# Patient Record
Sex: FEMALE | Race: WHITE | Employment: OTHER | URBAN - METROPOLITAN AREA
[De-identification: names, ages, dates, MRNs, and addresses within clinical notes are randomized per-mention and may not be internally consistent; named-entity substitution may affect disease eponyms.]

---

## 2017-05-18 ENCOUNTER — HOSPITAL ENCOUNTER (OUTPATIENT)
Dept: WOUND CARE | Age: 82
Discharge: HOME | End: 2017-05-18
Attending: SURGERY
Payer: MEDICARE

## 2017-05-18 PROCEDURE — 99215 OFFICE O/P EST HI 40 MIN: CPT

## 2017-05-18 PROCEDURE — 29580 STRAPPING UNNA BOOT: CPT

## 2017-05-22 PROCEDURE — 29580 STRAPPING UNNA BOOT: CPT

## 2017-05-25 PROCEDURE — 77030030167 HC BNDG UNNA BOOT TELE -A

## 2017-05-25 PROCEDURE — 29581 APPL MULTLAYER CMPRN SYS LEG: CPT

## 2017-06-01 ENCOUNTER — HOSPITAL ENCOUNTER (OUTPATIENT)
Dept: WOUND CARE | Age: 82
Discharge: HOME | End: 2017-06-01
Attending: SURGERY
Payer: MEDICARE

## 2017-06-01 PROCEDURE — 29581 APPL MULTLAYER CMPRN SYS LEG: CPT

## 2017-06-08 PROCEDURE — 99214 OFFICE O/P EST MOD 30 MIN: CPT

## 2017-06-12 PROBLEM — R01.1 MURMUR, CARDIAC: Status: ACTIVE | Noted: 2017-06-12

## 2018-03-23 ENCOUNTER — TELEPHONE (OUTPATIENT)
Dept: CARDIOLOGY | Age: 83
End: 2018-03-23

## 2018-03-24 NOTE — TELEPHONE ENCOUNTER
Pt called to reports noting elevated BPs in the evening over the past 4-5 days. States that her PCP had put her on 100mg Lopressor BID but that she had only been taking 100mg in the AM. Rx was refilled by Dr. Rad Dang and was ordered as 50mg BID. However, she continued to take 100mg (2 tabs) only in the AM. Current /72. States that she took an additional 50mg tab around 10pm. She denies HA, vision changes, CP/pressure or SOB. Discussed MOA of med and that once daily dosing may result in elevated PM BPs. Discussed taking med BID and keeping a BP log. Instructed to call office if notes persistently elevated BPs. Pt v/u.        Marisa Rubin, NP-C

## 2018-06-26 ENCOUNTER — HOSPITAL ENCOUNTER (OUTPATIENT)
Dept: WOUND CARE | Age: 83
Discharge: HOME | End: 2018-06-26
Attending: SURGERY
Payer: MEDICARE

## 2018-06-26 PROCEDURE — 29581 APPL MULTLAYER CMPRN SYS LEG: CPT

## 2018-06-26 PROCEDURE — 99215 OFFICE O/P EST HI 40 MIN: CPT

## 2018-06-29 ENCOUNTER — HOSPITAL ENCOUNTER (OUTPATIENT)
Dept: WOUND CARE | Age: 83
Discharge: HOME | End: 2018-06-29
Attending: SURGERY
Payer: MEDICARE

## 2018-06-29 PROCEDURE — 29581 APPL MULTLAYER CMPRN SYS LEG: CPT

## 2018-07-03 ENCOUNTER — HOSPITAL ENCOUNTER (OUTPATIENT)
Dept: WOUND CARE | Age: 83
Discharge: HOME | End: 2018-07-03
Attending: SURGERY
Payer: MEDICARE

## 2018-07-03 PROCEDURE — 29581 APPL MULTLAYER CMPRN SYS LEG: CPT

## 2018-07-06 ENCOUNTER — HOSPITAL ENCOUNTER (OUTPATIENT)
Dept: WOUND CARE | Age: 83
Discharge: HOME | End: 2018-07-06
Attending: SURGERY
Payer: MEDICARE

## 2018-07-06 PROCEDURE — 29581 APPL MULTLAYER CMPRN SYS LEG: CPT

## 2018-07-10 ENCOUNTER — HOSPITAL ENCOUNTER (OUTPATIENT)
Dept: WOUND CARE | Age: 83
Discharge: HOME | End: 2018-07-10
Attending: SURGERY
Payer: MEDICARE

## 2018-07-10 PROCEDURE — 29580 STRAPPING UNNA BOOT: CPT

## 2018-07-16 ENCOUNTER — HOSPITAL ENCOUNTER (OUTPATIENT)
Dept: WOUND CARE | Age: 83
Discharge: HOME | End: 2018-07-16
Attending: SURGERY
Payer: MEDICARE

## 2018-07-16 PROCEDURE — 29580 STRAPPING UNNA BOOT: CPT

## 2018-07-24 ENCOUNTER — HOSPITAL ENCOUNTER (OUTPATIENT)
Dept: WOUND CARE | Age: 83
Discharge: HOME | End: 2018-07-24
Attending: SURGERY
Payer: MEDICARE

## 2018-07-24 PROCEDURE — 99212 OFFICE O/P EST SF 10 MIN: CPT

## 2018-08-03 ENCOUNTER — HOSPITAL ENCOUNTER (OUTPATIENT)
Dept: WOUND CARE | Age: 83
Discharge: HOME | End: 2018-08-03
Attending: SURGERY
Payer: MEDICARE

## 2018-08-03 PROCEDURE — 29580 STRAPPING UNNA BOOT: CPT

## 2018-08-07 ENCOUNTER — HOSPITAL ENCOUNTER (OUTPATIENT)
Dept: WOUND CARE | Age: 83
Discharge: HOME | End: 2018-08-07
Attending: SURGERY
Payer: MEDICARE

## 2018-08-07 PROCEDURE — 29580 STRAPPING UNNA BOOT: CPT

## 2018-08-10 ENCOUNTER — APPOINTMENT (OUTPATIENT)
Dept: WOUND CARE | Age: 83
End: 2018-08-10
Attending: SURGERY
Payer: MEDICARE

## 2018-08-14 ENCOUNTER — HOSPITAL ENCOUNTER (OUTPATIENT)
Dept: WOUND CARE | Age: 83
Discharge: HOME | End: 2018-08-14
Attending: SURGERY
Payer: MEDICARE

## 2018-08-14 PROCEDURE — 29580 STRAPPING UNNA BOOT: CPT

## 2018-08-17 ENCOUNTER — HOSPITAL ENCOUNTER (OUTPATIENT)
Dept: WOUND CARE | Age: 83
Discharge: HOME | End: 2018-08-17
Attending: SURGERY
Payer: MEDICARE

## 2018-08-17 NOTE — WOUND CARE
08/17/18 1436   Wound Leg Lower Left   Date First Assessed/Time First Assessed: 08/17/18 1434   POA: Yes  Wound Type: Venous  Location: Leg Lower  Wound Description (Optional): 15 A  Orientation: Left   Wound Length (cm) 0.1 cm   Wound Width (cm) 0.1 cm   Wound Depth (cm) 0.1   Wound Surface area (cm^2) 0.01 cm^2   Epithelialization (%) 100  (100)   Wound Odor None   Wound Leg Lower Right   Date First Assessed/Time First Assessed: 08/17/18 1436   POA: Yes  Wound Type: Venous  Location: Leg Lower  Wound Description (Optional): 16  Orientation: Right   Wound Length (cm) 0.5 cm   Wound Width (cm) 0.5 cm   Wound Depth (cm) 0.1   Wound Surface area (cm^2) 0.25 cm^2   Epithelialization (%) 50   Tissue Type Red   Tissue Type Percent Red 50   Drainage Amount  Small    Drainage Color Serous   Wound Odor None

## 2018-08-21 ENCOUNTER — HOSPITAL ENCOUNTER (OUTPATIENT)
Dept: WOUND CARE | Age: 83
Discharge: HOME | End: 2018-08-21
Attending: SURGERY
Payer: MEDICARE

## 2018-08-21 VITALS
OXYGEN SATURATION: 95 % | WEIGHT: 260.8 LBS | HEIGHT: 65 IN | TEMPERATURE: 97.3 F | HEART RATE: 69 BPM | BODY MASS INDEX: 43.45 KG/M2 | SYSTOLIC BLOOD PRESSURE: 149 MMHG | RESPIRATION RATE: 18 BRPM | DIASTOLIC BLOOD PRESSURE: 65 MMHG

## 2018-08-21 PROBLEM — L97.221 NON-PRESSURE CHRONIC ULCER OF LEFT CALF, LIMITED TO BREAKDOWN OF SKIN (HCC): Status: ACTIVE | Noted: 2018-08-21

## 2018-08-21 PROCEDURE — 29580 STRAPPING UNNA BOOT: CPT

## 2018-08-21 NOTE — PROGRESS NOTES
Wound Center Progress Note    Patient: Jefe Hester MRN: [id number]  SSN: [id number]    YOB: 1935  Age: 81 y.o.   Sex: female      Subjective:     Chief Complaint:  Recurrent Venous leg ulcer BLE    History of Present Illness:       Wound Caused By: venous insufficiency  Associated Signs and Symptoms: drainage, pain  Timing: since June 2018  Quality: wound  Severity: full thickness  Modifying Factors: Dm2, obesity, venous insufficiency  Current Wound Care: Multilayer compression, CHF    Past Medical History:   Diagnosis Date    Acute hyponatremia 1/21/2011    Acute on chronic diastolic congestive heart failure ([address] 1/19/2011    Acute renal failure ([address] 1/21/2011    AMI (acute myocardial infarction) ([address] 2009    Anemia 9/5/2012    Arthritis     Asthma     Asthma exacerbation 11/11/2011    CAD (coronary artery disease)     MI 2009, stents heart 2009    CAD (coronary artery disease), native coronary artery 9/2/2009    Previous stent to dRCA with Cypher 3.5x33mm SABA 8/09 9/09 LAD- Xience 2.5x23mm and 3.0x18mm SABA in mid-distal LAD      Cellulitis Bilateral Lower Extremities 1/20/2011    Chest discomfort, tightness, pressure 1/28/2016    Chronic diastolic heart failure ([address] 10/20/2011    Chronic venous insufficiency 10/24/2011    Diabetes ([address]     Diabetes Mellitus Type II-insulin dependent 9/2/2009    Diverticulitis 1/28/2016    Dyslipidemia 9/2/2009    Edema 1/28/2016    GERD (gastroesophageal reflux disease)     GLAUCOMA     BILATERAL    Heart failure ([address]     Hypertension     Hypokalemia 1/28/2016    Hypoxemia 9/7/2012    Morbid obesity (HCC)     Nausea & vomiting     Neuropathy in diabetes ([address] 9/2/2009    NSTEMI (non-ST elevation myocardial infarction) ([address] 9/2/2009    Dr Adela Calero    Obstructive sleep apnea (adult) (pediatric)- probably  11/11/2011    TRISH (obstructive sleep apnea) 9/7/2012    Intolerant of CPAP     Other dyspnea and respiratory abnormality 11/10/2011    Other ill-defined conditions(799.89)     benign tumor in lower abdomen- not removed, diabetic ulcers, edema, neuropathy    Peripheral vascular disease ([facility] 10/20/2011    Post PTCA 6/20/2014    PVD (peripheral vascular disease) ([facility]     Unspecified anemia 1/23/2011    Unspecified sleep apnea     oxygen at night    Venous stasis of lower extremity 2010    BILATERAL W RECURRENT ADMISSIONS    Volume overload 9/6/2012      Past Surgical History:   Procedure Laterality Date    BREAST SURGERY PROCEDURE UNLISTED  1973    benign tumor left breast    CARDIAC SURG PROCEDURE UNLIST      4 stents most recent 2 yrs ago    HX CHOLECYSTECTOMY      HX COLONOSCOPY      HX GI      PTCA EACH ADDL VESSEL      stentsx3     Family History   Problem Relation Age of Onset    Cancer Father     Lung Disease Father     Cancer Brother     Diabetes Paternal Aunt     Hypertension Maternal Grandmother     Hypertension Paternal Grandmother       Social History   Substance Use Topics    Smoking status: Never Smoker    Smokeless tobacco: Never Used    Alcohol use No       Prior to Admission medications    Medication Sig Start Date End Date Taking? Authorizing Provider   bumetanide (BUMEX) 2 mg tablet Take 1 Tab by mouth daily. 8/17/18   Jyothi Mackey MD   atorvastatin (LIPITOR) 80 mg tablet Take 1 Tab by mouth daily. 7/16/18   Jyothi Mackey MD   clopidogrel (PLAVIX) 75 mg tab Take 1 Tab by mouth daily. 3/2/18   Jyothi Mackey MD   metOLazone (ZAROXOLYN) 10 mg tablet Take 1 Tab by mouth daily. 1/19/18   Jyothi Mackey MD   metoprolol tartrate (LOPRESSOR) 50 mg tablet Take 1 Tab by mouth two (2) times a day. 1/19/18   Jyothi Mackey MD   aspirin 81 mg chewable tablet Take 1 Tab by mouth daily. 6/12/17   Jyothi Mackey MD   DULoxetine (CYMBALTA) 60 mg capsule Take 60 mg by mouth daily.     Historical Provider   insulin glargine (TOUJEO SOLOSTAR) 300 unit/mL (1.5 mL) inpn 220 Units by SubCUTAneous route daily. Historical Provider   acetaminophen (TYLENOL EXTRA STRENGTH) 500 mg tablet Take  by mouth every six (6) hours as needed for Pain. Historical Provider   insulin glulisine (APIDRA) 100 unit/mL injection 90 Units by SubCUTAneous route three (3) times daily. Historical Provider   gabapentin (NEURONTIN) 400 mg capsule Take 800 mg by mouth two (2) times a day. Pt states 8 x daily      Phys MD Alda   albuterol (PROVENTIL VENTOLIN) 2.5 mg /3 mL (0.083 %) nebulizer solution 3 mL by Nebulization route three (3) times daily. 11/16/11   Federico Moore MD   nitroglycerin (NITROSTAT) 0.4 mg SL tablet 0.4 mg by SubLINGual route every five (5) minutes as needed. Historical Provider   ergocalciferol (VITAMIN D) 50,000 unit capsule Take 50,000 Units by mouth every seven (7) days. Takes on wed once a month     Historical Provider   albuterol (PROVENTIL, VENTOLIN) 90 mcg/Actuation inhaler Take 2 Puffs by inhalation every four (4) hours as needed for Wheezing. Dispense: (1) inhaler with no refills. 12/19/10   NATHAN Robles     Allergies   Allergen Reactions    Pcn [Penicillins] Hives, Swelling and Myalgia     Can take cephalosporins    Codeine Nausea Only    Dilaudid [Hydromorphone (Bulk)] Other (comments)    Lortab [Hydrocodone-Acetaminophen] Nausea Only        Review of Systems:  Pertinent items are noted in the History of Present Illness. Lab Results   Component Value Date/Time    Hemoglobin A1c 8.7 (H) 09/30/2011 01:07 PM        Immunization History   Administered Date(s) Administered    Influenza Vaccine Split 09/11/2012    ZZZ-RETIRED (DO NOT USE) Pneumococcal Vaccine (Unspecified Type) 09/20/2010       Body mass index is 43.4 kg/(m^2).     Counseling regarding nutrition done: Yes Pateint counsled about risks of smoking and cessation      Current medications:  Current Outpatient Prescriptions   Medication Sig Dispense Refill    bumetanide (BUMEX) 2 mg tablet Take 1 Tab by mouth daily. 90 Tab 3    atorvastatin (LIPITOR) 80 mg tablet Take 1 Tab by mouth daily. 90 Tab 3    clopidogrel (PLAVIX) 75 mg tab Take 1 Tab by mouth daily. 30 Tab 11    metOLazone (ZAROXOLYN) 10 mg tablet Take 1 Tab by mouth daily. 90 Tab 3    metoprolol tartrate (LOPRESSOR) 50 mg tablet Take 1 Tab by mouth two (2) times a day. 180 Tab 3    aspirin 81 mg chewable tablet Take 1 Tab by mouth daily. 90 Tab 3    DULoxetine (CYMBALTA) 60 mg capsule Take 60 mg by mouth daily.  insulin glargine (TOUJEO SOLOSTAR) 300 unit/mL (1.5 mL) inpn 220 Units by SubCUTAneous route daily.  acetaminophen (TYLENOL EXTRA STRENGTH) 500 mg tablet Take  by mouth every six (6) hours as needed for Pain.  insulin glulisine (APIDRA) 100 unit/mL injection 90 Units by SubCUTAneous route three (3) times daily.  gabapentin (NEURONTIN) 400 mg capsule Take 800 mg by mouth two (2) times a day. Pt states 8 x daily        albuterol (PROVENTIL VENTOLIN) 2.5 mg /3 mL (0.083 %) nebulizer solution 3 mL by Nebulization route three (3) times daily. 1 Package 0    nitroglycerin (NITROSTAT) 0.4 mg SL tablet 0.4 mg by SubLINGual route every five (5) minutes as needed.  ergocalciferol (VITAMIN D) 50,000 unit capsule Take 50,000 Units by mouth every seven (7) days. Takes on wed once a month       albuterol (PROVENTIL, VENTOLIN) 90 mcg/Actuation inhaler Take 2 Puffs by inhalation every four (4) hours as needed for Wheezing. Dispense: (1) inhaler with no refills. 1 g 0         Objective:     Physical Exam:     Visit Vitals    /65 (BP 1 Location: Left arm)    Pulse 69    Temp 97.3 °F (36.3 °C)    Resp 18    Ht 5' 5\" (1.651 m)    Wt 118.3 kg (260 lb 12.8 oz)    SpO2 95%    BMI 43.4 kg/m2       General: Healthy appearing  Neuro: alert and oriented to person/place/situation. Otherwise nonfocal.  Derm: Normal turgor for age, otherwise per wound exam  HEENT: Normocephalic, atraumatic. EOMI. Conjunctiva clear. No scleral icterus. Neck: Normal range of motion. No masses. Chest: Resp unlabored  Cardio: Regular rate, rhythm  Abdomen: Soft, nontender  Ext. No hemosiderrosis. DP/PT 2+  Psych: cooperative. Pleasant. No anxiety or depression. Normal mood and affect. Ulcer Description:   Wound Leg Medial;Left;Right (Active)   Number of days:2175       Wound Leg Lower Left (Active)   Wound Length (cm) 0 cm 8/21/2018  2:23 PM   Wound Width (cm) 0 cm 8/21/2018  2:23 PM   Wound Depth (cm) 0 8/21/2018  2:23 PM   Wound Surface area (cm^2) 0 cm^2 8/21/2018  2:23 PM   Epithelialization (%) 100 8/21/2018  2:23 PM   Drainage Amount  None 8/21/2018  2:23 PM   Wound Odor None 8/17/2018  2:36 PM   Number of days:4       Wound Leg Lower Right (Active)   Wound Length (cm) 0.7 cm 8/21/2018  2:23 PM   Wound Width (cm) 0.7 cm 8/21/2018  2:23 PM   Wound Depth (cm) 0.1 8/21/2018  2:23 PM   Wound Surface area (cm^2) 0.49 cm^2 8/21/2018  2:23 PM   Condition of Base Epithelializing;Granulation 8/21/2018  2:23 PM   Epithelialization (%) 50 8/17/2018  2:36 PM   Tissue Type Red 8/17/2018  2:36 PM   Tissue Type Percent Pink 50 8/21/2018  2:23 PM   Tissue Type Percent Red 50 8/17/2018  2:36 PM   Drainage Amount  Small  8/21/2018  2:23 PM   Drainage Color Serous 8/21/2018  2:23 PM   Wound Odor None 8/21/2018  2:23 PM   Cleansing and Cleansing Agents  Soap and water 8/21/2018  2:23 PM   Number of days:4             Assessment:     BLE VSU, slowly improving    Plan:     Almost healed on the left. One more wrap and start compression hose. Bring compression to next visit and put in place at next.     Signed By: Isidra Almaraz MD     August 21, 2018

## 2018-08-21 NOTE — DISCHARGE INSTRUCTIONS
Cleanse wounds with normal saline. Apply Xeroform to wound base; cover with ABD and wrap with unna boots bilaterally. Change dressing at wound center two times per week (Friday and Tuesday). Return to wound center in 1 week to see MD. Be sure to bring bilateral compression stockings to next MD visit.

## 2018-08-21 NOTE — IP AVS SNAPSHOT
[address] [place] [address] [facility] [address] 
[phone] Patient: Julian Almanza MRN: [id number] JQE:[birth year] About your hospitalization You were admitted on:  August 21, 2018 You last received care in the:  [place] [address]. You were discharged on:  August 21, 2018 Why you were hospitalized Your primary diagnosis was:  Not on File Follow-up Information Follow up With Details Comments Contact Info [facility] Go in 1 week  [address] [place] [phone] 
[phone] Discharge Orders None A check serenity indicates which time of day the medication should be taken. My Medications ASK your doctor about these medications Instructions Each Dose to Equal  
 Morning Noon Evening Bedtime  
 albuterol 2.5 mg /3 mL (0.083 %) nebulizer solution Commonly known as:  PROVENTIL VENTOLIN Your last dose was: Your next dose is:    
   
   
 3 mL by Nebulization route three (3) times daily. 2.5 mg  
    
   
   
   
  
 albuterol 90 mcg/actuation inhaler Commonly known as:  Reji Khalil Your last dose was: Your next dose is: Take 2 Puffs by inhalation every four (4) hours as needed for Wheezing. Dispense: (1) inhaler with no refills. 2 Puff APIDRA U-100 INSULIN 100 unit/mL injection Generic drug:  insulin glulisine U-100 Your last dose was: Your next dose is:    
   
   
 90 Units by SubCUTAneous route three (3) times daily. 90 Units  
    
   
   
   
  
 aspirin 81 mg chewable tablet Your last dose was: Your next dose is: Take 1 Tab by mouth daily. 81 mg  
    
   
   
   
  
 atorvastatin 80 mg tablet Commonly known as:  LIPITOR Your last dose was: Your next dose is: Take 1 Tab by mouth daily.   
 80 mg  
    
   
 bumetanide 2 mg tablet Commonly known as:  Megan Ezel Your last dose was: Your next dose is: Take 1 Tab by mouth daily. 2 mg  
    
   
   
   
  
 clopidogrel 75 mg Tab Commonly known as:  PLAVIX Your last dose was: Your next dose is: Take 1 Tab by mouth daily. 75 mg DULoxetine 60 mg capsule Commonly known as:  CYMBALTA Your last dose was: Your next dose is: Take 60 mg by mouth daily. 60 mg  
    
   
   
   
  
 gabapentin 400 mg capsule Commonly known as:  NEURONTIN Your last dose was: Your next dose is: Take 800 mg by mouth two (2) times a day. Pt states 8 x daily 800 mg  
    
   
   
   
  
 metOLazone 10 mg tablet Commonly known as:  Kriss Campbell Your last dose was: Your next dose is: Take 1 Tab by mouth daily. 10 mg  
    
   
   
   
  
 metoprolol tartrate 50 mg tablet Commonly known as:  LOPRESSOR Your last dose was: Your next dose is: Take 1 Tab by mouth two (2) times a day. 50 mg  
    
   
   
   
  
 nitroglycerin 0.4 mg SL tablet Commonly known as:  NITROSTAT Your last dose was: Your next dose is: 0.4 mg by SubLINGual route every five (5) minutes as needed. 0.4 mg  
    
   
   
   
  
 TOUJEO SOLOSTAR U-300 INSULIN 300 unit/mL (1.5 mL) Inpn Generic drug:  insulin glargine Your last dose was: Your next dose is:    
   
   
 220 Units by SubCUTAneous route daily. 220 Units TYLENOL EXTRA STRENGTH 500 mg tablet Generic drug:  acetaminophen Your last dose was: Your next dose is: Take  by mouth every six (6) hours as needed for Pain. VITAMIN D2 50,000 unit capsule Generic drug:  ergocalciferol Your last dose was: Your next dose is: Take 50,000 Units by mouth every seven (7) days. Takes on wed once a month [address] Units Discharge Instructions Cleanse wounds with normal saline. Apply Xeroform to wound base; cover with ABD and wrap with unna boots bilaterally. Change dressing at wound center two times per week (Friday and Tuesday). Return to wound center in 1 week to see MD. Be sure to bring bilateral compression stockings to next MD visit. Introducing [facility]! Dear Stefani Dear: 
Thank you for requesting a [employer] account. Our records indicate that you already have an active [employer] account. You can access your account anytime at [email or web address] Did you know that you can access your hospital and ER discharge instructions at any time in [employer]? You can also review all of your test results from your hospital stay or ER visit. Additional Information If you have questions, please visit the Frequently Asked Questions section of the [employer] website at [email or web address] Remember, [employer] is NOT to be used for urgent needs. For medical emergencies, dial 911. Now available from your iPhone and Android! Introducing Victoriano Oconnell As a [facility] patient, I wanted to make you aware of our electronic visit tool called Victoriano Nilsdaniellekaylan. [facility] 24/7 allows you to connect within minutes with a medical provider 24 hours a day, seven days a week via a mobile device or tablet or logging into a secure website from your computer. You can access Victoriano Oconnell from anywhere in the [place].  
 
A virtual visit might be right for you when you have a simple condition and feel like you just dont want to get out of bed, or cant get away from work for an appointment, when your regular [facility] provider is not available (evenings, weekends or holidays), or when youre out of town and need minor care. Electronic visits cost only $49 and if the [employer] 24/7 provider determines a prescription is needed to treat your condition, one can be electronically transmitted to a nearby pharmacy*. Please take a moment to enroll today if you have not already done so. The enrollment process is free and takes just a few minutes. To enroll, please download the [employer] 24/7 jessica to your tablet or phone, or visit [email or web address] to enroll on your computer. And, as an [address] [place] [address] patient with a [employer] account, the results of your visits will be scanned into your electronic medical record and your primary care provider will be able to view the scanned results. We urge you to continue to see your regular [employer] provider for your ongoing medical care. And while your primary care provider may not be the one available when you seek a [employer] virtual visit, the peace of mind you get from getting a real diagnosis real time can be priceless. For more information on [employer], view our Frequently Asked Questions (FAQs) at [email or web address]. Sincerely, 
 
Denton Nassar MD 
Chief Medical Officer [address] *:  certain medications cannot be prescribed via [employer] Providers Seen During Your Hospitalization Provider Specialty Primary office phone Hosea Jolly MD General Surgery [phone] Nidia Nascimento MD Plastic Surgery [phone] Your Primary Care Physician (PCP) Primary Care Physician Office Phone Office Fax Tonya Guerrero [phone] You are allergic to the following Allergen Reactions Pcn (Penicillins) Hives Swelling Myalgia Can take cephalosporins Codeine Nausea Only Dilaudid (Hydromorphone (Bulk)) Other (comments) Lortab (Hydrocodone-Acetaminophen) Nausea Only Recent Documentation Height Weight BMI OB Status Smoking Status 1.651 m 118.3 kg 43.4 kg/m2 Postmenopausal Never Smoker Emergency Contacts Name Discharge Info Relation Home Work Mobile Oneil Mendoza  Spouse [3] [phone] [facility]  Child [2] [phone] Patient Belongings The following personal items are in your possession at time of discharge: 
                             
 
  
  
 Please provide this summary of care documentation to your next provider. Signatures-by signing, you are acknowledging that this After Visit Summary has been reviewed with you and you have received a copy. Patient Signature:  ____________________________________________________________ Date:  ____________________________________________________________  
  
[facility] Provider Signature:  ____________________________________________________________ Date:  ____________________________________________________________

## 2018-08-24 ENCOUNTER — APPOINTMENT (OUTPATIENT)
Dept: WOUND CARE | Age: 83
End: 2018-08-24
Attending: SURGERY
Payer: MEDICARE

## 2018-08-28 ENCOUNTER — HOSPITAL ENCOUNTER (OUTPATIENT)
Dept: WOUND CARE | Age: 83
Discharge: HOME | End: 2018-08-28
Attending: SURGERY
Payer: MEDICARE

## 2018-08-28 VITALS
OXYGEN SATURATION: 95 % | HEART RATE: 86 BPM | TEMPERATURE: 97.7 F | SYSTOLIC BLOOD PRESSURE: 177 MMHG | DIASTOLIC BLOOD PRESSURE: 75 MMHG | RESPIRATION RATE: 18 BRPM

## 2018-08-28 PROCEDURE — 29580 STRAPPING UNNA BOOT: CPT

## 2018-08-28 NOTE — DISCHARGE INSTRUCTIONS
Cleanse right leg with normal saline. Cover wound bed with Xeroform gauze, ABD pad, Unna Boot to right lower leg. Change 2 times a week at [facility].  Return with clinician on Friday for dressing change and return in 1 week with MD.

## 2018-08-28 NOTE — WOUND CARE
08/28/18 1534   Wound Leg Lower Left   Date First Assessed/Time First Assessed: 08/17/18 1434   POA: Yes  Wound Type: Venous  Location: Leg Lower  Wound Description (Optional): 15 A  Orientation: Left   DRESSING STATUS Breakthrough drainage;Moist   DRESSING TYPE Unna boot   Wound Length (cm) 0 cm   Wound Width (cm) 0 cm   Wound Depth (cm) 0   Wound Surface area (cm^2) 0 cm^2   Epithelialization (%) 100   Drainage Amount  None   Wound Odor None   Cleansing and Cleansing Agents  Soap and water   Wound Leg Lower Right   Date First Assessed/Time First Assessed: 08/17/18 1436   POA: Yes  Wound Type: Venous  Location: Leg Lower  Wound Description (Optional): 16  Orientation: Right   DRESSING STATUS Breakthrough drainage;Moist   DRESSING TYPE (Xeroform, ABD, Unna Boot)   Wound Length (cm) 1.4 cm   Wound Width (cm) 1 cm   Wound Depth (cm) 0.1   Wound Surface area (cm^2) 1.4 cm^2   Condition of Base Epithelializing;Granulation   Epithelialization (%) 50   Tissue Type Percent Pink 50   Tissue Type Percent Red 50   Drainage Amount  Small    Drainage Color Serous   Wound Odor None   Cleansing and Cleansing Agents  Soap and water

## 2018-08-28 NOTE — PROGRESS NOTES
Wound Center Progress Note    Patient: Spring Payton MRN: [id number]  SSN: [id number]    YOB: 1935  Age: 81 y.o. Sex: female      Subjective:     Chief Complaint:  Recurrent Venous leg ulcer BLE    History of Present Illness:       Wound Caused By: venous insufficiency  Associated Signs and Symptoms: drainage, pain  Timing: since June 2018  Quality: wound  Severity: full thickness  Modifying Factors: Dm2, obesity, venous insufficiency  Current Wound Care: Multilayer compression, CHF    Noted to have issues with urinary soiling of the bottom feet but not down wraps. Patient unclear of reason. Left remains healed.     Past Medical History:   Diagnosis Date    Acute hyponatremia 1/21/2011    Acute on chronic diastolic congestive heart failure ([address] 1/19/2011    Acute renal failure ([address] 1/21/2011    AMI (acute myocardial infarction) ([address] 2009    Anemia 9/5/2012    Arthritis     Asthma     Asthma exacerbation 11/11/2011    CAD (coronary artery disease)     MI 2009, stents heart 2009    CAD (coronary artery disease), native coronary artery 9/2/2009    Previous stent to CA with Cypher 3.5x33mm SABA 8/09 9/09 LAD- Xience 2.5x23mm and 3.0x18mm SABA in mid-distal LAD      Cellulitis Bilateral Lower Extremities 1/20/2011    Chest discomfort, tightness, pressure 1/28/2016    Chronic diastolic heart failure ([address] 10/20/2011    Chronic venous insufficiency 10/24/2011    Diabetes ([address]     Diabetes Mellitus Type II-insulin dependent 9/2/2009    Diverticulitis 1/28/2016    Dyslipidemia 9/2/2009    Edema 1/28/2016    GERD (gastroesophageal reflux disease)     GLAUCOMA     BILATERAL    Heart failure ([address]     Hypertension     Hypokalemia 1/28/2016    Hypoxemia 9/7/2012    Morbid obesity (HCC)     Nausea & vomiting     Neuropathy in diabetes ([address] 9/2/2009    NSTEMI (non-ST elevation myocardial infarction) ([address] 9/2/2009    Dr Nithya Ibanez    Obstructive sleep apnea (adult) (pediatric)- probably  2011    TRISH (obstructive sleep apnea) 2012    Intolerant of CPAP     Other dyspnea and respiratory abnormality 11/10/2011    Other ill-defined conditions(799.89)     benign tumor in lower abdomen- not removed, diabetic ulcers, edema, neuropathy    Peripheral vascular disease ([facility] 10/20/2011    Post PTCA 2014    PVD (peripheral vascular disease) ([facility]     Unspecified anemia 2011    Unspecified sleep apnea     oxygen at night    Venous stasis of lower extremity 2010    BILATERAL W RECURRENT ADMISSIONS    Volume overload 2012      Past Surgical History:   Procedure Laterality Date    BREAST SURGERY PROCEDURE UNLISTED  1973    benign tumor left breast    CARDIAC SURG PROCEDURE UNLIST      4 stents most recent 2 yrs ago    HX CHOLECYSTECTOMY      HX COLONOSCOPY      HX GI      PTCA EACH ADDL VESSEL      stentsx3     Family History   Problem Relation Age of Onset    Cancer Father     Lung Disease Father     Cancer Brother     Diabetes Paternal Aunt     Hypertension Maternal Grandmother     Hypertension Paternal Grandmother       Social History   Substance Use Topics    Smoking status: Never Smoker    Smokeless tobacco: Never Used    Alcohol use No       Prior to Admission medications    Medication Sig Start Date End Date Taking? Authorizing Provider   bumetanide (BUMEX) 2 mg tablet Take 1 Tab by mouth daily. 18   Rodrigo Garcia MD   atorvastatin (LIPITOR) 80 mg tablet Take 1 Tab by mouth daily. 18   Rodrigo Garcia MD   clopidogrel (PLAVIX) 75 mg tab Take 1 Tab by mouth daily. 3/2/18   Rodrigo Garcia MD   metOLazone (ZAROXOLYN) 10 mg tablet Take 1 Tab by mouth daily. 18   Rodrigo Garcia MD   metoprolol tartrate (LOPRESSOR) 50 mg tablet Take 1 Tab by mouth two (2) times a day. 18   Rodrigo Garcia MD   aspirin 81 mg chewable tablet Take 1 Tab by mouth daily.  17   Rodrigo Garcia MD   DULoxetine (CYMBALTA) 60 mg capsule Take 60 mg by mouth daily. Historical Provider   insulin glargine (TOUJEO SOLOSTAR) 300 unit/mL (1.5 mL) inpn 220 Units by SubCUTAneous route daily. Historical Provider   acetaminophen (TYLENOL EXTRA STRENGTH) 500 mg tablet Take  by mouth every six (6) hours as needed for Pain. Historical Provider   insulin glulisine (APIDRA) 100 unit/mL injection 90 Units by SubCUTAneous route three (3) times daily. Historical Provider   gabapentin (NEURONTIN) 400 mg capsule Take 800 mg by mouth two (2) times a day. Pt states 8 x daily      Phys MD Alda   albuterol (PROVENTIL VENTOLIN) 2.5 mg /3 mL (0.083 %) nebulizer solution 3 mL by Nebulization route three (3) times daily. 11/16/11   Joy Palacios MD   nitroglycerin (NITROSTAT) 0.4 mg SL tablet 0.4 mg by SubLINGual route every five (5) minutes as needed. Historical Provider   ergocalciferol (VITAMIN D) 50,000 unit capsule Take 50,000 Units by mouth every seven (7) days. Takes on wed once a month     Historical Provider   albuterol (PROVENTIL, VENTOLIN) 90 mcg/Actuation inhaler Take 2 Puffs by inhalation every four (4) hours as needed for Wheezing. Dispense: (1) inhaler with no refills. 12/19/10   NATHAN Woodward     Allergies   Allergen Reactions    Pcn [Penicillins] Hives, Swelling and Myalgia     Can take cephalosporins    Codeine Nausea Only    Dilaudid [Hydromorphone (Bulk)] Other (comments)    Lortab [Hydrocodone-Acetaminophen] Nausea Only        Review of Systems:  Pertinent items are noted in the History of Present Illness. Lab Results   Component Value Date/Time    Hemoglobin A1c 8.7 (H) 09/30/2011 01:07 PM        Immunization History   Administered Date(s) Administered    Influenza Vaccine Split 09/11/2012    ZZZ-RETIRED (DO NOT USE) Pneumococcal Vaccine (Unspecified Type) 09/20/2010       There is no height or weight on file to calculate BMI.     Counseling regarding nutrition done: Yes Pateint counsled about risks of smoking and cessation      Current medications:  Current Outpatient Prescriptions   Medication Sig Dispense Refill    bumetanide (BUMEX) 2 mg tablet Take 1 Tab by mouth daily. 90 Tab 3    atorvastatin (LIPITOR) 80 mg tablet Take 1 Tab by mouth daily. 90 Tab 3    clopidogrel (PLAVIX) 75 mg tab Take 1 Tab by mouth daily. 30 Tab 11    metOLazone (ZAROXOLYN) 10 mg tablet Take 1 Tab by mouth daily. 90 Tab 3    metoprolol tartrate (LOPRESSOR) 50 mg tablet Take 1 Tab by mouth two (2) times a day. 180 Tab 3    aspirin 81 mg chewable tablet Take 1 Tab by mouth daily. 90 Tab 3    DULoxetine (CYMBALTA) 60 mg capsule Take 60 mg by mouth daily.  insulin glargine (TOUJEO SOLOSTAR) 300 unit/mL (1.5 mL) inpn 220 Units by SubCUTAneous route daily.  acetaminophen (TYLENOL EXTRA STRENGTH) 500 mg tablet Take  by mouth every six (6) hours as needed for Pain.  insulin glulisine (APIDRA) 100 unit/mL injection 90 Units by SubCUTAneous route three (3) times daily.  gabapentin (NEURONTIN) 400 mg capsule Take 800 mg by mouth two (2) times a day. Pt states 8 x daily        albuterol (PROVENTIL VENTOLIN) 2.5 mg /3 mL (0.083 %) nebulizer solution 3 mL by Nebulization route three (3) times daily. 1 Package 0    nitroglycerin (NITROSTAT) 0.4 mg SL tablet 0.4 mg by SubLINGual route every five (5) minutes as needed.  ergocalciferol (VITAMIN D) 50,000 unit capsule Take 50,000 Units by mouth every seven (7) days. Takes on wed once a month       albuterol (PROVENTIL, VENTOLIN) 90 mcg/Actuation inhaler Take 2 Puffs by inhalation every four (4) hours as needed for Wheezing. Dispense: (1) inhaler with no refills. 1 g 0         Objective:     Physical Exam:     Visit Vitals    /75 (BP 1 Location: Left arm)    Pulse 86    Temp 97.7 °F (36.5 °C)    Resp 18    SpO2 95%       General: Healthy appearing  Neuro: alert and oriented to person/place/situation.  Otherwise nonfocal.  Derm: Normal turgor for age, otherwise per wound exam  HEENT: Normocephalic, atraumatic. EOMI. Conjunctiva clear. No scleral icterus. Neck: Normal range of motion. No masses. Chest: Resp unlabored  Cardio: Regular rate, rhythm  Abdomen: Soft, nontender  Ext. No hemosiderrosis. DP/PT 2+  Psych: cooperative. Pleasant. No anxiety or depression. Normal mood and affect.          Diabetic Foot Ulcer/Neuropathic   Is Wound Greater than 1.0 sq cm ? : No  Re-Current Wound with Skin Substitue within Last Year : No  X-Ray in Last 3 Months: No  Smoking Status: Non- Smoker  Wound Free from Infection : No Culture Done  Is Wound Free of Eschar, [place] , and / or Bio-[place]: Yes  Malignant Process in Wound : No Biopsy Done  Compression Therapy of 20mm or greater ?: Yes     Ulcer Description:   Wound Leg Medial;Left;Right (Active)   Number of days:2182       Wound Leg Lower Left (Active)   DRESSING STATUS Breakthrough drainage;Moist 8/28/2018  3:34 PM   DRESSING TYPE Unna boot 8/28/2018  3:34 PM   Wound Length (cm) 0 cm 8/28/2018  3:34 PM   Wound Width (cm) 0 cm 8/28/2018  3:34 PM   Wound Depth (cm) 0 8/28/2018  3:34 PM   Wound Surface area (cm^2) 0 cm^2 8/28/2018  3:34 PM   Epithelialization (%) 100 8/28/2018  3:34 PM   Drainage Amount  None 8/28/2018  3:34 PM   Wound Odor None 8/28/2018  3:34 PM   Cleansing and Cleansing Agents  Soap and water 8/28/2018  3:34 PM   Number of days:11       Wound Leg Lower Right (Active)   DRESSING STATUS Breakthrough drainage;Moist 8/28/2018  3:34 PM   Wound Length (cm) 1.4 cm 8/28/2018  3:34 PM   Wound Width (cm) 1 cm 8/28/2018  3:34 PM   Wound Depth (cm) 0.1 8/28/2018  3:34 PM   Wound Surface area (cm^2) 1.4 cm^2 8/28/2018  3:34 PM   Condition of Base Epithelializing;Granulation 8/28/2018  3:34 PM   Epithelialization (%) 50 8/28/2018  3:34 PM   Tissue Type Red 8/17/2018  2:36 PM   Tissue Type Percent Pink 50 8/28/2018  3:34 PM   Tissue Type Percent Red 50 8/28/2018  3:34 PM   Drainage Amount  Small  8/28/2018  3:34 PM Drainage Color Serous 8/28/2018  3:34 PM   Wound Odor None 8/28/2018  3:34 PM   Cleansing and Cleansing Agents  Soap and water 8/28/2018  3:34 PM   Number of days:11             Assessment:     BLE VSU, slowly improving    Plan:     Healed on left. Start compression hose on left side. Continue wraps on right. Avoid soiling.      Signed By: Isidra Almaraz MD     August 28, 2018

## 2018-08-28 NOTE — WOUND CARE
[address]  [address], [address]   Phone: [phone]  Fax: [phone]    Patient: Bakari Willams MRN: [id number]  SSN: [id number]    YOB: 1935  Age: 81 y.o. Sex: female       Return Appointment: 1 week with Gadiel Hester MD   Return Appointment: Friday 8/31/18 with Clinician      Instructions: Cleanse right leg with normal saline. Cover wound bed with Xeroform gauze, ABD pad, Unna Boot to bilateral lower legs. Change 2 times a week at [facility]. Return with clinician on Friday for dressing change and return in 1 week with MD.     Should you experience increased redness, swelling, pain, foul odor, size of wound(s), or have a temperature over 101 degrees please contact the [address] [place] [address] Road at [phone] or if after hours contact your primary care physician or go to the hospital emergency department.     Signed By: Alis Costa RN     August 28, 2018

## 2018-08-31 ENCOUNTER — HOSPITAL ENCOUNTER (OUTPATIENT)
Dept: WOUND CARE | Age: 83
Discharge: HOME | End: 2018-08-31
Attending: SURGERY
Payer: MEDICARE

## 2018-08-31 VITALS
OXYGEN SATURATION: 97 % | HEART RATE: 69 BPM | WEIGHT: 260.7 LBS | HEIGHT: 65 IN | RESPIRATION RATE: 20 BRPM | DIASTOLIC BLOOD PRESSURE: 76 MMHG | BODY MASS INDEX: 43.43 KG/M2 | SYSTOLIC BLOOD PRESSURE: 157 MMHG | TEMPERATURE: 99 F

## 2018-08-31 PROCEDURE — 29580 STRAPPING UNNA BOOT: CPT

## 2018-08-31 PROCEDURE — 77030030167 HC BNDG UNNA BOOT TELE -A

## 2018-08-31 NOTE — WOUND CARE
08/31/18 0901 Wound Leg Lower Left Date First Assessed/Time First Assessed: 08/17/18 1434   POA: Yes  Wound Type: Venous  Location: Leg Lower  Wound Description (Optional): 15 A  Orientation: Left DRESSING TYPE Unna boot Wound Length (cm) 0 cm Wound Width (cm) 0 cm Wound Depth (cm) 0 Wound Surface area (cm^2) 0 cm^2 Epithelialization (%) 100 Drainage Amount  None Wound Odor None Cleansing and Cleansing Agents  Soap and water Dressing Type Applied Unna boot Wound Leg Lower Right Date First Assessed/Time First Assessed: 08/17/18 1436   POA: Yes  Wound Type: Venous  Location: Leg Lower  Wound Description (Optional): 16  Orientation: Right DRESSING STATUS Breakthrough drainage DRESSING TYPE (Xeroform, ABD, [employer]) Wound Length (cm) 1.6 cm Wound Width (cm) 1.6 cm Wound Depth (cm) 0.1 Wound Surface area (cm^2) 2.56 cm^2 Condition of Base Epithelializing;Granulation Epithelialization (%) 50 Tissue Type Percent Pink 50 Tissue Type Percent Red 50 Drainage Amount  Small Drainage Color Serous Wound Odor None Cleansing and Cleansing Agents  Normal saline Dressing Type Applied Xeroform;ABD pad;Unna boot Procedure Tolerated Well

## 2018-09-04 ENCOUNTER — HOSPITAL ENCOUNTER (OUTPATIENT)
Dept: WOUND CARE | Age: 83
Discharge: HOME | End: 2018-09-04
Attending: SURGERY
Payer: MEDICARE

## 2018-09-04 VITALS
DIASTOLIC BLOOD PRESSURE: 68 MMHG | BODY MASS INDEX: 43.42 KG/M2 | RESPIRATION RATE: 18 BRPM | HEART RATE: 72 BPM | HEIGHT: 65 IN | TEMPERATURE: 97.8 F | SYSTOLIC BLOOD PRESSURE: 153 MMHG | OXYGEN SATURATION: 95 % | WEIGHT: 260.58 LBS

## 2018-09-04 PROCEDURE — 29580 STRAPPING UNNA BOOT: CPT

## 2018-09-04 NOTE — WOUND CARE
[address]  [address], [address]  Phone: [phone]  Fax: [phone]    Patient: Sung Celis MRN: [id number]  SSN: [id number]    YOB: 1935  Age: 81 y.o. Sex: female       Return Appointment: 1 week with Silvino Payne MD  Return Appointment: Friday with Clinician    Instructions: To right lower leg wound:  Clean with soap and water. Xeroform- apply to wound bed. Cover with ABD. Wrap bilateral lower legs with unna boots. Change two times/week in wound center. Should you experience increased redness, swelling, pain, foul odor, size of wound(s), or have a temperature over 101 degrees please contact the [address] [place] [address] Road at [phone] or if after hours contact your primary care physician or go to the hospital emergency department.     Signed By: Carito Avendano RN     September 4, 2018

## 2018-09-04 NOTE — WOUND CARE
09/04/18 1429   Wound Leg Lower Left   Date First Assessed/Time First Assessed: 08/17/18 1434   POA: Yes  Wound Type: Venous  Location: Leg Lower  Wound Description (Optional): 15 A  Orientation: Left   DRESSING STATUS Clean, dry, and intact   DRESSING TYPE Unna boot   Wound Length (cm) 0 cm   Wound Width (cm) 0 cm   Wound Depth (cm) 0   Wound Surface area (cm^2) 0 cm^2   Drainage Amount  None   Wound Odor None   Cleansing and Cleansing Agents  Soap and water   Wound Leg Lower Right   Date First Assessed/Time First Assessed: 08/17/18 1436   POA: Yes  Wound Type: Venous  Location: Leg Lower  Wound Description (Optional): 16  Orientation: Right   DRESSING STATUS Clean   DRESSING TYPE (xeroform, unn aboot)   Wound Length (cm) 1.1 cm   Wound Width (cm) 1.4 cm   Wound Depth (cm) 0.1   Wound Surface area (cm^2) 1.54 cm^2   Condition of Base Epithelializing   Tissue Type Percent Pink 100   Drainage Amount  Small    Drainage Color Serous   Wound Odor None   Periwound Skin Condition Macerated   Cleansing and Cleansing Agents  Normal saline

## 2018-09-07 ENCOUNTER — HOSPITAL ENCOUNTER (OUTPATIENT)
Dept: WOUND CARE | Age: 83
Discharge: HOME | End: 2018-09-07
Attending: SURGERY
Payer: MEDICARE

## 2018-09-07 VITALS
RESPIRATION RATE: 20 BRPM | DIASTOLIC BLOOD PRESSURE: 80 MMHG | SYSTOLIC BLOOD PRESSURE: 186 MMHG | HEART RATE: 78 BPM | HEIGHT: 65 IN | OXYGEN SATURATION: 96 % | TEMPERATURE: 98.6 F

## 2018-09-07 PROCEDURE — 29580 STRAPPING UNNA BOOT: CPT

## 2018-09-07 NOTE — WOUND CARE
09/07/18 1635   Wound Leg Lower Left   Date First Assessed/Time First Assessed: 08/17/18 1434   POA: Yes  Wound Type: Venous  Location: Leg Lower  Wound Description (Optional): 15 A  Orientation: Left   DRESSING STATUS Clean, dry, and intact   DRESSING TYPE (Unna Boot)   Wound Length (cm) 0 cm   Wound Width (cm) 0 cm   Wound Depth (cm) 0   Wound Surface area (cm^2) 0 cm^2   Epithelialization (%) 100   Drainage Amount  None   Wound Odor None   Cleansing and Cleansing Agents  Soap and water   Dressing Type Applied Unna boot   Wound Leg Lower Right   Date First Assessed/Time First Assessed: 08/17/18 1436   POA: Yes  Wound Type: Venous  Location: Leg Lower  Wound Description (Optional): 16  Orientation: Right   DRESSING STATUS Clean   DRESSING TYPE Unna boot   Wound Length (cm) 1.1 cm   Wound Width (cm) 1.4 cm   Wound Depth (cm) 0.1   Wound Surface area (cm^2) 1.54 cm^2   Condition of Base Epithelializing   Epithelialization (%) 50   Tissue Type Percent Pink 100   Drainage Amount  Small    Drainage Color Serous   Wound Odor None   Periwound Skin Condition Macerated   Cleansing and Cleansing Agents  Normal saline; Soap and water   Dressing Type Applied (Xeroform, [employer])

## 2018-09-11 ENCOUNTER — HOSPITAL ENCOUNTER (OUTPATIENT)
Dept: WOUND CARE | Age: 83
Discharge: HOME | End: 2018-09-11
Attending: SURGERY
Payer: MEDICARE

## 2018-09-11 VITALS
SYSTOLIC BLOOD PRESSURE: 180 MMHG | DIASTOLIC BLOOD PRESSURE: 73 MMHG | OXYGEN SATURATION: 96 % | BODY MASS INDEX: 43.42 KG/M2 | WEIGHT: 260.58 LBS | HEART RATE: 74 BPM | RESPIRATION RATE: 20 BRPM | HEIGHT: 65 IN | TEMPERATURE: 98.1 F

## 2018-09-11 PROCEDURE — 29580 STRAPPING UNNA BOOT: CPT

## 2018-09-11 NOTE — WOUND CARE
09/11/18 1601   Wound Leg Lower Right   Date First Assessed/Time First Assessed: 08/17/18 1436   POA: Yes  Wound Type: Venous  Location: Leg Lower  Wound Description (Optional): 16  Orientation: Right   DRESSING STATUS Clean, dry, and intact   DRESSING TYPE Unna boot   Wound Length (cm) 1.5 cm   Wound Width (cm) 1.5 cm   Wound Depth (cm) 0.1   Wound Surface area (cm^2) 2.25 cm^2   Condition of Base Granulation;Epithelializing   Epithelialization (%) 50   Tissue Type Percent Pink 50   Drainage Amount  Small    Drainage Color Serous   Wound Odor None   Periwound Skin Condition Macerated   Cleansing and Cleansing Agents  Normal saline   Wound Leg Lower Left   Date First Assessed/Time First Assessed: 08/17/18 1434   POA: Yes  Wound Type: Venous  Location: Leg Lower  Wound Description (Optional): 15 A  Orientation: Left   DRESSING STATUS Clean, dry, and intact   DRESSING TYPE Unna boot   Wound Length (cm) 0 cm   Wound Width (cm) 0 cm   Wound Depth (cm) 0   Wound Surface area (cm^2) 0 cm^2   Epithelialization (%) 100   Drainage Amount  None   Wound Odor None   Cleansing and Cleansing Agents  Normal saline; Soap and water

## 2018-09-11 NOTE — WOUND CARE
[address]  [address], [address]  Phone: [phone]  Fax: [phone]    Patient: Matthew Dukes MRN: [id number]  SSN: [id number]    YOB: 1935  Age: 81 y.o. Sex: female       Return Appointment: 1 week with Ronald Frey MD    Instructions: R leg  Cleanse wound and periwound with wound cleanser or normal saline. Acticoat Flex 3: cut top approximate size of wound, apply to wound bed. Unna boots bilaterally. Change in one week at wound center. Should you experience increased redness, swelling, pain, foul odor, size of wound(s), or have a temperature over 101 degrees please contact the [address] [place] [address] Road at [phone] or if after hours contact your primary care physician or go to the hospital emergency department.     Signed By: Willow Borrero     September 11, 2018

## 2018-09-18 ENCOUNTER — HOSPITAL ENCOUNTER (OUTPATIENT)
Dept: WOUND CARE | Age: 83
Discharge: HOME | End: 2018-09-18
Attending: SURGERY
Payer: MEDICARE

## 2018-09-18 VITALS
DIASTOLIC BLOOD PRESSURE: 79 MMHG | OXYGEN SATURATION: 93 % | SYSTOLIC BLOOD PRESSURE: 187 MMHG | TEMPERATURE: 97.4 F | RESPIRATION RATE: 22 BRPM | HEIGHT: 65 IN | HEART RATE: 84 BPM

## 2018-09-18 PROCEDURE — 29580 STRAPPING UNNA BOOT: CPT

## 2018-09-18 NOTE — WOUND CARE
09/18/18 1407   [REMOVED] Wound Leg Lower Left   Final Assessment Date/Final Assessment Time: 09/18/18 1413  Date First Assessed/Time First Assessed: 08/17/18 1434   POA: Yes  Wound Type: Venous  Location: Leg Lower  Wound Description (Optional): 15 A  Orientation: Left   DRESSING STATUS Clean, dry, and intact   DRESSING TYPE Unna boot   Wound Leg Lower Right   Date First Assessed/Time First Assessed: 08/17/18 1436   POA: Yes  Wound Type: Venous  Location: Leg Lower  Wound Description (Optional): 16  Orientation: Right   DRESSING STATUS Clean, dry, and intact   DRESSING TYPE Unna boot   Wound Length (cm) 1.5 cm   Wound Width (cm) 1.5 cm   Wound Depth (cm) 0.1   Wound Surface area (cm^2) 2.25 cm^2   Change in Wound Size % -800   Condition of Base Epithelializing   Tissue Type Percent Pink 100   Drainage Amount  Small    Drainage Color Serous   Wound Odor None   Periwound Skin Condition Macerated   Cleansing and Cleansing Agents  Normal saline

## 2018-09-18 NOTE — WOUND CARE
[address], [address]  Phone: [phone]  Fax: [phone]    Patient: Irene Bella MRN: [id number]  SSN: [id number]    YOB: 1935  Age: 81 y.o. Sex: female       Return Appointment: 1 week with Inez Coronado MD    Instructions: R leg  Cleanse wound and periwound with wound cleanser or normal saline. Acticoat Flex 3: cut top approximate size of wound, apply to wound bed. Unna boots bilaterally. Change in one week at wound center. Should you experience increased redness, swelling, pain, foul odor, size of wound(s), or have a temperature over 101 degrees please contact the [address] [place] [address] Road at [phone] or if after hours contact your primary care physician or go to the hospital emergency department.     Signed By: Deborah Rhodes RN     September 18, 2018

## 2018-09-25 ENCOUNTER — HOSPITAL ENCOUNTER (OUTPATIENT)
Dept: WOUND CARE | Age: 83
Discharge: HOME | End: 2018-09-25
Attending: SURGERY
Payer: MEDICARE

## 2018-09-25 PROCEDURE — 29580 STRAPPING UNNA BOOT: CPT

## 2018-09-25 NOTE — WOUND CARE
[address]  [address], [address]  Phone: [phone]  Fax: [phone]    Patient: Yamileth Caro MRN: [id number]  SSN: [id number]    YOB: 1935  Age: 81 y.o. Sex: female       Return Appointment: 1 weeks with Melquiades Ambriz MD    Instructions:   R Leg Wound  Cleanse wound and periwound with wound cleanser or normal saline. Acticoat Flex 3: cut top approximate size of wound, apply to wound bed. Cover with ABD and Unna boots bilaterally. Change in one week at wound center. Should you experience increased redness, swelling, pain, foul odor, size of wound(s), or have a temperature over 101 degrees please contact the [address] [place] [address] Road at [phone] or if after hours contact your primary care physician or go to the hospital emergency department.     Signed By: Cynthia Valle RN     September 25, 2018

## 2018-09-25 NOTE — WOUND CARE
09/25/18 1507   Wound Leg Lower Right   Date First Assessed/Time First Assessed: 08/17/18 1436   POA: Yes  Wound Type: Venous  Location: Leg Lower  Wound Description (Optional): 16  Orientation: Right   DRESSING STATUS Clean, dry, and intact   DRESSING TYPE Unna boot   Wound Length (cm) 1.5 cm   Wound Width (cm) 1 cm   Wound Depth (cm) 0.1   Wound Surface area (cm^2) 1.5 cm^2   Change in Wound Size % -500   Condition of Base Granulation;Epithelializing   Tissue Type Percent Pink 100   Drainage Amount  Small    Drainage Color Serous   Wound Odor None   Periwound Skin Condition Macerated   Cleansing and Cleansing Agents  Soap and water

## 2018-09-25 NOTE — DISCHARGE INSTRUCTIONS
R Leg Wound  Cleanse wound and periwound with wound cleanser or normal saline. Acticoat Flex 3: cut top approximate size of wound, apply to wound bed. Cover with ABD and Unna boots bilaterally. Change in one week at wound center.

## 2018-10-01 NOTE — PROGRESS NOTES
Wound Center Progress Note    Patient: Johnathon Kc MRN: [id number]  SSN: [id number]    YOB: 1935  Age: 81 y.o. Sex: female      Subjective:     Chief Complaint:  Recurrent Venous leg ulcer BLE    History of Present Illness:       Wound Caused By: venous insufficiency  Associated Signs and Symptoms: drainage, pain  Timing: since June 2018  Quality: wound  Severity: full thickness  Modifying Factors: Dm2, obesity, venous insufficiency  Current Wound Care: Multilayer compression, CHF    8/2018 - Left VSU healed      Using multilayer compression on both sides as does not have anyone scheduled to help with left.  not strong enough. Noted to have issues with urinary soiling of the bottom feet but not down wraps.            Past Medical History:   Diagnosis Date    Acute hyponatremia 1/21/2011    Acute on chronic diastolic congestive heart failure ([address] 1/19/2011    Acute renal failure ([address] 1/21/2011    AMI (acute myocardial infarction) ([address] 2009    Anemia 9/5/2012    Arthritis     Asthma     Asthma exacerbation 11/11/2011    CAD (coronary artery disease)     MI 2009, stents heart 2009    CAD (coronary artery disease), native coronary artery 9/2/2009    Previous stent to dRCA with Cypher 3.5x33mm SABA 8/09 9/09 LAD- Xience 2.5x23mm and 3.0x18mm SABA in mid-distal LAD      Cellulitis Bilateral Lower Extremities 1/20/2011    Chest discomfort, tightness, pressure 1/28/2016    Chronic diastolic heart failure ([address] 10/20/2011    Chronic venous insufficiency 10/24/2011    Diabetes ([address]     Diabetes Mellitus Type II-insulin dependent 9/2/2009    Diverticulitis 1/28/2016    Dyslipidemia 9/2/2009    Edema 1/28/2016    GERD (gastroesophageal reflux disease)     GLAUCOMA     BILATERAL    Heart failure ([address]     Hypertension     Hypokalemia 1/28/2016    Hypoxemia 9/7/2012    Morbid obesity (HCC)     Nausea & vomiting     Neuropathy in diabetes ([address] 9/2/2009    NSTEMI (non-ST elevation myocardial infarction) ([facility] 9/2/2009    Dr Juan Luis Medina    Obstructive sleep apnea (adult) (pediatric)- probably  11/11/2011    TRISH (obstructive sleep apnea) 9/7/2012    Intolerant of CPAP     Other dyspnea and respiratory abnormality 11/10/2011    Other ill-defined conditions(799.89)     benign tumor in lower abdomen- not removed, diabetic ulcers, edema, neuropathy    Peripheral vascular disease ([facility] 10/20/2011    Post PTCA 6/20/2014    PVD (peripheral vascular disease) ([facility]     Unspecified anemia 1/23/2011    Unspecified sleep apnea     oxygen at night    Venous stasis of lower extremity 2010    BILATERAL W RECURRENT ADMISSIONS    Volume overload 9/6/2012      Past Surgical History:   Procedure Laterality Date    BREAST SURGERY PROCEDURE UNLISTED  1973    benign tumor left breast    CARDIAC SURG PROCEDURE UNLIST      4 stents most recent 2 yrs ago    HX CHOLECYSTECTOMY      HX COLONOSCOPY      HX GI      PTCA EACH ADDL VESSEL      stentsx3     Family History   Problem Relation Age of Onset    Cancer Father     Lung Disease Father     Cancer Brother     Diabetes Paternal Aunt     Hypertension Maternal Grandmother     Hypertension Paternal Grandmother       Social History   Substance Use Topics    Smoking status: Never Smoker    Smokeless tobacco: Never Used    Alcohol use No       Prior to Admission medications    Medication Sig Start Date End Date Taking? Authorizing Provider   bumetanide (BUMEX) 2 mg tablet Take 1 Tab by mouth daily. 8/17/18   Rosetta Jackson MD   atorvastatin (LIPITOR) 80 mg tablet Take 1 Tab by mouth daily. 7/16/18   Rosetta Jackson MD   clopidogrel (PLAVIX) 75 mg tab Take 1 Tab by mouth daily. 3/2/18   Rosetta Jackson MD   metOLazone (ZAROXOLYN) 10 mg tablet Take 1 Tab by mouth daily. 1/19/18   Rosetta Jackson MD   metoprolol tartrate (LOPRESSOR) 50 mg tablet Take 1 Tab by mouth two (2) times a day.  1/19/18   Rosetta Jackson MD aspirin 81 mg chewable tablet Take 1 Tab by mouth daily. 6/12/17   Dakota Jo MD   DULoxetine (CYMBALTA) 60 mg capsule Take 60 mg by mouth daily. Historical Provider   insulin glargine (TOUJEO SOLOSTAR) 300 unit/mL (1.5 mL) inpn 220 Units by SubCUTAneous route daily. Historical Provider   acetaminophen (TYLENOL EXTRA STRENGTH) 500 mg tablet Take  by mouth every six (6) hours as needed for Pain. Historical Provider   insulin glulisine (APIDRA) 100 unit/mL injection 90 Units by SubCUTAneous route three (3) times daily. Historical Provider   gabapentin (NEURONTIN) 400 mg capsule Take 800 mg by mouth two (2) times a day. Pt states 8 x daily      Diego Lizama MD   albuterol (PROVENTIL VENTOLIN) 2.5 mg /3 mL (0.083 %) nebulizer solution 3 mL by Nebulization route three (3) times daily. 11/16/11   Joy Palacios MD   nitroglycerin (NITROSTAT) 0.4 mg SL tablet 0.4 mg by SubLINGual route every five (5) minutes as needed. Historical Provider   ergocalciferol (VITAMIN D) 50,000 unit capsule Take 50,000 Units by mouth every seven (7) days. Takes on wed once a month     Historical Provider   albuterol (PROVENTIL, VENTOLIN) 90 mcg/Actuation inhaler Take 2 Puffs by inhalation every four (4) hours as needed for Wheezing. Dispense: (1) inhaler with no refills. 12/19/10   NATHAN Woodward     Allergies   Allergen Reactions    Pcn [Penicillins] Hives, Swelling and Myalgia     Can take cephalosporins    Codeine Nausea Only    Dilaudid [Hydromorphone (Bulk)] Other (comments)    Lortab [Hydrocodone-Acetaminophen] Nausea Only        Review of Systems:  Pertinent items are noted in the History of Present Illness.      Lab Results   Component Value Date/Time    Hemoglobin A1c 8.7 (H) 09/30/2011 01:07 PM        Immunization History   Administered Date(s) Administered    Influenza Vaccine Split 09/11/2012    ZZZ-RETIRED (DO NOT USE) Pneumococcal Vaccine (Unspecified Type) 09/20/2010       Body mass index is 43.36 kg/(m^2). Counseling regarding nutrition done: Yes Pateint counsled about risks of smoking and cessation      Current medications:  Current Outpatient Prescriptions   Medication Sig Dispense Refill    bumetanide (BUMEX) 2 mg tablet Take 1 Tab by mouth daily. 90 Tab 3    atorvastatin (LIPITOR) 80 mg tablet Take 1 Tab by mouth daily. 90 Tab 3    clopidogrel (PLAVIX) 75 mg tab Take 1 Tab by mouth daily. 30 Tab 11    metOLazone (ZAROXOLYN) 10 mg tablet Take 1 Tab by mouth daily. 90 Tab 3    metoprolol tartrate (LOPRESSOR) 50 mg tablet Take 1 Tab by mouth two (2) times a day. 180 Tab 3    aspirin 81 mg chewable tablet Take 1 Tab by mouth daily. 90 Tab 3    DULoxetine (CYMBALTA) 60 mg capsule Take 60 mg by mouth daily.  insulin glargine (TOUJEO SOLOSTAR) 300 unit/mL (1.5 mL) inpn 220 Units by SubCUTAneous route daily.  acetaminophen (TYLENOL EXTRA STRENGTH) 500 mg tablet Take  by mouth every six (6) hours as needed for Pain.  insulin glulisine (APIDRA) 100 unit/mL injection 90 Units by SubCUTAneous route three (3) times daily.  gabapentin (NEURONTIN) 400 mg capsule Take 800 mg by mouth two (2) times a day. Pt states 8 x daily        albuterol (PROVENTIL VENTOLIN) 2.5 mg /3 mL (0.083 %) nebulizer solution 3 mL by Nebulization route three (3) times daily. 1 Package 0    nitroglycerin (NITROSTAT) 0.4 mg SL tablet 0.4 mg by SubLINGual route every five (5) minutes as needed.  ergocalciferol (VITAMIN D) 50,000 unit capsule Take 50,000 Units by mouth every seven (7) days. Takes on wed once a month       albuterol (PROVENTIL, VENTOLIN) 90 mcg/Actuation inhaler Take 2 Puffs by inhalation every four (4) hours as needed for Wheezing. Dispense: (1) inhaler with no refills.  1 g 0         Objective:     Physical Exam:     Visit Vitals    /73 (BP 1 Location: Right arm)    Pulse 74    Temp 98.1 °F (36.7 °C)    Resp 20    Ht 5' 5\" (1.651 m)    Wt 118.2 kg (260 lb 9.3 oz)    SpO2 96%    BMI 43.36 kg/m2       General: Healthy appearing  Neuro: alert and oriented to person/place/situation. Otherwise nonfocal.  Derm: Normal turgor for age, otherwise per wound exam  HEENT: Normocephalic, atraumatic. EOMI. Conjunctiva clear. No scleral icterus. Neck: Normal range of motion. No masses. Chest: Resp unlabored  Cardio: Regular rate, rhythm  Abdomen: Soft, nontender  Ext. No hemosiderrosis. DP/PT 2+  Psych: cooperative. Pleasant. No anxiety or depression. Normal mood and affect. Diabetic Foot Ulcer/Neuropathic   Is Wound Greater than 1.0 sq cm ? : No  Re-Current Wound with Skin Substitue within Last Year : No  X-Ray in Last 3 Months: No  Smoking Status: Non- Smoker  Wound Free from Infection : No Culture Done  Is Wound Free of Eschar, Slough , and / or Bio-Orange: Yes  Malignant Process in Wound : No Biopsy Done  Compression Therapy of 20mm or greater ?: Yes     Assessment:     BLE VSU, slowly improving    Plan:     Continue multilayer compression on both sides.  Dm2 control still not optimal.    Signed By: Shashi Brandon MD     October 1, 2018

## 2018-10-01 NOTE — PROGRESS NOTES
Wound Center Progress Note    Patient: Julian Almanza MRN: [id number]  SSN: [id number]    YOB: 1935  Age: 81 y.o. Sex: female      Subjective:     Chief Complaint:  Recurrent Venous leg ulcer BLE    History of Present Illness:       Wound Caused By: venous insufficiency  Associated Signs and Symptoms: drainage, pain  Timing: since June 2018  Quality: wound  Severity: full thickness  Modifying Factors: Dm2, obesity, venous insufficiency  Current Wound Care: Multilayer compression, CHF    8/2018 - Left VSU healed    Noted to have issues with urinary soiling of the bottom feet but not down wraps.            Past Medical History:   Diagnosis Date    Acute hyponatremia 1/21/2011    Acute on chronic diastolic congestive heart failure ([address] 1/19/2011    Acute renal failure ([address] 1/21/2011    AMI (acute myocardial infarction) ([address] 2009    Anemia 9/5/2012    Arthritis     Asthma     Asthma exacerbation 11/11/2011    CAD (coronary artery disease)     MI 2009, stents heart 2009    CAD (coronary artery disease), native coronary artery 9/2/2009    Previous stent to [facility] with Cypher 3.5x33mm SABA 8/09 9/09 LAD- Xience 2.5x23mm and 3.0x18mm SABA in mid-distal LAD      Cellulitis Bilateral Lower Extremities 1/20/2011    Chest discomfort, tightness, pressure 1/28/2016    Chronic diastolic heart failure ([address] 10/20/2011    Chronic venous insufficiency 10/24/2011    Diabetes ([address]     Diabetes Mellitus Type II-insulin dependent 9/2/2009    Diverticulitis 1/28/2016    Dyslipidemia 9/2/2009    Edema 1/28/2016    GERD (gastroesophageal reflux disease)     GLAUCOMA     BILATERAL    Heart failure ([address]     Hypertension     Hypokalemia 1/28/2016    Hypoxemia 9/7/2012    Morbid obesity (HCC)     Nausea & vomiting     Neuropathy in diabetes ([address] 9/2/2009    NSTEMI (non-ST elevation myocardial infarction) ([address] 9/2/2009    Dr Darren Branham    Obstructive sleep apnea (adult) (pediatric)- probably  11/11/2011    TRISH (obstructive sleep apnea) 9/7/2012    Intolerant of CPAP     Other dyspnea and respiratory abnormality 11/10/2011    Other ill-defined conditions(799.89)     benign tumor in lower abdomen- not removed, diabetic ulcers, edema, neuropathy    Peripheral vascular disease ([facility] 10/20/2011    Post PTCA 6/20/2014    PVD (peripheral vascular disease) ([facility]     Unspecified anemia 1/23/2011    Unspecified sleep apnea     oxygen at night    Venous stasis of lower extremity 2010    BILATERAL W RECURRENT ADMISSIONS    Volume overload 9/6/2012      Past Surgical History:   Procedure Laterality Date    BREAST SURGERY PROCEDURE UNLISTED  1973    benign tumor left breast    CARDIAC SURG PROCEDURE UNLIST      4 stents most recent 2 yrs ago    HX CHOLECYSTECTOMY      HX COLONOSCOPY      HX GI      PTCA EACH ADDL VESSEL      stentsx3     Family History   Problem Relation Age of Onset    Cancer Father     Lung Disease Father     Cancer Brother     Diabetes Paternal Aunt     Hypertension Maternal Grandmother     Hypertension Paternal Grandmother       Social History   Substance Use Topics    Smoking status: Never Smoker    Smokeless tobacco: Never Used    Alcohol use No       Prior to Admission medications    Medication Sig Start Date End Date Taking? Authorizing Provider   bumetanide (BUMEX) 2 mg tablet Take 1 Tab by mouth daily. 8/17/18   Cielo Cerda MD   atorvastatin (LIPITOR) 80 mg tablet Take 1 Tab by mouth daily. 7/16/18   Cielo Cerda MD   clopidogrel (PLAVIX) 75 mg tab Take 1 Tab by mouth daily. 3/2/18   Cielo Cerda MD   metOLazone (ZAROXOLYN) 10 mg tablet Take 1 Tab by mouth daily. 1/19/18   Cielo Cerda MD   metoprolol tartrate (LOPRESSOR) 50 mg tablet Take 1 Tab by mouth two (2) times a day. 1/19/18   Cielo Cerda MD   aspirin 81 mg chewable tablet Take 1 Tab by mouth daily.  6/12/17   Cielo Cerda MD   DULoxetine (CYMBALTA) 60 mg capsule Take 60 mg by mouth daily. Historical Provider   insulin glargine (TOUJEO SOLOSTAR) 300 unit/mL (1.5 mL) inpn 220 Units by SubCUTAneous route daily. Historical Provider   acetaminophen (TYLENOL EXTRA STRENGTH) 500 mg tablet Take  by mouth every six (6) hours as needed for Pain. Historical Provider   insulin glulisine (APIDRA) 100 unit/mL injection 90 Units by SubCUTAneous route three (3) times daily. Historical Provider   gabapentin (NEURONTIN) 400 mg capsule Take 800 mg by mouth two (2) times a day. Pt states 8 x daily      Phys MD Alda   albuterol (PROVENTIL VENTOLIN) 2.5 mg /3 mL (0.083 %) nebulizer solution 3 mL by Nebulization route three (3) times daily. 11/16/11   Argenis Partida MD   nitroglycerin (NITROSTAT) 0.4 mg SL tablet 0.4 mg by SubLINGual route every five (5) minutes as needed. Historical Provider   ergocalciferol (VITAMIN D) 50,000 unit capsule Take 50,000 Units by mouth every seven (7) days. Takes on wed once a month     Historical Provider   albuterol (PROVENTIL, VENTOLIN) 90 mcg/Actuation inhaler Take 2 Puffs by inhalation every four (4) hours as needed for Wheezing. Dispense: (1) inhaler with no refills. 12/19/10   NATHAN Waterman     Allergies   Allergen Reactions    Pcn [Penicillins] Hives, Swelling and Myalgia     Can take cephalosporins    Codeine Nausea Only    Dilaudid [Hydromorphone (Bulk)] Other (comments)    Lortab [Hydrocodone-Acetaminophen] Nausea Only        Review of Systems:  Pertinent items are noted in the History of Present Illness. Lab Results   Component Value Date/Time    Hemoglobin A1c 8.7 (H) 09/30/2011 01:07 PM        Immunization History   Administered Date(s) Administered    Influenza Vaccine Split 09/11/2012    ZZZ-RETIRED (DO NOT USE) Pneumococcal Vaccine (Unspecified Type) 09/20/2010       Body mass index is 43.36 kg/(m^2).     Counseling regarding nutrition done: Yes Pateint counsled about risks of smoking and cessation Current medications:  Current Outpatient Prescriptions   Medication Sig Dispense Refill    bumetanide (BUMEX) 2 mg tablet Take 1 Tab by mouth daily. 90 Tab 3    atorvastatin (LIPITOR) 80 mg tablet Take 1 Tab by mouth daily. 90 Tab 3    clopidogrel (PLAVIX) 75 mg tab Take 1 Tab by mouth daily. 30 Tab 11    metOLazone (ZAROXOLYN) 10 mg tablet Take 1 Tab by mouth daily. 90 Tab 3    metoprolol tartrate (LOPRESSOR) 50 mg tablet Take 1 Tab by mouth two (2) times a day. 180 Tab 3    aspirin 81 mg chewable tablet Take 1 Tab by mouth daily. 90 Tab 3    DULoxetine (CYMBALTA) 60 mg capsule Take 60 mg by mouth daily.  insulin glargine (TOUJEO SOLOSTAR) 300 unit/mL (1.5 mL) inpn 220 Units by SubCUTAneous route daily.  acetaminophen (TYLENOL EXTRA STRENGTH) 500 mg tablet Take  by mouth every six (6) hours as needed for Pain.  insulin glulisine (APIDRA) 100 unit/mL injection 90 Units by SubCUTAneous route three (3) times daily.  gabapentin (NEURONTIN) 400 mg capsule Take 800 mg by mouth two (2) times a day. Pt states 8 x daily        albuterol (PROVENTIL VENTOLIN) 2.5 mg /3 mL (0.083 %) nebulizer solution 3 mL by Nebulization route three (3) times daily. 1 Package 0    nitroglycerin (NITROSTAT) 0.4 mg SL tablet 0.4 mg by SubLINGual route every five (5) minutes as needed.  ergocalciferol (VITAMIN D) 50,000 unit capsule Take 50,000 Units by mouth every seven (7) days. Takes on wed once a month       albuterol (PROVENTIL, VENTOLIN) 90 mcg/Actuation inhaler Take 2 Puffs by inhalation every four (4) hours as needed for Wheezing. Dispense: (1) inhaler with no refills. 1 g 0         Objective:     Physical Exam:     Visit Vitals    /68 (BP 1 Location: Right arm)    Pulse 72    Temp 97.8 °F (36.6 °C)    Resp 18    Ht 5' 5\" (1.651 m)    Wt 118.2 kg (260 lb 9.3 oz)    SpO2 95%    BMI 43.36 kg/m2       General: Healthy appearing  Neuro: alert and oriented to person/place/situation. Otherwise nonfocal.  Derm: Normal turgor for age, otherwise per wound exam  HEENT: Normocephalic, atraumatic. EOMI. Conjunctiva clear. No scleral icterus. Neck: Normal range of motion. No masses. Chest: Resp unlabored  Cardio: Regular rate, rhythm  Abdomen: Soft, nontender  Ext. No hemosiderrosis. DP/PT 2+  Psych: cooperative. Pleasant. No anxiety or depression. Normal mood and affect. Diabetic Foot Ulcer/Neuropathic   Is Wound Greater than 1.0 sq cm ? : No  Re-Current Wound with Skin Substitue within Last Year : No  X-Ray in Last 3 Months: No  Smoking Status: Non- Smoker  Wound Free from Infection : No Culture Done  Is Wound Free of Eschar, Slough , and / or Bio-[place]ada: Yes  Malignant Process in Wound : No Biopsy Done  Compression Therapy of 20mm or greater ?: Yes     Assessment:     BLE VSU, slowly improving    Plan:     Compression hose on left side. Continue wraps on right. Avoid soiling.      Signed By: Kait Palma MD     October 1, 2018

## 2018-10-01 NOTE — PROGRESS NOTES
Wound Center Progress Note    Patient: Spring Payton MRN: [id number]  SSN: [id number]    YOB: 1935  Age: 81 y.o. Sex: female      Subjective:     Chief Complaint:  Recurrent Venous leg ulcer BLE    History of Present Illness:       Wound Caused By: venous insufficiency  Associated Signs and Symptoms: drainage, pain  Timing: since June 2018  Quality: wound  Severity: full thickness  Modifying Factors: Dm2, obesity, venous insufficiency  Current Wound Care: Multilayer compression, CHF    8/2018 - Left VSU healed      Using multilayer compression on both sides as does not have anyone scheduled to help with left.  not strong enough. Noted to have issues with urinary soiling of the bottom feet but not down wraps.            Past Medical History:   Diagnosis Date    Acute hyponatremia 1/21/2011    Acute on chronic diastolic congestive heart failure ([address] 1/19/2011    Acute renal failure ([address] 1/21/2011    AMI (acute myocardial infarction) ([address] 2009    Anemia 9/5/2012    Arthritis     Asthma     Asthma exacerbation 11/11/2011    CAD (coronary artery disease)     MI 2009, stents heart 2009    CAD (coronary artery disease), native coronary artery 9/2/2009    Previous stent to dRCA with Cypher 3.5x33mm SABA 8/09 9/09 LAD- Xience 2.5x23mm and 3.0x18mm SABA in mid-distal LAD      Cellulitis Bilateral Lower Extremities 1/20/2011    Chest discomfort, tightness, pressure 1/28/2016    Chronic diastolic heart failure ([address] 10/20/2011    Chronic venous insufficiency 10/24/2011    Diabetes ([address]     Diabetes Mellitus Type II-insulin dependent 9/2/2009    Diverticulitis 1/28/2016    Dyslipidemia 9/2/2009    Edema 1/28/2016    GERD (gastroesophageal reflux disease)     GLAUCOMA     BILATERAL    Heart failure ([address]     Hypertension     Hypokalemia 1/28/2016    Hypoxemia 9/7/2012    Morbid obesity (HCC)     Nausea & vomiting     Neuropathy in diabetes ([address] 9/2/2009    NSTEMI (non-ST elevation myocardial infarction) ([facility] 9/2/2009    Dr Joe Godinez    Obstructive sleep apnea (adult) (pediatric)- probably  11/11/2011    TRISH (obstructive sleep apnea) 9/7/2012    Intolerant of CPAP     Other dyspnea and respiratory abnormality 11/10/2011    Other ill-defined conditions(799.89)     benign tumor in lower abdomen- not removed, diabetic ulcers, edema, neuropathy    Peripheral vascular disease ([facility] 10/20/2011    Post PTCA 6/20/2014    PVD (peripheral vascular disease) ([facility]     Unspecified anemia 1/23/2011    Unspecified sleep apnea     oxygen at night    Venous stasis of lower extremity 2010    BILATERAL W RECURRENT ADMISSIONS    Volume overload 9/6/2012      Past Surgical History:   Procedure Laterality Date    BREAST SURGERY PROCEDURE UNLISTED  1973    benign tumor left breast    CARDIAC SURG PROCEDURE UNLIST      4 stents most recent 2 yrs ago    HX CHOLECYSTECTOMY      HX COLONOSCOPY      HX GI      PTCA EACH ADDL VESSEL      stentsx3     Family History   Problem Relation Age of Onset    Cancer Father     Lung Disease Father     Cancer Brother     Diabetes Paternal Aunt     Hypertension Maternal Grandmother     Hypertension Paternal Grandmother       Social History   Substance Use Topics    Smoking status: Never Smoker    Smokeless tobacco: Never Used    Alcohol use No       Prior to Admission medications    Medication Sig Start Date End Date Taking? Authorizing Provider   bumetanide (BUMEX) 2 mg tablet Take 1 Tab by mouth daily. 8/17/18   Jyothi Mackey MD   atorvastatin (LIPITOR) 80 mg tablet Take 1 Tab by mouth daily. 7/16/18   Jyothi Mackey MD   clopidogrel (PLAVIX) 75 mg tab Take 1 Tab by mouth daily. 3/2/18   Jyothi Mackey MD   metOLazone (ZAROXOLYN) 10 mg tablet Take 1 Tab by mouth daily. 1/19/18   Jyothi Mackey MD   metoprolol tartrate (LOPRESSOR) 50 mg tablet Take 1 Tab by mouth two (2) times a day.  1/19/18   Jyothi Mackey MD aspirin 81 mg chewable tablet Take 1 Tab by mouth daily. 6/12/17   Jyothi Mackey MD   DULoxetine (CYMBALTA) 60 mg capsule Take 60 mg by mouth daily. Historical Provider   insulin glargine (TOUJEO SOLOSTAR) 300 unit/mL (1.5 mL) inpn 220 Units by SubCUTAneous route daily. Historical Provider   acetaminophen (TYLENOL EXTRA STRENGTH) 500 mg tablet Take  by mouth every six (6) hours as needed for Pain. Historical Provider   insulin glulisine (APIDRA) 100 unit/mL injection 90 Units by SubCUTAneous route three (3) times daily. Historical Provider   gabapentin (NEURONTIN) 400 mg capsule Take 800 mg by mouth two (2) times a day. Pt states 8 x daily      Phys MD Alda   albuterol (PROVENTIL VENTOLIN) 2.5 mg /3 mL (0.083 %) nebulizer solution 3 mL by Nebulization route three (3) times daily. 11/16/11   Wilbert Bach MD   nitroglycerin (NITROSTAT) 0.4 mg SL tablet 0.4 mg by SubLINGual route every five (5) minutes as needed. Historical Provider   ergocalciferol (VITAMIN D) 50,000 unit capsule Take 50,000 Units by mouth every seven (7) days. Takes on wed once a month     Historical Provider   albuterol (PROVENTIL, VENTOLIN) 90 mcg/Actuation inhaler Take 2 Puffs by inhalation every four (4) hours as needed for Wheezing. Dispense: (1) inhaler with no refills. 12/19/10   NATHAN Garcia     Allergies   Allergen Reactions    Pcn [Penicillins] Hives, Swelling and Myalgia     Can take cephalosporins    Codeine Nausea Only    Dilaudid [Hydromorphone (Bulk)] Other (comments)    Lortab [Hydrocodone-Acetaminophen] Nausea Only        Review of Systems:  Pertinent items are noted in the History of Present Illness.      Lab Results   Component Value Date/Time    Hemoglobin A1c 8.7 (H) 09/30/2011 01:07 PM        Immunization History   Administered Date(s) Administered    Influenza Vaccine Split 09/11/2012    ZZZ-RETIRED (DO NOT USE) Pneumococcal Vaccine (Unspecified Type) 09/20/2010       There is no height or weight on file to calculate BMI. Counseling regarding nutrition done: Yes Pateint counsled about risks of smoking and cessation      Current medications:  Current Outpatient Prescriptions   Medication Sig Dispense Refill    bumetanide (BUMEX) 2 mg tablet Take 1 Tab by mouth daily. 90 Tab 3    atorvastatin (LIPITOR) 80 mg tablet Take 1 Tab by mouth daily. 90 Tab 3    clopidogrel (PLAVIX) 75 mg tab Take 1 Tab by mouth daily. 30 Tab 11    metOLazone (ZAROXOLYN) 10 mg tablet Take 1 Tab by mouth daily. 90 Tab 3    metoprolol tartrate (LOPRESSOR) 50 mg tablet Take 1 Tab by mouth two (2) times a day. 180 Tab 3    aspirin 81 mg chewable tablet Take 1 Tab by mouth daily. 90 Tab 3    DULoxetine (CYMBALTA) 60 mg capsule Take 60 mg by mouth daily.  insulin glargine (TOUJEO SOLOSTAR) 300 unit/mL (1.5 mL) inpn 220 Units by SubCUTAneous route daily.  acetaminophen (TYLENOL EXTRA STRENGTH) 500 mg tablet Take  by mouth every six (6) hours as needed for Pain.  insulin glulisine (APIDRA) 100 unit/mL injection 90 Units by SubCUTAneous route three (3) times daily.  gabapentin (NEURONTIN) 400 mg capsule Take 800 mg by mouth two (2) times a day. Pt states 8 x daily        albuterol (PROVENTIL VENTOLIN) 2.5 mg /3 mL (0.083 %) nebulizer solution 3 mL by Nebulization route three (3) times daily. 1 Package 0    nitroglycerin (NITROSTAT) 0.4 mg SL tablet 0.4 mg by SubLINGual route every five (5) minutes as needed.  ergocalciferol (VITAMIN D) 50,000 unit capsule Take 50,000 Units by mouth every seven (7) days. Takes on wed once a month       albuterol (PROVENTIL, VENTOLIN) 90 mcg/Actuation inhaler Take 2 Puffs by inhalation every four (4) hours as needed for Wheezing. Dispense: (1) inhaler with no refills.  1 g 0         Objective:     Physical Exam:     Visit Vitals    /79 (BP 1 Location: Right arm)    Pulse 84    Temp 97.4 °F (36.3 °C)    Resp 22    Ht 5' 5\" (1.651 m)    SpO2 93% General: Healthy appearing  Neuro: alert and oriented to person/place/situation. Otherwise nonfocal.  Derm: Normal turgor for age, otherwise per wound exam  HEENT: Normocephalic, atraumatic. EOMI. Conjunctiva clear. No scleral icterus. Neck: Normal range of motion. No masses. Chest: Resp unlabored  Cardio: Regular rate, rhythm  Abdomen: Soft, nontender  Ext. No hemosiderrosis. DP/PT 2+  Psych: cooperative. Pleasant. No anxiety or depression. Normal mood and affect. Diabetic Foot Ulcer/Neuropathic   Is Wound Greater than 1.0 sq cm ? : No  Re-Current Wound with Skin Substitue within Last Year : No  X-Ray in Last 3 Months: No  Smoking Status: Non- Smoker  Wound Free from Infection : No Culture Done  Is Wound Free of Eschar, [place] , and / or Bio-[place]: Yes  Malignant Process in Wound : No Biopsy Done  Compression Therapy of 20mm or greater ?: Yes     Assessment:     BLE VSU, slowly improving    Plan:     Continue multilayer compression on both sides.  Dm2 control still not optimal.    Signed By: Adriel Piper MD     October 1, 2018

## 2018-10-05 ENCOUNTER — HOSPITAL ENCOUNTER (OUTPATIENT)
Dept: WOUND CARE | Age: 83
Discharge: HOME | End: 2018-10-05
Attending: SURGERY
Payer: MEDICARE

## 2018-10-05 VITALS
RESPIRATION RATE: 22 BRPM | TEMPERATURE: 98.4 F | DIASTOLIC BLOOD PRESSURE: 73 MMHG | HEIGHT: 65 IN | SYSTOLIC BLOOD PRESSURE: 152 MMHG | HEART RATE: 74 BPM

## 2018-10-05 PROCEDURE — 29580 STRAPPING UNNA BOOT: CPT

## 2018-10-05 NOTE — WOUND CARE
[address]  [address], [address]  Phone: [phone]  Fax: [phone]    Patient: Alexander Luis MRN: [id number]  SSN: [id number]    YOB: 1935  Age: 81 y.o. Sex: female       Return Appointment: 1 week with Latesha Barger MD    Instructions:  Clean with saline. Xeroform- apply to wound bed. Wrap bilateral lower legs with unna boots. Change once weekly. Should you experience increased redness, swelling, pain, foul odor, size of wound(s), or have a temperature over 101 degrees please contact the [address] [place] [address] Road at [phone] or if after hours contact your primary care physician or go to the hospital emergency department.     Signed By: Robert Pedersen RN     October 5, 2018

## 2018-10-12 ENCOUNTER — HOSPITAL ENCOUNTER (OUTPATIENT)
Dept: WOUND CARE | Age: 83
Discharge: HOME | End: 2018-10-12
Attending: SURGERY
Payer: MEDICARE

## 2018-10-12 VITALS
HEART RATE: 78 BPM | OXYGEN SATURATION: 92 % | TEMPERATURE: 97.6 F | DIASTOLIC BLOOD PRESSURE: 86 MMHG | SYSTOLIC BLOOD PRESSURE: 160 MMHG | HEIGHT: 65 IN | RESPIRATION RATE: 20 BRPM

## 2018-10-12 PROCEDURE — 29580 STRAPPING UNNA BOOT: CPT

## 2018-10-12 NOTE — PROGRESS NOTES
10/12/18 1623   Wound Toe Left   Date First Assessed/Time First Assessed: 10/12/18 1622   POA: Yes  Wound Type: Diabetic  Location: Toe  Wound Description (Optional): 2nd  Orientation: Left   DRESSING TYPE (no dressing)   Wound Length (cm) 0.1 cm   Wound Width (cm) 0.4 cm   Wound Depth (cm) 0.1   Wound Surface area (cm^2) 0.04 cm^2   Tissue Type Percent Pink 100   Drainage Amount  Small    Drainage Color Sanguinous   Wound Odor None   Cleansing and Cleansing Agents  Normal saline   Dressing Type Applied (Iodosorb, dry gauze, rolled gauze)   Procedure Tolerated Well   Wound Leg Lower Right   Date First Assessed/Time First Assessed: 08/17/18 1436   POA: Yes  Wound Type: Venous  Location: Leg Lower  Wound Description (Optional): 16  Orientation: Right   DRESSING STATUS Clean, dry, and intact   DRESSING TYPE (Xeroform, ABD, unna boot bilaterally)   Wound Length (cm) 0.7 cm   Wound Width (cm) 1 cm   Wound Depth (cm) 0.1   Wound Surface area (cm^2) 0.7 cm^2   Change in Wound Size % -180   Condition of Base Granulation   Tissue Type Percent Pink 100   Drainage Amount  Moderate   Drainage Color Serous   Wound Odor None   Periwound Skin Condition Macerated   Cleansing and Cleansing Agents  Soap and water   Dressing Type Applied (Xeroform, ABD, unna boot bilaterally)   Procedure Tolerated Well

## 2018-10-12 NOTE — WOUND CARE
[address]  [address], [address]   Phone: [phone]  Fax: [phone]    Patient: Oliver Peters MRN: [id number]  SSN: [id number]    YOB: 1935  Age: 81 y.o. Sex: female       Return Appointment: 1 week with Kailyn Lagos MD    Instructions:   Right lower leg:   Clean with saline. Apply Xeroform to wound bed and cover with ABD pad. Wrap bilateral lower legs with unna boots. Change dressing once weekly. Left 2nd toe:  Clean with saline. Apply Iodosorb ointment to wound base and cover. Change dressing daily. Should you experience increased redness, swelling, pain, foul odor, size of wound(s), or have a temperature over 101 degrees please contact the [address] [place] [address] Road at [phone] or if after hours contact your primary care physician or go to the hospital emergency department.     Signed By: Venessa Leggett PT, [facility]    October 12, 2018

## 2018-10-19 ENCOUNTER — HOSPITAL ENCOUNTER (OUTPATIENT)
Dept: WOUND CARE | Age: 83
Discharge: HOME | End: 2018-10-19
Attending: SURGERY
Payer: MEDICARE

## 2018-10-19 VITALS
OXYGEN SATURATION: 94 % | HEART RATE: 75 BPM | RESPIRATION RATE: 18 BRPM | WEIGHT: 268.8 LBS | SYSTOLIC BLOOD PRESSURE: 144 MMHG | BODY MASS INDEX: 44.79 KG/M2 | DIASTOLIC BLOOD PRESSURE: 98 MMHG | TEMPERATURE: 99.1 F | HEIGHT: 65 IN

## 2018-10-19 PROCEDURE — 99214 OFFICE O/P EST MOD 30 MIN: CPT

## 2018-10-19 NOTE — WOUND CARE
[address]  [address], [address]   Phone: [phone]  Fax: [phone]    Patient: Juan Bond MRN: [id number]  SSN: [id number]    YOB: 1935  Age: 81 y.o. Sex: female       Return Appointment: 1 week with Kadeem Melendrez MD    Instructions: Right lower leg  Clean with saline. Xeroform- apply to wound bed. Wrap bilateral lower legs with unna boots. Change once weekly. Left 2nd and 3rd toe-  Clean with saline  Iodosorb to open areas; wrap with rolled gauze. Change three times a week. Should you experience increased redness, swelling, pain, foul odor, size of wound(s), or have a temperature over 101 degrees please contact the [address] [place] [address] Road at [phone] or if after hours contact your primary care physician or go to the hospital emergency department.     Signed By: Enid Otoole RN     October 19, 2018

## 2018-10-19 NOTE — WOUND CARE
10/19/18 1508   Wound Toe (specify in comments) Left;Plantar   Date First Assessed/Time First Assessed: 10/19/18 1513   POA: Yes  Wound Type: Diabetic  Location: Toe (specify in comments)  Wound Description (Optional): 3rd toe  Orientation: Left;Plantar   DRESSING TYPE (no dressing)   Non-Pressure Injury Partial thickness (epider/derm)   Wound Length (cm) 0.5 cm   Wound Width (cm) 0.7 cm   Wound Depth (cm) 0.1   Wound Surface area (cm^2) 0.35 cm^2   Condition of Base Pink   Drainage Amount  None   Wound Odor None   Periwound Skin Condition Intact   Cleansing and Cleansing Agents  Normal saline   Wound Toe Left   Date First Assessed/Time First Assessed: 10/12/18 1622   POA: Yes  Wound Type: Diabetic  Location: Toe  Wound Description (Optional): 2nd  Orientation: Left   DRESSING TYPE (no dressing, Iodosorb noted )   Wound Length (cm) 0.1 cm   Wound Width (cm) 0.4 cm   Wound Depth (cm) 0.1   Wound Surface area (cm^2) 0.04 cm^2   Change in Wound Size % 0   Tissue Type Percent Pink 100   Drainage Amount  Scant   Drainage Color Serosanguinous   Wound Odor None   Periwound Skin Condition Intact   Cleansing and Cleansing Agents  Normal saline   Wound Leg Lower Right   Date First Assessed/Time First Assessed: 08/17/18 1436   POA: Yes  Wound Type: Venous  Location: Leg Lower  Wound Description (Optional): 16  Orientation: Right   Wound Length (cm) 0.7 cm   Wound Width (cm) 1 cm   Wound Depth (cm) 0.1   Wound Surface area (cm^2) 0.7 cm^2   Change in Wound Size % -180   Condition of Base Granulation   Tissue Type Percent Pink 100   Drainage Amount  Small    Drainage Color Serous   Wound Odor None   Periwound Skin Condition Intact   Cleansing and Cleansing Agents  Soap and water

## 2018-11-02 ENCOUNTER — HOSPITAL ENCOUNTER (OUTPATIENT)
Dept: WOUND CARE | Age: 83
Discharge: HOME | End: 2018-11-02
Attending: SURGERY
Payer: MEDICARE

## 2018-11-02 VITALS
HEART RATE: 103 BPM | SYSTOLIC BLOOD PRESSURE: 167 MMHG | DIASTOLIC BLOOD PRESSURE: 73 MMHG | OXYGEN SATURATION: 93 % | RESPIRATION RATE: 18 BRPM | TEMPERATURE: 97.7 F | BODY MASS INDEX: 44.73 KG/M2 | HEIGHT: 65 IN

## 2018-11-02 PROCEDURE — 74011000250 HC RX REV CODE- 250: Performed by: SURGERY

## 2018-11-02 PROCEDURE — 29580 STRAPPING UNNA BOOT: CPT

## 2018-11-02 RX ORDER — SILVER NITRATE 38.21; 12.74 MG/1; MG/1
1 STICK TOPICAL ONCE
Status: COMPLETED | OUTPATIENT
Start: 2018-11-02 | End: 2018-11-02

## 2018-11-02 RX ADMIN — SILVER NITRATE APPLICATORS 1 APPLICATOR: 25; 75 STICK TOPICAL at 11:38

## 2018-11-02 NOTE — WOUND CARE
11/02/18 1104   Wound Leg Lower Right   Date First Assessed/Time First Assessed: 08/17/18 1436   POA: Yes  Wound Type: Venous  Location: Leg Lower  Wound Description (Optional): 16  Orientation: Right   DRESSING STATUS (xeroform, unna boot)   Wound Length (cm) 1 cm   Wound Width (cm) 1.1 cm   Wound Depth (cm) 0.1   Wound Surface area (cm^2) 1.1 cm^2   Change in Wound Size % -340   Condition of Base Granulation   Tissue Type Percent Pink 100   Drainage Amount  Small    Drainage Color Serous   Wound Odor None   Periwound Skin Condition Edematous   Cleansing and Cleansing Agents  Soap and water   Wound Toe (specify in comments) Left;Plantar   Date First Assessed/Time First Assessed: 10/19/18 1513   POA: Yes  Wound Type: Diabetic  Location: Toe (specify in comments)  Wound Description (Optional): 3rd toe  Orientation: Left;Plantar   DRESSING STATUS Clean, dry, and intact   DRESSING TYPE Adhesive wound dressing (Band-Aid)   Wound Length (cm) 0 cm   Wound Width (cm) 0 cm   Wound Depth (cm) 0   Wound Surface area (cm^2) 0 cm^2   Change in Wound Size % 100   Drainage Amount  None   Wound Odor None   Cleansing and Cleansing Agents  Normal saline   Wound Toe Left   Date First Assessed/Time First Assessed: 10/12/18 1622   POA: Yes  Wound Type: Diabetic  Location: Toe  Wound Description (Optional): 2nd  Orientation: Left   DRESSING STATUS (no dressing)   Wound Length (cm) 0 cm   Wound Width (cm) 0 cm   Wound Depth (cm) 0   Wound Surface area (cm^2) 0 cm^2   Change in Wound Size % 100   Drainage Amount  None   Wound Odor None   Cleansing and Cleansing Agents  Normal saline

## 2018-11-02 NOTE — WOUND CARE
[address]  [address], [address]  Phone: [phone]  Fax: [phone]    Patient: Licha Wu MRN: [id number]  SSN: [id number]    YOB: 1935  Age: 81 y.o. Sex: female       Return Appointment: 2 weeks with Fara Hightower MD    Instructions: Clean legs with soap and water. Xeroform- apply to right lower leg wound bed. Cover with ABD and wrap bilateral lower legs with unna boots. Change two times every week. Home health for twice weekly dressing changes. Should you experience increased redness, swelling, pain, foul odor, size of wound(s), or have a temperature over 101 degrees please contact the [address] [place] [address] Road at [phone] or if after hours contact your primary care physician or go to the hospital emergency department.     Signed By: Esther Marquez RN     November 2, 2018

## 2018-11-16 ENCOUNTER — HOSPITAL ENCOUNTER (OUTPATIENT)
Dept: WOUND CARE | Age: 83
End: 2018-11-16
Attending: SURGERY
Payer: MEDICARE

## 2018-11-19 NOTE — WOUND CARE
10/05/18 1452   Wound Leg Lower Right   Date First Assessed/Time First Assessed: 08/17/18 1436   POA: Yes  Wound Type: Venous  Location: Leg Lower  Wound Description (Optional): 16  Orientation: Right   DRESSING STATUS Clean, dry, and intact   DRESSING TYPE Unna boot   Wound Length (cm) 0.6 cm   Wound Width (cm) 1 cm   Wound Depth (cm) 0.1   Wound Surface area (cm^2) 0.6 cm^2   Change in Wound Size % -140   Condition of Base Granulation   Tissue Type Percent Pink 100   Drainage Amount  Small    Drainage Color Serous   Wound Odor None   Periwound Skin Condition Intact   Cleansing and Cleansing Agents  Soap and water   Dressing Type Applied (xeroform, unna boot bilaterally)

## 2018-12-03 NOTE — PROGRESS NOTES
Wound Center Progress Note    Patient: Garcia Polo MRN: [id number]  SSN: [id number]    YOB: 1935  Age: 81 y.o. Sex: female      Subjective:     Chief Complaint:  Recurrent Venous leg ulcer BLE    History of Present Illness:       Wound Caused By: venous insufficiency  Associated Signs and Symptoms: drainage, pain  Timing: since June 2018  Quality: wound  Severity: full thickness  Modifying Factors: Dm2, obesity, venous insufficiency  Current Wound Care: Multilayer compression, CHF    No new issues. Urinary soiling continues to be improved. Left 2nd toe stable.      Past Medical History:   Diagnosis Date    Acute hyponatremia 1/21/2011    Acute on chronic diastolic congestive heart failure ([address] 1/19/2011    Acute renal failure ([address] 1/21/2011    AMI (acute myocardial infarction) ([address] 2009    Anemia 9/5/2012    Arthritis     Asthma     Asthma exacerbation 11/11/2011    CAD (coronary artery disease)     MI 2009, stents heart 2009    CAD (coronary artery disease), native coronary artery 9/2/2009    Previous stent to dRCA with Cypher 3.5x33mm SABA 8/09 9/09 LAD- Xience 2.5x23mm and 3.0x18mm SABA in mid-distal LAD      Cellulitis Bilateral Lower Extremities 1/20/2011    Chest discomfort, tightness, pressure 1/28/2016    Chronic diastolic heart failure ([address] 10/20/2011    Chronic venous insufficiency 10/24/2011    Diabetes ([address]     Diabetes Mellitus Type II-insulin dependent 9/2/2009    Diverticulitis 1/28/2016    Dyslipidemia 9/2/2009    Edema 1/28/2016    GERD (gastroesophageal reflux disease)     GLAUCOMA     BILATERAL    Heart failure ([address]     Hypertension     Hypokalemia 1/28/2016    Hypoxemia 9/7/2012    Morbid obesity (HCC)     Nausea & vomiting     Neuropathy in diabetes ([address] 9/2/2009    NSTEMI (non-ST elevation myocardial infarction) ([address] 9/2/2009    Dr Mali Sinclair    Obstructive sleep apnea (adult) (pediatric)- probably  11/11/2011    TRISH (obstructive sleep apnea) 9/7/2012    Intolerant of CPAP     Other dyspnea and respiratory abnormality 11/10/2011    Other ill-defined conditions(799.89)     benign tumor in lower abdomen- not removed, diabetic ulcers, edema, neuropathy    Peripheral vascular disease ([facility] 10/20/2011    Post PTCA 6/20/2014    PVD (peripheral vascular disease) ([facility]     Unspecified anemia 1/23/2011    Unspecified sleep apnea     oxygen at night    Venous stasis of lower extremity 2010    BILATERAL W RECURRENT ADMISSIONS    Volume overload 9/6/2012      Past Surgical History:   Procedure Laterality Date    BREAST SURGERY PROCEDURE UNLISTED  1973    benign tumor left breast    CARDIAC SURG PROCEDURE UNLIST      4 stents most recent 2 yrs ago    HX CHOLECYSTECTOMY      HX COLONOSCOPY      HX GI      PTCA EACH ADDL VESSEL      stentsx3     Family History   Problem Relation Age of Onset    Cancer Father     Lung Disease Father     Cancer Brother     Diabetes Paternal Aunt     Hypertension Maternal Grandmother     Hypertension Paternal Grandmother       Social History     Tobacco Use    Smoking status: Never Smoker    Smokeless tobacco: Never Used   Substance Use Topics    Alcohol use: No       Prior to Admission medications    Medication Sig Start Date End Date Taking? Authorizing Provider   dulaglutide (TRULICITY) 4.90 FF/8.8 mL sub-q pen 0.75 mg by SubCUTAneous route every seven (7) days. Yes Provider, Historical   bumetanide (BUMEX) 2 mg tablet Take 1 Tab by mouth daily. 8/17/18   Ignacio Longoria MD   atorvastatin (LIPITOR) 80 mg tablet Take 1 Tab by mouth daily. 7/16/18   Ignacio Longoria MD   clopidogrel (PLAVIX) 75 mg tab Take 1 Tab by mouth daily. 3/2/18   Ignacio Longoria MD   metOLazone (ZAROXOLYN) 10 mg tablet Take 1 Tab by mouth daily. 1/19/18   Ignacio Longoria MD   metoprolol tartrate (LOPRESSOR) 50 mg tablet Take 1 Tab by mouth two (2) times a day.  1/19/18   Ignacio Longoria MD   aspirin 81 mg chewable tablet Take 1 Tab by mouth daily. 6/12/17   Ari Longoria MD   DULoxetine (CYMBALTA) 60 mg capsule Take 60 mg by mouth daily. Provider, Historical   insulin glargine (TOUJEO SOLOSTAR) 300 unit/mL (1.5 mL) inpn 220 Units by SubCUTAneous route daily. Provider, Historical   acetaminophen (TYLENOL EXTRA STRENGTH) 500 mg tablet Take  by mouth every six (6) hours as needed for Pain. Provider, Historical   insulin glulisine (APIDRA) 100 unit/mL injection 90 Units by SubCUTAneous route three (3) times daily. Provider, Historical   gabapentin (NEURONTIN) 400 mg capsule Take 800 mg by mouth two (2) times a day. Pt states 8 x daily      Other, MD Diego   albuterol (PROVENTIL VENTOLIN) 2.5 mg /3 mL (0.083 %) nebulizer solution 3 mL by Nebulization route three (3) times daily. 11/16/11   Cami Alcocer MD   nitroglycerin (NITROSTAT) 0.4 mg SL tablet 0.4 mg by SubLINGual route every five (5) minutes as needed. Provider, Historical   ergocalciferol (VITAMIN D) 50,000 unit capsule Take 50,000 Units by mouth every seven (7) days. Takes on wed once a month     Provider, Historical   albuterol (PROVENTIL, VENTOLIN) 90 mcg/Actuation inhaler Take 2 Puffs by inhalation every four (4) hours as needed for Wheezing. Dispense: (1) inhaler with no refills. 12/19/10   Rian Breaux PA     Allergies   Allergen Reactions    Pcn [Penicillins] Hives, Swelling and Myalgia     Can take cephalosporins    Codeine Nausea Only    Dilaudid [Hydromorphone (Bulk)] Other (comments)    Lortab [Hydrocodone-Acetaminophen] Nausea Only        Review of Systems:  Pertinent items are noted in the History of Present Illness.      Lab Results   Component Value Date/Time    Hemoglobin A1c 8.7 (H) 09/30/2011 01:07 PM        Immunization History   Administered Date(s) Administered    Influenza Vaccine Split 09/11/2012    ZZZ-RETIRED (DO NOT USE) Pneumococcal Vaccine (Unspecified Type) 09/20/2010       Body mass index is 44.73 kg/m². Counseling regarding nutrition done: Yes Pateint counsled about risks of smoking and cessation      Current medications:  Current Outpatient Medications   Medication Sig Dispense Refill    dulaglutide (TRULICITY) 8.10 VJ/2.4 mL sub-q pen 0.75 mg by SubCUTAneous route every seven (7) days.  bumetanide (BUMEX) 2 mg tablet Take 1 Tab by mouth daily. 90 Tab 3    atorvastatin (LIPITOR) 80 mg tablet Take 1 Tab by mouth daily. 90 Tab 3    clopidogrel (PLAVIX) 75 mg tab Take 1 Tab by mouth daily. 30 Tab 11    metOLazone (ZAROXOLYN) 10 mg tablet Take 1 Tab by mouth daily. 90 Tab 3    metoprolol tartrate (LOPRESSOR) 50 mg tablet Take 1 Tab by mouth two (2) times a day. 180 Tab 3    aspirin 81 mg chewable tablet Take 1 Tab by mouth daily. 90 Tab 3    DULoxetine (CYMBALTA) 60 mg capsule Take 60 mg by mouth daily.  insulin glargine (TOUJEO SOLOSTAR) 300 unit/mL (1.5 mL) inpn 220 Units by SubCUTAneous route daily.  acetaminophen (TYLENOL EXTRA STRENGTH) 500 mg tablet Take  by mouth every six (6) hours as needed for Pain.  insulin glulisine (APIDRA) 100 unit/mL injection 90 Units by SubCUTAneous route three (3) times daily.  gabapentin (NEURONTIN) 400 mg capsule Take 800 mg by mouth two (2) times a day. Pt states 8 x daily        albuterol (PROVENTIL VENTOLIN) 2.5 mg /3 mL (0.083 %) nebulizer solution 3 mL by Nebulization route three (3) times daily. 1 Package 0    nitroglycerin (NITROSTAT) 0.4 mg SL tablet 0.4 mg by SubLINGual route every five (5) minutes as needed.  ergocalciferol (VITAMIN D) 50,000 unit capsule Take 50,000 Units by mouth every seven (7) days. Takes on wed once a month       albuterol (PROVENTIL, VENTOLIN) 90 mcg/Actuation inhaler Take 2 Puffs by inhalation every four (4) hours as needed for Wheezing. Dispense: (1) inhaler with no refills.  1 g 0         Objective:     Physical Exam:     Visit Vitals  BP (!) 144/98 (BP 1 Location: Left arm)   Pulse 75   Temp 99.1 °F (37.3 °C)   Resp 18   Ht 5' 5\" (1.651 m)   Wt 121.9 kg (268 lb 12.8 oz)   SpO2 94%   BMI 44.73 kg/m²       General: Healthy appearing  Neuro: alert and oriented to person/place/situation. Otherwise nonfocal.  Derm: Normal turgor for age, otherwise per wound exam  HEENT: Normocephalic, atraumatic. EOMI. Conjunctiva clear. No scleral icterus. Neck: Normal range of motion. No masses. Chest: Resp unlabored  Cardio: Regular rate, rhythm  Abdomen: Soft, nontender  Ext. No hemosiderrosis. DP/PT 2+  Psych: cooperative. Pleasant. No anxiety or depression. Normal mood and affect. Diabetic Foot Ulcer/Neuropathic   Is Wound Greater than 1.0 sq cm ? : No  Re-Current Wound with Skin Substitue within Last Year : No  X-Ray in Last 3 Months: No  VIBHA In Last 6 Months : No  Smoking Status: Non- Smoker  Wound Free from Infection : No Culture Done  Is Wound Free of Eschar, Slough , and / or Bio-[place]: Yes  Malignant Process in Wound : No Biopsy Done  Hemoglobin A1Cin Last 3 Months: No(8.8 June 2018 per care everywhere)  Type of off Loading: Wheelchair if area off surface of wheelchair  Compression Therapy of 20mm or greater ?: Yes         Assessment:     BLE VSU, slowly improving. L resolved. R 2nd toe diabetic/traumatic wound    Plan:     Continue dressings. Slow but appropriate improvement.

## 2018-12-03 NOTE — PROGRESS NOTES
Wound Center Progress Note    Patient: Cameron Brooks MRN: [id number]  SSN: [id number]    YOB: 1935  Age: 81 y.o. Sex: female      Subjective:     Chief Complaint:  Recurrent Venous leg ulcer BLE    History of Present Illness:       Wound Caused By: venous insufficiency  Associated Signs and Symptoms: drainage, pain  Timing: since June 2018  Quality: wound  Severity: full thickness  Modifying Factors: Dm2, obesity, venous insufficiency  Current Wound Care: Multilayer compression, CHF    No new issues. Urinary soiling continues to be improved. Left 2nd toe stable.  Getting HH for wraps    Past Medical History:   Diagnosis Date    Acute hyponatremia 1/21/2011    Acute on chronic diastolic congestive heart failure ([address] 1/19/2011    Acute renal failure ([address] 1/21/2011    AMI (acute myocardial infarction) ([address] 2009    Anemia 9/5/2012    Arthritis     Asthma     Asthma exacerbation 11/11/2011    CAD (coronary artery disease)     MI 2009, stents heart 2009    CAD (coronary artery disease), native coronary artery 9/2/2009    Previous stent to dRCA with Cypher 3.5x33mm SABA 8/09 9/09 LAD- Xience 2.5x23mm and 3.0x18mm SABA in mid-distal LAD      Cellulitis Bilateral Lower Extremities 1/20/2011    Chest discomfort, tightness, pressure 1/28/2016    Chronic diastolic heart failure ([address] 10/20/2011    Chronic venous insufficiency 10/24/2011    Diabetes ([address]     Diabetes Mellitus Type II-insulin dependent 9/2/2009    Diverticulitis 1/28/2016    Dyslipidemia 9/2/2009    Edema 1/28/2016    GERD (gastroesophageal reflux disease)     GLAUCOMA     BILATERAL    Heart failure ([address]     Hypertension     Hypokalemia 1/28/2016    Hypoxemia 9/7/2012    Morbid obesity (HCC)     Nausea & vomiting     Neuropathy in diabetes ([address] 9/2/2009    NSTEMI (non-ST elevation myocardial infarction) ([address] 9/2/2009    Dr Ramiro Rahman    Obstructive sleep apnea (adult) (pediatric)- probably  11/11/2011  TRISH (obstructive sleep apnea) 9/7/2012    Intolerant of CPAP     Other dyspnea and respiratory abnormality 11/10/2011    Other ill-defined conditions(799.89)     benign tumor in lower abdomen- not removed, diabetic ulcers, edema, neuropathy    Peripheral vascular disease ([facility] 10/20/2011    Post PTCA 6/20/2014    PVD (peripheral vascular disease) ([facility]     Unspecified anemia 1/23/2011    Unspecified sleep apnea     oxygen at night    Venous stasis of lower extremity 2010    BILATERAL W RECURRENT ADMISSIONS    Volume overload 9/6/2012      Past Surgical History:   Procedure Laterality Date    BREAST SURGERY PROCEDURE UNLISTED  1973    benign tumor left breast    CARDIAC SURG PROCEDURE UNLIST      4 stents most recent 2 yrs ago    HX CHOLECYSTECTOMY      HX COLONOSCOPY      HX GI      PTCA EACH ADDL VESSEL      stentsx3     Family History   Problem Relation Age of Onset    Cancer Father     Lung Disease Father     Cancer Brother     Diabetes Paternal Aunt     Hypertension Maternal Grandmother     Hypertension Paternal Grandmother       Social History     Tobacco Use    Smoking status: Never Smoker    Smokeless tobacco: Never Used   Substance Use Topics    Alcohol use: No       Prior to Admission medications    Medication Sig Start Date End Date Taking? Authorizing Provider   dulaglutide (TRULICITY) 0.83 QE/0.2 mL sub-q pen 0.75 mg by SubCUTAneous route every seven (7) days. Provider, Historical   bumetanide (BUMEX) 2 mg tablet Take 1 Tab by mouth daily. 8/17/18   Nessmith, Jerlyn Siemens, MD   atorvastatin (LIPITOR) 80 mg tablet Take 1 Tab by mouth daily. 7/16/18   Nessmith, Jerlyn Siemens, MD   clopidogrel (PLAVIX) 75 mg tab Take 1 Tab by mouth daily. 3/2/18   Nessmith, Jerlyn Siemens, MD   metOLazone (ZAROXOLYN) 10 mg tablet Take 1 Tab by mouth daily. 1/19/18   Nessmith, Jerlyn Siemens, MD   metoprolol tartrate (LOPRESSOR) 50 mg tablet Take 1 Tab by mouth two (2) times a day.  1/19/18   Janie Longoria MD [facility]   aspirin 81 mg chewable tablet Take 1 Tab by mouth daily. 6/12/17   Jason Longoria MD   DULoxetine (CYMBALTA) 60 mg capsule Take 60 mg by mouth daily. Provider, Historical   insulin glargine (TOUJEO SOLOSTAR) 300 unit/mL (1.5 mL) inpn 220 Units by SubCUTAneous route daily. Provider, Historical   acetaminophen (TYLENOL EXTRA STRENGTH) 500 mg tablet Take  by mouth every six (6) hours as needed for Pain. Provider, Historical   insulin glulisine (APIDRA) 100 unit/mL injection 90 Units by SubCUTAneous route three (3) times daily. Provider, Historical   gabapentin (NEURONTIN) 400 mg capsule Take 800 mg by mouth two (2) times a day. Pt states 8 x daily      Other, MD Diego   albuterol (PROVENTIL VENTOLIN) 2.5 mg /3 mL (0.083 %) nebulizer solution 3 mL by Nebulization route three (3) times daily. 11/16/11   Barb Lamb MD   nitroglycerin (NITROSTAT) 0.4 mg SL tablet 0.4 mg by SubLINGual route every five (5) minutes as needed. Provider, Historical   ergocalciferol (VITAMIN D) 50,000 unit capsule Take 50,000 Units by mouth every seven (7) days. Takes on wed once a month     Provider, Historical   albuterol (PROVENTIL, VENTOLIN) 90 mcg/Actuation inhaler Take 2 Puffs by inhalation every four (4) hours as needed for Wheezing. Dispense: (1) inhaler with no refills. 12/19/10   Amirah Breaux PA     Allergies   Allergen Reactions    Pcn [Penicillins] Hives, Swelling and Myalgia     Can take cephalosporins    Codeine Nausea Only    Dilaudid [Hydromorphone (Bulk)] Other (comments)    Lortab [Hydrocodone-Acetaminophen] Nausea Only        Review of Systems:  Pertinent items are noted in the History of Present Illness.      Lab Results   Component Value Date/Time    Hemoglobin A1c 8.7 (H) 09/30/2011 01:07 PM        Immunization History   Administered Date(s) Administered    Influenza Vaccine Split 09/11/2012    ZZZ-RETIRED (DO NOT USE) Pneumococcal Vaccine (Unspecified Type) 09/20/2010 Body mass index is 44.73 kg/m². Counseling regarding nutrition done: Yes Pateint counsled about risks of smoking and cessation      Current medications:  Current Outpatient Medications   Medication Sig Dispense Refill    dulaglutide (TRULICITY) 8.79 WH/6.2 mL sub-q pen 0.75 mg by SubCUTAneous route every seven (7) days.  bumetanide (BUMEX) 2 mg tablet Take 1 Tab by mouth daily. 90 Tab 3    atorvastatin (LIPITOR) 80 mg tablet Take 1 Tab by mouth daily. 90 Tab 3    clopidogrel (PLAVIX) 75 mg tab Take 1 Tab by mouth daily. 30 Tab 11    metOLazone (ZAROXOLYN) 10 mg tablet Take 1 Tab by mouth daily. 90 Tab 3    metoprolol tartrate (LOPRESSOR) 50 mg tablet Take 1 Tab by mouth two (2) times a day. 180 Tab 3    aspirin 81 mg chewable tablet Take 1 Tab by mouth daily. 90 Tab 3    DULoxetine (CYMBALTA) 60 mg capsule Take 60 mg by mouth daily.  insulin glargine (TOUJEO SOLOSTAR) 300 unit/mL (1.5 mL) inpn 220 Units by SubCUTAneous route daily.  acetaminophen (TYLENOL EXTRA STRENGTH) 500 mg tablet Take  by mouth every six (6) hours as needed for Pain.  insulin glulisine (APIDRA) 100 unit/mL injection 90 Units by SubCUTAneous route three (3) times daily.  gabapentin (NEURONTIN) 400 mg capsule Take 800 mg by mouth two (2) times a day. Pt states 8 x daily        albuterol (PROVENTIL VENTOLIN) 2.5 mg /3 mL (0.083 %) nebulizer solution 3 mL by Nebulization route three (3) times daily. 1 Package 0    nitroglycerin (NITROSTAT) 0.4 mg SL tablet 0.4 mg by SubLINGual route every five (5) minutes as needed.  ergocalciferol (VITAMIN D) 50,000 unit capsule Take 50,000 Units by mouth every seven (7) days. Takes on wed once a month       albuterol (PROVENTIL, VENTOLIN) 90 mcg/Actuation inhaler Take 2 Puffs by inhalation every four (4) hours as needed for Wheezing. Dispense: (1) inhaler with no refills.  1 g 0         Objective:     Physical Exam:     Visit Vitals  /73 (BP 1 Location: Right arm)   Pulse (!) 103   Temp 97.7 °F (36.5 °C)   Resp 18   Ht 5' 5\" (1.651 m)   SpO2 93%   BMI 44.73 kg/m²       General: Healthy appearing  Neuro: alert and oriented to person/place/situation. Otherwise nonfocal.  Derm: Normal turgor for age, otherwise per wound exam  HEENT: Normocephalic, atraumatic. EOMI. Conjunctiva clear. No scleral icterus. Neck: Normal range of motion. No masses. Chest: Resp unlabored  Cardio: Regular rate, rhythm  Abdomen: Soft, nontender  Ext. No hemosiderrosis. DP/PT 2+  Psych: cooperative. Pleasant. No anxiety or depression. Normal mood and affect. Diabetic Foot Ulcer/Neuropathic   Is Wound Greater than 1.0 sq cm ? : Yes  Re-Current Wound with Skin Substitue within Last Year : No  X-Ray in Last 3 Months: No  VIBHA In Last 6 Months : No  Smoking Status: Non- Smoker  Wound Free from Infection : No Culture Done  Is Wound Free of Eschar, Slough , and / or Bio-[place]: Yes  Malignant Process in Wound : No Biopsy Done  Type of off Loading: Wheelchair if area off surface of wheelchair  Compression Therapy of 20mm or greater ?: Yes         Assessment:     BLE VSU, slowly improving. L resolved. R 2nd toe diabetic/traumatic wound    Plan:     Continue dressings. Slow but appropriate improvement. Continue HH.  Keep legs elevated

## 2018-12-03 NOTE — PROGRESS NOTES
Wound Center Progress Note    Patient: Jefe Hester MRN: [id number]  SSN: [id number]    YOB: 1935  Age: 81 y.o. Sex: female      Subjective:     Chief Complaint:  Recurrent Venous leg ulcer BLE    History of Present Illness:       Wound Caused By: venous insufficiency  Associated Signs and Symptoms: drainage, pain  Timing: since June 2018  Quality: wound  Severity: full thickness  Modifying Factors: Dm2, obesity, venous insufficiency  Current Wound Care: Multilayer compression, CHF    No new issues. Remains healed on the left side. Does not have help for compression and HH.      Past Medical History:   Diagnosis Date    Acute hyponatremia 1/21/2011    Acute on chronic diastolic congestive heart failure ([address] 1/19/2011    Acute renal failure ([address] 1/21/2011    AMI (acute myocardial infarction) ([address] 2009    Anemia 9/5/2012    Arthritis     Asthma     Asthma exacerbation 11/11/2011    CAD (coronary artery disease)     MI 2009, stents heart 2009    CAD (coronary artery disease), native coronary artery 9/2/2009    Previous stent to dRCA with Cypher 3.5x33mm SABA 8/09 9/09 LAD- Xience 2.5x23mm and 3.0x18mm SABA in mid-distal LAD      Cellulitis Bilateral Lower Extremities 1/20/2011    Chest discomfort, tightness, pressure 1/28/2016    Chronic diastolic heart failure ([address] 10/20/2011    Chronic venous insufficiency 10/24/2011    Diabetes ([address]     Diabetes Mellitus Type II-insulin dependent 9/2/2009    Diverticulitis 1/28/2016    Dyslipidemia 9/2/2009    Edema 1/28/2016    GERD (gastroesophageal reflux disease)     GLAUCOMA     BILATERAL    Heart failure ([address]     Hypertension     Hypokalemia 1/28/2016    Hypoxemia 9/7/2012    Morbid obesity (HCC)     Nausea & vomiting     Neuropathy in diabetes ([address] 9/2/2009    NSTEMI (non-ST elevation myocardial infarction) ([address] 9/2/2009    Dr Adela Calero    Obstructive sleep apnea (adult) (pediatric)- probably  11/11/2011    TRISH (obstructive sleep apnea) 9/7/2012    Intolerant of CPAP     Other dyspnea and respiratory abnormality 11/10/2011    Other ill-defined conditions(799.89)     benign tumor in lower abdomen- not removed, diabetic ulcers, edema, neuropathy    Peripheral vascular disease ([facility] 10/20/2011    Post PTCA 6/20/2014    PVD (peripheral vascular disease) ([facility]     Unspecified anemia 1/23/2011    Unspecified sleep apnea     oxygen at night    Venous stasis of lower extremity 2010    BILATERAL W RECURRENT ADMISSIONS    Volume overload 9/6/2012      Past Surgical History:   Procedure Laterality Date    BREAST SURGERY PROCEDURE UNLISTED  1973    benign tumor left breast    CARDIAC SURG PROCEDURE UNLIST      4 stents most recent 2 yrs ago    HX CHOLECYSTECTOMY      HX COLONOSCOPY      HX GI      PTCA EACH ADDL VESSEL      stentsx3     Family History   Problem Relation Age of Onset    Cancer Father     Lung Disease Father     Cancer Brother     Diabetes Paternal Aunt     Hypertension Maternal Grandmother     Hypertension Paternal Grandmother       Social History     Tobacco Use    Smoking status: Never Smoker    Smokeless tobacco: Never Used   Substance Use Topics    Alcohol use: No       Prior to Admission medications    Medication Sig Start Date End Date Taking? Authorizing Provider   dulaglutide (TRULICITY) 4.44 VE/2.9 mL sub-q pen 0.75 mg by SubCUTAneous route every seven (7) days. Provider, Historical   bumetanide (BUMEX) 2 mg tablet Take 1 Tab by mouth daily. 8/17/18   Ward Longoria MD   atorvastatin (LIPITOR) 80 mg tablet Take 1 Tab by mouth daily. 7/16/18   Ward Longoria MD   clopidogrel (PLAVIX) 75 mg tab Take 1 Tab by mouth daily. 3/2/18   Ward Longoria MD   metOLazone (ZAROXOLYN) 10 mg tablet Take 1 Tab by mouth daily. 1/19/18   Ward Longoria MD   metoprolol tartrate (LOPRESSOR) 50 mg tablet Take 1 Tab by mouth two (2) times a day.  1/19/18   Ward Longoria MD aspirin 81 mg chewable tablet Take 1 Tab by mouth daily. 6/12/17   Moises Longoria MD   DULoxetine (CYMBALTA) 60 mg capsule Take 60 mg by mouth daily. Provider, Historical   insulin glargine (TOUJEO SOLOSTAR) 300 unit/mL (1.5 mL) inpn 220 Units by SubCUTAneous route daily. Provider, Historical   acetaminophen (TYLENOL EXTRA STRENGTH) 500 mg tablet Take  by mouth every six (6) hours as needed for Pain. Provider, Historical   insulin glulisine (APIDRA) 100 unit/mL injection 90 Units by SubCUTAneous route three (3) times daily. Provider, Historical   gabapentin (NEURONTIN) 400 mg capsule Take 800 mg by mouth two (2) times a day. Pt states 8 x daily      Other, MD Diego   albuterol (PROVENTIL VENTOLIN) 2.5 mg /3 mL (0.083 %) nebulizer solution 3 mL by Nebulization route three (3) times daily. 11/16/11   Milagros Robles MD   nitroglycerin (NITROSTAT) 0.4 mg SL tablet 0.4 mg by SubLINGual route every five (5) minutes as needed. Provider, Historical   ergocalciferol (VITAMIN D) 50,000 unit capsule Take 50,000 Units by mouth every seven (7) days. Takes on wed once a month     Provider, Historical   albuterol (PROVENTIL, VENTOLIN) 90 mcg/Actuation inhaler Take 2 Puffs by inhalation every four (4) hours as needed for Wheezing. Dispense: (1) inhaler with no refills. 12/19/10   Liliana Breaux PA     Allergies   Allergen Reactions    Pcn [Penicillins] Hives, Swelling and Myalgia     Can take cephalosporins    Codeine Nausea Only    Dilaudid [Hydromorphone (Bulk)] Other (comments)    Lortab [Hydrocodone-Acetaminophen] Nausea Only        Review of Systems:  Pertinent items are noted in the History of Present Illness.      Lab Results   Component Value Date/Time    Hemoglobin A1c 8.7 (H) 09/30/2011 01:07 PM        Immunization History   Administered Date(s) Administered    Influenza Vaccine Split 09/11/2012    ZZZ-RETIRED (DO NOT USE) Pneumococcal Vaccine (Unspecified Type) 09/20/2010       There is no height or weight on file to calculate BMI. Counseling regarding nutrition done: Yes Pateint counsled about risks of smoking and cessation      Current medications:  Current Outpatient Medications   Medication Sig Dispense Refill    dulaglutide (TRULICITY) 7.20 LY/1.5 mL sub-q pen 0.75 mg by SubCUTAneous route every seven (7) days.  bumetanide (BUMEX) 2 mg tablet Take 1 Tab by mouth daily. 90 Tab 3    atorvastatin (LIPITOR) 80 mg tablet Take 1 Tab by mouth daily. 90 Tab 3    clopidogrel (PLAVIX) 75 mg tab Take 1 Tab by mouth daily. 30 Tab 11    metOLazone (ZAROXOLYN) 10 mg tablet Take 1 Tab by mouth daily. 90 Tab 3    metoprolol tartrate (LOPRESSOR) 50 mg tablet Take 1 Tab by mouth two (2) times a day. 180 Tab 3    aspirin 81 mg chewable tablet Take 1 Tab by mouth daily. 90 Tab 3    DULoxetine (CYMBALTA) 60 mg capsule Take 60 mg by mouth daily.  insulin glargine (TOUJEO SOLOSTAR) 300 unit/mL (1.5 mL) inpn 220 Units by SubCUTAneous route daily.  acetaminophen (TYLENOL EXTRA STRENGTH) 500 mg tablet Take  by mouth every six (6) hours as needed for Pain.  insulin glulisine (APIDRA) 100 unit/mL injection 90 Units by SubCUTAneous route three (3) times daily.  gabapentin (NEURONTIN) 400 mg capsule Take 800 mg by mouth two (2) times a day. Pt states 8 x daily        albuterol (PROVENTIL VENTOLIN) 2.5 mg /3 mL (0.083 %) nebulizer solution 3 mL by Nebulization route three (3) times daily. 1 Package 0    nitroglycerin (NITROSTAT) 0.4 mg SL tablet 0.4 mg by SubLINGual route every five (5) minutes as needed.  ergocalciferol (VITAMIN D) 50,000 unit capsule Take 50,000 Units by mouth every seven (7) days. Takes on wed once a month       albuterol (PROVENTIL, VENTOLIN) 90 mcg/Actuation inhaler Take 2 Puffs by inhalation every four (4) hours as needed for Wheezing. Dispense: (1) inhaler with no refills.  1 g 0         Objective:     Physical Exam:     There were no vitals taken for this visit. General: Healthy appearing  Neuro: alert and oriented to person/place/situation. Otherwise nonfocal.  Derm: Normal turgor for age, otherwise per wound exam  HEENT: Normocephalic, atraumatic. EOMI. Conjunctiva clear. No scleral icterus. Neck: Normal range of motion. No masses. Chest: Resp unlabored  Cardio: Regular rate, rhythm  Abdomen: Soft, nontender  Ext. No hemosiderrosis. DP/PT 2+  Psych: cooperative. Pleasant. No anxiety or depression. Normal mood and affect. Diabetic Foot Ulcer/Neuropathic   Is Wound Greater than 1.0 sq cm ? : No  Re-Current Wound with Skin Substitue within Last Year : No  X-Ray in Last 3 Months: No  Smoking Status: Non- Smoker  Wound Free from Infection : No Culture Done  Is Wound Free of Eschar, Slough , and / or Bio-[place]: Yes  Malignant Process in Wound : No Biopsy Done  Compression Therapy of 20mm or greater ?: Yes             Assessment:     BLE VSU, slowly improving    Plan:   Continue compression R. L remains healed.

## 2018-12-03 NOTE — PROGRESS NOTES
Wound Center Progress Note    Patient: Ugo Smith MRN: [id number]  SSN: [id number]    YOB: 1935  Age: 81 y.o. Sex: female      Subjective:     Chief Complaint:  Recurrent Venous leg ulcer BLE    History of Present Illness:       Wound Caused By: venous insufficiency  Associated Signs and Symptoms: drainage, pain  Timing: since June 2018  Quality: wound  Severity: full thickness  Modifying Factors: Dm2, obesity, venous insufficiency  Current Wound Care: Multilayer compression, CHF    Note new  to have issues. Soiling improved.      Past Medical History:   Diagnosis Date    Acute hyponatremia 1/21/2011    Acute on chronic diastolic congestive heart failure ([address] 1/19/2011    Acute renal failure ([address] 1/21/2011    AMI (acute myocardial infarction) ([address] 2009    Anemia 9/5/2012    Arthritis     Asthma     Asthma exacerbation 11/11/2011    CAD (coronary artery disease)     MI 2009, stents heart 2009    CAD (coronary artery disease), native coronary artery 9/2/2009    Previous stent to [facility] with Cypher 3.5x33mm SABA 8/09 9/09 LAD- Xience 2.5x23mm and 3.0x18mm SABA in mid-distal LAD      Cellulitis Bilateral Lower Extremities 1/20/2011    Chest discomfort, tightness, pressure 1/28/2016    Chronic diastolic heart failure ([address] 10/20/2011    Chronic venous insufficiency 10/24/2011    Diabetes ([address]     Diabetes Mellitus Type II-insulin dependent 9/2/2009    Diverticulitis 1/28/2016    Dyslipidemia 9/2/2009    Edema 1/28/2016    GERD (gastroesophageal reflux disease)     GLAUCOMA     BILATERAL    Heart failure ([address]     Hypertension     Hypokalemia 1/28/2016    Hypoxemia 9/7/2012    Morbid obesity (HCC)     Nausea & vomiting     Neuropathy in diabetes ([address] 9/2/2009    NSTEMI (non-ST elevation myocardial infarction) ([address] 9/2/2009    Dr Tere Sawant    Obstructive sleep apnea (adult) (pediatric)- probably  11/11/2011    TRISH (obstructive sleep apnea) 9/7/2012 Intolerant of CPAP     Other dyspnea and respiratory abnormality 11/10/2011    Other ill-defined conditions(799.89)     benign tumor in lower abdomen- not removed, diabetic ulcers, edema, neuropathy    Peripheral vascular disease ([facility] 10/20/2011    Post PTCA 6/20/2014    PVD (peripheral vascular disease) ([facility]     Unspecified anemia 1/23/2011    Unspecified sleep apnea     oxygen at night    Venous stasis of lower extremity 2010    BILATERAL W RECURRENT ADMISSIONS    Volume overload 9/6/2012      Past Surgical History:   Procedure Laterality Date    BREAST SURGERY PROCEDURE UNLISTED  1973    benign tumor left breast    CARDIAC SURG PROCEDURE UNLIST      4 stents most recent 2 yrs ago    HX CHOLECYSTECTOMY      HX COLONOSCOPY      HX GI      PTCA EACH ADDL VESSEL      stentsx3     Family History   Problem Relation Age of Onset    Cancer Father     Lung Disease Father     Cancer Brother     Diabetes Paternal Aunt     Hypertension Maternal Grandmother     Hypertension Paternal Grandmother       Social History     Tobacco Use    Smoking status: Never Smoker    Smokeless tobacco: Never Used   Substance Use Topics    Alcohol use: No       Prior to Admission medications    Medication Sig Start Date End Date Taking? Authorizing Provider   dulaglutide (TRULICITY) 7.97 EJ/2.2 mL sub-q pen 0.75 mg by SubCUTAneous route every seven (7) days. Provider, Historical   bumetanide (BUMEX) 2 mg tablet Take 1 Tab by mouth daily. 8/17/18   Char Longoria MD   atorvastatin (LIPITOR) 80 mg tablet Take 1 Tab by mouth daily. 7/16/18   Char Longoria MD   clopidogrel (PLAVIX) 75 mg tab Take 1 Tab by mouth daily. 3/2/18   Char Longoria MD   metOLazone (ZAROXOLYN) 10 mg tablet Take 1 Tab by mouth daily. 1/19/18   Char Longoria MD   metoprolol tartrate (LOPRESSOR) 50 mg tablet Take 1 Tab by mouth two (2) times a day.  1/19/18   Char Longoria MD   aspirin 81 mg chewable tablet Take 1 Tab by mouth daily. 6/12/17   Checo Longoria MD   DULoxetine (CYMBALTA) 60 mg capsule Take 60 mg by mouth daily. Provider, Historical   insulin glargine (TOUJEO SOLOSTAR) 300 unit/mL (1.5 mL) inpn 220 Units by SubCUTAneous route daily. Provider, Historical   acetaminophen (TYLENOL EXTRA STRENGTH) 500 mg tablet Take  by mouth every six (6) hours as needed for Pain. Provider, Historical   insulin glulisine (APIDRA) 100 unit/mL injection 90 Units by SubCUTAneous route three (3) times daily. Provider, Historical   gabapentin (NEURONTIN) 400 mg capsule Take 800 mg by mouth two (2) times a day. Pt states 8 x daily      Other, MD Diego   albuterol (PROVENTIL VENTOLIN) 2.5 mg /3 mL (0.083 %) nebulizer solution 3 mL by Nebulization route three (3) times daily. 11/16/11   Iva Jennings MD   nitroglycerin (NITROSTAT) 0.4 mg SL tablet 0.4 mg by SubLINGual route every five (5) minutes as needed. Provider, Historical   ergocalciferol (VITAMIN D) 50,000 unit capsule Take 50,000 Units by mouth every seven (7) days. Takes on wed once a month     Provider, Historical   albuterol (PROVENTIL, VENTOLIN) 90 mcg/Actuation inhaler Take 2 Puffs by inhalation every four (4) hours as needed for Wheezing. Dispense: (1) inhaler with no refills. 12/19/10   Hoshko, Onita [place], PA     Allergies   Allergen Reactions    Pcn [Penicillins] Hives, Swelling and Myalgia     Can take cephalosporins    Codeine Nausea Only    Dilaudid [Hydromorphone (Bulk)] Other (comments)    Lortab [Hydrocodone-Acetaminophen] Nausea Only        Review of Systems:  Pertinent items are noted in the History of Present Illness. Lab Results   Component Value Date/Time    Hemoglobin A1c 8.7 (H) 09/30/2011 01:07 PM        Immunization History   Administered Date(s) Administered    Influenza Vaccine Split 09/11/2012    ZZZ-RETIRED (DO NOT USE) Pneumococcal Vaccine (Unspecified Type) 09/20/2010       Body mass index is 43.36 kg/m².     Counseling regarding nutrition done: Yes Pateint counsled about risks of smoking and cessation      Current medications:  Current Outpatient Medications   Medication Sig Dispense Refill    dulaglutide (TRULICITY) 4.50 YK/3.2 mL sub-q pen 0.75 mg by SubCUTAneous route every seven (7) days.  bumetanide (BUMEX) 2 mg tablet Take 1 Tab by mouth daily. 90 Tab 3    atorvastatin (LIPITOR) 80 mg tablet Take 1 Tab by mouth daily. 90 Tab 3    clopidogrel (PLAVIX) 75 mg tab Take 1 Tab by mouth daily. 30 Tab 11    metOLazone (ZAROXOLYN) 10 mg tablet Take 1 Tab by mouth daily. 90 Tab 3    metoprolol tartrate (LOPRESSOR) 50 mg tablet Take 1 Tab by mouth two (2) times a day. 180 Tab 3    aspirin 81 mg chewable tablet Take 1 Tab by mouth daily. 90 Tab 3    DULoxetine (CYMBALTA) 60 mg capsule Take 60 mg by mouth daily.  insulin glargine (TOUJEO SOLOSTAR) 300 unit/mL (1.5 mL) inpn 220 Units by SubCUTAneous route daily.  acetaminophen (TYLENOL EXTRA STRENGTH) 500 mg tablet Take  by mouth every six (6) hours as needed for Pain.  insulin glulisine (APIDRA) 100 unit/mL injection 90 Units by SubCUTAneous route three (3) times daily.  gabapentin (NEURONTIN) 400 mg capsule Take 800 mg by mouth two (2) times a day. Pt states 8 x daily        albuterol (PROVENTIL VENTOLIN) 2.5 mg /3 mL (0.083 %) nebulizer solution 3 mL by Nebulization route three (3) times daily. 1 Package 0    nitroglycerin (NITROSTAT) 0.4 mg SL tablet 0.4 mg by SubLINGual route every five (5) minutes as needed.  ergocalciferol (VITAMIN D) 50,000 unit capsule Take 50,000 Units by mouth every seven (7) days. Takes on wed once a month       albuterol (PROVENTIL, VENTOLIN) 90 mcg/Actuation inhaler Take 2 Puffs by inhalation every four (4) hours as needed for Wheezing. Dispense: (1) inhaler with no refills.  1 g 0         Objective:     Physical Exam:     Visit Vitals  /73 (BP 1 Location: Left arm)   Pulse 74   Temp 98.4 °F (36.9 °C)   Resp 22 Ht 5' 5\" (1.651 m)   BMI 43.36 kg/m²       General: Healthy appearing  Neuro: alert and oriented to person/place/situation. Otherwise nonfocal.  Derm: Normal turgor for age, otherwise per wound exam  HEENT: Normocephalic, atraumatic. EOMI. Conjunctiva clear. No scleral icterus. Neck: Normal range of motion. No masses. Chest: Resp unlabored  Cardio: Regular rate, rhythm  Abdomen: Soft, nontender  Ext. No hemosiderrosis. DP/PT 2+  Psych: cooperative. Pleasant. No anxiety or depression. Normal mood and affect. Diabetic Foot Ulcer/Neuropathic   Is Wound Greater than 1.0 sq cm ? : No  Re-Current Wound with Skin Substitue within Last Year : No  X-Ray in Last 3 Months: No  VIBHA In Last 6 Months : No  Smoking Status: Non- Smoker  Wound Free from Infection : No Culture Done  Is Wound Free of Eschar, Slough , and / or Bio-[place]: Yes  Malignant Process in Wound : No Biopsy Done  Compression Therapy of 20mm or greater ?: Yes         Assessment:     BLE VSU, slowly improving    Plan:     Healed on left. Can't do unilateral compression as does not have the help.

## 2018-12-03 NOTE — PROGRESS NOTES
Wound Center Progress Note    Patient: Devne Cosby MRN: [id number]  SSN: [id number]    YOB: 1935  Age: 81 y.o. Sex: female      Subjective:     Chief Complaint:  Recurrent Venous leg ulcer BLE    History of Present Illness:       Wound Caused By: venous insufficiency  Associated Signs and Symptoms: drainage, pain  Timing: since June 2018  Quality: wound  Severity: full thickness  Modifying Factors: Dm2, obesity, venous insufficiency  Current Wound Care: Multilayer compression, CHF    No new  to have issues. Soiling continues to be improved.  Took left 2nd toenail off stubbing it    Past Medical History:   Diagnosis Date    Acute hyponatremia 1/21/2011    Acute on chronic diastolic congestive heart failure ([address] 1/19/2011    Acute renal failure ([address] 1/21/2011    AMI (acute myocardial infarction) ([address] 2009    Anemia 9/5/2012    Arthritis     Asthma     Asthma exacerbation 11/11/2011    CAD (coronary artery disease)     MI 2009, stents heart 2009    CAD (coronary artery disease), native coronary artery 9/2/2009    Previous stent to CA with Cypher 3.5x33mm SABA 8/09 9/09 LAD- Xience 2.5x23mm and 3.0x18mm SABA in mid-distal LAD      Cellulitis Bilateral Lower Extremities 1/20/2011    Chest discomfort, tightness, pressure 1/28/2016    Chronic diastolic heart failure ([address] 10/20/2011    Chronic venous insufficiency 10/24/2011    Diabetes ([address]     Diabetes Mellitus Type II-insulin dependent 9/2/2009    Diverticulitis 1/28/2016    Dyslipidemia 9/2/2009    Edema 1/28/2016    GERD (gastroesophageal reflux disease)     GLAUCOMA     BILATERAL    Heart failure ([address]     Hypertension     Hypokalemia 1/28/2016    Hypoxemia 9/7/2012    Morbid obesity (HCC)     Nausea & vomiting     Neuropathy in diabetes ([address] 9/2/2009    NSTEMI (non-ST elevation myocardial infarction) ([address] 9/2/2009    Dr Megan Woods    Obstructive sleep apnea (adult) (pediatric)- probably  11/11/2011    TRISH (obstructive sleep apnea) 9/7/2012    Intolerant of CPAP     Other dyspnea and respiratory abnormality 11/10/2011    Other ill-defined conditions(799.89)     benign tumor in lower abdomen- not removed, diabetic ulcers, edema, neuropathy    Peripheral vascular disease ([facility] 10/20/2011    Post PTCA 6/20/2014    PVD (peripheral vascular disease) ([facility]     Unspecified anemia 1/23/2011    Unspecified sleep apnea     oxygen at night    Venous stasis of lower extremity 2010    BILATERAL W RECURRENT ADMISSIONS    Volume overload 9/6/2012      Past Surgical History:   Procedure Laterality Date    BREAST SURGERY PROCEDURE UNLISTED  1973    benign tumor left breast    CARDIAC SURG PROCEDURE UNLIST      4 stents most recent 2 yrs ago    HX CHOLECYSTECTOMY      HX COLONOSCOPY      HX GI      PTCA EACH ADDL VESSEL      stentsx3     Family History   Problem Relation Age of Onset    Cancer Father     Lung Disease Father     Cancer Brother     Diabetes Paternal Aunt     Hypertension Maternal Grandmother     Hypertension Paternal Grandmother       Social History     Tobacco Use    Smoking status: Never Smoker    Smokeless tobacco: Never Used   Substance Use Topics    Alcohol use: No       Prior to Admission medications    Medication Sig Start Date End Date Taking? Authorizing Provider   dulaglutide (TRULICITY) 3.75 YH/9.0 mL sub-q pen 0.75 mg by SubCUTAneous route every seven (7) days. Provider, Historical   bumetanide (BUMEX) 2 mg tablet Take 1 Tab by mouth daily. 8/17/18   Heather Longoria MD   atorvastatin (LIPITOR) 80 mg tablet Take 1 Tab by mouth daily. 7/16/18   Heather Longoria MD   clopidogrel (PLAVIX) 75 mg tab Take 1 Tab by mouth daily. 3/2/18   Heather Longoria MD   metOLazone (ZAROXOLYN) 10 mg tablet Take 1 Tab by mouth daily. 1/19/18   Heather Longoria MD   metoprolol tartrate (LOPRESSOR) 50 mg tablet Take 1 Tab by mouth two (2) times a day.  1/19/18   Heather Longoria MD aspirin 81 mg chewable tablet Take 1 Tab by mouth daily. 6/12/17   Toni Longoria MD   DULoxetine (CYMBALTA) 60 mg capsule Take 60 mg by mouth daily. Provider, Historical   insulin glargine (TOUJEO SOLOSTAR) 300 unit/mL (1.5 mL) inpn 220 Units by SubCUTAneous route daily. Provider, Historical   acetaminophen (TYLENOL EXTRA STRENGTH) 500 mg tablet Take  by mouth every six (6) hours as needed for Pain. Provider, Historical   insulin glulisine (APIDRA) 100 unit/mL injection 90 Units by SubCUTAneous route three (3) times daily. Provider, Historical   gabapentin (NEURONTIN) 400 mg capsule Take 800 mg by mouth two (2) times a day. Pt states 8 x daily      Other, MD Diego   albuterol (PROVENTIL VENTOLIN) 2.5 mg /3 mL (0.083 %) nebulizer solution 3 mL by Nebulization route three (3) times daily. 11/16/11   Khadijah Connelly MD   nitroglycerin (NITROSTAT) 0.4 mg SL tablet 0.4 mg by SubLINGual route every five (5) minutes as needed. Provider, Historical   ergocalciferol (VITAMIN D) 50,000 unit capsule Take 50,000 Units by mouth every seven (7) days. Takes on wed once a month     Provider, Historical   albuterol (PROVENTIL, VENTOLIN) 90 mcg/Actuation inhaler Take 2 Puffs by inhalation every four (4) hours as needed for Wheezing. Dispense: (1) inhaler with no refills. 12/19/10   Ludwin Breaux PA     Allergies   Allergen Reactions    Pcn [Penicillins] Hives, Swelling and Myalgia     Can take cephalosporins    Codeine Nausea Only    Dilaudid [Hydromorphone (Bulk)] Other (comments)    Lortab [Hydrocodone-Acetaminophen] Nausea Only        Review of Systems:  Pertinent items are noted in the History of Present Illness.      Lab Results   Component Value Date/Time    Hemoglobin A1c 8.7 (H) 09/30/2011 01:07 PM        Immunization History   Administered Date(s) Administered    Influenza Vaccine Split 09/11/2012    ZZZ-RETIRED (DO NOT USE) Pneumococcal Vaccine (Unspecified Type) 09/20/2010       Body mass index is 43.36 kg/m². Counseling regarding nutrition done: Yes Pateint counsled about risks of smoking and cessation      Current medications:  Current Outpatient Medications   Medication Sig Dispense Refill    dulaglutide (TRULICITY) 1.95 BF/2.6 mL sub-q pen 0.75 mg by SubCUTAneous route every seven (7) days.  bumetanide (BUMEX) 2 mg tablet Take 1 Tab by mouth daily. 90 Tab 3    atorvastatin (LIPITOR) 80 mg tablet Take 1 Tab by mouth daily. 90 Tab 3    clopidogrel (PLAVIX) 75 mg tab Take 1 Tab by mouth daily. 30 Tab 11    metOLazone (ZAROXOLYN) 10 mg tablet Take 1 Tab by mouth daily. 90 Tab 3    metoprolol tartrate (LOPRESSOR) 50 mg tablet Take 1 Tab by mouth two (2) times a day. 180 Tab 3    aspirin 81 mg chewable tablet Take 1 Tab by mouth daily. 90 Tab 3    DULoxetine (CYMBALTA) 60 mg capsule Take 60 mg by mouth daily.  insulin glargine (TOUJEO SOLOSTAR) 300 unit/mL (1.5 mL) inpn 220 Units by SubCUTAneous route daily.  acetaminophen (TYLENOL EXTRA STRENGTH) 500 mg tablet Take  by mouth every six (6) hours as needed for Pain.  insulin glulisine (APIDRA) 100 unit/mL injection 90 Units by SubCUTAneous route three (3) times daily.  gabapentin (NEURONTIN) 400 mg capsule Take 800 mg by mouth two (2) times a day. Pt states 8 x daily        albuterol (PROVENTIL VENTOLIN) 2.5 mg /3 mL (0.083 %) nebulizer solution 3 mL by Nebulization route three (3) times daily. 1 Package 0    nitroglycerin (NITROSTAT) 0.4 mg SL tablet 0.4 mg by SubLINGual route every five (5) minutes as needed.  ergocalciferol (VITAMIN D) 50,000 unit capsule Take 50,000 Units by mouth every seven (7) days. Takes on wed once a month       albuterol (PROVENTIL, VENTOLIN) 90 mcg/Actuation inhaler Take 2 Puffs by inhalation every four (4) hours as needed for Wheezing. Dispense: (1) inhaler with no refills.  1 g 0         Objective:     Physical Exam:     Visit Vitals  /86 (BP 1 Location: Left arm)   Pulse 78   Temp 97.6 °F (36.4 °C)   Resp 20   Ht 5' 5\" (1.651 m)   SpO2 92%   BMI 43.36 kg/m²       General: Healthy appearing  Neuro: alert and oriented to person/place/situation. Otherwise nonfocal.  Derm: Normal turgor for age, otherwise per wound exam  HEENT: Normocephalic, atraumatic. EOMI. Conjunctiva clear. No scleral icterus. Neck: Normal range of motion. No masses. Chest: Resp unlabored  Cardio: Regular rate, rhythm  Abdomen: Soft, nontender  Ext. No hemosiderrosis. DP/PT 2+  Psych: cooperative. Pleasant. No anxiety or depression. Normal mood and affect. Diabetic Foot Ulcer/Neuropathic   Is Wound Greater than 1.0 sq cm ? : No  Re-Current Wound with Skin Substitue within Last Year : No  X-Ray in Last 3 Months: No  VIBHA In Last 6 Months : No  Smoking Status: Non- Smoker  Wound Free from Infection : No Culture Done  Is Wound Free of Eschar, Slough , and / or Bio[place]: Yes  Malignant Process in Wound : No Biopsy Done  Compression Therapy of 20mm or greater ?: Yes         Assessment:     BLE VSU, slowly improving    Plan:     Healed on left. New toe wound. Continue compression and start dressings to the toe.

## 2018-12-21 ENCOUNTER — HOSPITAL ENCOUNTER (OUTPATIENT)
Dept: WOUND CARE | Age: 83
Discharge: HOME | End: 2018-12-21
Attending: SURGERY
Payer: MEDICARE

## 2018-12-21 VITALS
BODY MASS INDEX: 44.73 KG/M2 | SYSTOLIC BLOOD PRESSURE: 189 MMHG | OXYGEN SATURATION: 90 % | RESPIRATION RATE: 18 BRPM | HEART RATE: 80 BPM | HEIGHT: 65 IN | DIASTOLIC BLOOD PRESSURE: 74 MMHG | TEMPERATURE: 98.5 F

## 2018-12-21 PROCEDURE — 29580 STRAPPING UNNA BOOT: CPT

## 2018-12-21 NOTE — WOUND CARE
12/21/18 1432   Wound Leg Lower Right   Date First Assessed/Time First Assessed: 08/17/18 1436   POA: Yes  Wound Type: Venous  Location: Leg Lower  Wound Description (Optional): 16  Orientation: Right   DRESSING STATUS Clean, dry, and intact   DRESSING TYPE (xeroform, ABD, Unna Boot)   Wound Length (cm) 1.3 cm   Wound Width (cm) 1 cm   Wound Depth (cm) 0.1   Wound Surface area (cm^2) 1.3 cm^2   Change in Wound Size % -420   Condition of Base Granulation   Tissue Type Percent Red 100   Drainage Amount  Small    Drainage Color Serous   Wound Odor None   Periwound Skin Condition Intact   Cleansing and Cleansing Agents  Normal saline

## 2018-12-21 NOTE — WOUND CARE
[address]  [address], [address]  Phone: [phone]  Fax: [phone]    Patient: Abelino Pearson MRN: [id number]  SSN: [id number]    YOB: 1935  Age: 81 y.o. Sex: female       Return Appointment: 2 weeks with Aleksandr Jones MD    Instructions: Return Appointment: 2 weeks with Aleksandr Jones MD     Instructions: Clean legs with soap and water. Xeroform- apply to right lower leg wound bed. Cover with ABD and wrap bilateral lower legs with unna boots. Change two times every week. Home health for twice weekly dressing changes. Should you experience increased redness, swelling, pain, foul odor, size of wound(s), or have a temperature over 101 degrees please contact the [address] [place] [address] Road at [phone] or if after hours contact your primary care physician or go to the hospital emergency department.     Signed By: Ismael Jerez RN     December 21, 2018

## 2019-01-18 ENCOUNTER — HOSPITAL ENCOUNTER (OUTPATIENT)
Dept: WOUND CARE | Age: 84
Discharge: HOME | End: 2019-01-18
Attending: SURGERY
Payer: MEDICARE

## 2019-01-18 VITALS
DIASTOLIC BLOOD PRESSURE: 75 MMHG | TEMPERATURE: 97.8 F | HEART RATE: 83 BPM | SYSTOLIC BLOOD PRESSURE: 162 MMHG | BODY MASS INDEX: 44.73 KG/M2 | HEIGHT: 65 IN | RESPIRATION RATE: 18 BRPM | OXYGEN SATURATION: 93 %

## 2019-01-18 PROCEDURE — 29580 STRAPPING UNNA BOOT: CPT

## 2019-01-18 NOTE — WOUND CARE
[address]  [address], [address]  Phone: [phone]  Fax: [phone]    Patient: Rand Del Rio MRN: [id number]  SSN: [id number]    YOB: 1935  Age: 81 y.o. Sex: female       Return Appointment: 2 weeks with Taryn Henderson MD    Instructions: Clean legs with soap and water. Xeroform- apply to right & left lower leg wound bed. Cover with ABD and wrap bilateral lower legs with unna boots. Change two times every week. Home health for twice weekly dressing changes. Should you experience increased redness, swelling, pain, foul odor, size of wound(s), or have a temperature over 101 degrees please contact the [address] [place] [address] Road at [phone] or if after hours contact your primary care physician or go to the hospital emergency department.     Signed By: Sobeida Vásquez RN     January 18, 2019

## 2019-01-18 NOTE — WOUND CARE
01/18/19 1317   Wound Leg Lower Right   Date First Assessed/Time First Assessed: 08/17/18 1436   POA: Yes  Wound Type: Venous  Location: Leg Lower  Wound Description (Optional): 16  Orientation: Right   DRESSING STATUS Clean, dry, and intact   DRESSING TYPE (xeroform, unna boot)   Wound Length (cm) 1.4 cm   Wound Width (cm) 1 cm   Wound Depth (cm) 0.1   Wound Surface area (cm^2) 1.4 cm^2   Change in Wound Size % -460   Condition of Base Granulation   Tissue Type Percent Red 100   Drainage Amount  Small    Drainage Color Serous   Wound Odor None   Periwound Skin Condition Intact   Cleansing and Cleansing Agents  Normal saline   Wound Leg Lower Left;Medial   Date First Assessed/Time First Assessed: 01/18/19 1325   POA: Yes  Wound Type: Trauma  Location: Leg Lower  Orientation: Left;Medial   DRESSING STATUS Clean, dry, and intact   DRESSING TYPE (unna boot)   Wound Length (cm) 1 cm   Wound Width (cm) 0.4 cm   Wound Depth (cm) 0.1   Wound Surface area (cm^2) 0.4 cm^2   Condition of Base Granulation   Tissue Type Percent Red 100   Drainage Amount  Scant   Drainage Color Serous   Wound Odor None   Periwound Skin Condition Intact   Cleansing and Cleansing Agents  Normal saline

## 2019-02-01 ENCOUNTER — APPOINTMENT (OUTPATIENT)
Dept: WOUND CARE | Age: 84
End: 2019-02-01
Attending: SURGERY

## 2019-03-01 ENCOUNTER — HOSPITAL ENCOUNTER (OUTPATIENT)
Dept: WOUND CARE | Age: 84
Discharge: HOME | End: 2019-03-01
Attending: SURGERY
Payer: MEDICARE

## 2019-03-01 VITALS
TEMPERATURE: 98.1 F | OXYGEN SATURATION: 91 % | HEART RATE: 87 BPM | RESPIRATION RATE: 18 BRPM | DIASTOLIC BLOOD PRESSURE: 66 MMHG | SYSTOLIC BLOOD PRESSURE: 150 MMHG | WEIGHT: 256.4 LBS | HEIGHT: 65 IN | BODY MASS INDEX: 42.72 KG/M2

## 2019-03-01 PROCEDURE — 29580 STRAPPING UNNA BOOT: CPT

## 2019-03-01 NOTE — WOUND CARE
[address]  [address], [address] [facility]   Phone: [phone]  Fax: [phone]    Patient: Ela Fournier MRN: [id number]  SSN: [id number]    YOB: 1935  Age: 81 y.o. Sex: female       Return Appointment: 2 weeks with Noah Perdomo MD    Instructions: Return Appointment: 2 weeks with Noah Perdomo MD     Instructions: Clean legs with soap and water. Xeroform- apply to right lower leg wound bed. Cover with ABD and wrap bilateral lower legs with unna boots. Change two times every week. Home health for twice weekly dressing changes.          Should you experience increased redness, swelling, pain, foul odor, size of wound(s), or have a temperature over 101 degrees please contact the [address] [place] [address] Road at [phone] or if after hours contact your primary care physician or go to the hospital emergency department.     Signed By: Eduin Cox RN     March 1, 2019

## 2019-03-01 NOTE — WOUND CARE
03/01/19 1341   Wound Leg Lower Left;Medial   Date First Assessed/Time First Assessed: 01/18/19 1325   POA: Yes  Wound Type: Trauma  Location: Leg Lower  Orientation: Left;Medial   Dressing Status  Clean, dry, and intact   Dressing Type  (unna boot)   Wound Length (cm) 0 cm   Wound Width (cm) 0 cm   Wound Depth (cm) 0   Wound Surface area (cm^2) 0 cm^2   Change in Wound Size % 100   Condition of Base Epithelializing   Tissue Type Percent Pink 100   Drainage Amount  None   Wound Odor None   Periwound Skin Condition Intact   Cleansing and Cleansing Agents  Soap and water   Wound Leg Lower Right   Date First Assessed/Time First Assessed: 08/17/18 1436   POA: Yes  Wound Type: Venous  Location: Leg Lower  Wound Description (Optional): 16  Orientation: Right   Dressing Status  Clean, dry, and intact   Dressing Type  (xeroform, unna boot)   Wound Length (cm) 0.9 cm   Wound Width (cm) 0.9 cm   Wound Depth (cm) 0.1   Wound Surface area (cm^2) 0.81 cm^2   Change in Wound Size % -224   Condition of Base Granulation   Tissue Type Percent Red 100   Drainage Amount  Small    Drainage Color Serous   Wound Odor None   Periwound Skin Condition Intact   Cleansing and Cleansing Agents  Soap and water           Patient is taking Aspirin 81 mg and Plavix 75 mg daily.

## 2019-03-01 NOTE — PROGRESS NOTES
Wound Center Progress Note    Patient: Daisha Mayo MRN: [id number]  SSN: [id number]    YOB: 1935  Age: 81 y.o. Sex: female      Subjective:     Chief Complaint:  Recurrent Venous leg ulcer BLE    History of Present Illness:       Wound Caused By: venous insufficiency  Associated Signs and Symptoms: drainage, pain  Timing: since June 2018  Quality: wound  Severity: full thickness  Modifying Factors: Dm2, obesity, venous insufficiency  Current Wound Care: Multilayer compression, CHF    12/21/2018  No new issues. Urinary soiling continues to be improved. Left 2nd toe stable.  Getting HH for wraps    Past Medical History:   Diagnosis Date    Acute hyponatremia 1/21/2011    Acute on chronic diastolic congestive heart failure ([address] 1/19/2011    Acute renal failure ([address] 1/21/2011    AMI (acute myocardial infarction) ([address] 2009    Anemia 9/5/2012    Arthritis     Asthma     Asthma exacerbation 11/11/2011    CAD (coronary artery disease)     MI 2009, stents heart 2009    CAD (coronary artery disease), native coronary artery 9/2/2009    Previous stent to [facility] with Cypher 3.5x33mm SABA 8/09 9/09 LAD- Xience 2.5x23mm and 3.0x18mm SABA in mid-distal LAD      Cellulitis Bilateral Lower Extremities 1/20/2011    Chest discomfort, tightness, pressure 1/28/2016    Chronic diastolic heart failure ([address] 10/20/2011    Chronic venous insufficiency 10/24/2011    Diabetes ([address]     Diabetes Mellitus Type II-insulin dependent 9/2/2009    Diverticulitis 1/28/2016    Dyslipidemia 9/2/2009    Edema 1/28/2016    GERD (gastroesophageal reflux disease)     GLAUCOMA     BILATERAL    Heart failure ([address]     Hypertension     Hypokalemia 1/28/2016    Hypoxemia 9/7/2012    Morbid obesity (HCC)     Nausea & vomiting     Neuropathy in diabetes ([address] 9/2/2009    NSTEMI (non-ST elevation myocardial infarction) ([address] 9/2/2009    Dr Iraj Doshi    Obstructive sleep apnea (adult) (pediatric)- probably 11/11/2011    TRISH (obstructive sleep apnea) 9/7/2012    Intolerant of CPAP     Other dyspnea and respiratory abnormality 11/10/2011    Other ill-defined conditions(799.89)     benign tumor in lower abdomen- not removed, diabetic ulcers, edema, neuropathy    Peripheral vascular disease ([facility] 10/20/2011    Post PTCA 6/20/2014    PVD (peripheral vascular disease) ([facility]     Unspecified anemia 1/23/2011    Unspecified sleep apnea     oxygen at night    Venous stasis of lower extremity 2010    BILATERAL W RECURRENT ADMISSIONS    Volume overload 9/6/2012      Past Surgical History:   Procedure Laterality Date    BREAST SURGERY PROCEDURE UNLISTED  1973    benign tumor left breast    CARDIAC SURG PROCEDURE UNLIST      4 stents most recent 2 yrs ago    HX CHOLECYSTECTOMY      HX COLONOSCOPY      HX GI      PTCA EACH ADDL VESSEL      stentsx3     Family History   Problem Relation Age of Onset    Cancer Father     Lung Disease Father     Cancer Brother     Diabetes Paternal Aunt     Hypertension Maternal Grandmother     Hypertension Paternal Grandmother       Social History     Tobacco Use    Smoking status: Never Smoker    Smokeless tobacco: Never Used   Substance Use Topics    Alcohol use: No       Prior to Admission medications    Medication Sig Start Date End Date Taking? Authorizing Provider   dulaglutide (TRULICITY) 5.70 CZ/1.8 mL sub-q pen 0.75 mg by SubCUTAneous route every seven (7) days. Provider, Historical   bumetanide (BUMEX) 2 mg tablet Take 1 Tab by mouth daily. 8/17/18   Maribell Longoria MD   atorvastatin (LIPITOR) 80 mg tablet Take 1 Tab by mouth daily. 7/16/18   Maribell Longoria MD   clopidogrel (PLAVIX) 75 mg tab Take 1 Tab by mouth daily. 3/2/18   Maribell Longoria MD   metOLazone (ZAROXOLYN) 10 mg tablet Take 1 Tab by mouth daily. 1/19/18   Maribell Longoria MD   metoprolol tartrate (LOPRESSOR) 50 mg tablet Take 1 Tab by mouth two (2) times a day.  1/19/18 Lorrie Smyth MD   aspirin 81 mg chewable tablet Take 1 Tab by mouth daily. 6/12/17   Annette Longoria MD   DULoxetine (CYMBALTA) 60 mg capsule Take 60 mg by mouth daily. Provider, Historical   insulin glargine (TOUJEO SOLOSTAR) 300 unit/mL (1.5 mL) inpn 220 Units by SubCUTAneous route daily. Provider, Historical   acetaminophen (TYLENOL EXTRA STRENGTH) 500 mg tablet Take  by mouth every six (6) hours as needed for Pain. Provider, Historical   insulin glulisine (APIDRA) 100 unit/mL injection 90 Units by SubCUTAneous route three (3) times daily. Provider, Historical   gabapentin (NEURONTIN) 400 mg capsule Take 800 mg by mouth two (2) times a day. Pt states 8 x daily      Other, MD Diego   albuterol (PROVENTIL VENTOLIN) 2.5 mg /3 mL (0.083 %) nebulizer solution 3 mL by Nebulization route three (3) times daily. 11/16/11   Gene Mosher MD   nitroglycerin (NITROSTAT) 0.4 mg SL tablet 0.4 mg by SubLINGual route every five (5) minutes as needed. Provider, Historical   ergocalciferol (VITAMIN D) 50,000 unit capsule Take 50,000 Units by mouth every seven (7) days. Takes on wed once a month     Provider, Historical   albuterol (PROVENTIL, VENTOLIN) 90 mcg/Actuation inhaler Take 2 Puffs by inhalation every four (4) hours as needed for Wheezing. Dispense: (1) inhaler with no refills. 12/19/10   Adriel Breaux PA     Allergies   Allergen Reactions    Pcn [Penicillins] Hives, Swelling and Myalgia     Can take cephalosporins    Codeine Nausea Only    Dilaudid [Hydromorphone (Bulk)] Other (comments)    Lortab [Hydrocodone-Acetaminophen] Nausea Only        Review of Systems:  Pertinent items are noted in the History of Present Illness.      Lab Results   Component Value Date/Time    Hemoglobin A1c 8.7 (H) 09/30/2011 01:07 PM        Immunization History   Administered Date(s) Administered    Influenza Vaccine Split 09/11/2012    ZZZ-RETIRED (DO NOT USE) Pneumococcal Vaccine (Unspecified Type) 09/20/2010       Body mass index is 44.73 kg/m². Counseling regarding nutrition done: Yes Pateint counsled about risks of smoking and cessation      Current medications:  Current Outpatient Medications   Medication Sig Dispense Refill    dulaglutide (TRULICITY) 9.05 HY/8.0 mL sub-q pen 0.75 mg by SubCUTAneous route every seven (7) days.  bumetanide (BUMEX) 2 mg tablet Take 1 Tab by mouth daily. 90 Tab 3    atorvastatin (LIPITOR) 80 mg tablet Take 1 Tab by mouth daily. 90 Tab 3    clopidogrel (PLAVIX) 75 mg tab Take 1 Tab by mouth daily. 30 Tab 11    metOLazone (ZAROXOLYN) 10 mg tablet Take 1 Tab by mouth daily. 90 Tab 3    metoprolol tartrate (LOPRESSOR) 50 mg tablet Take 1 Tab by mouth two (2) times a day. 180 Tab 3    aspirin 81 mg chewable tablet Take 1 Tab by mouth daily. 90 Tab 3    DULoxetine (CYMBALTA) 60 mg capsule Take 60 mg by mouth daily.  insulin glargine (TOUJEO SOLOSTAR) 300 unit/mL (1.5 mL) inpn 220 Units by SubCUTAneous route daily.  acetaminophen (TYLENOL EXTRA STRENGTH) 500 mg tablet Take  by mouth every six (6) hours as needed for Pain.  insulin glulisine (APIDRA) 100 unit/mL injection 90 Units by SubCUTAneous route three (3) times daily.  gabapentin (NEURONTIN) 400 mg capsule Take 800 mg by mouth two (2) times a day. Pt states 8 x daily        albuterol (PROVENTIL VENTOLIN) 2.5 mg /3 mL (0.083 %) nebulizer solution 3 mL by Nebulization route three (3) times daily. 1 Package 0    nitroglycerin (NITROSTAT) 0.4 mg SL tablet 0.4 mg by SubLINGual route every five (5) minutes as needed.  ergocalciferol (VITAMIN D) 50,000 unit capsule Take 50,000 Units by mouth every seven (7) days. Takes on wed once a month       albuterol (PROVENTIL, VENTOLIN) 90 mcg/Actuation inhaler Take 2 Puffs by inhalation every four (4) hours as needed for Wheezing. Dispense: (1) inhaler with no refills.  1 g 0         Objective:     Physical Exam:     Visit Vitals  /74 (BP 1 Location: Right arm)   Pulse 80   Temp 98.5 °F (36.9 °C)   Resp 18   Ht 5' 5\" (1.651 m)   SpO2 90%   BMI 44.73 kg/m²       General: Healthy appearing  Neuro: alert and oriented to person/place/situation. Otherwise nonfocal.  Derm: Normal turgor for age, otherwise per wound exam  HEENT: Normocephalic, atraumatic. EOMI. Conjunctiva clear. No scleral icterus. Neck: Normal range of motion. No masses. Chest: Resp unlabored  Cardio: Regular rate, rhythm  Abdomen: Soft, nontender  Ext. No hemosiderrosis. DP/PT 2+  Psych: cooperative. Pleasant. No anxiety or depression. Normal mood and affect. Diabetic Foot Ulcer/Neuropathic   Is Wound Greater than 1.0 sq cm ? : Yes  Re-Current Wound with Skin Substitue within Last Year : No  X-Ray in Last 3 Months: No  VIBHA In Last 6 Months : No  Smoking Status: Non- Smoker  Wound Free from Infection : No Culture Done  Is Wound Free of Eschar, Slough , and / or Bio-[place]: Yes  Malignant Process in Wound : No Biopsy Done  Compression Therapy of 20mm or greater ?: Yes                 Assessment:     BLE VSU, slowly improving. L resolved. R 2nd toe diabetic/traumatic wound    Plan:     Continue dressings. Slow but appropriate improvement. Continue HH. Keep legs elevated. Will need both sides resolved simultaneously before she can have help getting her home compression on.

## 2019-03-04 NOTE — PROGRESS NOTES
Wound Center Progress Note    Patient: Tona Bruno MRN: [id number]  SSN: [id number]    YOB: 1935  Age: 81 y.o. Sex: female      Subjective:     Chief Complaint:  Recurrent Venous leg ulcer BLE    History of Present Illness:       Wound Caused By: venous insufficiency  Associated Signs and Symptoms: drainage, pain  Timing: since June 2018  Quality: wound  Severity: full thickness  Modifying Factors: Dm2, obesity, venous insufficiency  Current Wound Care: Multilayer compression, CHF    3/11/2018  No new issues. Urinary soiling continues to be improved. Left 2nd toe stable.  Getting HH for wraps    Past Medical History:   Diagnosis Date    Acute hyponatremia 1/21/2011    Acute on chronic diastolic congestive heart failure ([address] 1/19/2011    Acute renal failure ([address] 1/21/2011    AMI (acute myocardial infarction) ([address] 2009    Anemia 9/5/2012    Arthritis     Asthma     Asthma exacerbation 11/11/2011    CAD (coronary artery disease)     MI 2009, stents heart 2009    CAD (coronary artery disease), native coronary artery 9/2/2009    Previous stent to [facility] with Cypher 3.5x33mm SABA 8/09 9/09 LAD- Xience 2.5x23mm and 3.0x18mm SABA in mid-distal LAD      Cellulitis Bilateral Lower Extremities 1/20/2011    Chest discomfort, tightness, pressure 1/28/2016    Chronic diastolic heart failure ([address] 10/20/2011    Chronic venous insufficiency 10/24/2011    Diabetes ([address]     Diabetes Mellitus Type II-insulin dependent 9/2/2009    Diverticulitis 1/28/2016    Dyslipidemia 9/2/2009    Edema 1/28/2016    GERD (gastroesophageal reflux disease)     GLAUCOMA     BILATERAL    Heart failure ([address]     Hypertension     Hypokalemia 1/28/2016    Hypoxemia 9/7/2012    Morbid obesity (HCC)     Nausea & vomiting     Neuropathy in diabetes ([address] 9/2/2009    NSTEMI (non-ST elevation myocardial infarction) ([address] 9/2/2009    Dr Oropeza Mention    Obstructive sleep apnea (adult) (pediatric)- probably 11/11/2011    TRISH (obstructive sleep apnea) 9/7/2012    Intolerant of CPAP     Other dyspnea and respiratory abnormality 11/10/2011    Other ill-defined conditions(799.89)     benign tumor in lower abdomen- not removed, diabetic ulcers, edema, neuropathy    Peripheral vascular disease ([facility] 10/20/2011    Post PTCA 6/20/2014    PVD (peripheral vascular disease) ([facility]     Unspecified anemia 1/23/2011    Unspecified sleep apnea     oxygen at night    Venous stasis of lower extremity 2010    BILATERAL W RECURRENT ADMISSIONS    Volume overload 9/6/2012      Past Surgical History:   Procedure Laterality Date    BREAST SURGERY PROCEDURE UNLISTED  1973    benign tumor left breast    CARDIAC SURG PROCEDURE UNLIST      4 stents most recent 2 yrs ago    HX CHOLECYSTECTOMY      HX COLONOSCOPY      HX GI      PTCA EACH ADDL VESSEL      stentsx3     Family History   Problem Relation Age of Onset    Cancer Father     Lung Disease Father     Cancer Brother     Diabetes Paternal Aunt     Hypertension Maternal Grandmother     Hypertension Paternal Grandmother       Social History     Tobacco Use    Smoking status: Never Smoker    Smokeless tobacco: Never Used   Substance Use Topics    Alcohol use: No       Prior to Admission medications    Medication Sig Start Date End Date Taking? Authorizing Provider   dulaglutide (TRULICITY) 0.55 CD/2.5 mL sub-q pen 0.75 mg by SubCUTAneous route every seven (7) days. Provider, Historical   bumetanide (BUMEX) 2 mg tablet Take 1 Tab by mouth daily. 8/17/18   Oral Longoria MD   atorvastatin (LIPITOR) 80 mg tablet Take 1 Tab by mouth daily. 7/16/18   Oral Longoria MD   clopidogrel (PLAVIX) 75 mg tab Take 1 Tab by mouth daily. 3/2/18   Oral Longoria MD   metOLazone (ZAROXOLYN) 10 mg tablet Take 1 Tab by mouth daily. 1/19/18   Oral Longoria MD   metoprolol tartrate (LOPRESSOR) 50 mg tablet Take 1 Tab by mouth two (2) times a day.  1/19/18 Leatha Faulkner MD   aspirin 81 mg chewable tablet Take 1 Tab by mouth daily. 6/12/17   Negin Longoria MD   DULoxetine (CYMBALTA) 60 mg capsule Take 60 mg by mouth daily. Provider, Historical   insulin glargine (TOUJEO SOLOSTAR) 300 unit/mL (1.5 mL) inpn 220 Units by SubCUTAneous route daily. Provider, Historical   acetaminophen (TYLENOL EXTRA STRENGTH) 500 mg tablet Take  by mouth every six (6) hours as needed for Pain. Provider, Historical   insulin glulisine (APIDRA) 100 unit/mL injection 90 Units by SubCUTAneous route three (3) times daily. Provider, Historical   gabapentin (NEURONTIN) 400 mg capsule Take 800 mg by mouth two (2) times a day. Pt states 8 x daily      Other, MD Diego   albuterol (PROVENTIL VENTOLIN) 2.5 mg /3 mL (0.083 %) nebulizer solution 3 mL by Nebulization route three (3) times daily. 11/16/11   Gualberto Rodriguez MD   nitroglycerin (NITROSTAT) 0.4 mg SL tablet 0.4 mg by SubLINGual route every five (5) minutes as needed. Provider, Historical   ergocalciferol (VITAMIN D) 50,000 unit capsule Take 50,000 Units by mouth every seven (7) days. Takes on wed once a month     Provider, Historical   albuterol (PROVENTIL, VENTOLIN) 90 mcg/Actuation inhaler Take 2 Puffs by inhalation every four (4) hours as needed for Wheezing. Dispense: (1) inhaler with no refills. 12/19/10   Liz Breaux PA     Allergies   Allergen Reactions    Pcn [Penicillins] Hives, Swelling and Myalgia     Can take cephalosporins    Codeine Nausea Only    Dilaudid [Hydromorphone (Bulk)] Other (comments)    Lortab [Hydrocodone-Acetaminophen] Nausea Only        Review of Systems:  Pertinent items are noted in the History of Present Illness.      Lab Results   Component Value Date/Time    Hemoglobin A1c 8.7 (H) 09/30/2011 01:07 PM        Immunization History   Administered Date(s) Administered    Influenza Vaccine Split 09/11/2012    ZZZ-RETIRED (DO NOT USE) Pneumococcal Vaccine (Unspecified Type) 09/20/2010       Body mass index is 42.67 kg/m². Counseling regarding nutrition done: Yes Pateint counsled about risks of smoking and cessation      Current medications:  Current Outpatient Medications   Medication Sig Dispense Refill    dulaglutide (TRULICITY) 7.19 RC/0.9 mL sub-q pen 0.75 mg by SubCUTAneous route every seven (7) days.  bumetanide (BUMEX) 2 mg tablet Take 1 Tab by mouth daily. 90 Tab 3    atorvastatin (LIPITOR) 80 mg tablet Take 1 Tab by mouth daily. 90 Tab 3    clopidogrel (PLAVIX) 75 mg tab Take 1 Tab by mouth daily. 30 Tab 11    metOLazone (ZAROXOLYN) 10 mg tablet Take 1 Tab by mouth daily. 90 Tab 3    metoprolol tartrate (LOPRESSOR) 50 mg tablet Take 1 Tab by mouth two (2) times a day. 180 Tab 3    aspirin 81 mg chewable tablet Take 1 Tab by mouth daily. 90 Tab 3    DULoxetine (CYMBALTA) 60 mg capsule Take 60 mg by mouth daily.  insulin glargine (TOUJEO SOLOSTAR) 300 unit/mL (1.5 mL) inpn 220 Units by SubCUTAneous route daily.  acetaminophen (TYLENOL EXTRA STRENGTH) 500 mg tablet Take  by mouth every six (6) hours as needed for Pain.  insulin glulisine (APIDRA) 100 unit/mL injection 90 Units by SubCUTAneous route three (3) times daily.  gabapentin (NEURONTIN) 400 mg capsule Take 800 mg by mouth two (2) times a day. Pt states 8 x daily        albuterol (PROVENTIL VENTOLIN) 2.5 mg /3 mL (0.083 %) nebulizer solution 3 mL by Nebulization route three (3) times daily. 1 Package 0    nitroglycerin (NITROSTAT) 0.4 mg SL tablet 0.4 mg by SubLINGual route every five (5) minutes as needed.  ergocalciferol (VITAMIN D) 50,000 unit capsule Take 50,000 Units by mouth every seven (7) days. Takes on wed once a month       albuterol (PROVENTIL, VENTOLIN) 90 mcg/Actuation inhaler Take 2 Puffs by inhalation every four (4) hours as needed for Wheezing. Dispense: (1) inhaler with no refills.  1 g 0         Objective:     Physical Exam:     Visit Vitals  /66 (BP 1 Location: Right arm)   Pulse 87   Temp 98.1 °F (36.7 °C)   Resp 18   Ht 5' 5\" (1.651 m)   Wt 116.3 kg (256 lb 6.4 oz)   SpO2 91%   BMI 42.67 kg/m²       General: Healthy appearing  Neuro: alert and oriented to person/place/situation. Otherwise nonfocal.  Derm: Normal turgor for age, otherwise per wound exam  HEENT: Normocephalic, atraumatic. EOMI. Conjunctiva clear. No scleral icterus. Neck: Normal range of motion. No masses. Chest: Resp unlabored  Cardio: Regular rate, rhythm  Abdomen: Soft, nontender  Ext. No hemosiderrosis. DP/PT 2+  Psych: cooperative. Pleasant. No anxiety or depression. Normal mood and affect. Diabetic Foot Ulcer/Neuropathic   Is Wound Greater than 1.0 sq cm ? : No  Re-Current Wound with Skin Substitue within Last Year : No  X-Ray in Last 3 Months: No  VIBHA In Last 6 Months : No  Smoking Status: Non- Smoker  Wound Free from Infection : No Culture Done  Is Wound Free of Eschar, Slough , and / or Bio-[place]: Yes  Malignant Process in Wound : No Biopsy Done  Type of off Loading: Wheelchair if area off surface of wheelchair  Compression Therapy of 20mm or greater ?: Yes                         Assessment:     BLE VSU, slowly improving. L resolved. R 2nd toe diabetic/traumatic wound    Plan:     Continue dressings. Slow but appropriate improvement. Continue HH. Keep legs elevated. Will need both sides resolved simultaneously before she can have help getting her home compression on.

## 2019-03-29 ENCOUNTER — HOSPITAL ENCOUNTER (OUTPATIENT)
Dept: WOUND CARE | Age: 84
Discharge: HOME | End: 2019-03-29
Attending: SURGERY
Payer: MEDICARE

## 2019-03-29 VITALS
HEART RATE: 88 BPM | HEIGHT: 65 IN | TEMPERATURE: 97.8 F | SYSTOLIC BLOOD PRESSURE: 114 MMHG | OXYGEN SATURATION: 92 % | BODY MASS INDEX: 42.67 KG/M2 | RESPIRATION RATE: 18 BRPM | DIASTOLIC BLOOD PRESSURE: 83 MMHG

## 2019-03-29 PROCEDURE — 29580 STRAPPING UNNA BOOT: CPT

## 2019-03-29 NOTE — WOUND CARE
[address]  [address], [address]  Phone: [phone]  Fax: [phone]    Patient: Neetu Burk MRN: [id number]  SSN: [id number]    YOB: 1935  Age: 81 y.o. Sex: female       Return Appointment: 2 weeks with Gela Monson MD    Instructions: Clean legs with soap and water. Xeroform- apply to right lower leg wound bed. Cover with ABD and wrap bilateral lower legs with unna boots. Change two times every week. Home health for twice weekly dressing changes.           Should you experience increased redness, swelling, pain, foul odor, size of wound(s), or have a temperature over 101 degrees please contact the [address] [place] [address] Road at [phone] or if after hours contact your primary care physician or go to the hospital emergency department.     Signed By: Harry Sneed RN     March 29, 2019

## 2019-03-29 NOTE — WOUND CARE
03/29/19 1315   Wound Leg Lower Right   Date First Assessed/Time First Assessed: 08/17/18 1436   POA: Yes  Wound Type: Venous  Location: Leg Lower  Wound Description (Optional): 16  Orientation: Right   Dressing Status  Clean, dry, and intact   Wound Length (cm) 0.8 cm   Wound Width (cm) 0.8 cm   Wound Depth (cm) 0.1   Wound Surface area (cm^2) 0.64 cm^2   Change in Wound Size % -156   Condition of Base Granulation   Tissue Type Percent Red 100   Drainage Amount  Small    Drainage Color Serous   Wound Odor None   Periwound Skin Condition Intact   Cleansing and Cleansing Agents  Soap and water       Patient takes Plavix and ASA Daily

## 2019-03-30 NOTE — PROGRESS NOTES
Wound Center Progress Note    Patient: Bakari Willams MRN: [id number]  SSN: [id number]    YOB: 1935  Age: 81 y.o. Sex: female      Subjective:     Chief Complaint:  Recurrent Venous leg ulcer BLE    History of Present Illness:       Wound Caused By: venous insufficiency  Associated Signs and Symptoms: drainage, pain  Timing: since June 2018  Quality: wound  Severity: full thickness  Modifying Factors: Dm2, obesity, venous insufficiency  Current Wound Care: Multilayer compression, CHF    3/11/2018  No new issues. Urinary soiling continues to be improved. Left 2nd toe stable. Getting [facility] for wraps    3/29/2019  No new issues. Continues to do well with dressings.      Past Medical History:   Diagnosis Date    Acute hyponatremia 1/21/2011    Acute on chronic diastolic congestive heart failure ([address] 1/19/2011    Acute renal failure ([address] 1/21/2011    AMI (acute myocardial infarction) ([address] 2009    Anemia 9/5/2012    Arthritis     Asthma     Asthma exacerbation 11/11/2011    CAD (coronary artery disease)     MI 2009, stents heart 2009    CAD (coronary artery disease), native coronary artery 9/2/2009    Previous stent to [place] with Cypher 3.5x33mm SABA 8/09 9/09 LAD- Xience 2.5x23mm and 3.0x18mm SABA in mid-distal LAD      Cellulitis Bilateral Lower Extremities 1/20/2011    Chest discomfort, tightness, pressure 1/28/2016    Chronic diastolic heart failure ([address] 10/20/2011    Chronic venous insufficiency 10/24/2011    Diabetes ([address]     Diabetes Mellitus Type II-insulin dependent 9/2/2009    Diverticulitis 1/28/2016    Dyslipidemia 9/2/2009    Edema 1/28/2016    GERD (gastroesophageal reflux disease)     GLAUCOMA     BILATERAL    Heart failure ([address]     Hypertension     Hypokalemia 1/28/2016    Hypoxemia 9/7/2012    Morbid obesity (HCC)     Nausea & vomiting     Neuropathy in diabetes ([address] 9/2/2009    NSTEMI (non-ST elevation myocardial infarction) ([address] 9/2/2009     [employer]    Obstructive sleep apnea (adult) (pediatric)- probably  11/11/2011    TRISH (obstructive sleep apnea) 9/7/2012    Intolerant of CPAP     Other dyspnea and respiratory abnormality 11/10/2011    Other ill-defined conditions(799.89)     benign tumor in lower abdomen- not removed, diabetic ulcers, edema, neuropathy    Peripheral vascular disease ([facility] 10/20/2011    Post PTCA 6/20/2014    PVD (peripheral vascular disease) ([facility]     Unspecified anemia 1/23/2011    Unspecified sleep apnea     oxygen at night    Venous stasis of lower extremity 2010    BILATERAL W RECURRENT ADMISSIONS    Volume overload 9/6/2012      Past Surgical History:   Procedure Laterality Date    BREAST SURGERY PROCEDURE UNLISTED  1973    benign tumor left breast    CARDIAC SURG PROCEDURE UNLIST      4 stents most recent 2 yrs ago    HX CHOLECYSTECTOMY      HX COLONOSCOPY      HX GI      PTCA EACH ADDL VESSEL      stentsx3     Family History   Problem Relation Age of Onset    Cancer Father     Lung Disease Father     Cancer Brother     Diabetes Paternal Aunt     Hypertension Maternal Grandmother     Hypertension Paternal Grandmother       Social History     Tobacco Use    Smoking status: Never Smoker    Smokeless tobacco: Never Used   Substance Use Topics    Alcohol use: No       Prior to Admission medications    Medication Sig Start Date End Date Taking? Authorizing Provider   dulaglutide (TRULICITY) 7.89 DR/8.8 mL sub-q pen 0.75 mg by SubCUTAneous route every seven (7) days. Provider, Historical   bumetanide (BUMEX) 2 mg tablet Take 1 Tab by mouth daily. 8/17/18   Isaiah Longoria MD   atorvastatin (LIPITOR) 80 mg tablet Take 1 Tab by mouth daily. 7/16/18   Isaiah Longoria MD   clopidogrel (PLAVIX) 75 mg tab Take 1 Tab by mouth daily. 3/2/18   Isaiah Longoria MD   metOLazone (ZAROXOLYN) 10 mg tablet Take 1 Tab by mouth daily.  1/19/18   Isaiah Longoria MD   metoprolol tartrate (LOPRESSOR) 50 mg tablet Take 1 Tab by mouth two (2) times a day. 1/19/18   Colonel Lyndsay Longoria MD   aspirin 81 mg chewable tablet Take 1 Tab by mouth daily. 6/12/17   Colonel Lyndsay Longoria MD   DULoxetine (CYMBALTA) 60 mg capsule Take 60 mg by mouth daily. Provider, Historical   insulin glargine (TOUJEO SOLOSTAR) 300 unit/mL (1.5 mL) inpn 220 Units by SubCUTAneous route daily. Provider, Historical   acetaminophen (TYLENOL EXTRA STRENGTH) 500 mg tablet Take  by mouth every six (6) hours as needed for Pain. Provider, Historical   insulin glulisine (APIDRA) 100 unit/mL injection 90 Units by SubCUTAneous route three (3) times daily. Provider, Historical   gabapentin (NEURONTIN) 400 mg capsule Take 800 mg by mouth two (2) times a day. Pt states 8 x daily      Other, MD Diego   albuterol (PROVENTIL VENTOLIN) 2.5 mg /3 mL (0.083 %) nebulizer solution 3 mL by Nebulization route three (3) times daily. 11/16/11   Celeste Ramírez MD   nitroglycerin (NITROSTAT) 0.4 mg SL tablet 0.4 mg by SubLINGual route every five (5) minutes as needed. Provider, Historical   ergocalciferol (VITAMIN D) 50,000 unit capsule Take 50,000 Units by mouth every seven (7) days. Takes on wed once a month     Provider, Historical   albuterol (PROVENTIL, VENTOLIN) 90 mcg/Actuation inhaler Take 2 Puffs by inhalation every four (4) hours as needed for Wheezing. Dispense: (1) inhaler with no refills. 12/19/10   Ariadna Breaux PA     Allergies   Allergen Reactions    Pcn [Penicillins] Hives, Swelling and Myalgia     Can take cephalosporins    Codeine Nausea Only    Dilaudid [Hydromorphone (Bulk)] Other (comments)    Lortab [Hydrocodone-Acetaminophen] Nausea Only        Review of Systems:  Pertinent items are noted in the History of Present Illness.      Lab Results   Component Value Date/Time    Hemoglobin A1c 8.7 (H) 09/30/2011 01:07 PM        Immunization History   Administered Date(s) Administered    (RETIRED) Pneumococcal Vaccine (Unspecified Type) 09/20/2010    Influenza Vaccine Split 09/11/2012       Body mass index is 42.67 kg/m². Counseling regarding nutrition done: Yes Pateint counsled about risks of smoking and cessation      Current medications:  Current Outpatient Medications   Medication Sig Dispense Refill    dulaglutide (TRULICITY) 1.26 WC/6.3 mL sub-q pen 0.75 mg by SubCUTAneous route every seven (7) days.  bumetanide (BUMEX) 2 mg tablet Take 1 Tab by mouth daily. 90 Tab 3    atorvastatin (LIPITOR) 80 mg tablet Take 1 Tab by mouth daily. 90 Tab 3    clopidogrel (PLAVIX) 75 mg tab Take 1 Tab by mouth daily. 30 Tab 11    metOLazone (ZAROXOLYN) 10 mg tablet Take 1 Tab by mouth daily. 90 Tab 3    metoprolol tartrate (LOPRESSOR) 50 mg tablet Take 1 Tab by mouth two (2) times a day. 180 Tab 3    aspirin 81 mg chewable tablet Take 1 Tab by mouth daily. 90 Tab 3    DULoxetine (CYMBALTA) 60 mg capsule Take 60 mg by mouth daily.  insulin glargine (TOUJEO SOLOSTAR) 300 unit/mL (1.5 mL) inpn 220 Units by SubCUTAneous route daily.  acetaminophen (TYLENOL EXTRA STRENGTH) 500 mg tablet Take  by mouth every six (6) hours as needed for Pain.  insulin glulisine (APIDRA) 100 unit/mL injection 90 Units by SubCUTAneous route three (3) times daily.  gabapentin (NEURONTIN) 400 mg capsule Take 800 mg by mouth two (2) times a day. Pt states 8 x daily        albuterol (PROVENTIL VENTOLIN) 2.5 mg /3 mL (0.083 %) nebulizer solution 3 mL by Nebulization route three (3) times daily. 1 Package 0    nitroglycerin (NITROSTAT) 0.4 mg SL tablet 0.4 mg by SubLINGual route every five (5) minutes as needed.  ergocalciferol (VITAMIN D) 50,000 unit capsule Take 50,000 Units by mouth every seven (7) days. Takes on wed once a month       albuterol (PROVENTIL, VENTOLIN) 90 mcg/Actuation inhaler Take 2 Puffs by inhalation every four (4) hours as needed for Wheezing. Dispense: (1) inhaler with no refills.  1 g 0         Objective:     Physical Exam:     Visit Vitals  /83 (BP 1 Location: Right arm)   Pulse 88   Temp 97.8 °F (36.6 °C)   Resp 18   Ht 5' 5\" (1.651 m)   SpO2 92%   BMI 42.67 kg/m²       General: Healthy appearing  Neuro: alert and oriented to person/place/situation. Otherwise nonfocal.  Derm: Normal turgor for age, otherwise per wound exam  HEENT: Normocephalic, atraumatic. EOMI. Conjunctiva clear. No scleral icterus. Neck: Normal range of motion. No masses. Chest: Resp unlabored  Cardio: Regular rate, rhythm  Abdomen: Soft, nontender  Ext. No hemosiderrosis. DP/PT 2+  Psych: cooperative. Pleasant. No anxiety or depression. Normal mood and affect. Diabetic Foot Ulcer/Neuropathic   Is Wound Greater than 1.0 sq cm ? : No  Re-Current Wound with Skin Substitue within Last Year : No  X-Ray in Last 3 Months: No  VIBHA In Last 6 Months : No  Smoking Status: Non- Smoker  Wound Free from Infection : No Culture Done  Is Wound Free of Eschar, Slough , and / or Bio-Canby: Yes  Malignant Process in Wound : No Biopsy Done  Compression Therapy of 20mm or greater ?: Yes                         Assessment:     BLE VSU, slowly improving. L resolved. R 2nd toe diabetic/traumatic wound    Plan:     Continue dressings. Slow but appropriate improvement. Continue HH. Keep legs elevated. Will need both sides resolved simultaneously before she can have help getting her home compression on.

## 2019-03-31 NOTE — PROGRESS NOTES
Wound Center Progress Note    Patient: Bernardino Oconnell MRN: [id number]  SSN: [id number]    YOB: 1935  Age: 81 y.o. Sex: female      Subjective:     Chief Complaint:  Recurrent Venous leg ulcer BLE    History of Present Illness:       Wound Caused By: venous insufficiency  Associated Signs and Symptoms: drainage, pain  Timing: since June 2018  Quality: wound  Severity: full thickness  Modifying Factors: Dm2, obesity, venous insufficiency  Current Wound Care: Multilayer compression, CHF    1/18/2018  No new issues. Urinary soiling continues to be improved. Left 2nd toe stable. Continues HH for wraps.  health is stable.      Past Medical History:   Diagnosis Date    Acute hyponatremia 1/21/2011    Acute on chronic diastolic congestive heart failure ([address] 1/19/2011    Acute renal failure ([address] 1/21/2011    AMI (acute myocardial infarction) ([address] 2009    Anemia 9/5/2012    Arthritis     Asthma     Asthma exacerbation 11/11/2011    CAD (coronary artery disease)     MI 2009, stents heart 2009    CAD (coronary artery disease), native coronary artery 9/2/2009    Previous stent to [facility] with Cypher 3.5x33mm SABA 8/09 9/09 LAD- Xience 2.5x23mm and 3.0x18mm SABA in mid-distal LAD      Cellulitis Bilateral Lower Extremities 1/20/2011    Chest discomfort, tightness, pressure 1/28/2016    Chronic diastolic heart failure ([address] 10/20/2011    Chronic venous insufficiency 10/24/2011    Diabetes ([address]     Diabetes Mellitus Type II-insulin dependent 9/2/2009    Diverticulitis 1/28/2016    Dyslipidemia 9/2/2009    Edema 1/28/2016    GERD (gastroesophageal reflux disease)     GLAUCOMA     BILATERAL    Heart failure ([address]     Hypertension     Hypokalemia 1/28/2016    Hypoxemia 9/7/2012    Morbid obesity (HCC)     Nausea & vomiting     Neuropathy in diabetes ([address] 9/2/2009    NSTEMI (non-ST elevation myocardial infarction) ([address] 9/2/2009    Dr Dillan Sanford    Obstructive sleep apnea (adult) (pediatric)- probably  11/11/2011    TRISH (obstructive sleep apnea) 9/7/2012    Intolerant of CPAP     Other dyspnea and respiratory abnormality 11/10/2011    Other ill-defined conditions(799.89)     benign tumor in lower abdomen- not removed, diabetic ulcers, edema, neuropathy    Peripheral vascular disease ([facility] 10/20/2011    Post PTCA 6/20/2014    PVD (peripheral vascular disease) ([facility]     Unspecified anemia 1/23/2011    Unspecified sleep apnea     oxygen at night    Venous stasis of lower extremity 2010    BILATERAL W RECURRENT ADMISSIONS    Volume overload 9/6/2012      Past Surgical History:   Procedure Laterality Date    BREAST SURGERY PROCEDURE UNLISTED  1973    benign tumor left breast    CARDIAC SURG PROCEDURE UNLIST      4 stents most recent 2 yrs ago    HX CHOLECYSTECTOMY      HX COLONOSCOPY      HX GI      PTCA EACH ADDL VESSEL      stentsx3     Family History   Problem Relation Age of Onset    Cancer Father     Lung Disease Father     Cancer Brother     Diabetes Paternal Aunt     Hypertension Maternal Grandmother     Hypertension Paternal Grandmother       Social History     Tobacco Use    Smoking status: Never Smoker    Smokeless tobacco: Never Used   Substance Use Topics    Alcohol use: No       Prior to Admission medications    Medication Sig Start Date End Date Taking? Authorizing Provider   dulaglutide (TRULICITY) 9.29 ED/0.9 mL sub-q pen 0.75 mg by SubCUTAneous route every seven (7) days. Provider, Historical   bumetanide (BUMEX) 2 mg tablet Take 1 Tab by mouth daily. 8/17/18   Neptali Longoria MD   atorvastatin (LIPITOR) 80 mg tablet Take 1 Tab by mouth daily. 7/16/18   Neptali Longoria MD   clopidogrel (PLAVIX) 75 mg tab Take 1 Tab by mouth daily. 3/2/18   Neptali Longoria MD   metOLazone (ZAROXOLYN) 10 mg tablet Take 1 Tab by mouth daily.  1/19/18   Neptali Longoria MD   metoprolol tartrate (LOPRESSOR) 50 mg tablet Take 1 Tab by mouth two (2) times a day. 1/19/18   Neptali Longoria MD   aspirin 81 mg chewable tablet Take 1 Tab by mouth daily. 6/12/17   Neptali Longoria MD   DULoxetine (CYMBALTA) 60 mg capsule Take 60 mg by mouth daily. Provider, Historical   insulin glargine (TOUJEO SOLOSTAR) 300 unit/mL (1.5 mL) inpn 220 Units by SubCUTAneous route daily. Provider, Historical   acetaminophen (TYLENOL EXTRA STRENGTH) 500 mg tablet Take  by mouth every six (6) hours as needed for Pain. Provider, Historical   insulin glulisine (APIDRA) 100 unit/mL injection 90 Units by SubCUTAneous route three (3) times daily. Provider, Historical   gabapentin (NEURONTIN) 400 mg capsule Take 800 mg by mouth two (2) times a day. Pt states 8 x daily      Other, MD Diego   albuterol (PROVENTIL VENTOLIN) 2.5 mg /3 mL (0.083 %) nebulizer solution 3 mL by Nebulization route three (3) times daily. 11/16/11   Janet Huntley MD   nitroglycerin (NITROSTAT) 0.4 mg SL tablet 0.4 mg by SubLINGual route every five (5) minutes as needed. Provider, Historical   ergocalciferol (VITAMIN D) 50,000 unit capsule Take 50,000 Units by mouth every seven (7) days. Takes on wed once a month     Provider, Historical   albuterol (PROVENTIL, VENTOLIN) 90 mcg/Actuation inhaler Take 2 Puffs by inhalation every four (4) hours as needed for Wheezing. Dispense: (1) inhaler with no refills. 12/19/10   Silvano Breaux, PA     Allergies   Allergen Reactions    Pcn [Penicillins] Hives, Swelling and Myalgia     Can take cephalosporins    Codeine Nausea Only    Dilaudid [Hydromorphone (Bulk)] Other (comments)    Lortab [Hydrocodone-Acetaminophen] Nausea Only        Review of Systems:  Pertinent items are noted in the History of Present Illness.      Lab Results   Component Value Date/Time    Hemoglobin A1c 8.7 (H) 09/30/2011 01:07 PM        Immunization History   Administered Date(s) Administered    (RETIRED) Pneumococcal Vaccine (Unspecified Type) 09/20/2010    Influenza Vaccine Split 09/11/2012       Body mass index is 44.73 kg/m². Counseling regarding nutrition done: Yes Pateint counsled about risks of smoking and cessation      Current medications:  Current Outpatient Medications   Medication Sig Dispense Refill    dulaglutide (TRULICITY) 9.71 FP/8.4 mL sub-q pen 0.75 mg by SubCUTAneous route every seven (7) days.  bumetanide (BUMEX) 2 mg tablet Take 1 Tab by mouth daily. 90 Tab 3    atorvastatin (LIPITOR) 80 mg tablet Take 1 Tab by mouth daily. 90 Tab 3    clopidogrel (PLAVIX) 75 mg tab Take 1 Tab by mouth daily. 30 Tab 11    metOLazone (ZAROXOLYN) 10 mg tablet Take 1 Tab by mouth daily. 90 Tab 3    metoprolol tartrate (LOPRESSOR) 50 mg tablet Take 1 Tab by mouth two (2) times a day. 180 Tab 3    aspirin 81 mg chewable tablet Take 1 Tab by mouth daily. 90 Tab 3    DULoxetine (CYMBALTA) 60 mg capsule Take 60 mg by mouth daily.  insulin glargine (TOUJEO SOLOSTAR) 300 unit/mL (1.5 mL) inpn 220 Units by SubCUTAneous route daily.  acetaminophen (TYLENOL EXTRA STRENGTH) 500 mg tablet Take  by mouth every six (6) hours as needed for Pain.  insulin glulisine (APIDRA) 100 unit/mL injection 90 Units by SubCUTAneous route three (3) times daily.  gabapentin (NEURONTIN) 400 mg capsule Take 800 mg by mouth two (2) times a day. Pt states 8 x daily        albuterol (PROVENTIL VENTOLIN) 2.5 mg /3 mL (0.083 %) nebulizer solution 3 mL by Nebulization route three (3) times daily. 1 Package 0    nitroglycerin (NITROSTAT) 0.4 mg SL tablet 0.4 mg by SubLINGual route every five (5) minutes as needed.  ergocalciferol (VITAMIN D) 50,000 unit capsule Take 50,000 Units by mouth every seven (7) days. Takes on wed once a month       albuterol (PROVENTIL, VENTOLIN) 90 mcg/Actuation inhaler Take 2 Puffs by inhalation every four (4) hours as needed for Wheezing. Dispense: (1) inhaler with no refills.  1 g 0         Objective:     Physical Exam:     Visit Vitals  BP 162/75 (BP 1 Location: Right arm)   Pulse 83   Temp 97.8 °F (36.6 °C)   Resp 18   Ht 5' 5\" (1.651 m)   SpO2 93%   BMI 44.73 kg/m²       General: Healthy appearing  Neuro: alert and oriented to person/place/situation. Otherwise nonfocal.  Derm: Normal turgor for age, otherwise per wound exam  HEENT: Normocephalic, atraumatic. EOMI. Conjunctiva clear. No scleral icterus. Neck: Normal range of motion. No masses. Chest: Resp unlabored  Cardio: Regular rate, rhythm  Abdomen: Soft, nontender  Ext. No hemosiderrosis. DP/PT 2+  Psych: cooperative. Pleasant. No anxiety or depression. Normal mood and affect. Assessment:     BLE VSU, slowly improving. L resolved. R 2nd toe diabetic/traumatic wound    Plan:     Continue dressings. Slow but appropriate improvement. Continue HH. Keep legs elevated. Will need both sides resolved simultaneously before she can have help getting her home compression on.

## 2019-04-26 ENCOUNTER — HOSPITAL ENCOUNTER (OUTPATIENT)
Dept: WOUND CARE | Age: 84
Discharge: HOME | End: 2019-04-26
Attending: SURGERY
Payer: MEDICARE

## 2019-04-26 VITALS
RESPIRATION RATE: 20 BRPM | DIASTOLIC BLOOD PRESSURE: 77 MMHG | OXYGEN SATURATION: 90 % | TEMPERATURE: 97.5 F | HEART RATE: 89 BPM | SYSTOLIC BLOOD PRESSURE: 171 MMHG

## 2019-04-26 PROCEDURE — 29580 STRAPPING UNNA BOOT: CPT

## 2019-04-26 NOTE — WOUND CARE
[address]  [address], [address]   Phone: [phone]  Fax: [phone]    Patient: Jackie Gamboa MRN: [id number]  SSN: [id number]    YOB: 1935  Age: 81 y.o. Sex: female       Return Appointment: 2 weeks with Anita Gatica MD       Instructions: Clean legs with soap and water. Xeroform- apply to right lower leg wound bed. Cover with ABD and wrap bilateral lower legs with unna boots. Change two times every week. Home health for twice weekly dressing changes.                 Should you experience increased redness, swelling, pain, foul odor, size of wound(s), or have a temperature over 101 degrees please contact the [address] [place] [address] Road at [phone] or if after hours contact your primary care physician or go to the hospital emergency department.     Signed By: Rocío Dale RN     April 26, 2019

## 2019-04-26 NOTE — PROGRESS NOTES
Wound Center Progress Note    Patient: Johnathon Kc MRN: [id number]  SSN: [id number]    YOB: 1935  Age: 81 y.o. Sex: female      Subjective:     Chief Complaint:  Recurrent Venous leg ulcer BLE    History of Present Illness:       Wound Caused By: venous insufficiency  Associated Signs and Symptoms: drainage, pain  Timing: since June 2018  Quality: wound  Severity: full thickness  Modifying Factors: Dm2, obesity, venous insufficiency  Current Wound Care: Multilayer compression, CHF    3/11/2018  No new issues. Urinary soiling continues to be improved. Left 2nd toe stable. Getting [facility] for wraps    4/26/2019  Continues to do well with dressings. Has had issues with a recurrent UTI for the last few weeks. Has been to ER.      Past Medical History:   Diagnosis Date    Acute hyponatremia 1/21/2011    Acute on chronic diastolic congestive heart failure ([address] 1/19/2011    Acute renal failure ([address] 1/21/2011    AMI (acute myocardial infarction) ([address] 2009    Anemia 9/5/2012    Arthritis     Asthma     Asthma exacerbation 11/11/2011    CAD (coronary artery disease)     MI 2009, stents heart 2009    CAD (coronary artery disease), native coronary artery 9/2/2009    Previous stent to dRCA with Cypher 3.5x33mm SABA 8/09 9/09 LAD- Xience 2.5x23mm and 3.0x18mm SABA in mid-distal LAD      Cellulitis Bilateral Lower Extremities 1/20/2011    Chest discomfort, tightness, pressure 1/28/2016    Chronic diastolic heart failure ([address] 10/20/2011    Chronic venous insufficiency 10/24/2011    Diabetes ([address]     Diabetes Mellitus Type II-insulin dependent 9/2/2009    Diverticulitis 1/28/2016    Dyslipidemia 9/2/2009    Edema 1/28/2016    GERD (gastroesophageal reflux disease)     GLAUCOMA     BILATERAL    Heart failure ([address]     Hypertension     Hypokalemia 1/28/2016    Hypoxemia 9/7/2012    Morbid obesity (HCC)     Nausea & vomiting     Neuropathy in diabetes ([address] 9/2/2009    NSTEMI (non-ST elevation myocardial infarction) ([facility] 9/2/2009    Dr Juan Luis Medina    Obstructive sleep apnea (adult) (pediatric)- probably  11/11/2011    TRISH (obstructive sleep apnea) 9/7/2012    Intolerant of CPAP     Other dyspnea and respiratory abnormality 11/10/2011    Other ill-defined conditions(799.89)     benign tumor in lower abdomen- not removed, diabetic ulcers, edema, neuropathy    Peripheral vascular disease ([facility] 10/20/2011    Post PTCA 6/20/2014    PVD (peripheral vascular disease) ([facility]     Unspecified anemia 1/23/2011    Unspecified sleep apnea     oxygen at night    Venous stasis of lower extremity 2010    BILATERAL W RECURRENT ADMISSIONS    Volume overload 9/6/2012      Past Surgical History:   Procedure Laterality Date    BREAST SURGERY PROCEDURE UNLISTED  1973    benign tumor left breast    CARDIAC SURG PROCEDURE UNLIST      4 stents most recent 2 yrs ago    HX CHOLECYSTECTOMY      HX COLONOSCOPY      HX GI      PTCA EACH ADDL VESSEL      stentsx3     Family History   Problem Relation Age of Onset    Cancer Father     Lung Disease Father     Cancer Brother     Diabetes Paternal Aunt     Hypertension Maternal Grandmother     Hypertension Paternal Grandmother       Social History     Tobacco Use    Smoking status: Never Smoker    Smokeless tobacco: Never Used   Substance Use Topics    Alcohol use: No       Prior to Admission medications    Medication Sig Start Date End Date Taking? Authorizing Provider   dulaglutide (TRULICITY) 8.22 JK/4.7 mL sub-q pen 0.75 mg by SubCUTAneous route every seven (7) days. Provider, Historical   bumetanide (BUMEX) 2 mg tablet Take 1 Tab by mouth daily. 8/17/18   Deloris Longoria MD   atorvastatin (LIPITOR) 80 mg tablet Take 1 Tab by mouth daily. 7/16/18   Deloris Longoria MD   clopidogrel (PLAVIX) 75 mg tab Take 1 Tab by mouth daily. 3/2/18   Deloris Longoria MD   metOLazone (ZAROXOLYN) 10 mg tablet Take 1 Tab by mouth daily.  1/19/18 Lino Flores MD   metoprolol tartrate (LOPRESSOR) 50 mg tablet Take 1 Tab by mouth two (2) times a day. 1/19/18   Gustavo Longoria MD   aspirin 81 mg chewable tablet Take 1 Tab by mouth daily. 6/12/17   Gustavo Longoria MD   DULoxetine (CYMBALTA) 60 mg capsule Take 60 mg by mouth daily. Provider, Historical   insulin glargine (TOUJEO SOLOSTAR) 300 unit/mL (1.5 mL) inpn 220 Units by SubCUTAneous route daily. Provider, Historical   acetaminophen (TYLENOL EXTRA STRENGTH) 500 mg tablet Take  by mouth every six (6) hours as needed for Pain. Provider, Historical   insulin glulisine (APIDRA) 100 unit/mL injection 90 Units by SubCUTAneous route three (3) times daily. Provider, Historical   gabapentin (NEURONTIN) 400 mg capsule Take 800 mg by mouth two (2) times a day. Pt states 8 x daily      Other, MD Diego   albuterol (PROVENTIL VENTOLIN) 2.5 mg /3 mL (0.083 %) nebulizer solution 3 mL by Nebulization route three (3) times daily. 11/16/11   Rogerio Monroe MD   nitroglycerin (NITROSTAT) 0.4 mg SL tablet 0.4 mg by SubLINGual route every five (5) minutes as needed. Provider, Historical   ergocalciferol (VITAMIN D) 50,000 unit capsule Take 50,000 Units by mouth every seven (7) days. Takes on wed once a month     Provider, Historical   albuterol (PROVENTIL, VENTOLIN) 90 mcg/Actuation inhaler Take 2 Puffs by inhalation every four (4) hours as needed for Wheezing. Dispense: (1) inhaler with no refills. 12/19/10   Santiago Breaux PA     Allergies   Allergen Reactions    Pcn [Penicillins] Hives, Swelling and Myalgia     Can take cephalosporins    Codeine Nausea Only    Dilaudid [Hydromorphone (Bulk)] Other (comments)    Lortab [Hydrocodone-Acetaminophen] Nausea Only        Review of Systems:  Pertinent items are noted in the History of Present Illness.      Lab Results   Component Value Date/Time    Hemoglobin A1c 8.7 (H) 09/30/2011 01:07 PM        Immunization History   Administered Date(s) Administered    (RETIRED) Pneumococcal Vaccine (Unspecified Type) 09/20/2010    Influenza Vaccine Split 09/11/2012       There is no height or weight on file to calculate BMI. Counseling regarding nutrition done: Yes Pateint counsled about risks of smoking and cessation      Current medications:  Current Outpatient Medications   Medication Sig Dispense Refill    dulaglutide (TRULICITY) 3.53 CI/7.2 mL sub-q pen 0.75 mg by SubCUTAneous route every seven (7) days.  bumetanide (BUMEX) 2 mg tablet Take 1 Tab by mouth daily. 90 Tab 3    atorvastatin (LIPITOR) 80 mg tablet Take 1 Tab by mouth daily. 90 Tab 3    clopidogrel (PLAVIX) 75 mg tab Take 1 Tab by mouth daily. 30 Tab 11    metOLazone (ZAROXOLYN) 10 mg tablet Take 1 Tab by mouth daily. 90 Tab 3    metoprolol tartrate (LOPRESSOR) 50 mg tablet Take 1 Tab by mouth two (2) times a day. 180 Tab 3    aspirin 81 mg chewable tablet Take 1 Tab by mouth daily. 90 Tab 3    DULoxetine (CYMBALTA) 60 mg capsule Take 60 mg by mouth daily.  insulin glargine (TOUJEO SOLOSTAR) 300 unit/mL (1.5 mL) inpn 220 Units by SubCUTAneous route daily.  acetaminophen (TYLENOL EXTRA STRENGTH) 500 mg tablet Take  by mouth every six (6) hours as needed for Pain.  insulin glulisine (APIDRA) 100 unit/mL injection 90 Units by SubCUTAneous route three (3) times daily.  gabapentin (NEURONTIN) 400 mg capsule Take 800 mg by mouth two (2) times a day. Pt states 8 x daily        albuterol (PROVENTIL VENTOLIN) 2.5 mg /3 mL (0.083 %) nebulizer solution 3 mL by Nebulization route three (3) times daily. 1 Package 0    nitroglycerin (NITROSTAT) 0.4 mg SL tablet 0.4 mg by SubLINGual route every five (5) minutes as needed.  ergocalciferol (VITAMIN D) 50,000 unit capsule Take 50,000 Units by mouth every seven (7) days.  Takes on wed once a month       albuterol (PROVENTIL, VENTOLIN) 90 mcg/Actuation inhaler Take 2 Puffs by inhalation every four (4) hours as needed for Wheezing. Dispense: (1) inhaler with no refills. 1 g 0         Objective:     Physical Exam:     Visit Vitals  /77 (BP 1 Location: Left arm)   Pulse 89   Temp 97.5 °F (36.4 °C)   Resp 20   SpO2 90%       General: Healthy appearing  Neuro: alert and oriented to person/place/situation. Otherwise nonfocal.  Derm: Normal turgor for age, otherwise per wound exam  HEENT: Normocephalic, atraumatic. EOMI. Conjunctiva clear. No scleral icterus. Neck: Normal range of motion. No masses. Chest: Resp unlabored  Cardio: Regular rate, rhythm  Abdomen: Soft, nontender  Ext. No hemosiderrosis. DP/PT 2+  Psych: cooperative. Pleasant. No anxiety or depression. Normal mood and affect. Assessment:     BLE VSU, slowly improving. L resolved. R 2nd toe diabetic/traumatic wound    Plan:     Continue dressings. Slow but appropriate improvement. Continue HH. Keep legs elevated. Will need both sides resolved simultaneously before she can have help getting her home compression on.

## 2019-04-26 NOTE — WOUND CARE
04/26/19 1417   Wound Leg Lower Right   Date First Assessed/Time First Assessed: 08/17/18 1436   POA: Yes  Wound Type: Venous  Location: Leg Lower  Wound Description (Optional): 16  Orientation: Right   Dressing Status  Clean, dry, and intact   Dressing Type    (xeroform, unna boot)   Wound Length (cm) 1.2 cm   Wound Width (cm) 1.7 cm   Wound Depth (cm) 0.1   Wound Surface area (cm^2) 2.04 cm^2   Change in Wound Size % -716   Condition of Base Epithelializing;Granulation   Tissue Type Percent Red 100   Drainage Amount  Scant   Drainage Color Serous   Wound Odor None   Periwound Skin Condition Intact   Cleansing and Cleansing Agents  Soap and water

## 2019-05-02 PROBLEM — R01.1 MURMUR, CARDIAC: Status: RESOLVED | Noted: 2017-06-12 | Resolved: 2019-05-02

## 2019-05-02 PROBLEM — I10 ESSENTIAL HYPERTENSION: Status: ACTIVE | Noted: 2019-05-02

## 2019-05-02 PROBLEM — I35.0 NONRHEUMATIC AORTIC VALVE STENOSIS: Status: ACTIVE | Noted: 2019-05-02

## 2019-05-24 ENCOUNTER — HOSPITAL ENCOUNTER (OUTPATIENT)
Dept: WOUND CARE | Age: 84
Discharge: HOME | End: 2019-05-24
Attending: SURGERY
Payer: MEDICARE

## 2019-05-24 VITALS
OXYGEN SATURATION: 94 % | SYSTOLIC BLOOD PRESSURE: 185 MMHG | DIASTOLIC BLOOD PRESSURE: 76 MMHG | TEMPERATURE: 98.8 F | HEIGHT: 65 IN | WEIGHT: 257.6 LBS | HEART RATE: 78 BPM | BODY MASS INDEX: 42.92 KG/M2 | RESPIRATION RATE: 20 BRPM

## 2019-05-24 PROCEDURE — 29580 STRAPPING UNNA BOOT: CPT

## 2019-05-24 NOTE — WOUND CARE
05/24/19 1424   Wound Leg Lower Right   Date First Assessed/Time First Assessed: 08/17/18 1436   POA: Yes  Wound Type: Venous  Location: Leg Lower  Wound Description (Optional): 16  Orientation: Right   Dressing Status  Clean, dry, and intact   Dressing Type    (xeroform, abd, unna boot)   Non-Pressure Injury Full thickness (subcut/muscle)   Wound Length (cm) 0.8 cm   Wound Width (cm) 0.7 cm   Wound Depth (cm) 0.1   Wound Surface area (cm^2) 0.56 cm^2   Change in Wound Size % -124   Condition of Base Epithelializing;Granulation   Tissue Type Percent Red 100   Drainage Amount  Small    Drainage Color Serous; Serosanguinous   Wound Odor None   Periwound Skin Condition Intact   Cleansing and Cleansing Agents  Soap and water       Patient is on an anti-coagulant daily ASA81 and Plavix

## 2019-05-24 NOTE — WOUND CARE
[address]  [address], [address]  Phone: [phone]  Fax: [phone]    Patient: Matthew Dukes MRN: [id number]  SSN: [id number]    YOB: 1935  Age: 81 y.o. Sex: female       Return Appointment: 2 weeks with Ronald Frey MD    Instructions: Right leg  Cleanse wound and periwound with wound cleanser or normal saline. Xeroform- apply to wound bed. May cover with abd if needed. Unna boots bilaterally. Home health to change 2x weekly. Should you experience increased redness, swelling, pain, foul odor, size of wound(s), or have a temperature over 101 degrees please contact the [address] [place] [address] Road at [phone] or if after hours contact your primary care physician or go to the hospital emergency department.     Signed By: Willow Borrero     May 24, 2019

## 2019-05-31 NOTE — PROGRESS NOTES
Wound Center Progress Note    Patient: Vee Ko MRN: [id number]  SSN: [id number]    YOB: 1935  Age: 81 y.o. Sex: female      Subjective:     Chief Complaint:  Recurrent Venous leg ulcer BLE    History of Present Illness:       Wound Caused By: venous insufficiency  Associated Signs and Symptoms: drainage, pain  Timing: since June 2018  Quality: wound  Severity: full thickness  Modifying Factors: Dm2, obesity, venous insufficiency  Current Wound Care: Multilayer compression, CHF    3/11/2018  No new issues. Urinary soiling continues to be improved. Left 2nd toe stable. Getting HH for wraps    Continues to do well with dressings. No further UTI issues.          Past Medical History:   Diagnosis Date    Acute hyponatremia 1/21/2011    Acute on chronic diastolic congestive heart failure ([address] 1/19/2011    Acute renal failure ([address] 1/21/2011    AMI (acute myocardial infarction) ([address] 2009    Anemia 9/5/2012    Arthritis     Asthma     Asthma exacerbation 11/11/2011    CAD (coronary artery disease)     MI 2009, stents heart 2009    CAD (coronary artery disease), native coronary artery 9/2/2009    Previous stent to [facility] with Cypher 3.5x33mm SABA 8/09 9/09 LAD- Xience 2.5x23mm and 3.0x18mm SABA in mid-distal LAD      Cellulitis Bilateral Lower Extremities 1/20/2011    Chest discomfort, tightness, pressure 1/28/2016    Chronic diastolic heart failure ([address] 10/20/2011    Chronic venous insufficiency 10/24/2011    Diabetes ([address]     Diabetes Mellitus Type II-insulin dependent 9/2/2009    Diverticulitis 1/28/2016    Dyslipidemia 9/2/2009    Edema 1/28/2016    GERD (gastroesophageal reflux disease)     GLAUCOMA     BILATERAL    Heart failure ([address]     Hypertension     Hypokalemia 1/28/2016    Hypoxemia 9/7/2012    Morbid obesity (HCC)     Nausea & vomiting     Neuropathy in diabetes ([address] 9/2/2009    NSTEMI (non-ST elevation myocardial infarction) ([address] 9/2/2009     [employer]    Obstructive sleep apnea (adult) (pediatric)- probably  11/11/2011    TRISH (obstructive sleep apnea) 9/7/2012    Intolerant of CPAP     Other dyspnea and respiratory abnormality 11/10/2011    Other ill-defined conditions(799.89)     benign tumor in lower abdomen- not removed, diabetic ulcers, edema, neuropathy    Peripheral vascular disease ([facility] 10/20/2011    Post PTCA 6/20/2014    PVD (peripheral vascular disease) ([facility]     Unspecified anemia 1/23/2011    Unspecified sleep apnea     oxygen at night    Venous stasis of lower extremity 2010    BILATERAL W RECURRENT ADMISSIONS    Volume overload 9/6/2012      Past Surgical History:   Procedure Laterality Date    BREAST SURGERY PROCEDURE UNLISTED  1973    benign tumor left breast    CARDIAC SURG PROCEDURE UNLIST      4 stents most recent 2 yrs ago    HX CHOLECYSTECTOMY      HX COLONOSCOPY      HX GI      PTCA EACH ADDL VESSEL      stentsx3     Family History   Problem Relation Age of Onset    Cancer Father     Lung Disease Father     Cancer Brother     Diabetes Paternal Aunt     Hypertension Maternal Grandmother     Hypertension Paternal Grandmother       Social History     Tobacco Use    Smoking status: Never Smoker    Smokeless tobacco: Never Used   Substance Use Topics    Alcohol use: No       Prior to Admission medications    Medication Sig Start Date End Date Taking? Authorizing Provider   metoprolol tartrate (LOPRESSOR) 50 mg tablet Take 1 Tab by mouth two (2) times a day. 5/2/19   Eduin Longoria MD   clopidogrel (PLAVIX) 75 mg tab Take 1 Tab by mouth daily. 5/2/19   Eduin Longoria MD   DULoxetine (CYMBALTA) 60 mg capsule Take 1 Cap by mouth daily. 5/2/19   Eduin Longoria MD   dulaglutide (TRULICITY) 0.98 IM/9.1 mL sub-q pen 0.75 mg by SubCUTAneous route every seven (7) days. Provider, Historical   bumetanide (BUMEX) 2 mg tablet Take 1 Tab by mouth daily.  8/17/18   Eduin Longoria MD   atorvastatin (LIPITOR) 80 mg tablet Take 1 Tab by mouth daily. 7/16/18   Leatha Longoria MD   metOLazone (ZAROXOLYN) 10 mg tablet Take 1 Tab by mouth daily. 1/19/18   Leatha Longoria MD   aspirin 81 mg chewable tablet Take 1 Tab by mouth daily. 6/12/17   Leatha Longoria MD   insulin glargine (TOUJEO SOLOSTAR) 300 unit/mL (1.5 mL) inpn 220 Units by SubCUTAneous route daily. Provider, Historical   acetaminophen (TYLENOL EXTRA STRENGTH) 500 mg tablet Take  by mouth every six (6) hours as needed for Pain. Provider, Historical   insulin glulisine (APIDRA) 100 unit/mL injection 90 Units by SubCUTAneous route three (3) times daily. Provider, Historical   gabapentin (NEURONTIN) 400 mg capsule Take 800 mg by mouth two (2) times a day. Pt states 8 x daily      Other, MD Diego   albuterol (PROVENTIL VENTOLIN) 2.5 mg /3 mL (0.083 %) nebulizer solution 3 mL by Nebulization route three (3) times daily. 11/16/11   Jenifer Villareal MD   nitroglycerin (NITROSTAT) 0.4 mg SL tablet 0.4 mg by SubLINGual route every five (5) minutes as needed. Provider, Historical   ergocalciferol (VITAMIN D) 50,000 unit capsule Take 50,000 Units by mouth every seven (7) days. Takes on wed once a month     Provider, Historical   albuterol (PROVENTIL, VENTOLIN) 90 mcg/Actuation inhaler Take 2 Puffs by inhalation every four (4) hours as needed for Wheezing. Dispense: (1) inhaler with no refills. 12/19/10   Donald Breaux PA     Allergies   Allergen Reactions    Pcn [Penicillins] Hives, Swelling and Myalgia     Can take cephalosporins    Codeine Nausea Only    Dilaudid [Hydromorphone (Bulk)] Other (comments)    Lortab [Hydrocodone-Acetaminophen] Nausea Only        Review of Systems:  Pertinent items are noted in the History of Present Illness.      Lab Results   Component Value Date/Time    Hemoglobin A1c 8.7 (H) 09/30/2011 01:07 PM        Immunization History   Administered Date(s) Administered    (RETIRED) Pneumococcal Vaccine (Unspecified Type) 09/20/2010    Influenza Vaccine Split 09/11/2012       Body mass index is 42.87 kg/m². Counseling regarding nutrition done: Yes Pateint counsled about risks of smoking and cessation      Current medications:  Current Outpatient Medications   Medication Sig Dispense Refill    metoprolol tartrate (LOPRESSOR) 50 mg tablet Take 1 Tab by mouth two (2) times a day. 180 Tab 3    clopidogrel (PLAVIX) 75 mg tab Take 1 Tab by mouth daily. 90 Tab 3    DULoxetine (CYMBALTA) 60 mg capsule Take 1 Cap by mouth daily. 90 Cap 3    dulaglutide (TRULICITY) 0.91 VR/5.7 mL sub-q pen 0.75 mg by SubCUTAneous route every seven (7) days.  bumetanide (BUMEX) 2 mg tablet Take 1 Tab by mouth daily. 90 Tab 3    atorvastatin (LIPITOR) 80 mg tablet Take 1 Tab by mouth daily. 90 Tab 3    metOLazone (ZAROXOLYN) 10 mg tablet Take 1 Tab by mouth daily. 90 Tab 3    aspirin 81 mg chewable tablet Take 1 Tab by mouth daily. 90 Tab 3    insulin glargine (TOUJEO SOLOSTAR) 300 unit/mL (1.5 mL) inpn 220 Units by SubCUTAneous route daily.  acetaminophen (TYLENOL EXTRA STRENGTH) 500 mg tablet Take  by mouth every six (6) hours as needed for Pain.  insulin glulisine (APIDRA) 100 unit/mL injection 90 Units by SubCUTAneous route three (3) times daily.  gabapentin (NEURONTIN) 400 mg capsule Take 800 mg by mouth two (2) times a day. Pt states 8 x daily        albuterol (PROVENTIL VENTOLIN) 2.5 mg /3 mL (0.083 %) nebulizer solution 3 mL by Nebulization route three (3) times daily. 1 Package 0    nitroglycerin (NITROSTAT) 0.4 mg SL tablet 0.4 mg by SubLINGual route every five (5) minutes as needed.  ergocalciferol (VITAMIN D) 50,000 unit capsule Take 50,000 Units by mouth every seven (7) days. Takes on wed once a month       albuterol (PROVENTIL, VENTOLIN) 90 mcg/Actuation inhaler Take 2 Puffs by inhalation every four (4) hours as needed for Wheezing. Dispense: (1) inhaler with no refills.  1 g 0 Objective:     Physical Exam:     Visit Vitals  /76 (BP 1 Location: Right arm)   Pulse 78   Temp 98.8 °F (37.1 °C)   Resp 20   Ht 5' 5\" (1.651 m)   Wt 116.8 kg (257 lb 9.6 oz)   SpO2 94%   BMI 42.87 kg/m²       General: Healthy appearing  Neuro: alert and oriented to person/place/situation. Otherwise nonfocal.  Derm: Normal turgor for age, otherwise per wound exam  HEENT: Normocephalic, atraumatic. EOMI. Conjunctiva clear. No scleral icterus. Neck: Normal range of motion. No masses. Chest: Resp unlabored  Cardio: Regular rate, rhythm  Abdomen: Soft, nontender  Ext. No hemosiderrosis. DP/PT 2+  Psych: cooperative. Pleasant. No anxiety or depression. Normal mood and affect. Diabetic Foot Ulcer/Neuropathic   Is Wound Greater than 1.0 sq cm ? : No  Re-Current Wound with Skin Substitue within Last Year : No  X-Ray in Last 3 Months: No  VIBHA In Last 6 Months : No  Smoking Status: Non- Smoker  Wound Free from Infection : No Culture Done  Is Wound Free of Eschar, Slough , and / or Bio-Garrison: Yes  Malignant Process in Wound : No Biopsy Done  Type of off Loading: Wheelchair if area off surface of wheelchair  Compression Therapy of 20mm or greater ?: Yes(unna boots)      Assessment:     BLE VSU, slowly improving. L resolved. R 2nd toe diabetic/traumatic wound    Plan:     Continue dressings. Slow but appropriate improvement. Continue HH. Keep legs elevated. Will need both sides resolved simultaneously before she can have help getting her home compression on.

## 2019-06-14 ENCOUNTER — HOSPITAL ENCOUNTER (OUTPATIENT)
Dept: WOUND CARE | Age: 84
Discharge: HOME | End: 2019-06-14
Attending: SURGERY
Payer: MEDICARE

## 2019-06-14 PROCEDURE — 29580 STRAPPING UNNA BOOT: CPT

## 2019-06-14 NOTE — WOUND CARE
06/14/19 1419   Wound Leg Lower Right   Date First Assessed/Time First Assessed: 08/17/18 1436   POA: Yes  Wound Type: Venous  Location: Leg Lower  Wound Description (Optional): 16  Orientation: Right   Dressing Status  Clean, dry, and intact   Dressing Type    (Xeroform, Unna Boot)   Non-Pressure Injury Full thickness (subcut/muscle)   Wound Length (cm) 0.8 cm   Wound Width (cm) 0.6 cm   Wound Depth (cm) 0.1   Wound Surface area (cm^2) 0.48 cm^2   Change in Wound Size % -92   Condition of Base Granulation   Tissue Type Percent Red 100   Drainage Amount  Small    Drainage Color Serosanguinous   Wound Odor None   Periwound Skin Condition Intact   Cleansing and Cleansing Agents  Soap and water       Patient takes ASA 81mg and Plavix

## 2019-06-14 NOTE — WOUND CARE
[address]  [facility], [address]  Phone: [phone]  Fax: [phone]    Patient: Sung Celis MRN: [id number]  SSN: [id number]    YOB: 1935  Age: 81 y.o. Sex: female       Return Appointment: 2 weeks with Silvino Payne MD    Instructions: Right leg  Cleanse wound and periwound with wound cleanser or normal saline. Xeroform- apply to wound bed. May cover with abd if needed.      Unna boots bilaterally. Home health to change 2x weekly.            Should you experience increased redness, swelling, pain, foul odor, size of wound(s), or have a temperature over 101 degrees please contact the [address] [place] [address] Road at [phone] or if after hours contact your primary care physician or go to the hospital emergency department.     Signed By: Nunu Boland RN     June 14, 2019

## 2019-07-05 NOTE — PROGRESS NOTES
Wound Center Progress Note    Patient: Deidre Corley MRN: [id number]  SSN: [id number]    YOB: 1935  Age: 81 y.o. Sex: female      Subjective:     Chief Complaint:  Recurrent Venous leg ulcer BLE    History of Present Illness:       Wound Caused By: venous insufficiency  Associated Signs and Symptoms: drainage, pain  Timing: since June 2018  Quality: wound  Severity: full thickness  Modifying Factors: Dm2, obesity, venous insufficiency  Current Wound Care: Multilayer compression, CHF    3/11/2018  No new issues. Urinary soiling continues to be improved. Left 2nd toe stable. Getting [place] for wraps    6/14/2019  Continues to do well with dressings but still with substantial drainage. No further UTI issues.          Past Medical History:   Diagnosis Date    Acute hyponatremia 1/21/2011    Acute on chronic diastolic congestive heart failure ([address] 1/19/2011    Acute renal failure ([address] 1/21/2011    AMI (acute myocardial infarction) ([address] 2009    Anemia 9/5/2012    Arthritis     Asthma     Asthma exacerbation 11/11/2011    CAD (coronary artery disease)     MI 2009, stents heart 2009    CAD (coronary artery disease), native coronary artery 9/2/2009    Previous stent to dRCA with Cypher 3.5x33mm SABA 8/09 9/09 LAD- Xience 2.5x23mm and 3.0x18mm SABA in mid-distal LAD      Cellulitis Bilateral Lower Extremities 1/20/2011    Chest discomfort, tightness, pressure 1/28/2016    Chronic diastolic heart failure ([address] 10/20/2011    Chronic venous insufficiency 10/24/2011    Diabetes ([address]     Diabetes Mellitus Type II-insulin dependent 9/2/2009    Diverticulitis 1/28/2016    Dyslipidemia 9/2/2009    Edema 1/28/2016    GERD (gastroesophageal reflux disease)     GLAUCOMA     BILATERAL    Heart failure ([address]     Hypertension     Hypokalemia 1/28/2016    Hypoxemia 9/7/2012    Morbid obesity (HCC)     Nausea & vomiting     Neuropathy in diabetes ([address] 9/2/2009    NSTEMI (non-ST elevation myocardial infarction) ([facility] 9/2/2009    Dr Aarti Blackmon    Obstructive sleep apnea (adult) (pediatric)- probably  11/11/2011    TRISH (obstructive sleep apnea) 9/7/2012    Intolerant of CPAP     Other dyspnea and respiratory abnormality 11/10/2011    Other ill-defined conditions(799.89)     benign tumor in lower abdomen- not removed, diabetic ulcers, edema, neuropathy    Peripheral vascular disease ([facility] 10/20/2011    Post PTCA 6/20/2014    [facility]     Unspecified anemia 1/23/2011    Unspecified sleep apnea     oxygen at night    Venous stasis of lower extremity 2010    BILATERAL W RECURRENT ADMISSIONS    Volume overload 9/6/2012      Past Surgical History:   Procedure Laterality Date    BREAST SURGERY PROCEDURE UNLISTED  1973    benign tumor left breast    CARDIAC SURG PROCEDURE UNLIST      4 stents most recent 2 yrs ago    HX CHOLECYSTECTOMY      HX COLONOSCOPY      HX GI      PTCA EACH ADDL VESSEL      stentsx3     Family History   Problem Relation Age of Onset    Cancer Father     Lung Disease Father     Cancer Brother     Diabetes Paternal Aunt     Hypertension Maternal Grandmother     Hypertension Paternal Grandmother       Social History     Tobacco Use    Smoking status: Never Smoker    Smokeless tobacco: Never Used   Substance Use Topics    Alcohol use: No       Prior to Admission medications    Medication Sig Start Date End Date Taking? Authorizing Provider   metoprolol tartrate (LOPRESSOR) 50 mg tablet Take 1 Tab by mouth two (2) times a day. 5/2/19   Tiff Longoria MD   clopidogrel (PLAVIX) 75 mg tab Take 1 Tab by mouth daily. 5/2/19   Tiff Longoria MD   DULoxetine (CYMBALTA) 60 mg capsule Take 1 Cap by mouth daily. 5/2/19   Tiff Longoria MD   dulaglutide (TRULICITY) 9.06 AF/8.9 mL sub-q pen 0.75 mg by SubCUTAneous route every seven (7) days.     Provider, Historical   bumetanide (BUMEX) 2 mg tablet Take 1 Tab by mouth daily. 8/17/18   Angel Longoria MD   atorvastatin (LIPITOR) 80 mg tablet Take 1 Tab by mouth daily. 7/16/18   Angel Longoria MD   metOLazone (ZAROXOLYN) 10 mg tablet Take 1 Tab by mouth daily. 1/19/18   Angel Longoria MD   aspirin 81 mg chewable tablet Take 1 Tab by mouth daily. 6/12/17   Angel Longoria MD   insulin glargine (TOUJEO SOLOSTAR) 300 unit/mL (1.5 mL) inpn 220 Units by SubCUTAneous route daily. Provider, Historical   acetaminophen (TYLENOL EXTRA STRENGTH) 500 mg tablet Take  by mouth every six (6) hours as needed for Pain. Provider, Historical   insulin glulisine (APIDRA) 100 unit/mL injection 90 Units by SubCUTAneous route three (3) times daily. Provider, Historical   gabapentin (NEURONTIN) 400 mg capsule Take 800 mg by mouth two (2) times a day. Pt states 8 x daily      Other, MD Diego   albuterol (PROVENTIL VENTOLIN) 2.5 mg /3 mL (0.083 %) nebulizer solution 3 mL by Nebulization route three (3) times daily. 11/16/11   Kari Castañeda MD   nitroglycerin (NITROSTAT) 0.4 mg SL tablet 0.4 mg by SubLINGual route every five (5) minutes as needed. Provider, Historical   ergocalciferol (VITAMIN D) 50,000 unit capsule Take 50,000 Units by mouth every seven (7) days. Takes on wed once a month     Provider, Historical   albuterol (PROVENTIL, VENTOLIN) 90 mcg/Actuation inhaler Take 2 Puffs by inhalation every four (4) hours as needed for Wheezing. Dispense: (1) inhaler with no refills. 12/19/10   Alonzo Breaux PA     Allergies   Allergen Reactions    Pcn [Penicillins] Hives, Swelling and Myalgia     Can take cephalosporins    Codeine Nausea Only    Dilaudid [Hydromorphone (Bulk)] Other (comments)    Lortab [Hydrocodone-Acetaminophen] Nausea Only        Review of Systems:  Pertinent items are noted in the History of Present Illness.      Lab Results   Component Value Date/Time    Hemoglobin A1c 8.7 (H) 09/30/2011 01:07 PM        Immunization History   Administered Date(s) Administered    (RETIRED) Pneumococcal Vaccine (Unspecified Type) 09/20/2010    Influenza Vaccine Split 09/11/2012       There is no height or weight on file to calculate BMI. Counseling regarding nutrition done: Yes Pateint counsled about risks of smoking and cessation      Current medications:  Current Outpatient Medications   Medication Sig Dispense Refill    metoprolol tartrate (LOPRESSOR) 50 mg tablet Take 1 Tab by mouth two (2) times a day. 180 Tab 3    clopidogrel (PLAVIX) 75 mg tab Take 1 Tab by mouth daily. 90 Tab 3    DULoxetine (CYMBALTA) 60 mg capsule Take 1 Cap by mouth daily. 90 Cap 3    dulaglutide (TRULICITY) 6.36 IS/3.8 mL sub-q pen 0.75 mg by SubCUTAneous route every seven (7) days.  bumetanide (BUMEX) 2 mg tablet Take 1 Tab by mouth daily. 90 Tab 3    atorvastatin (LIPITOR) 80 mg tablet Take 1 Tab by mouth daily. 90 Tab 3    metOLazone (ZAROXOLYN) 10 mg tablet Take 1 Tab by mouth daily. 90 Tab 3    aspirin 81 mg chewable tablet Take 1 Tab by mouth daily. 90 Tab 3    insulin glargine (TOUJEO SOLOSTAR) 300 unit/mL (1.5 mL) inpn 220 Units by SubCUTAneous route daily.  acetaminophen (TYLENOL EXTRA STRENGTH) 500 mg tablet Take  by mouth every six (6) hours as needed for Pain.  insulin glulisine (APIDRA) 100 unit/mL injection 90 Units by SubCUTAneous route three (3) times daily.  gabapentin (NEURONTIN) 400 mg capsule Take 800 mg by mouth two (2) times a day. Pt states 8 x daily        albuterol (PROVENTIL VENTOLIN) 2.5 mg /3 mL (0.083 %) nebulizer solution 3 mL by Nebulization route three (3) times daily. 1 Package 0    nitroglycerin (NITROSTAT) 0.4 mg SL tablet 0.4 mg by SubLINGual route every five (5) minutes as needed.  ergocalciferol (VITAMIN D) 50,000 unit capsule Take 50,000 Units by mouth every seven (7) days.  Takes on wed once a month       albuterol (PROVENTIL, VENTOLIN) 90 mcg/Actuation inhaler Take 2 Puffs by inhalation every four (4) hours as needed for Wheezing. Dispense: (1) inhaler with no refills. 1 g 0         Objective:     Physical Exam:     There were no vitals taken for this visit. General: Healthy appearing  Neuro: alert and oriented to person/place/situation. Otherwise nonfocal.  Derm: Normal turgor for age, otherwise per wound exam  HEENT: Normocephalic, atraumatic. EOMI. Conjunctiva clear. No scleral icterus. Neck: Normal range of motion. No masses. Chest: Resp unlabored  Cardio: Regular rate, rhythm  Abdomen: Soft, nontender  Ext. No hemosiderrosis. DP/PT 2+  Psych: cooperative. Pleasant. No anxiety or depression. Normal mood and affect. Diabetic Foot Ulcer/Neuropathic   Is Wound Greater than 1.0 sq cm ? : No  Re-Current Wound with Skin Substitue within Last Year : No  X-Ray in Last 3 Months: No  VIBHA In Last 6 Months : No  Smoking Status: Non- Smoker  Wound Free from Infection : No Culture Done  Is Wound Free of Eschar, Slough , and / or Bio-[place]: Yes  Malignant Process in Wound : No Biopsy Done  Type of off Loading: Wheelchair if area off surface of wheelchair  Compression Therapy of 20mm or greater ?: Yes      Assessment:     BLE VSU, slowly improving. L resolved. R 2nd toe diabetic/traumatic wound    Plan:     Continue dressings. Slow but appropriate improvement. Continue HH. Keep legs elevated. Continue plan to have both sides resolved before she can do home compression.

## 2019-07-19 ENCOUNTER — HOSPITAL ENCOUNTER (OUTPATIENT)
Dept: WOUND CARE | Age: 84
Discharge: HOME | End: 2019-07-19
Attending: SURGERY
Payer: MEDICARE

## 2019-07-19 VITALS
DIASTOLIC BLOOD PRESSURE: 79 MMHG | HEART RATE: 78 BPM | RESPIRATION RATE: 18 BRPM | SYSTOLIC BLOOD PRESSURE: 157 MMHG | HEIGHT: 65 IN | TEMPERATURE: 98.4 F | BODY MASS INDEX: 42.87 KG/M2 | OXYGEN SATURATION: 90 %

## 2019-07-19 PROCEDURE — 29580 STRAPPING UNNA BOOT: CPT

## 2019-07-19 PROCEDURE — 74011000250 HC RX REV CODE- 250: Performed by: SURGERY

## 2019-07-19 RX ORDER — SILVER NITRATE 38.21; 12.74 MG/1; MG/1
1 STICK TOPICAL ONCE
Status: COMPLETED | OUTPATIENT
Start: 2019-07-19 | End: 2019-07-19

## 2019-07-19 RX ADMIN — SILVER NITRATE APPLICATORS 1 APPLICATOR: 25; 75 STICK TOPICAL at 15:32

## 2019-07-19 NOTE — WOUND CARE
[address]  [address], [address]  Phone: [phone]  Fax: [phone]    Patient: Aline Finn MRN: [id number]  SSN: [id number]    YOB: 1935  Age: 81 y.o. Sex: female       Return Appointment: 1 week with Emily Vilchis MD    Instructions: Right leg  Cleanse wound and periwound with wound cleanser or normal saline. Xeroform- apply to wound bed only. Please do not apply Xeroform to entire anterior lower leg due to risk of maceration. Cover with ABD  Silver nitrate applied today.      Unna boots bilaterally. Home health to change 2x weekly.     Should you experience increased redness, swelling, pain, foul odor, size of wound(s), or have a temperature over 101 degrees please contact the [address] [place] [address] Road at [phone] or if after hours contact your primary care physician or go to the hospital emergency department.     Signed By: Devin Doll PT, [facility]     July 19, 2019

## 2019-07-22 NOTE — PROGRESS NOTES
Wound Center Progress Note    Patient: Nadja Head MRN: [id number]  SSN: [id number]    YOB: 1935  Age: 81 y.o. Sex: female      Subjective:     Chief Complaint:  Recurrent Venous leg ulcer BLE    History of Present Illness:       Wound Caused By: venous insufficiency  Associated Signs and Symptoms: drainage, pain  Timing: since June 2018  Quality: wound  Severity: full thickness  Modifying Factors: Dm2, obesity, venous insufficiency  Current Wound Care: Multilayer compression, CHF    3/11/2018  No new issues. Urinary soiling continues to be improved. Left 2nd toe stable. Getting [facility] for wraps    6/14/2019  Continues to do well with dressings but still with substantial drainage. No further UTI issues. 7/19/2019  No new issues. Tolerating dressings. No further soiling.      Past Medical History:   Diagnosis Date    Acute hyponatremia 1/21/2011    Acute on chronic diastolic congestive heart failure ([address] 1/19/2011    Acute renal failure ([address] 1/21/2011    AMI (acute myocardial infarction) ([address] 2009    Anemia 9/5/2012    Arthritis     Asthma     Asthma exacerbation 11/11/2011    CAD (coronary artery disease)     MI 2009, stents heart 2009    CAD (coronary artery disease), native coronary artery 9/2/2009    Previous stent to dRCA with Cypher 3.5x33mm SABA 8/09 9/09 LAD- Xience 2.5x23mm and 3.0x18mm SABA in mid-distal LAD      Cellulitis Bilateral Lower Extremities 1/20/2011    Chest discomfort, tightness, pressure 1/28/2016    Chronic diastolic heart failure ([address] 10/20/2011    Chronic venous insufficiency 10/24/2011    Diabetes ([address]     Diabetes Mellitus Type II-insulin dependent 9/2/2009    Diverticulitis 1/28/2016    Dyslipidemia 9/2/2009    Edema 1/28/2016    GERD (gastroesophageal reflux disease)     GLAUCOMA     BILATERAL    Heart failure ([address]     Hypertension     Hypokalemia 1/28/2016    Hypoxemia 9/7/2012    Morbid obesity (HCC)     Nausea & vomiting  Neuropathy in diabetes ([facility] 9/2/2009    NSTEMI (non-ST elevation myocardial infarction) ([facility] 9/2/2009    Dr Estella Lozano    Obstructive sleep apnea (adult) (pediatric)- probably  11/11/2011    TRISH (obstructive sleep apnea) 9/7/2012    Intolerant of CPAP     Other dyspnea and respiratory abnormality 11/10/2011    Other ill-defined conditions(799.89)     benign tumor in lower abdomen- not removed, diabetic ulcers, edema, neuropathy    Peripheral vascular disease ([facility] 10/20/2011    Post PTCA 6/20/2014    [facility]     Unspecified anemia 1/23/2011    Unspecified sleep apnea     oxygen at night    Venous stasis of lower extremity 2010    BILATERAL W RECURRENT ADMISSIONS    Volume overload 9/6/2012      Past Surgical History:   Procedure Laterality Date    BREAST SURGERY PROCEDURE UNLISTED  1973    benign tumor left breast    CARDIAC SURG PROCEDURE UNLIST      4 stents most recent 2 yrs ago    HX CHOLECYSTECTOMY      HX COLONOSCOPY      HX GI      PTCA EACH ADDL VESSEL      stentsx3     Family History   Problem Relation Age of Onset    Cancer Father     Lung Disease Father     Cancer Brother     Diabetes Paternal Aunt     Hypertension Maternal Grandmother     Hypertension Paternal Grandmother       Social History     Tobacco Use    Smoking status: Never Smoker    Smokeless tobacco: Never Used   Substance Use Topics    Alcohol use: No       Prior to Admission medications    Medication Sig Start Date End Date Taking? Authorizing Provider   metoprolol tartrate (LOPRESSOR) 50 mg tablet Take 1 Tab by mouth two (2) times a day. 5/2/19   Deepika Longoria MD   clopidogrel (PLAVIX) 75 mg tab Take 1 Tab by mouth daily. 5/2/19   Deepika Longoria MD   DULoxetine (CYMBALTA) 60 mg capsule Take 1 Cap by mouth daily. 5/2/19   Deepika Longoria MD   dulaglutide (TRULICITY) 1.48 GF/1.0 mL sub-q pen 0.75 mg by SubCUTAneous route every seven (7) days.     Provider, Historical   bumetanide (BUMEX) 2 mg tablet Take 1 Tab by mouth daily. 8/17/18   Abimael Longoria MD   atorvastatin (LIPITOR) 80 mg tablet Take 1 Tab by mouth daily. 7/16/18   Abimael Longoria MD   metOLazone (ZAROXOLYN) 10 mg tablet Take 1 Tab by mouth daily. 1/19/18   Abimael Longoria MD   aspirin 81 mg chewable tablet Take 1 Tab by mouth daily. 6/12/17   Abimael Longoria MD   insulin glargine (TOUJEO SOLOSTAR) 300 unit/mL (1.5 mL) inpn 220 Units by SubCUTAneous route daily. Provider, Historical   acetaminophen (TYLENOL EXTRA STRENGTH) 500 mg tablet Take  by mouth every six (6) hours as needed for Pain. Provider, Historical   insulin glulisine (APIDRA) 100 unit/mL injection 90 Units by SubCUTAneous route three (3) times daily. Provider, Historical   gabapentin (NEURONTIN) 400 mg capsule Take 800 mg by mouth two (2) times a day. Pt states 8 x daily      Other, MD Diego   albuterol (PROVENTIL VENTOLIN) 2.5 mg /3 mL (0.083 %) nebulizer solution 3 mL by Nebulization route three (3) times daily. 11/16/11   Vamsi Love MD   nitroglycerin (NITROSTAT) 0.4 mg SL tablet 0.4 mg by SubLINGual route every five (5) minutes as needed. Provider, Historical   ergocalciferol (VITAMIN D) 50,000 unit capsule Take 50,000 Units by mouth every seven (7) days. Takes on wed once a month     Provider, Historical   albuterol (PROVENTIL, VENTOLIN) 90 mcg/Actuation inhaler Take 2 Puffs by inhalation every four (4) hours as needed for Wheezing. Dispense: (1) inhaler with no refills. 12/19/10   Marcelo Breaux PA     Allergies   Allergen Reactions    Pcn [Penicillins] Hives, Swelling and Myalgia     Can take cephalosporins    Codeine Nausea Only    Dilaudid [Hydromorphone (Bulk)] Other (comments)    Lortab [Hydrocodone-Acetaminophen] Nausea Only        Review of Systems:  Pertinent items are noted in the History of Present Illness.      Lab Results   Component Value Date/Time    Hemoglobin A1c 8.7 (H) 09/30/2011 01:07 PM        Immunization History   Administered Date(s) Administered    (RETIRED) Pneumococcal Vaccine (Unspecified Type) 09/20/2010    Influenza Vaccine Split 09/11/2012       Body mass index is 42.87 kg/m². Counseling regarding nutrition done: Yes Pateint counsled about risks of smoking and cessation      Current medications:  Current Outpatient Medications   Medication Sig Dispense Refill    metoprolol tartrate (LOPRESSOR) 50 mg tablet Take 1 Tab by mouth two (2) times a day. 180 Tab 3    clopidogrel (PLAVIX) 75 mg tab Take 1 Tab by mouth daily. 90 Tab 3    DULoxetine (CYMBALTA) 60 mg capsule Take 1 Cap by mouth daily. 90 Cap 3    dulaglutide (TRULICITY) 5.94 MX/7.1 mL sub-q pen 0.75 mg by SubCUTAneous route every seven (7) days.  bumetanide (BUMEX) 2 mg tablet Take 1 Tab by mouth daily. 90 Tab 3    atorvastatin (LIPITOR) 80 mg tablet Take 1 Tab by mouth daily. 90 Tab 3    metOLazone (ZAROXOLYN) 10 mg tablet Take 1 Tab by mouth daily. 90 Tab 3    aspirin 81 mg chewable tablet Take 1 Tab by mouth daily. 90 Tab 3    insulin glargine (TOUJEO SOLOSTAR) 300 unit/mL (1.5 mL) inpn 220 Units by SubCUTAneous route daily.  acetaminophen (TYLENOL EXTRA STRENGTH) 500 mg tablet Take  by mouth every six (6) hours as needed for Pain.  insulin glulisine (APIDRA) 100 unit/mL injection 90 Units by SubCUTAneous route three (3) times daily.  gabapentin (NEURONTIN) 400 mg capsule Take 800 mg by mouth two (2) times a day. Pt states 8 x daily        albuterol (PROVENTIL VENTOLIN) 2.5 mg /3 mL (0.083 %) nebulizer solution 3 mL by Nebulization route three (3) times daily. 1 Package 0    nitroglycerin (NITROSTAT) 0.4 mg SL tablet 0.4 mg by SubLINGual route every five (5) minutes as needed.  ergocalciferol (VITAMIN D) 50,000 unit capsule Take 50,000 Units by mouth every seven (7) days.  Takes on wed once a month       albuterol (PROVENTIL, VENTOLIN) 90 mcg/Actuation inhaler Take 2 Puffs by inhalation every four (4) hours as needed for Wheezing. Dispense: (1) inhaler with no refills. 1 g 0         Objective:     Physical Exam:     Visit Vitals  /79 (BP 1 Location: Right arm)   Pulse 78   Temp 98.4 °F (36.9 °C)   Resp 18   Ht 5' 5\" (1.651 m)   SpO2 90%   BMI 42.87 kg/m²       General: Healthy appearing  Neuro: alert and oriented to person/place/situation. Otherwise nonfocal.  Derm: Normal turgor for age, otherwise per wound exam  HEENT: Normocephalic, atraumatic. EOMI. Conjunctiva clear. No scleral icterus. Neck: Normal range of motion. No masses. Chest: Resp unlabored  Cardio: Regular rate, rhythm  Abdomen: Soft, nontender  Ext. No hemosiderrosis. DP/PT 2+  Psych: cooperative. Pleasant. No anxiety or depression. Normal mood and affect. Diabetic Foot Ulcer/Neuropathic   Is Wound Greater than 1.0 sq cm ? : No  Re-Current Wound with Skin Substitue within Last Year : No  X-Ray in Last 3 Months: No  VIBHA In Last 6 Months : No  Smoking Status: Non- Smoker  Wound Free from Infection : No Culture Done  Is Wound Free of Eschar, Slough , and / or Bio-[place]: Yes  Malignant Process in Wound : No Biopsy Done  Type of off Loading: Wheelchair if area off surface of wheelchair  Compression Therapy of 20mm or greater ?: Yes      Assessment:     BLE VSU, slowly improving. L resolved. R 2nd toe diabetic/traumatic wound    Plan:     Minimal progress with leg wound in the last few weeks. Will plan for biopsy at next to r/o malignancy.

## 2019-07-26 ENCOUNTER — HOSPITAL ENCOUNTER (OUTPATIENT)
Dept: WOUND CARE | Age: 84
Discharge: HOME | End: 2019-07-26
Attending: SURGERY
Payer: MEDICARE

## 2019-07-26 PROCEDURE — 74011000250 HC RX REV CODE- 250: Performed by: SURGERY

## 2019-07-26 PROCEDURE — 88305 TISSUE EXAM BY PATHOLOGIST: CPT

## 2019-07-26 PROCEDURE — 29580 STRAPPING UNNA BOOT: CPT

## 2019-07-26 RX ORDER — LIDOCAINE HYDROCHLORIDE AND EPINEPHRINE 10; 10 MG/ML; UG/ML
1.5 INJECTION, SOLUTION INFILTRATION; PERINEURAL ONCE
Status: COMPLETED | OUTPATIENT
Start: 2019-07-26 | End: 2019-07-26

## 2019-07-26 RX ADMIN — LIDOCAINE HYDROCHLORIDE AND EPINEPHRINE 15 MG: 10; 10 INJECTION, SOLUTION INFILTRATION; PERINEURAL at 16:14

## 2019-07-26 NOTE — WOUND CARE
07/26/19 1521   Wound Leg Lower Right   Date First Assessed/Time First Assessed: 08/17/18 1436   POA: Yes  Wound Type: Venous  Location: Leg Lower  Wound Description (Optional): 16  Orientation: Right   Dressing Status  Clean, dry, and intact   Dressing Type    (xeroform, bilateral unna boot)   Wound Length (cm) 1 cm   Wound Width (cm) 0.8 cm   Wound Depth (cm) 0.1   Wound Surface area (cm^2) 0.8 cm^2   Change in Wound Size % -220   Condition of Base Granulation   Tissue Type Percent Red 100   Drainage Amount  Small    Drainage Color Serosanguinous   Wound Odor None   Periwound Skin Condition Intact   Cleansing and Cleansing Agents  Soap and water       Patient is currently taking plavix

## 2019-07-26 NOTE — WOUND CARE
[address]  [address], [address]  Phone: [phone]  Fax: [phone]    Patient: Kd Hammonds MRN: [id number]  SSN: [id number]    YOB: 1935  Age: 81 y.o. Sex: female       Return Appointment: 1 week with Pam Romero MD    Instructions: Right leg  Cleanse wound and periwound with wound cleanser or normal saline. Xeroform- apply to wound bed only. Please do not apply Xeroform to entire anterior lower leg due to risk of maceration. Cover with ABD   Unna boots bilaterally. Home health to change 2x weekly. Biopsy obtained today. 7/26/19    Should you experience increased redness, swelling, pain, foul odor, size of wound(s), or have a temperature over 101 degrees please contact the [address] [place] [address] Road at [phone] or if after hours contact your primary care physician or go to the hospital emergency department.     Signed By: Chikis Casanova RN     July 26, 2019

## 2019-08-02 ENCOUNTER — HOSPITAL ENCOUNTER (OUTPATIENT)
Dept: WOUND CARE | Age: 84
Discharge: HOME | End: 2019-08-02
Attending: SURGERY
Payer: MEDICARE

## 2019-08-02 VITALS — BODY MASS INDEX: 42.65 KG/M2 | WEIGHT: 256 LBS | HEIGHT: 65 IN

## 2019-08-02 NOTE — WOUND CARE
08/02/19 1514   Wound Leg Lower Right   Date First Assessed/Time First Assessed: 08/17/18 1436   POA: Yes  Wound Type: Venous  Location: Leg Lower  Wound Description (Optional): 16  Orientation: Right   Dressing Status  Clean, dry, and intact   Dressing Type    (xeroform, unna boot)   Wound Length (cm) 1.4 cm   Wound Width (cm) 1.1 cm   Wound Depth (cm) 0.1   Wound Surface area (cm^2) 1.54 cm^2   Change in Wound Size % -516   Condition of Base Granulation   Tissue Type Percent Red 100   Drainage Amount  Small    Drainage Color Serosanguinous   Wound Odor None   Periwound Skin Condition Intact   Cleansing and Cleansing Agents  Soap and water       Patient is currently taking plavix

## 2019-08-02 NOTE — PROGRESS NOTES
Wound Center Progress Note    Patient: Aline Finn MRN: [id number]  SSN: [id number]    YOB: 1935  Age: 81 y.o. Sex: female      Subjective:     Chief Complaint:  Recurrent Venous leg ulcer BLE    History of Present Illness:       Wound Caused By: venous insufficiency  Associated Signs and Symptoms: drainage, pain  Timing: since June 2018  Quality: wound  Severity: full thickness  Modifying Factors: Dm2, obesity, venous insufficiency  Current Wound Care: Multilayer compression, CHF    3/11/2018  No new issues. Urinary soiling continues to be improved. Left 2nd toe stable. Getting [facility] for wraps    6/14/2019  Continues to do well with dressings but still with substantial drainage. No further UTI issues. 7/19/2019  No new issues. Tolerating dressings. No further soiling.     7/26/2019  Doing well. No new issues. R leg wound not improving.     Past Medical History:   Diagnosis Date    Acute hyponatremia 1/21/2011    Acute on chronic diastolic congestive heart failure ([address] 1/19/2011    Acute renal failure ([address] 1/21/2011    AMI (acute myocardial infarction) ([address] 2009    Anemia 9/5/2012    Arthritis     Asthma     Asthma exacerbation 11/11/2011    CAD (coronary artery disease)     MI 2009, stents heart 2009    CAD (coronary artery disease), native coronary artery 9/2/2009    Previous stent to dRCA with Cypher 3.5x33mm SABA 8/09 9/09 LAD- Xience 2.5x23mm and 3.0x18mm SABA in mid-distal LAD      Cellulitis Bilateral Lower Extremities 1/20/2011    Chest discomfort, tightness, pressure 1/28/2016    Chronic diastolic heart failure ([address] 10/20/2011    Chronic venous insufficiency 10/24/2011    Diabetes ([address]     Diabetes Mellitus Type II-insulin dependent 9/2/2009    Diverticulitis 1/28/2016    Dyslipidemia 9/2/2009    Edema 1/28/2016    GERD (gastroesophageal reflux disease)     GLAUCOMA     BILATERAL    Heart failure ([address]     Hypertension     Hypokalemia 1/28/2016    Hypoxemia 9/7/2012    Morbid obesity (HCC)     Nausea & vomiting     Neuropathy in diabetes ([facility] 9/2/2009    NSTEMI (non-ST elevation myocardial infarction) ([facility] 9/2/2009    Dr Hermelindo Chandler    Obstructive sleep apnea (adult) (pediatric)- probably  11/11/2011    TRISH (obstructive sleep apnea) 9/7/2012    Intolerant of CPAP     Other dyspnea and respiratory abnormality 11/10/2011    Other ill-defined conditions(799.89)     benign tumor in lower abdomen- not removed, diabetic ulcers, edema, neuropathy    Peripheral vascular disease ([address] 10/20/2011    Post PTCA 6/20/2014    PVD (peripheral vascular disease) ([facility]     Unspecified anemia 1/23/2011    Unspecified sleep apnea     oxygen at night    Venous stasis of lower extremity 2010    BILATERAL W RECURRENT ADMISSIONS    Volume overload 9/6/2012      Past Surgical History:   Procedure Laterality Date    BREAST SURGERY PROCEDURE UNLISTED  1973    benign tumor left breast    CARDIAC SURG PROCEDURE UNLIST      4 stents most recent 2 yrs ago    HX CHOLECYSTECTOMY      HX COLONOSCOPY      HX GI      PTCA EACH ADDL VESSEL      stentsx3     Family History   Problem Relation Age of Onset    Cancer Father     Lung Disease Father     Cancer Brother     Diabetes Paternal Aunt     Hypertension Maternal Grandmother     Hypertension Paternal Grandmother       Social History     Tobacco Use    Smoking status: Never Smoker    Smokeless tobacco: Never Used   Substance Use Topics    Alcohol use: No       Prior to Admission medications    Medication Sig Start Date End Date Taking? Authorizing Provider   metoprolol tartrate (LOPRESSOR) 50 mg tablet Take 1 Tab by mouth two (2) times a day. Patient taking differently: Take 100 mg by mouth two (2) times a day. 5/2/19   Stacey Longoria MD   clopidogrel (PLAVIX) 75 mg tab Take 1 Tab by mouth daily. 5/2/19   Stacey Longoria MD   DULoxetine (CYMBALTA) 60 mg capsule Take 1 Cap by mouth daily.  5/2/19 Alfredito Longoria MD   dulaglutide (TRULICITY) 7.88 FT/3.0 mL sub-q pen 0.75 mg by SubCUTAneous route every seven (7) days. Provider, Historical   bumetanide (BUMEX) 2 mg tablet Take 1 Tab by mouth daily. 8/17/18   Alfredito Longoria MD   atorvastatin (LIPITOR) 80 mg tablet Take 1 Tab by mouth daily. 7/16/18   Alfredito Longoria MD   metOLazone (ZAROXOLYN) 10 mg tablet Take 1 Tab by mouth daily. 1/19/18   Alfredito Longoria MD   aspirin 81 mg chewable tablet Take 1 Tab by mouth daily. 6/12/17   Alfredito Longoria MD   insulin glargine (TOUJEO SOLOSTAR) 300 unit/mL (1.5 mL) inpn 220 Units by SubCUTAneous route daily. Provider, Historical   acetaminophen (TYLENOL EXTRA STRENGTH) 500 mg tablet Take  by mouth every six (6) hours as needed for Pain. Provider, Historical   insulin glulisine (APIDRA) 100 unit/mL injection 90 Units by SubCUTAneous route three (3) times daily. Provider, Historical   gabapentin (NEURONTIN) 400 mg capsule Take 800 mg by mouth two (2) times a day. Pt states 8 x daily      OtherDiego MD   albuterol (PROVENTIL VENTOLIN) 2.5 mg /3 mL (0.083 %) nebulizer solution 3 mL by Nebulization route three (3) times daily. 11/16/11   Bhavna Ly MD   nitroglycerin (NITROSTAT) 0.4 mg SL tablet 0.4 mg by SubLINGual route every five (5) minutes as needed. Provider, Historical   ergocalciferol (VITAMIN D) 50,000 unit capsule Take 50,000 Units by mouth every seven (7) days. Takes on wed once a month     Provider, Historical   albuterol (PROVENTIL, VENTOLIN) 90 mcg/Actuation inhaler Take 2 Puffs by inhalation every four (4) hours as needed for Wheezing. Dispense: (1) inhaler with no refills.  12/19/10   Zuly Breaux PA     Allergies   Allergen Reactions    Pcn [Penicillins] Hives, Swelling and Myalgia     Can take cephalosporins    Codeine Nausea Only    Dilaudid [Hydromorphone (Bulk)] Other (comments)    Lortab [Hydrocodone-Acetaminophen] Nausea Only        Review of Systems:  Pertinent items are noted in the History of Present Illness. Lab Results   Component Value Date/Time    Hemoglobin A1c 8.7 (H) 09/30/2011 01:07 PM        Immunization History   Administered Date(s) Administered    (RETIRED) Pneumococcal Vaccine (Unspecified Type) 09/20/2010    Influenza Vaccine Split 09/11/2012       There is no height or weight on file to calculate BMI. Counseling regarding nutrition done: Yes Pateint counsled about risks of smoking and cessation      Current medications:  Current Outpatient Medications   Medication Sig Dispense Refill    metoprolol tartrate (LOPRESSOR) 50 mg tablet Take 1 Tab by mouth two (2) times a day. (Patient taking differently: Take 100 mg by mouth two (2) times a day.) 180 Tab 3    clopidogrel (PLAVIX) 75 mg tab Take 1 Tab by mouth daily. 90 Tab 3    DULoxetine (CYMBALTA) 60 mg capsule Take 1 Cap by mouth daily. 90 Cap 3    dulaglutide (TRULICITY) 5.67 [place]/0.1 mL sub-q pen 0.75 mg by SubCUTAneous route every seven (7) days.  bumetanide (BUMEX) 2 mg tablet Take 1 Tab by mouth daily. 90 Tab 3    atorvastatin (LIPITOR) 80 mg tablet Take 1 Tab by mouth daily. 90 Tab 3    metOLazone (ZAROXOLYN) 10 mg tablet Take 1 Tab by mouth daily. 90 Tab 3    aspirin 81 mg chewable tablet Take 1 Tab by mouth daily. 90 Tab 3    insulin glargine (TOUJEO SOLOSTAR) 300 unit/mL (1.5 mL) inpn 220 Units by SubCUTAneous route daily.  acetaminophen (TYLENOL EXTRA STRENGTH) 500 mg tablet Take  by mouth every six (6) hours as needed for Pain.  insulin glulisine (APIDRA) 100 unit/mL injection 90 Units by SubCUTAneous route three (3) times daily.  gabapentin (NEURONTIN) 400 mg capsule Take 800 mg by mouth two (2) times a day. Pt states 8 x daily        albuterol (PROVENTIL VENTOLIN) 2.5 mg /3 mL (0.083 %) nebulizer solution 3 mL by Nebulization route three (3) times daily.  1 Package 0    nitroglycerin (NITROSTAT) 0.4 mg SL tablet 0.4 mg by SubLINGual route every five (5) minutes as needed.  ergocalciferol (VITAMIN D) 50,000 unit capsule Take 50,000 Units by mouth every seven (7) days. Takes on wed once a month       albuterol (PROVENTIL, VENTOLIN) 90 mcg/Actuation inhaler Take 2 Puffs by inhalation every four (4) hours as needed for Wheezing. Dispense: (1) inhaler with no refills. 1 g 0         Objective:     Physical Exam:     There were no vitals taken for this visit. General: Healthy appearing  Neuro: alert and oriented to person/place/situation. Otherwise nonfocal.  Derm: Normal turgor for age, otherwise per wound exam  HEENT: Normocephalic, atraumatic. EOMI. Conjunctiva clear. No scleral icterus. Neck: Normal range of motion. No masses. Chest: Resp unlabored  Cardio: Regular rate, rhythm  Abdomen: Soft, nontender  Ext. No hemosiderrosis. DP/PT 2+  Psych: cooperative. Pleasant. No anxiety or depression. Normal mood and affect. Assessment:     BLE VSU, slowly improving. L resolved. R 2nd toe diabetic/traumatic wound    Plan:     Minimal progress with leg wound in the last few weeks.  Biopsy today will follow

## 2019-08-02 NOTE — WOUND CARE
[address]  [address], [address]  [place]   Phone: [phone]  Fax: [phone]    Patient: Carmen Sheehan MRN: [id number]  SSN: [id number]    YOB: 1935  Age: 81 y.o. Sex: female       Return Appointment: 2 weeks with Jackie Braun MD    Instructions: Right lower leg:  Clean wound with saline. Hydrofera Blue: Cut to wound size, moisten with saline, and apply to wound bed. Cover with bandaid. Change 3 times/week. Wear compression stockings during the day. May remove at night. Home health for dressing changes    Should you experience increased redness, swelling, pain, foul odor, size of wound(s), or have a temperature over 101 degrees please contact the [address] [place] [address] Road at [phone] or if after hours contact your primary care physician or go to the hospital emergency department.     Signed By: Hannah Lara RN     August 2, 2019

## 2019-08-09 NOTE — PROGRESS NOTES
Wound Center Progress Note    Patient: Yordan Hightower MRN: [id number]  SSN: [id number]    YOB: 1935  Age: 81 y.o. Sex: female      Subjective:     Chief Complaint:  Recurrent Venous leg ulcer BLE    History of Present Illness:       Wound Caused By: venous insufficiency  Associated Signs and Symptoms: drainage, pain  Timing: since June 2018  Quality: wound  Severity: full thickness  Modifying Factors: Dm2, obesity, venous insufficiency  Current Wound Care: Multilayer compression, CHF    3/11/2018  No new issues. Urinary soiling continues to be improved. Left 2nd toe stable. Getting [facility] for wraps    6/14/2019  Continues to do well with dressings but still with substantial drainage. No further UTI issues. 7/19/2019  No new issues. Tolerating dressings. No further soiling.     7/26/2019  Doing well. No new issues. R leg wound not improving. 8/2/2019  Returns for re-eval. Bx proven [place]. Chest lesion benign.      Past Medical History:   Diagnosis Date    Acute hyponatremia 1/21/2011    Acute on chronic diastolic congestive heart failure ([address] 1/19/2011    Acute renal failure ([address] 1/21/2011    AMI (acute myocardial infarction) ([address] 2009    Anemia 9/5/2012    Arthritis     Asthma     Asthma exacerbation 11/11/2011    CAD (coronary artery disease)     MI 2009, stents heart 2009    CAD (coronary artery disease), native coronary artery 9/2/2009    Previous stent to dRCA with Cypher 3.5x33mm SABA 8/09 9/09 LAD- Xience 2.5x23mm and 3.0x18mm SABA in mid-distal LAD      Cellulitis Bilateral Lower Extremities 1/20/2011    Chest discomfort, tightness, pressure 1/28/2016    Chronic diastolic heart failure ([address] 10/20/2011    Chronic venous insufficiency 10/24/2011    Diabetes ([address]     Diabetes Mellitus Type II-insulin dependent 9/2/2009    Diverticulitis 1/28/2016    Dyslipidemia 9/2/2009    Edema 1/28/2016    GERD (gastroesophageal reflux disease)     GLAUCOMA BILATERAL    [facility]     Hypertension     Hypokalemia 1/28/2016    Hypoxemia 9/7/2012    Morbid obesity (HCC)     Nausea & vomiting     Neuropathy in diabetes ([facility] 9/2/2009    NSTEMI (non-ST elevation myocardial infarction) ([facility] 9/2/2009    Dr Erna Barnes    Obstructive sleep apnea (adult) (pediatric)- probably  11/11/2011    TRISH (obstructive sleep apnea) 9/7/2012    Intolerant of CPAP     Other dyspnea and respiratory abnormality 11/10/2011    Other ill-defined conditions(799.89)     benign tumor in lower abdomen- not removed, diabetic ulcers, edema, neuropathy    Peripheral vascular disease ([facility] 10/20/2011    Post PTCA 6/20/2014    PVD (peripheral vascular disease) ([facility]     Unspecified anemia 1/23/2011    Unspecified sleep apnea     oxygen at night    Venous stasis of lower extremity 2010    BILATERAL W RECURRENT ADMISSIONS    Volume overload 9/6/2012      Past Surgical History:   Procedure Laterality Date    BREAST SURGERY PROCEDURE UNLISTED  1973    benign tumor left breast    CARDIAC SURG PROCEDURE UNLIST      4 stents most recent 2 yrs ago    HX CHOLECYSTECTOMY      HX COLONOSCOPY      HX GI      PTCA EACH ADDL VESSEL      stentsx3     Family History   Problem Relation Age of Onset    Cancer Father     Lung Disease Father     Cancer Brother     Diabetes Paternal Aunt     Hypertension Maternal Grandmother     Hypertension Paternal Grandmother       Social History     Tobacco Use    Smoking status: Never Smoker    Smokeless tobacco: Never Used   Substance Use Topics    Alcohol use: No       Prior to Admission medications    Medication Sig Start Date End Date Taking? Authorizing Provider   metoprolol tartrate (LOPRESSOR) 50 mg tablet Take 1 Tab by mouth two (2) times a day. Patient taking differently: Take 100 mg by mouth two (2) times a day. 5/2/19   Esther Longoria MD   clopidogrel (PLAVIX) 75 mg tab Take 1 Tab by mouth daily.  5/2/19   Esther Longoria MD DULoxetine (CYMBALTA) 60 mg capsule Take 1 Cap by mouth daily. 5/2/19   Liseth Longoria MD   dulaglutide (TRULICITY) 9.46 AL/9.9 mL sub-q pen 0.75 mg by SubCUTAneous route every seven (7) days. Provider, Historical   bumetanide (BUMEX) 2 mg tablet Take 1 Tab by mouth daily. 8/17/18   Liseth Longoria MD   atorvastatin (LIPITOR) 80 mg tablet Take 1 Tab by mouth daily. 7/16/18   Liseth Longoria MD   metOLazone (ZAROXOLYN) 10 mg tablet Take 1 Tab by mouth daily. 1/19/18   Liseth Longoria MD   aspirin 81 mg chewable tablet Take 1 Tab by mouth daily. 6/12/17   Liseth Longoria MD   insulin glargine (TOUJEO SOLOSTAR) 300 unit/mL (1.5 mL) inpn 220 Units by SubCUTAneous route daily. Provider, Historical   acetaminophen (TYLENOL EXTRA STRENGTH) 500 mg tablet Take  by mouth every six (6) hours as needed for Pain. Provider, Historical   insulin glulisine (APIDRA) 100 unit/mL injection 90 Units by SubCUTAneous route three (3) times daily. Provider, Historical   gabapentin (NEURONTIN) 400 mg capsule Take 800 mg by mouth two (2) times a day. Pt states 8 x daily      OtherDiego MD   albuterol (PROVENTIL VENTOLIN) 2.5 mg /3 mL (0.083 %) nebulizer solution 3 mL by Nebulization route three (3) times daily. 11/16/11   Kinjal Zaragoza MD   nitroglycerin (NITROSTAT) 0.4 mg SL tablet 0.4 mg by SubLINGual route every five (5) minutes as needed. Provider, Historical   ergocalciferol (VITAMIN D) 50,000 unit capsule Take 50,000 Units by mouth every seven (7) days. Takes on wed once a month     Provider, Historical   albuterol (PROVENTIL, VENTOLIN) 90 mcg/Actuation inhaler Take 2 Puffs by inhalation every four (4) hours as needed for Wheezing. Dispense: (1) inhaler with no refills.  12/19/10   Adonay Breaux PA     Allergies   Allergen Reactions    Pcn [Penicillins] Hives, Swelling and Myalgia     Can take cephalosporins    Codeine Nausea Only    Dilaudid [Hydromorphone (Bulk)] Other (comments)    Lortab [Hydrocodone-Acetaminophen] Nausea Only        Review of Systems:  Pertinent items are noted in the History of Present Illness. Lab Results   Component Value Date/Time    Hemoglobin A1c 8.7 (H) 09/30/2011 01:07 PM        Immunization History   Administered Date(s) Administered    (RETIRED) Pneumococcal Vaccine (Unspecified Type) 09/20/2010    Influenza Vaccine Split 09/11/2012       Body mass index is 42.6 kg/m². Counseling regarding nutrition done: Yes Pateint counsled about risks of smoking and cessation      Current medications:  Current Outpatient Medications   Medication Sig Dispense Refill    metoprolol tartrate (LOPRESSOR) 50 mg tablet Take 1 Tab by mouth two (2) times a day. (Patient taking differently: Take 100 mg by mouth two (2) times a day.) 180 Tab 3    clopidogrel (PLAVIX) 75 mg tab Take 1 Tab by mouth daily. 90 Tab 3    DULoxetine (CYMBALTA) 60 mg capsule Take 1 Cap by mouth daily. 90 Cap 3    dulaglutide (TRULICITY) 3.83 VG/6.3 mL sub-q pen 0.75 mg by SubCUTAneous route every seven (7) days.  bumetanide (BUMEX) 2 mg tablet Take 1 Tab by mouth daily. 90 Tab 3    atorvastatin (LIPITOR) 80 mg tablet Take 1 Tab by mouth daily. 90 Tab 3    metOLazone (ZAROXOLYN) 10 mg tablet Take 1 Tab by mouth daily. 90 Tab 3    aspirin 81 mg chewable tablet Take 1 Tab by mouth daily. 90 Tab 3    insulin glargine (TOUJEO SOLOSTAR) 300 unit/mL (1.5 mL) inpn 220 Units by SubCUTAneous route daily.  acetaminophen (TYLENOL EXTRA STRENGTH) 500 mg tablet Take  by mouth every six (6) hours as needed for Pain.  insulin glulisine (APIDRA) 100 unit/mL injection 90 Units by SubCUTAneous route three (3) times daily.  gabapentin (NEURONTIN) 400 mg capsule Take 800 mg by mouth two (2) times a day. Pt states 8 x daily        albuterol (PROVENTIL VENTOLIN) 2.5 mg /3 mL (0.083 %) nebulizer solution 3 mL by Nebulization route three (3) times daily.  1 Package 0    nitroglycerin (NITROSTAT) 0.4 mg SL tablet 0.4 mg by SubLINGual route every five (5) minutes as needed.  ergocalciferol (VITAMIN D) 50,000 unit capsule Take 50,000 Units by mouth every seven (7) days. Takes on wed once a month       albuterol (PROVENTIL, VENTOLIN) 90 mcg/Actuation inhaler Take 2 Puffs by inhalation every four (4) hours as needed for Wheezing. Dispense: (1) inhaler with no refills. 1 g 0         Objective:     Physical Exam:     Visit Vitals  BP (P) 143/66 (BP 1 Location: Right arm)   Pulse (P) 64   Temp (P) 96.9 °F (36.1 °C)   Resp (P) 18   Ht 5' 5\" (1.651 m)   Wt 116.1 kg (256 lb)   SpO2 (P) 94%   BMI 42.60 kg/m²       General: Healthy appearing  Neuro: alert and oriented to person/place/situation. Otherwise nonfocal.  Derm: Normal turgor for age, otherwise per wound exam  HEENT: Normocephalic, atraumatic. EOMI. Conjunctiva clear. No scleral icterus. Neck: Normal range of motion. No masses. Chest: Resp unlabored  Cardio: Regular rate, rhythm  Abdomen: Soft, nontender  Ext. No hemosiderrosis. DP/PT 2+  Psych: cooperative. Pleasant. No anxiety or depression. Normal mood and affect. Diabetic Foot Ulcer/Neuropathic   Is Wound Greater than 1.0 sq cm ? : Yes  Re-Current Wound with Skin Substitue within Last Year : No  X-Ray in Last 3 Months: No  VIBHA In Last 6 Months : No  Smoking Status: Non- Smoker  Wound Free from Infection : No Culture Done  Is Wound Free of Eschar, [place] , and / or [place]: Yes  Malignant Process in Wound : Biopsy in Last 3 Months  Compression Therapy of 20mm or greater ?: Yes      Assessment:     BLE VSU, slowly improving. L resolved. R 2nd toe diabetic/traumatic wound    Plan:     Minimal progress with leg wound in the last few weeks. Reviewed path and will schedule for excision in the OR.

## 2019-08-16 ENCOUNTER — HOSPITAL ENCOUNTER (OUTPATIENT)
Dept: WOUND CARE | Age: 84
Discharge: HOME | End: 2019-08-16
Attending: SURGERY
Payer: MEDICARE

## 2019-08-16 VITALS
HEART RATE: 116 BPM | TEMPERATURE: 97.8 F | OXYGEN SATURATION: 94 % | SYSTOLIC BLOOD PRESSURE: 161 MMHG | HEIGHT: 65 IN | BODY MASS INDEX: 42.6 KG/M2 | DIASTOLIC BLOOD PRESSURE: 61 MMHG | RESPIRATION RATE: 18 BRPM

## 2019-08-16 PROCEDURE — 29580 STRAPPING UNNA BOOT: CPT

## 2019-08-16 RX ORDER — CEPHALEXIN 500 MG/1
500 CAPSULE ORAL 4 TIMES DAILY
Qty: 28 CAP | Refills: 0 | Status: SHIPPED | OUTPATIENT
Start: 2019-08-16 | End: 2019-09-13

## 2019-08-16 NOTE — PROGRESS NOTES
Wound Center Progress Note    Patient: Wes Cisse MRN: [id number]  SSN: [id number]    YOB: 1935  Age: 81 y.o. Sex: female      Subjective:     Chief Complaint:  Recurrent Venous leg ulcer BLE    History of Present Illness:       Wound Caused By: venous insufficiency  Associated Signs and Symptoms: drainage, pain  Timing: since June 2018  Quality: wound  Severity: full thickness  Modifying Factors: Dm2, obesity, venous insufficiency  Current Wound Care: Multilayer compression, CHF    3/11/2018  No new issues. Urinary soiling continues to be improved. Left 2nd toe stable. Getting [place] for wraps    6/14/2019  Continues to do well with dressings but still with substantial drainage. No further UTI issues. 7/19/2019  No new issues. Tolerating dressings. No further soiling.     7/26/2019  Doing well. No new issues. R leg wound not improving. 8/2/2019  Returns for re-eval. Bx proven basosquam RLE. Chest lesion benign. 8/16/2019  Increased redness, drainage and swelling at the legs. No recent abx. Scheduled for surgery in 2 weeks.      Past Medical History:   Diagnosis Date    Acute hyponatremia 1/21/2011    Acute on chronic diastolic congestive heart failure ([address] 1/19/2011    Acute renal failure ([address] 1/21/2011    AMI (acute myocardial infarction) ([address] 2009    Anemia 9/5/2012    Arthritis     Asthma     Asthma exacerbation 11/11/2011    CAD (coronary artery disease)     MI 2009, stents heart 2009    CAD (coronary artery disease), native coronary artery 9/2/2009    Previous stent to dRCA with Cypher 3.5x33mm SABA 8/09 9/09 LAD- Xience 2.5x23mm and 3.0x18mm SBAA in mid-distal LAD      Cellulitis Bilateral Lower Extremities 1/20/2011    Chest discomfort, tightness, pressure 1/28/2016    Chronic diastolic heart failure ([address] 10/20/2011    Chronic venous insufficiency 10/24/2011    Diabetes ([address]     Diabetes Mellitus Type II-insulin dependent 9/2/2009    Diverticulitis 1/28/2016    Dyslipidemia 9/2/2009    Edema 1/28/2016    GERD (gastroesophageal reflux disease)     GLAUCOMA     BILATERAL    Heart failure ([address]     Hypertension     Hypokalemia 1/28/2016    Hypoxemia 9/7/2012    Morbid obesity (HCC)     Nausea & vomiting     Neuropathy in diabetes ([address] 9/2/2009    NSTEMI (non-ST elevation myocardial infarction) ([address] 9/2/2009    Dr Alejandra Villegas    Obstructive sleep apnea (adult) (pediatric)- probably  11/11/2011    TRISH (obstructive sleep apnea) 9/7/2012    Intolerant of CPAP     Other dyspnea and respiratory abnormality 11/10/2011    Other ill-defined conditions(799.89)     benign tumor in lower abdomen- not removed, diabetic ulcers, edema, neuropathy    Peripheral vascular disease ([address] 10/20/2011    Post PTCA 6/20/2014    PVD [facility]peripheral [facility]) [facility]     Unspecified anemia 1/23/2011    Unspecified sleep apnea     oxygen at night    Venous stasis of lower extremity 2010    BILATERAL W RECURRENT ADMISSIONS    Volume overload 9/6/2012      Past Surgical History:   Procedure Laterality Date    BREAST SURGERY PROCEDURE UNLISTED  1973    benign tumor left breast    CARDIAC SURG PROCEDURE UNLIST      4 stents most recent 2 yrs ago    HX CHOLECYSTECTOMY      HX COLONOSCOPY      HX GI      PTCA EACH ADDL VESSEL      stentsx3     Family History   Problem Relation Age of Onset    Cancer Father     Lung Disease Father     Cancer Brother     Diabetes Paternal Aunt     Hypertension Maternal Grandmother     Hypertension Paternal Grandmother       Social History     Tobacco Use    Smoking status: Never Smoker    Smokeless tobacco: Never Used   Substance Use Topics    Alcohol use: No       Prior to Admission medications    Medication Sig Start Date End Date Taking? Authorizing Provider   metoprolol tartrate (LOPRESSOR) 50 mg tablet Take 1 Tab by mouth two (2) times a day. Patient taking differently: Take 100 mg by mouth two (2) times a day.  5/2/19 Matteo Dawson MD   clopidogrel (PLAVIX) 75 mg tab Take 1 Tab by mouth daily. 5/2/19   Deepika Longoria MD   DULoxetine (CYMBALTA) 60 mg capsule Take 1 Cap by mouth daily. 5/2/19   Deepika Longoria MD   dulaglutide (TRULICITY) 7.25 FV/7.9 mL sub-q pen 0.75 mg by SubCUTAneous route every seven (7) days. Provider, Historical   bumetanide (BUMEX) 2 mg tablet Take 1 Tab by mouth daily. 8/17/18   Deepika Longoria MD   atorvastatin (LIPITOR) 80 mg tablet Take 1 Tab by mouth daily. 7/16/18   Deepika Longoria MD   metOLazone (ZAROXOLYN) 10 mg tablet Take 1 Tab by mouth daily. 1/19/18   Deepika Longoria MD   aspirin 81 mg chewable tablet Take 1 Tab by mouth daily. 6/12/17   Deepika Longoria MD   insulin glargine (TOUJEO SOLOSTAR) 300 unit/mL (1.5 mL) inpn 220 Units by SubCUTAneous route daily. Provider, Historical   acetaminophen (TYLENOL EXTRA STRENGTH) 500 mg tablet Take  by mouth every six (6) hours as needed for Pain. Provider, Historical   insulin glulisine (APIDRA) 100 unit/mL injection 90 Units by SubCUTAneous route three (3) times daily. Provider, Historical   gabapentin (NEURONTIN) 400 mg capsule Take 800 mg by mouth two (2) times a day. Pt states 8 x daily      Other, MD Diego   albuterol (PROVENTIL VENTOLIN) 2.5 mg /3 mL (0.083 %) nebulizer solution 3 mL by Nebulization route three (3) times daily. 11/16/11   Escobar Zamudio MD   nitroglycerin (NITROSTAT) 0.4 mg SL tablet 0.4 mg by SubLINGual route every five (5) minutes as needed. Provider, Historical   ergocalciferol (VITAMIN D) 50,000 unit capsule Take 50,000 Units by mouth every seven (7) days. Takes on wed once a month     Provider, Historical   albuterol (PROVENTIL, VENTOLIN) 90 mcg/Actuation inhaler Take 2 Puffs by inhalation every four (4) hours as needed for Wheezing. Dispense: (1) inhaler with no refills.  12/19/10   Ria Breaux PA     Allergies   Allergen Reactions    Pcn [Penicillins] Hives, Swelling and Myalgia     Can take cephalosporins    Codeine Nausea Only    Dilaudid [Hydromorphone (Bulk)] Other (comments)    Lortab [Hydrocodone-Acetaminophen] Nausea Only        Review of Systems:  Pertinent items are noted in the History of Present Illness. Lab Results   Component Value Date/Time    Hemoglobin A1c 8.7 (H) 09/30/2011 01:07 PM        Immunization History   Administered Date(s) Administered    (RETIRED) Pneumococcal Vaccine (Unspecified Type) 09/20/2010    Influenza Vaccine Split 09/11/2012       Body mass index is 42.6 kg/m². Counseling regarding nutrition done: Yes Pateint counsled about risks of smoking and cessation      Current medications:  Current Outpatient Medications   Medication Sig Dispense Refill    metoprolol tartrate (LOPRESSOR) 50 mg tablet Take 1 Tab by mouth two (2) times a day. (Patient taking differently: Take 100 mg by mouth two (2) times a day.) 180 Tab 3    clopidogrel (PLAVIX) 75 mg tab Take 1 Tab by mouth daily. 90 Tab 3    DULoxetine (CYMBALTA) 60 mg capsule Take 1 Cap by mouth daily. 90 Cap 3    dulaglutide (TRULICITY) 3.31 QD/7.5 mL sub-q pen 0.75 mg by SubCUTAneous route every seven (7) days.  bumetanide (BUMEX) 2 mg tablet Take 1 Tab by mouth daily. 90 Tab 3    atorvastatin (LIPITOR) 80 mg tablet Take 1 Tab by mouth daily. 90 Tab 3    metOLazone (ZAROXOLYN) 10 mg tablet Take 1 Tab by mouth daily. 90 Tab 3    aspirin 81 mg chewable tablet Take 1 Tab by mouth daily. 90 Tab 3    insulin glargine (TOUJEO SOLOSTAR) 300 unit/mL (1.5 mL) inpn 220 Units by SubCUTAneous route daily.  acetaminophen (TYLENOL EXTRA STRENGTH) 500 mg tablet Take  by mouth every six (6) hours as needed for Pain.  insulin glulisine (APIDRA) 100 unit/mL injection 90 Units by SubCUTAneous route three (3) times daily.  gabapentin (NEURONTIN) 400 mg capsule Take 800 mg by mouth two (2) times a day.  Pt states 8 x daily        albuterol (PROVENTIL VENTOLIN) 2.5 mg /3 mL (0.083 %) nebulizer solution 3 mL by Nebulization route three (3) times daily. 1 Package 0    nitroglycerin (NITROSTAT) 0.4 mg SL tablet 0.4 mg by SubLINGual route every five (5) minutes as needed.  ergocalciferol (VITAMIN D) 50,000 unit capsule Take 50,000 Units by mouth every seven (7) days. Takes on wed once a month       albuterol (PROVENTIL, VENTOLIN) 90 mcg/Actuation inhaler Take 2 Puffs by inhalation every four (4) hours as needed for Wheezing. Dispense: (1) inhaler with no refills. 1 g 0         Objective:     Physical Exam:     Visit Vitals  /61 (BP 1 Location: Right arm)   Pulse (!) 116   Temp 97.8 °F (36.6 °C)   Resp 18   Ht 5' 5\" (1.651 m)   SpO2 94%   BMI 42.60 kg/m²       General: Healthy appearing  Neuro: alert and oriented to person/place/situation. Otherwise nonfocal.  Derm: Normal turgor for age, otherwise per wound exam  HEENT: Normocephalic, atraumatic. EOMI. Conjunctiva clear. No scleral icterus. Neck: Normal range of motion. No masses. Chest: Resp unlabored  Cardio: Regular rate, rhythm  Abdomen: Soft, nontender  Ext. No hemosiderrosis. DP/PT 2+  Psych: cooperative. Pleasant. No anxiety or depression. Normal mood and affect. Diabetic Foot Ulcer/Neuropathic   Is Wound Greater than 1.0 sq cm ? : Yes  Re-Current Wound with Skin Substitue within Last Year : No  X-Ray in Last 3 Months: No  VIBHA In Last 6 Months : No  Smoking Status: Non- Smoker  Wound Free from Infection : No Culture Done  Is Wound Free of Eschar, [place] , and / or Bio[place]: Yes  Malignant Process in Wound : Biopsy in Last 3 Months  Type of off Loading: Wheelchair if area off surface of wheelchair  Compression Therapy of 20mm or greater ?: Yes      Assessment:     BLE VSU, slowly improving. L resolved. R 2nd toe diabetic/traumatic wound - Resolved. Plan:     Increased redness, cellulitis today. Will start keflex empirically. Still needs definitive excision of [place] RLE.

## 2019-08-16 NOTE — WOUND CARE
08/16/19 1427   Wound Leg lower Left   Date First Assessed/Time First Assessed: 08/16/19 1427   Present on Hospital Admission: Yes  Primary Wound Type: Venous  Location: Leg lower  Wound Location Orientation: Left   Dressing Status   (compression stockings)   Wound Length (cm) 1 cm   Wound Width (cm) 1 cm   Wound Depth (cm) 0.1 cm   Wound Volume (cm^3) 0.1 cm^3   Condition of Base Granulation   Tissue Type Percent Red 100   Drainage Amount Small   Drainage Color Serous   Wound Odor None   Cydney-wound Assessment Edema;Blanchable erythema   Cleansing and Cleansing Agents  Normal saline   Wound Leg Lower Right   Date First Assessed/Time First Assessed: 08/17/18 1436   POA: Yes  Wound Type: Venous  Location: Leg Lower  Wound Description (Optional): 16  Orientation: Right   Dressing Status  Breakthrough drainage   Dressing Type    (covrsite)   Wound Length (cm) 9 cm   Wound Width (cm) 11 cm   Wound Depth (cm) 0.1   Wound Surface area (cm^2) 99 cm^2   Change in Wound Size % -[id number]   Condition of Base Granulation   Tissue Type Percent Red 100   Drainage Amount  Large   Drainage Color Serous   Wound Odor None   Periwound Skin Condition Edematous; Erythema, blanchable   Cleansing and Cleansing Agents  Soap and water           Patient is currently taking plavix

## 2019-08-16 NOTE — WOUND CARE
[address]  [address], [address]  Phone: [phone]  Fax: [phone]    Patient: Bekah Mofeftt MRN: [id number]  SSN: [id number]    YOB: 1935  Age: 81 y.o. Sex: female       Return Appointment: 1 week with Jo Quintana MD    Instructions: Bilateral lower legs:  Apply xeroform to wounds. Cover with ABDs. Wrap bilateral lower legs with unna boots. Change twice weekly. Home health for dressing changes.  antibiotic and start taking today. Should you experience increased redness, swelling, pain, foul odor, size of wound(s), or have a temperature over 101 degrees please contact the [address] [place] [address] Road at [phone] or if after hours contact your primary care physician or go to the hospital emergency department.     Signed By: Vipin Jack RN     August 16, 2019

## 2019-08-23 ENCOUNTER — HOSPITAL ENCOUNTER (OUTPATIENT)
Dept: WOUND CARE | Age: 84
Discharge: HOME | End: 2019-08-23
Attending: SURGERY
Payer: MEDICARE

## 2019-08-23 VITALS
HEART RATE: 74 BPM | DIASTOLIC BLOOD PRESSURE: 92 MMHG | SYSTOLIC BLOOD PRESSURE: 144 MMHG | TEMPERATURE: 97.9 F | RESPIRATION RATE: 18 BRPM

## 2019-08-23 PROCEDURE — 29580 STRAPPING UNNA BOOT: CPT

## 2019-08-23 NOTE — WOUND CARE
NAME:  Mejia Kingdom OF BIRTH:  1935  MEDICAL RECORD NUMBER:  [id number]  DATE:  8/23/2019    Remove old Rosalie Flair or Boots. Appied primary and secondary dressing as ordered. Applied Unna roll from toes to knee overlapping each time. Applied ace wrap or coban from toes to below the knee. Secured with tape and/or metal clips covered with tape. Instructed patient/caregiver to keep dressing dry and intact. DO NOT REMOVE DRESSING. Instructed pt/family/caregiver to report excessive draining, loose bandage, wet dressing, severe pain or tingling in toes. Applied [employer] dressing below the knee to bilateral lower legs. Xeroform to right lower leg      Unna Boot(s) were applied per  Guidelines.      Electronically signed by Humble Tyler RN on 8/23/2019 at 3:14 PM

## 2019-08-23 NOTE — WOUND CARE
[address]  [address], [address]   Phone: [phone]  Fax: [phone]    Patient: Jake Ramírez MRN: [id number]  SSN: [id number]    YOB: 1935  Age: 81 y.o. Sex: female       Return Appointment: 1 week with Dr. Jessica Dahl       Instructions: Bilateral lower legs:  Apply xeroform to right lower leg. Wrap bilateral lower legs with unna boots. Change twice weekly. Home health for dressing changes. Should you experience increased redness, swelling, pain, foul odor, size of wound(s), or have a temperature over 101 degrees please contact the [address] at [phone] or if after hours contact your primary care physician or go to the hospital emergency department.     Signed By: Deion Yao RN     August 23, 2019

## 2019-08-23 NOTE — WOUND CARE
Clinic Level of Care Assessment    NAME:  Roselia Mccarthy OF BIRTH:  1935 GENDER: female  MEDICAL RECORD NUMBER:  [id number]   DATE:  8/23/2019      Wound Count Document in [address] [place] [address]  Number of Wounds Assessed Points   No Wounds/Ulcers [x]   0   Less than Three Wounds/Ulcers []   1   3-6 Wounds/Ulcers []   2   Greater than 6 Wounds/Ulcers []   3     Ambulation Status Document in Daily Care/Safety tab  Status Definition Points   Independent Independently able to ambulate. Fully able (without any assistance) to get on/off exam table/chair. []   0   Minimal Physical Assistance Requires physical assistance of one person to ambulate and/or position patient to be examined. Includes necessary physical assistance to position lower extremities on/off stool. [x]   1   Moderate Physical Assistance Requires at least one staff member to physically assist patient in ambulating into treatment room, and on/off exam table. []   2   Full Assistance Requires assistance of at least two staff members to transfer patient into treatment room and/or on/off exam table/chair. \"Total Transfer\". []   3     Dressing Complexity Document in LDA and Write Appropriate Order  Complexity Definition Points   No Dressing  [x]   0   Simple Minimal, simple dressing. i.e. Band-aid, gauze, simple wrap. []   1   Intermediate Moderately complicated requiring licensed personnel to apply i.e. collagen matrix, ointments, gels, alginates. []   2   Complex Complicated requiring licensed personnel to apply dressings 6 or more wounds. []   3     Teaching Effort Document in Education Tab   Effort Definition Points   No Teaching  []   0   Simple Reinforce two or less topics. Document in Education navigator. [x]   1   Intermediate Reinforce three to five topics and/or one additional   new topic. Document in Education navigator. []   2   Complex Teach more than one new topic.  New patient information   packet reviewed and/or reinforce more than three topics. Document in Education navigator. HBO initial instruction. []   3       Patient Assessment and Planning  Planning Definition Points   Simple Multiple System Simple: Simple follow-up with routine assessment and planning. If Discharged, instructions and long term/follow-up care given to patient/caregiver. Discharged, instructions and/or After Visit Summary given to patient/caregiver and instructions completed. [x]   1   Intermediate Multiple System Intermediate: Contact with outside resources; i.e. Telephone calls to home health, [facility]. May include filling out forms and writing letters, arranging transportation, communication with insurance , vendors, etc.  Discharged, instructions and/or After Visit Summary given to patient/caregiver and instructions completed. []   2   Complex Multiple System Complex: Full, comprehensive assessment and planning. Follow the entire navigator under Wound Visit charting filling out each tab which includes OP Adm Database Screening, Education and CarePlan  HBO risk assessment completed. Discharged, instructions and/or After Visit Summary given to patient/caregiver and instructions completed.    []   3           Is this the Patient's First Visit to the [address] [place] [address] Road  No      Is this Patient Established @ [facility]  Yes             Clinical Level of Care      Points  0-2  Level 1 []     Points  3-5  Level 2 [x]     Points  6-9  Level 3 []     Points  10-12  Level 4 []     Points  13-15  Level 5 []       Electronically signed by María Avila RN on 8/23/2019 at 3:16 PM

## 2019-08-23 NOTE — WOUND CARE
08/23/19 1452   Wound Leg lower Left   Date First Assessed/Time First Assessed: 08/16/19 1427   Present on Hospital Admission: Yes  Primary Wound Type: Venous  Location: Leg lower  Wound Location Orientation: Left   Dressing Status Clean, dry, and intact   Dressing Type   (xeroform, coban 2)   Wound Length (cm) 0 cm   Wound Width (cm) 0 cm   Wound Depth (cm) 0 cm   Wound Volume (cm^3) 0 cm^3   Condition of Base Epithelializing   Epithelialization (%) 100   Drainage Amount None   Wound Odor None   Cydney-wound Assessment Blanchable erythema   Cleansing and Cleansing Agents  Soap and water   Wound Leg Lower Right   Date First Assessed/Time First Assessed: 08/17/18 1436   POA: Yes  Wound Type: Venous  Location: Leg Lower  Wound Description (Optional): 16  Orientation: Right   Dressing Status  Clean, dry, and intact   Dressing Type    (xeroform, coban 2)   Wound Length (cm) 0 cm   Wound Width (cm) 0 cm   Wound Depth (cm) 0   Wound Surface area (cm^2) 0 cm^2   Change in Wound Size % 100   Condition of Base Epithelializing   Epithelialization (%) 100   Drainage Amount  None   Wound Odor None   Periwound Skin Condition Erythema, blanchable   Cleansing and Cleansing Agents  Soap and water           PATIENT IS ON ANTICOAGULANT

## 2019-08-23 NOTE — PROGRESS NOTES
Wound Center Progress Note    Patient: Jake Ramírez MRN: [id number]  SSN: [id number]    YOB: 1935  Age: 81 y.o. Sex: female      Subjective:     Chief Complaint:  Jake Ramírez is a 81 y.o. WHITE OR  female who presents with R lower leg anterior, L lower leg lateral wound of 3 months duration. History of Present Illness: The right lower leg lesion has been biopsied and is of a skin cancer and she is scheduled for surgery on Tuesday.   Wound Caused By: none  Associated Signs and Symptoms:         Modifying Factors:age and venous insufficiency  Current wound care see nurse's notes  Past Medical History:   Diagnosis Date    Acute hyponatremia 1/21/2011    Acute on chronic diastolic congestive heart failure ([address] 1/19/2011    Acute renal failure ([address] 1/21/2011    AMI (acute myocardial infarction) ([address] 2009    Anemia 9/5/2012    Arthritis     Asthma     Asthma exacerbation 11/11/2011    CAD (coronary artery disease)     MI 2009, stents heart 2009    CAD (coronary artery disease), native coronary artery 9/2/2009    Previous stent to dRCA with Cypher 3.5x33mm SABA 8/09 9/09 LAD- Xience 2.5x23mm and 3.0x18mm SABA in mid-distal LAD      Cellulitis Bilateral Lower Extremities 1/20/2011    Chest discomfort, tightness, pressure 1/28/2016    Chronic diastolic heart failure ([address] 10/20/2011    Chronic venous insufficiency 10/24/2011    Diabetes (HCC)     checks QD, normal 200, no hyposymptoms     Diabetes Mellitus Type II-insulin dependent 9/2/2009    Diverticulitis 1/28/2016    Dyslipidemia 9/2/2009    Edema 1/28/2016    GERD (gastroesophageal reflux disease)     GLAUCOMA     BILATERAL    Heart failure ([address]     Hypertension     Hypokalemia 1/28/2016    Hypoxemia 9/7/2012    Morbid obesity ([address]     Nausea & vomiting     Neuropathy in diabetes ([address] 9/2/2009    NSTEMI (non-ST elevation myocardial infarction) ([address] 9/2/2009    Dr Amy Larkin Obstructive sleep apnea (adult) (pediatric)- probably  11/11/2011    TRISH (obstructive sleep apnea) 9/7/2012    Intolerant of CPAP     Other dyspnea and respiratory abnormality 11/10/2011    Other ill-defined conditions(799.89)     benign tumor in lower abdomen- not removed, diabetic ulcers, edema, neuropathy    Peripheral vascular disease ([facility] 10/20/2011    Post PTCA 6/20/2014    PVD (peripheral vascular disease) ([facility]     Unspecified anemia 1/23/2011    Unspecified sleep apnea     oxygen at night    Venous stasis of lower extremity 2010    BILATERAL W RECURRENT ADMISSIONS    Volume overload 9/6/2012      Past Surgical History:   Procedure Laterality Date    BREAST SURGERY PROCEDURE UNLISTED  1973    benign tumor left breast    CARDIAC SURG PROCEDURE UNLIST      4 stents most recent 2 yrs ago    HX CHOLECYSTECTOMY      HX COLONOSCOPY      HX GI      PTCA EACH ADDL VESSEL      stentsx3     Family History   Problem Relation Age of Onset    Cancer Father     Lung Disease Father     Cancer Brother     Diabetes Paternal Aunt     Hypertension Maternal Grandmother     Hypertension Paternal Grandmother       Social History     Tobacco Use    Smoking status: Never Smoker    Smokeless tobacco: Never Used   Substance Use Topics    Alcohol use: No       Prior to Admission medications    Medication Sig Start Date End Date Taking? Authorizing Provider   cephALEXin (KEFLEX) 500 mg capsule Take 1 Cap by mouth four (4) times daily. Indications: skin infection due to Streptococcus pyogenes bacteria 8/16/19   Kal Nassar MD   metoprolol tartrate (LOPRESSOR) 50 mg tablet Take 1 Tab by mouth two (2) times a day. Patient taking differently: Take 100 mg by mouth two (2) times a day. 5/2/19   Renetta Longoria MD   clopidogrel (PLAVIX) 75 mg tab Take 1 Tab by mouth daily. 5/2/19   Renetta Longoria MD   DULoxetine (CYMBALTA) 60 mg capsule Take 1 Cap by mouth daily.  5/2/19   Renetta Longoria MD dulaglutide (TRULICITY) 1.14 UU/7.6 mL sub-q pen 0.75 mg by SubCUTAneous route every seven (7) days. Provider, Historical   bumetanide (BUMEX) 2 mg tablet Take 1 Tab by mouth daily. 8/17/18   Polo Longoria MD   atorvastatin (LIPITOR) 80 mg tablet Take 1 Tab by mouth daily. 7/16/18   Polo Longoria MD   metOLazone (ZAROXOLYN) 10 mg tablet Take 1 Tab by mouth daily. Patient taking differently: Take 10 mg by mouth daily. Indications: as needed 1/19/18   Polo Longoria MD   aspirin 81 mg chewable tablet Take 1 Tab by mouth daily. 6/12/17   Polo Longoria MD   insulin glargine (TOUJEO SOLOSTAR) 300 unit/mL (1.5 mL) inpn 220 Units by SubCUTAneous route daily. Provider, Historical   acetaminophen (TYLENOL EXTRA STRENGTH) 500 mg tablet Take  by mouth every six (6) hours as needed for Pain. Provider, Historical   insulin glulisine (APIDRA) 100 unit/mL injection 90 Units by SubCUTAneous route three (3) times daily. Provider, Historical   gabapentin (NEURONTIN) 400 mg capsule Take 800 mg by mouth two (2) times a day. Pt states 8 x daily      OtherDiego MD   albuterol (PROVENTIL VENTOLIN) 2.5 mg /3 mL (0.083 %) nebulizer solution 3 mL by Nebulization route three (3) times daily. 11/16/11   Lonie Siemens, MD   nitroglycerin (NITROSTAT) 0.4 mg SL tablet 0.4 mg by SubLINGual route every five (5) minutes as needed. Provider, Historical   ergocalciferol (VITAMIN D) 50,000 unit capsule Take 50,000 Units by mouth every seven (7) days. Takes on wed once a month     Provider, Historical   albuterol (PROVENTIL, VENTOLIN) 90 mcg/Actuation inhaler Take 2 Puffs by inhalation every four (4) hours as needed for Wheezing. Dispense: (1) inhaler with no refills.  12/19/10   Dagoberto Breaux PA     Allergies   Allergen Reactions    Pcn [Penicillins] Hives, Swelling and Myalgia     Can take cephalosporins    Codeine Nausea Only    Dilaudid [Hydromorphone (Bulk)] Other (comments)    Lortab [Hydrocodone-Acetaminophen] Nausea Only        Review of Systems:  A comprehensive review of systems was negative except for that written in the History of Present Illness. Lab Results   Component Value Date/Time    Hemoglobin A1c 8.7 (H) 09/30/2011 01:07 PM        Immunization History   Administered Date(s) Administered    (RETIRED) Pneumococcal Vaccine (Unspecified Type) 09/20/2010    Influenza Vaccine Split 09/11/2012       There is no height or weight on file to calculate BMI. Counseling regarding nutrition done: No     Current medications:  Current Outpatient Medications   Medication Sig Dispense Refill    cephALEXin (KEFLEX) 500 mg capsule Take 1 Cap by mouth four (4) times daily. Indications: skin infection due to Streptococcus pyogenes bacteria 28 Cap 0    metoprolol tartrate (LOPRESSOR) 50 mg tablet Take 1 Tab by mouth two (2) times a day. (Patient taking differently: Take 100 mg by mouth two (2) times a day.) 180 Tab 3    clopidogrel (PLAVIX) 75 mg tab Take 1 Tab by mouth daily. 90 Tab 3    DULoxetine (CYMBALTA) 60 mg capsule Take 1 Cap by mouth daily. 90 Cap 3    dulaglutide (TRULICITY) 3.14 WJ/7.5 mL sub-q pen 0.75 mg by SubCUTAneous route every seven (7) days.  bumetanide (BUMEX) 2 mg tablet Take 1 Tab by mouth daily. 90 Tab 3    atorvastatin (LIPITOR) 80 mg tablet Take 1 Tab by mouth daily. 90 Tab 3    metOLazone (ZAROXOLYN) 10 mg tablet Take 1 Tab by mouth daily. (Patient taking differently: Take 10 mg by mouth daily. Indications: as needed) 90 Tab 3    aspirin 81 mg chewable tablet Take 1 Tab by mouth daily. 90 Tab 3    insulin glargine (TOUJEO SOLOSTAR) 300 unit/mL (1.5 mL) inpn 220 Units by SubCUTAneous route daily.  acetaminophen (TYLENOL EXTRA STRENGTH) 500 mg tablet Take  by mouth every six (6) hours as needed for Pain.  insulin glulisine (APIDRA) 100 unit/mL injection 90 Units by SubCUTAneous route three (3) times daily.       gabapentin (NEURONTIN) 400 mg capsule Take 800 mg by mouth two (2) times a day. Pt states 8 x daily        albuterol (PROVENTIL VENTOLIN) 2.5 mg /3 mL (0.083 %) nebulizer solution 3 mL by Nebulization route three (3) times daily. 1 Package 0    nitroglycerin (NITROSTAT) 0.4 mg SL tablet 0.4 mg by SubLINGual route every five (5) minutes as needed.  ergocalciferol (VITAMIN D) 50,000 unit capsule Take 50,000 Units by mouth every seven (7) days. Takes on wed once a month       albuterol (PROVENTIL, VENTOLIN) 90 mcg/Actuation inhaler Take 2 Puffs by inhalation every four (4) hours as needed for Wheezing. Dispense: (1) inhaler with no refills. 1 g 0         Objective:     Physical Exam:     Visit Vitals  BP (!) 144/92 (BP 1 Location: Right arm)   Pulse 74   Temp 97.9 °F (36.6 °C)   Resp 18       General: well developed, well nourished, pleasant , NAD. Hygiene good  Psych: cooperative. Pleasant. No anxiety or depression. Normal mood and affect. Neuro: alert and oriented to person/place/situation. Otherwise nonfocal.  Derm: Normal turgor for age, dry skin  HEENT: Normocephalic, atraumatic. EOMI. Conjunctiva clear. No scleral icterus. Neck: Normal range of motion. No masses. Chest: Good air entry bilaterally. Respirations nonlabored  Cardio: Normal heart sounds,no rubs, murmurs or gallops  Abdomen: Soft, nontender, nondistended, normoactive bowel sounds  Lower extremities: color normal; temperature normal. Hair growth is not present. Calves are supple, nontender, approximately equally sized in comparison.  Capillary refill <3 sec      Diabetic Foot Ulcer/Neuropathic   Is Wound Greater than 1.0 sq cm ? : No  Re-Current Wound with Skin Substitue within Last Year : No  X-Ray in Last 3 Months: No  VIBHA In Last 6 Months : No  Smoking Status: Non- Smoker  Wound Free from Infection : No Culture Done  Is Wound Free of Eschar, Slough , and / or Bio-[place]: Yes  Malignant Process in Wound : Biopsy in Last 3 Months  Type of off Loading: Wheelchair if area off surface of wheelchair  Compression Therapy of 20mm or greater ?: Yes     Ulcer Description:   Wound Leg Lower Right (Active)   Dressing Status  Clean, dry, and intact 8/23/2019  2:52 PM   Dressing Type  Unna boot 10/5/2018  2:52 PM   Non-Pressure Injury Full thickness (subcut/muscle) 7/19/2019  3:09 PM   Wound Length (cm) 0 cm 8/23/2019  2:52 PM   Wound Width (cm) 0 cm 8/23/2019  2:52 PM   Wound Depth (cm) 0 8/23/2019  2:52 PM   Wound Surface area (cm^2) 0 cm^2 8/23/2019  2:52 PM   Change in Wound Size % 100 8/23/2019  2:52 PM   Condition of Base Epithelializing 8/23/2019  2:52 PM   Epithelialization (%) 100 8/23/2019  2:52 PM   Tissue Type Red 8/17/2018  2:36 PM   Tissue Type Percent Pink 100 11/2/2018 11:04 AM   Tissue Type Percent Red 100 8/16/2019  2:27 PM   Drainage Amount  None 8/23/2019  2:52 PM   Drainage Color Serous 8/16/2019  2:27 PM   Wound Odor None 8/23/2019  2:52 PM   Periwound Skin Condition Erythema, blanchable 8/23/2019  2:52 PM   Cleansing and Cleansing Agents  Soap and water 8/23/2019  2:52 PM   Dressing Type Applied Xeroform;ABD pad;Marco Antonioa boot 8/31/2018  9:01 AM   Procedure Tolerated Well 7/19/2019  3:09 PM   Number of days: 371       Wound Leg lower Left (Active)   Dressing Status Clean, dry, and intact 8/23/2019  2:52 PM   Wound Length (cm) 0 cm 8/23/2019  2:52 PM   Wound Width (cm) 0 cm 8/23/2019  2:52 PM   Wound Depth (cm) 0 cm 8/23/2019  2:52 PM   Wound Volume (cm^3) 0 cm^3 8/23/2019  2:52 PM   Condition of Base Epithelializing 8/23/2019  2:52 PM   Epithelialization (%) 100 8/23/2019  2:52 PM   Tissue Type Percent Red 100 8/16/2019  2:27 PM   Drainage Amount None 8/23/2019  2:52 PM   Drainage Color Serous 8/16/2019  2:27 PM   Wound Odor None 8/23/2019  2:52 PM   [place]-wound Assessment Blanchable erythema 8/23/2019  2:52 PM   Cleansing and Cleansing Agents  Soap and water 8/23/2019  2:52 PM   Number of days: 7       [REMOVED] Wound Leg Medial;Left;Right (Removed)   Number of days: 3063 [REMOVED] Wound Leg Lower Left (Removed)   Dressing Status  Clean, dry, and intact 9/18/2018  2:07 PM   Dressing Type  Unna boot 9/18/2018  2:07 PM   Wound Length (cm) 0 cm 9/11/2018  4:01 PM   Wound Width (cm) 0 cm 9/11/2018  4:01 PM   Wound Depth (cm) 0 9/11/2018  4:01 PM   Wound Surface area (cm^2) 0 cm^2 9/11/2018  4:01 PM   Epithelialization (%) 100 9/11/2018  4:01 PM   Drainage Amount  None 9/11/2018  4:01 PM   Wound Odor None 9/11/2018  4:01 PM   Cleansing and Cleansing Agents  Normal saline; Soap and water 9/11/2018  4:01 PM   Dressing Type Applied Unna boot 9/11/2018  4:28 PM   Procedure Tolerated Well 9/11/2018  4:28 PM   Number of days: 32       [REMOVED] Wound Toe Left (Removed)   Wound Length (cm) 0 cm 11/2/2018 11:04 AM   Wound Width (cm) 0 cm 11/2/2018 11:04 AM   Wound Depth (cm) 0 11/2/2018 11:04 AM   Wound Surface area (cm^2) 0 cm^2 11/2/2018 11:04 AM   Change in Wound Size % 100 11/2/2018 11:04 AM   Tissue Type Percent Pink 100 10/19/2018  3:08 PM   Drainage Amount  None 11/2/2018 11:04 AM   Drainage Color Serosanguinous 10/19/2018  3:08 PM   Wound Odor None 11/2/2018 11:04 AM   Periwound Skin Condition Intact 10/19/2018  3:08 PM   Cleansing and Cleansing Agents  Normal saline 11/2/2018 11:04 AM   Procedure Tolerated Well 10/12/2018  4:23 PM   Number of days: 21       [REMOVED] Wound Toe (specify in comments) Left;Plantar (Removed)   Dressing Status  Clean, dry, and intact 11/2/2018 11:04 AM   Dressing Type  Adhesive wound dressing (Band-Aid) 11/2/2018 11:04 AM   Non-Pressure Injury Partial thickness (epider/derm) 10/19/2018  3:08 PM   Wound Length (cm) 0 cm 11/2/2018 11:04 AM   Wound Width (cm) 0 cm 11/2/2018 11:04 AM   Wound Depth (cm) 0 11/2/2018 11:04 AM   Wound Surface area (cm^2) 0 cm^2 11/2/2018 11:04 AM   Change in Wound Size % 100 11/2/2018 11:04 AM   Condition of Base [place] 10/19/2018  3:08 PM   Drainage Amount  None 11/2/2018 11:04 AM   Wound Odor None 11/2/2018 11:04 AM   Periwound Skin Condition Intact 10/19/2018  3:08 PM   Cleansing and Cleansing Agents  Normal saline 11/2/2018 11:04 AM   Number of days: 14       [REMOVED] Wound Leg Lower Left;Medial (Removed)   Dressing Status  Clean, dry, and intact 3/1/2019  1:41 PM   Wound Length (cm) 0 cm 3/1/2019  1:41 PM   Wound Width (cm) 0 cm 3/1/2019  1:41 PM   Wound Depth (cm) 0 3/1/2019  1:41 PM   Wound Surface area (cm^2) 0 cm^2 3/1/2019  1:41 PM   Change in Wound Size % 100 3/1/2019  1:41 PM   Condition of Base Epithelializing 3/1/2019  1:41 PM   Tissue Type Percent Pink 100 3/1/2019  1:41 PM   Tissue Type Percent Red 100 1/18/2019  1:17 PM   Drainage Amount  None 3/1/2019  1:41 PM   Drainage Color Serous 1/18/2019  1:17 PM   Wound Odor None 3/1/2019  1:41 PM   Periwound Skin Condition Intact 3/1/2019  1:41 PM   Cleansing and Cleansing Agents  Soap and water 3/1/2019  1:41 PM   Number of days: 42         Data Review:   No results found for this or any previous visit (from the past 24 hour(s)). Assessment:     81 y.o. female with R lower leg anterior skin cancer ulcer. Problem List  Date Reviewed: 7/9/2018          Codes Class Noted    Nonrheumatic aortic valve stenosis ICD-10-CM: I35.0  ICD-9-CM: 424.1  5/2/2019    Overview Signed 5/8/2019  6:15 PM by Ramila Sun MD     Echo (5/3/19):  EF 60-65%. Normal RV. Mild/moderate AS. MG 15. PG 26. Mild MR/TR/AR             Essential hypertension ICD-10-CM: I10  ICD-9-CM: 401.9  5/2/2019        Non-pressure chronic ulcer of left calf, limited to breakdown of skin [facility]) ICD-10-CM: J99.665  ICD-9-CM: 707.12  8/21/2018        Edema ICD-10-CM: R60.9  ICD-9-CM: 782.3  1/28/2016        Diverticulitis ICD-10-CM: K57.92  ICD-9-CM: 562.11  1/28/2016    Overview Signed 1/28/2016 12:10 PM by Aaliyah Osorio     1. CT of abdomen (7/9/14): Evidence of uncomplicated diverticulitis at the junction of the descending and sigmoid colon. Extensive diverticular are seen with some pericolonic fat stranding. RTISH (obstructive sleep apnea) (Chronic) ICD-10-CM: G47.33  ICD-9-CM: 327.23  9/7/2012    Overview Signed 9/7/2012 11:46 AM by Oksana Joya NP     Intolerant of CPAP             Anemia ICD-10-CM: D64.9  ICD-9-CM: 285.9  9/5/2012        Obstructive sleep apnea (adult) (pediatric)- probably  ICD-10-CM: G47.33  ICD-9-CM: 327.23  11/11/2011        Chronic venous insufficiency ICD-10-CM: I87.2  ICD-9-CM: 459.81  10/24/2011        Chronic diastolic heart failure (HCC) (Chronic) ICD-10-CM: I50.32  ICD-9-CM: 428.32  10/20/2011    Overview Signed 1/28/2016 12:05 PM by Jadene Bumpers     1. Echo (3/5/10): EF 55%. No wall motion abnormalities. Mild LVH. Mild diastolic dysfunction. Mild bi-atrial enlargement. Mild mitral regurgitation. RVSP 32.    2. Echo (4/26/11): Normal LV systolic function. EF 60%. Mild LVH. Mild diastolic dysfunction. Mild left atrial enlargement. Mild aortic regurgitation. Peripheral vascular disease ([address] (Chronic) ICD-10-CM: I73.9  ICD-9-CM: 443.9  10/20/2011    Overview Signed 1/28/2016 12:08 PM by Jadene Bumpers     1. Lower extremity duplex (9/2/10): Right lower extremity duplex suggested moderate to severe reduction with multilevel disease. Stenotic SFA most pronounced distally. Right VIBHA 0.5. Left VIBHA suggests mild reduction VIBHA of 0.86. GERD (gastroesophageal reflux disease) (Chronic) ICD-10-CM: K21.9  ICD-9-CM: 530.81  10/20/2011        Anemia, unspecified ICD-10-CM: D64.9  ICD-9-CM: 285.9  1/23/2011        Acute hyponatremia ICD-10-CM: E87.1  ICD-9-CM: 276.1  1/21/2011        CAD (coronary artery disease), native coronary artery (Chronic) ICD-10-CM: I25.10  ICD-9-CM: 414.01  9/2/2009    Overview Addendum 6/11/2017  9:05 PM by Aidan Flores MD     1. NSTEMI (8/17/09):  EF 60%. PCI of dRCA with  Cypher 3.5x 33 mm SABA. Residual 80-90% LAD stenosis. 2.  Staged PCI of LAD (9/1/09): Xience 2.5x23mm and 3.0x18mm SABA in mid-distal LAD .  Adjunctive POBA of D1 and D2.    3.  PCI of mid RCA (6/20/14): Promus 4 x 20 mm SABA (overlapped with previous stent)             Diabetes mellitus, type 2 (HCC) (Chronic) ICD-10-CM: E11.9  ICD-9-CM: 250.00  9/2/2009        Morbid obesity (HCC) (Chronic) ICD-10-CM: E66.01  ICD-9-CM: 278.01  9/2/2009        Dyslipidemia (Chronic) ICD-10-CM: E78.5  ICD-9-CM: 272.4  9/2/2009        Neuropathy in diabetes ([address] (Chronic) ICD-10-CM: E11.40  ICD-9-CM: 250.60, 357.2  9/2/2009               Needs:  Good local wound care  Edema management  Nutrition optimization  Good Diabetic control    Plan:     No orders of the defined types were placed in this encounter. Patient understood and agrees with plan. Questions answered. Follow-up Information    None            Any procedures done today in the [address] [facility] are documented in a separate note in [address] [place] [address] and made part of this record by reference. Dictated using voice recognition software; proofread, but unrecognized errors may exist.    Signed By: Hannah Porras MD     August 23, 2019                       Wound Center Progress Note    Patient: Yordan Hightower MRN: [id number]  SSN: [id number]    YOB: 1935  Age: 81 y.o. Sex: female      Subjective:     Chief Complaint:  Yordan Hightower is a 81 y.o. WHITE OR  female who presents with R lower leg anterior wound of 3 months duration. History of Present Illness: To have a skin excision and skin graft on Tuesday. Wound Caused By:this was biopsied and turns out to be a basal cell carcinoma, which will be excised and grafted on to state by Dr. Gerhardt Kluver.     Past Medical History:   Diagnosis Date    Acute hyponatremia 1/21/2011    Acute on chronic diastolic congestive heart failure ([address] 1/19/2011    Acute renal failure ([address] 1/21/2011    AMI (acute myocardial infarction) ([address] 2009    Anemia 9/5/2012    Arthritis     Asthma     Asthma exacerbation 11/11/2011    CAD (coronary artery disease)     MI 2009, stents heart 2009    CAD (coronary artery disease), native coronary artery 9/2/2009    Previous stent to dRCA with Cypher 3.5x33mm SABA 8/09 9/09 LAD- Xience 2.5x23mm and 3.0x18mm SABA in mid-distal LAD      Cellulitis Bilateral Lower Extremities 1/20/2011    Chest discomfort, tightness, pressure 1/28/2016    Chronic diastolic heart failure ([address] 10/20/2011    Chronic venous insufficiency 10/24/2011    Diabetes (HCC)     checks QD, normal 200, no hyposymptoms     Diabetes Mellitus Type II-insulin dependent 9/2/2009    Diverticulitis 1/28/2016    Dyslipidemia 9/2/2009    Edema 1/28/2016    GERD (gastroesophageal reflux disease)     GLAUCOMA     BILATERAL    Heart failure ([address]     Hypertension     Hypokalemia 1/28/2016    Hypoxemia 9/7/2012    Morbid obesity ([facility])     Nausea & vomiting     Neuropathy in diabetes ([address] 9/2/2009    NSTEMI (non-ST elevation myocardial infarction) ([address] 9/2/2009    Dr Mehrdad Coffey    Obstructive sleep apnea (adult) (pediatric)- probably  11/11/2011    TRISH (obstructive sleep apnea) 9/7/2012    Intolerant of CPAP     Other dyspnea and respiratory abnormality 11/10/2011    Other ill-defined conditions(799.89)     benign tumor in lower abdomen- not removed, diabetic ulcers, edema, neuropathy    Peripheral vascular disease ([address] 10/20/2011    Post PTCA 6/20/2014    PVD (peripheral vascular disease) ([address]     Unspecified anemia 1/23/2011    Unspecified sleep apnea     oxygen at night    Venous stasis of lower extremity 2010    BILATERAL W RECURRENT ADMISSIONS    Volume overload 9/6/2012      Past Surgical History:   Procedure Laterality Date    BREAST SURGERY PROCEDURE UNLISTED  1973    benign tumor left breast    CARDIAC SURG PROCEDURE UNLIST      4 stents most recent 2 yrs ago    HX CHOLECYSTECTOMY      HX COLONOSCOPY      HX GI      PTCA EACH ADDL VESSEL      stentsx3     Family History   Problem Relation Age of Onset    Cancer Father     Lung Disease Father     Cancer Brother     Diabetes Paternal Aunt     Hypertension Maternal Grandmother     Hypertension Paternal Grandmother       Social History     Tobacco Use    Smoking status: Never Smoker    Smokeless tobacco: Never Used   Substance Use Topics    Alcohol use: No       Prior to Admission medications    Medication Sig Start Date End Date Taking? Authorizing Provider   cephALEXin (KEFLEX) 500 mg capsule Take 1 Cap by mouth four (4) times daily. Indications: skin infection due to Streptococcus pyogenes bacteria 8/16/19   Randy Nassar Si, MD   metoprolol tartrate (LOPRESSOR) 50 mg tablet Take 1 Tab by mouth two (2) times a day. Patient taking differently: Take 100 mg by mouth two (2) times a day. 5/2/19   Anna Longoria MD   clopidogrel (PLAVIX) 75 mg tab Take 1 Tab by mouth daily. 5/2/19   Anna Longoria MD   DULoxetine (CYMBALTA) 60 mg capsule Take 1 Cap by mouth daily. 5/2/19   Anna Longoria MD   dulaglutide (TRULICITY) 4.30 QW/0.8 mL sub-q pen 0.75 mg by SubCUTAneous route every seven (7) days. Provider, Historical   bumetanide (BUMEX) 2 mg tablet Take 1 Tab by mouth daily. 8/17/18   Anna Longoria MD   atorvastatin (LIPITOR) 80 mg tablet Take 1 Tab by mouth daily. 7/16/18   Anna Longoria MD   metOLazone (ZAROXOLYN) 10 mg tablet Take 1 Tab by mouth daily. Patient taking differently: Take 10 mg by mouth daily. Indications: as needed 1/19/18   Anna Longoria MD   aspirin 81 mg chewable tablet Take 1 Tab by mouth daily. 6/12/17   Anna Longoria MD   insulin glargine (TOUJEO SOLOSTAR) 300 unit/mL (1.5 mL) inpn 220 Units by SubCUTAneous route daily. Provider, Historical   acetaminophen (TYLENOL EXTRA STRENGTH) 500 mg tablet Take  by mouth every six (6) hours as needed for Pain. Provider, Historical   insulin glulisine (APIDRA) 100 unit/mL injection 90 Units by SubCUTAneous route three (3) times daily.     Provider, Historical gabapentin (NEURONTIN) 400 mg capsule Take 800 mg by mouth two (2) times a day. Pt states 8 x daily      OtherDiego MD   albuterol (PROVENTIL VENTOLIN) 2.5 mg /3 mL (0.083 %) nebulizer solution 3 mL by Nebulization route three (3) times daily. 11/16/11   Favio Tolliver MD   nitroglycerin (NITROSTAT) 0.4 mg SL tablet 0.4 mg by SubLINGual route every five (5) minutes as needed. Provider, Historical   ergocalciferol (VITAMIN D) 50,000 unit capsule Take 50,000 Units by mouth every seven (7) days. Takes on wed once a month     Provider, Historical   albuterol (PROVENTIL, VENTOLIN) 90 mcg/Actuation inhaler Take 2 Puffs by inhalation every four (4) hours as needed for Wheezing. Dispense: (1) inhaler with no refills. 12/19/10   Therese Breaux PA     Allergies   Allergen Reactions    Pcn [Penicillins] Hives, Swelling and Myalgia     Can take cephalosporins    Codeine Nausea Only    Dilaudid [Hydromorphone (Bulk)] Other (comments)    Lortab [Hydrocodone-Acetaminophen] Nausea Only        Review of Systems:  A comprehensive review of systems was negative except for that written in the History of Present Illness. Lab Results   Component Value Date/Time    Hemoglobin A1c 8.7 (H) 09/30/2011 01:07 PM        Immunization History   Administered Date(s) Administered    (RETIRED) Pneumococcal Vaccine (Unspecified Type) 09/20/2010    Influenza Vaccine Split 09/11/2012       There is no height or weight on file to calculate BMI. Counseling regarding nutrition done: No     Current medications:  Current Outpatient Medications   Medication Sig Dispense Refill    cephALEXin (KEFLEX) 500 mg capsule Take 1 Cap by mouth four (4) times daily. Indications: skin infection due to Streptococcus pyogenes bacteria 28 Cap 0    metoprolol tartrate (LOPRESSOR) 50 mg tablet Take 1 Tab by mouth two (2) times a day.  (Patient taking differently: Take 100 mg by mouth two (2) times a day.) 180 Tab 3    clopidogrel (PLAVIX) 75 mg tab Take 1 Tab by mouth daily. 90 Tab 3    DULoxetine (CYMBALTA) 60 mg capsule Take 1 Cap by mouth daily. 90 Cap 3    dulaglutide (TRULICITY) 0.40 PD/3.2 mL sub-q pen 0.75 mg by SubCUTAneous route every seven (7) days.  bumetanide (BUMEX) 2 mg tablet Take 1 Tab by mouth daily. 90 Tab 3    atorvastatin (LIPITOR) 80 mg tablet Take 1 Tab by mouth daily. 90 Tab 3    metOLazone (ZAROXOLYN) 10 mg tablet Take 1 Tab by mouth daily. (Patient taking differently: Take 10 mg by mouth daily. Indications: as needed) 90 Tab 3    aspirin 81 mg chewable tablet Take 1 Tab by mouth daily. 90 Tab 3    insulin glargine (TOUJEO SOLOSTAR) 300 unit/mL (1.5 mL) inpn 220 Units by SubCUTAneous route daily.  acetaminophen (TYLENOL EXTRA STRENGTH) 500 mg tablet Take  by mouth every six (6) hours as needed for Pain.  insulin glulisine (APIDRA) 100 unit/mL injection 90 Units by SubCUTAneous route three (3) times daily.  gabapentin (NEURONTIN) 400 mg capsule Take 800 mg by mouth two (2) times a day. Pt states 8 x daily        albuterol (PROVENTIL VENTOLIN) 2.5 mg /3 mL (0.083 %) nebulizer solution 3 mL by Nebulization route three (3) times daily. 1 Package 0    nitroglycerin (NITROSTAT) 0.4 mg SL tablet 0.4 mg by SubLINGual route every five (5) minutes as needed.  ergocalciferol (VITAMIN D) 50,000 unit capsule Take 50,000 Units by mouth every seven (7) days. Takes on wed once a month       albuterol (PROVENTIL, VENTOLIN) 90 mcg/Actuation inhaler Take 2 Puffs by inhalation every four (4) hours as needed for Wheezing. Dispense: (1) inhaler with no refills. 1 g 0         Objective:     Physical Exam:     Visit Vitals  BP (!) 144/92 (BP 1 Location: Right arm)   Pulse 74   Temp 97.9 °F (36.6 °C)   Resp 18       General: well developed, well nourished, pleasant , NAD. Hygiene good  Psych: cooperative. Pleasant. No anxiety or depression. Normal mood and affect. Neuro: alert and oriented to person/place/situation. Otherwise nonfocal.  Derm: Normal turgor for age, dry skin  HEENT: Normocephalic, atraumatic. EOMI. Conjunctiva clear. No scleral icterus. Neck: Normal range of motion. No masses. Chest: Good air entry bilaterally. Respirations nonlabored  Cardio: Normal heart sounds,no rubs, murmurs or gallops  Abdomen: Soft, nontender, nondistended, normoactive bowel sounds  Lower extremities: color normal; temperature normal. Hair growth is not present. Calves are supple, nontender, approximately equally sized in comparison.  Capillary refill <3 sec      Diabetic Foot Ulcer/Neuropathic   Is Wound Greater than 1.0 sq cm ? : No  Re-Current Wound with Skin Substitue within Last Year : No  X-Ray in Last 3 Months: No  VIBHA In Last 6 Months : No  Smoking Status: Non- Smoker  Wound Free from Infection : No Culture Done  Is Wound Free of Eschar, Slough , and / or Bio-Commerce: Yes  Malignant Process in Wound : Biopsy in Last 3 Months  Type of off Loading: Wheelchair if area off surface of wheelchair  Compression Therapy of 20mm or greater ?: Yes     Ulcer Description:   Wound Leg Lower Right (Active)   Dressing Status  Clean, dry, and intact 8/23/2019  2:52 PM   Dressing Type  Unna boot 10/5/2018  2:52 PM   Non-Pressure Injury Full thickness (subcut/muscle) 7/19/2019  3:09 PM   Wound Length (cm) 0 cm 8/23/2019  2:52 PM   Wound Width (cm) 0 cm 8/23/2019  2:52 PM   Wound Depth (cm) 0 8/23/2019  2:52 PM   Wound Surface area (cm^2) 0 cm^2 8/23/2019  2:52 PM   Change in Wound Size % 100 8/23/2019  2:52 PM   Condition of Base Epithelializing 8/23/2019  2:52 PM   Epithelialization (%) 100 8/23/2019  2:52 PM   Tissue Type Red 8/17/2018  2:36 PM   Tissue Type Percent Pink 100 11/2/2018 11:04 AM   Tissue Type Percent Red 100 8/16/2019  2:27 PM   Drainage Amount  None 8/23/2019  2:52 PM   Drainage Color Serous 8/16/2019  2:27 PM   Wound Odor None 8/23/2019  2:52 PM   Periwound Skin Condition Erythema, blanchable 8/23/2019  2:52 PM   Cleansing and Cleansing Agents  Soap and water 8/23/2019  2:52 PM   Dressing Type Applied Xeroform;ABD pad;Unna boot 8/31/2018  9:01 AM   Procedure Tolerated Well 7/19/2019  3:09 PM   Number of days: 371       Wound Leg lower Left (Active)   Dressing Status Clean, dry, and intact 8/23/2019  2:52 PM   Wound Length (cm) 0 cm 8/23/2019  2:52 PM   Wound Width (cm) 0 cm 8/23/2019  2:52 PM   Wound Depth (cm) 0 cm 8/23/2019  2:52 PM   Wound Volume (cm^3) 0 cm^3 8/23/2019  2:52 PM   Condition of Base Epithelializing 8/23/2019  2:52 PM   Epithelialization (%) 100 8/23/2019  2:52 PM   Tissue Type Percent Red 100 8/16/2019  2:27 PM   Drainage Amount None 8/23/2019  2:52 PM   Drainage Color Serous 8/16/2019  2:27 PM   Wound Odor None 8/23/2019  2:52 PM   [place]ydney-wound Assessment Blanchable erythema 8/23/2019  2:52 PM   Cleansing and Cleansing Agents  Soap and water 8/23/2019  2:52 PM   Number of days: 7       [REMOVED] Wound Leg Medial;Left;Right (Removed)   Number of days: 7401       [REMOVED] Wound Leg Lower Left (Removed)   Dressing Status  Clean, dry, and intact 9/18/2018  2:07 PM   Dressing Type  Unna boot 9/18/2018  2:07 PM   Wound Length (cm) 0 cm 9/11/2018  4:01 PM   Wound Width (cm) 0 cm 9/11/2018  4:01 PM   Wound Depth (cm) 0 9/11/2018  4:01 PM   Wound Surface area (cm^2) 0 cm^2 9/11/2018  4:01 PM   Epithelialization (%) 100 9/11/2018  4:01 PM   Drainage Amount  None 9/11/2018  4:01 PM   Wound Odor None 9/11/2018  4:01 PM   Cleansing and Cleansing Agents  Normal saline; Soap and water 9/11/2018  4:01 PM   Dressing Type Applied Unna boot 9/11/2018  4:28 PM   Procedure Tolerated Well 9/11/2018  4:28 PM   Number of days: 32       [REMOVED] Wound Toe Left (Removed)   Wound Length (cm) 0 cm 11/2/2018 11:04 AM   Wound Width (cm) 0 cm 11/2/2018 11:04 AM   Wound Depth (cm) 0 11/2/2018 11:04 AM   Wound Surface area (cm^2) 0 cm^2 11/2/2018 11:04 AM   Change in Wound Size % 100 11/2/2018 11:04 AM   Tissue Type Percent Pink 100 10/19/2018  3:08 PM Drainage Amount  None 11/2/2018 11:04 AM   Drainage Color Serosanguinous 10/19/2018  3:08 PM   Wound Odor None 11/2/2018 11:04 AM   Periwound Skin Condition Intact 10/19/2018  3:08 PM   Cleansing and Cleansing Agents  Normal saline 11/2/2018 11:04 AM   Procedure Tolerated Well 10/12/2018  4:23 PM   Number of days: 21       [REMOVED] Wound Toe (specify in comments) Left;Plantar (Removed)   Dressing Status  Clean, dry, and intact 11/2/2018 11:04 AM   Dressing Type  Adhesive wound dressing (Band-Aid) 11/2/2018 11:04 AM   Non-Pressure Injury Partial thickness (epider/derm) 10/19/2018  3:08 PM   Wound Length (cm) 0 cm 11/2/2018 11:04 AM   Wound Width (cm) 0 cm 11/2/2018 11:04 AM   Wound Depth (cm) 0 11/2/2018 11:04 AM   Wound Surface area (cm^2) 0 cm^2 11/2/2018 11:04 AM   Change in Wound Size % 100 11/2/2018 11:04 AM   Condition of Base Okay 10/19/2018  3:08 PM   Drainage Amount  None 11/2/2018 11:04 AM   Wound Odor None 11/2/2018 11:04 AM   Periwound Skin Condition Intact 10/19/2018  3:08 PM   Cleansing and Cleansing Agents  Normal saline 11/2/2018 11:04 AM   Number of days: 14       [REMOVED] Wound Leg Lower Left;Medial (Removed)   Dressing Status  Clean, dry, and intact 3/1/2019  1:41 PM   Wound Length (cm) 0 cm 3/1/2019  1:41 PM   Wound Width (cm) 0 cm 3/1/2019  1:41 PM   Wound Depth (cm) 0 3/1/2019  1:41 PM   Wound Surface area (cm^2) 0 cm^2 3/1/2019  1:41 PM   Change in Wound Size % 100 3/1/2019  1:41 PM   Condition of Base Epithelializing 3/1/2019  1:41 PM   Tissue Type Percent Pink 100 3/1/2019  1:41 PM   Tissue Type Percent Red 100 1/18/2019  1:17 PM   Drainage Amount  None 3/1/2019  1:41 PM   Drainage Color Serous 1/18/2019  1:17 PM   Wound Odor None 3/1/2019  1:41 PM   Periwound Skin Condition Intact 3/1/2019  1:41 PM   Cleansing and Cleansing Agents  Soap and water 3/1/2019  1:41 PM   Number of days: 42         Data Review:   No results found for this or any previous visit (from the past 24 hour(s)). Assessment:     81 y.o. female with R lower leg anterior skin cancer ulcer. Problem List  Date Reviewed: 7/9/2018          Codes Class Noted    Nonrheumatic aortic valve stenosis ICD-10-CM: I35.0  ICD-9-CM: 424.1  5/2/2019    Overview Signed 5/8/2019  6:15 PM by Hernesto Arciniega MD     Echo (5/3/19):  EF 60-65%. Normal RV. Mild/moderate AS. MG 15. PG 26. Mild MR/TR/AR             Essential hypertension ICD-10-CM: I10  ICD-9-CM: 401.9  5/2/2019        Non-pressure chronic ulcer of left calf, limited to breakdown of skin [facility]) ICD-10-CM: F60.399  ICD-9-CM: 707.12  8/21/2018        Edema ICD-10-CM: R60.9  ICD-9-CM: 782.3  1/28/2016        Diverticulitis ICD-10-CM: K57.92  ICD-9-CM: 562.11  1/28/2016    Overview Signed 1/28/2016 12:10 PM by Leonela Lawson     1. CT of abdomen (7/9/14): Evidence of uncomplicated diverticulitis at the junction of the descending and sigmoid colon. Extensive diverticular are seen with some pericolonic fat stranding. TRISH (obstructive sleep apnea) (Chronic) ICD-10-CM: G47.33  ICD-9-CM: 327.23  9/7/2012    Overview Signed 9/7/2012 11:46 AM by Gissell Pimentel NP     Intolerant of CPAP             Anemia ICD-10-CM: D64.9  ICD-9-CM: 285.9  9/5/2012        Obstructive sleep apnea (adult) (pediatric)- probably  ICD-10-CM: G47.33  ICD-9-CM: 327.23  11/11/2011        Chronic venous insufficiency ICD-10-CM: I87.2  ICD-9-CM: 459.81  10/24/2011        Chronic diastolic heart failure (HCC) (Chronic) ICD-10-CM: I50.32  ICD-9-CM: 428.32  10/20/2011    Overview Signed 1/28/2016 12:05 PM by Leoenla Lawson     1. Echo (3/5/10): EF 55%. No wall motion abnormalities. Mild LVH. Mild diastolic dysfunction. Mild bi-atrial enlargement. Mild mitral regurgitation. RVSP 32.    2. Echo (4/26/11): Normal LV systolic function. EF 60%. Mild LVH. Mild diastolic dysfunction. Mild left atrial enlargement. Mild aortic regurgitation.              Peripheral vascular disease ([address] (Chronic) ICD-10-CM: I73.9  ICD-9-CM: 443.9  10/20/2011    Overview Signed 1/28/2016 12:08 PM by Linda Mendoza     1. Lower extremity duplex (9/2/10): Right lower extremity duplex suggested moderate to severe reduction with multilevel disease. Stenotic SFA most pronounced distally. Right VIBHA 0.5. Left VIBHA suggests mild reduction VIBHA of 0.86. GERD (gastroesophageal reflux disease) (Chronic) ICD-10-CM: K21.9  ICD-9-CM: 530.81  10/20/2011        Anemia, unspecified ICD-10-CM: D64.9  ICD-9-CM: 285.9  1/23/2011        Acute hyponatremia ICD-10-CM: E87.1  ICD-9-CM: 276.1  1/21/2011        CAD (coronary artery disease), native coronary artery (Chronic) ICD-10-CM: I25.10  ICD-9-CM: 414.01  9/2/2009    Overview Addendum 6/11/2017  9:05 PM by Clyde Schilling MD     1. NSTEMI (8/17/09):  EF 60%. PCI of dRCA with  Cypher 3.5x 33 mm SABA. Residual 80-90% LAD stenosis. 2.  Staged PCI of LAD (9/1/09): Xience 2.5x23mm and 3.0x18mm SABA in mid-distal LAD . Adjunctive POBA of D1 and D2.    3.  PCI of mid RCA (6/20/14): Promus 4 x 20 mm SABA (overlapped with previous stent)             Diabetes mellitus, type 2 (HCC) (Chronic) ICD-10-CM: E11.9  ICD-9-CM: 250.00  9/2/2009        Morbid obesity (HCC) (Chronic) ICD-10-CM: E66.01  ICD-9-CM: 278.01  9/2/2009        Dyslipidemia (Chronic) ICD-10-CM: E78.5  ICD-9-CM: 272.4  9/2/2009        Neuropathy in diabetes ([facility] [address] (Chronic) ICD-10-CM: E11.40  ICD-9-CM: 250.60, 357.2  9/2/2009               Needs:  Good local wound care  Edema management  Nutrition optimization  Good Diabetic control    Plan:     No orders of the defined types were placed in this encounter. Patient understood and agrees with plan. Questions answered. Follow-up Information    None            Any procedures done today in the [address] [facility] are documented in a separate note in [facility] and made part of this record by reference.      Dictated using voice recognition software; proofread, but unrecognized errors may exist.    Signed By: Everett Parsons MD     August 23, 2019

## 2019-08-26 ENCOUNTER — ANESTHESIA EVENT (OUTPATIENT)
Dept: SURGERY | Age: 84
End: 2019-08-26
Payer: MEDICARE

## 2019-08-26 NOTE — PERIOP NOTES
Talked with Deepa Perera at Dr. Di Bower office. She stated it is ok for patient to remain on Plavix. Does not need to hold prior to surgery.

## 2019-08-27 ENCOUNTER — ANESTHESIA (OUTPATIENT)
Dept: SURGERY | Age: 84
End: 2019-08-27
Payer: MEDICARE

## 2019-08-27 ENCOUNTER — HOSPITAL ENCOUNTER (OUTPATIENT)
Age: 84
Setting detail: OUTPATIENT SURGERY
Discharge: HOME | End: 2019-08-27
Attending: SURGERY | Admitting: SURGERY
Payer: MEDICARE

## 2019-08-27 VITALS
HEIGHT: 65 IN | HEART RATE: 150 BPM | OXYGEN SATURATION: 96 % | TEMPERATURE: 98 F | BODY MASS INDEX: 43.16 KG/M2 | WEIGHT: 259.04 LBS | DIASTOLIC BLOOD PRESSURE: 70 MMHG | RESPIRATION RATE: 16 BRPM | SYSTOLIC BLOOD PRESSURE: 129 MMHG

## 2019-08-27 LAB
GLUCOSE BLD STRIP.AUTO-MCNC: 120 MG/DL (ref 65–100)
GLUCOSE BLD STRIP.AUTO-MCNC: 130 MG/DL (ref 65–100)
POTASSIUM BLD-SCNC: 5.1 MMOL/L (ref 3.5–5.1)

## 2019-08-27 PROCEDURE — 88305 TISSUE EXAM BY PATHOLOGIST: CPT

## 2019-08-27 PROCEDURE — 74011250636 HC RX REV CODE- 250/636: Performed by: SURGERY

## 2019-08-27 PROCEDURE — 76010000160 HC OR TIME 0.5 TO 1 HR INTENSV-TIER 1: Performed by: SURGERY

## 2019-08-27 PROCEDURE — 77030031139 HC SUT VCRL2 J&J -A: Performed by: SURGERY

## 2019-08-27 PROCEDURE — 77030018836 HC SOL IRR NACL ICUM -A: Performed by: SURGERY

## 2019-08-27 PROCEDURE — 74011250636 HC RX REV CODE- 250/636

## 2019-08-27 PROCEDURE — 74011000250 HC RX REV CODE- 250: Performed by: SURGERY

## 2019-08-27 PROCEDURE — 77030013629 HC ELECTRD NDL STRY -B: Performed by: SURGERY

## 2019-08-27 PROCEDURE — 76210000063 HC OR PH I REC FIRST 0.5 HR: Performed by: SURGERY

## 2019-08-27 PROCEDURE — 82962 GLUCOSE BLOOD TEST: CPT

## 2019-08-27 PROCEDURE — 77030003666 HC NDL SPINAL BD -A: Performed by: SURGERY

## 2019-08-27 PROCEDURE — 84132 ASSAY OF SERUM POTASSIUM: CPT

## 2019-08-27 PROCEDURE — 74011250636 HC RX REV CODE- 250/636: Performed by: ANESTHESIOLOGY

## 2019-08-27 PROCEDURE — 77030002996 HC SUT SLK J&J -A: Performed by: SURGERY

## 2019-08-27 PROCEDURE — 77030008462 HC STPLR SKN PROX J&J -A: Performed by: SURGERY

## 2019-08-27 PROCEDURE — 76210000021 HC REC RM PH II 0.5 TO 1 HR: Performed by: SURGERY

## 2019-08-27 PROCEDURE — 76060000032 HC ANESTHESIA 0.5 TO 1 HR: Performed by: SURGERY

## 2019-08-27 PROCEDURE — 77030002916 HC SUT ETHLN J&J -A: Performed by: SURGERY

## 2019-08-27 RX ORDER — LIDOCAINE HYDROCHLORIDE 10 MG/ML
0.1 INJECTION INFILTRATION; PERINEURAL AS NEEDED
Status: DISCONTINUED | OUTPATIENT
Start: 2019-08-27 | End: 2019-08-27 | Stop reason: HOSPADM

## 2019-08-27 RX ORDER — SODIUM CHLORIDE, SODIUM LACTATE, POTASSIUM CHLORIDE, CALCIUM CHLORIDE 600; 310; 30; 20 MG/100ML; MG/100ML; MG/100ML; MG/100ML
75 INJECTION, SOLUTION INTRAVENOUS CONTINUOUS
Status: DISCONTINUED | OUTPATIENT
Start: 2019-08-27 | End: 2019-08-27 | Stop reason: HOSPADM

## 2019-08-27 RX ORDER — ALBUTEROL SULFATE 0.83 MG/ML
2.5 SOLUTION RESPIRATORY (INHALATION) AS NEEDED
Status: DISCONTINUED | OUTPATIENT
Start: 2019-08-27 | End: 2019-08-27 | Stop reason: HOSPADM

## 2019-08-27 RX ORDER — PROPOFOL 10 MG/ML
INJECTION, EMULSION INTRAVENOUS AS NEEDED
Status: DISCONTINUED | OUTPATIENT
Start: 2019-08-27 | End: 2019-08-27 | Stop reason: HOSPADM

## 2019-08-27 RX ORDER — PROPOFOL 10 MG/ML
INJECTION, EMULSION INTRAVENOUS
Status: DISCONTINUED | OUTPATIENT
Start: 2019-08-27 | End: 2019-08-27 | Stop reason: HOSPADM

## 2019-08-27 RX ORDER — LIDOCAINE HYDROCHLORIDE AND EPINEPHRINE 10; 10 MG/ML; UG/ML
INJECTION, SOLUTION INFILTRATION; PERINEURAL AS NEEDED
Status: DISCONTINUED | OUTPATIENT
Start: 2019-08-27 | End: 2019-08-27 | Stop reason: HOSPADM

## 2019-08-27 RX ORDER — OXYCODONE HYDROCHLORIDE 5 MG/1
5 TABLET ORAL
Status: DISCONTINUED | OUTPATIENT
Start: 2019-08-27 | End: 2019-08-27 | Stop reason: HOSPADM

## 2019-08-27 RX ORDER — FENTANYL CITRATE 50 UG/ML
100 INJECTION, SOLUTION INTRAMUSCULAR; INTRAVENOUS ONCE
Status: DISCONTINUED | OUTPATIENT
Start: 2019-08-27 | End: 2019-08-27 | Stop reason: HOSPADM

## 2019-08-27 RX ORDER — SODIUM CHLORIDE, SODIUM LACTATE, POTASSIUM CHLORIDE, CALCIUM CHLORIDE 600; 310; 30; 20 MG/100ML; MG/100ML; MG/100ML; MG/100ML
50 INJECTION, SOLUTION INTRAVENOUS CONTINUOUS
Status: DISCONTINUED | OUTPATIENT
Start: 2019-08-27 | End: 2019-08-27 | Stop reason: HOSPADM

## 2019-08-27 RX ADMIN — PROPOFOL 200 MCG/KG/MIN: 10 INJECTION, EMULSION INTRAVENOUS at 09:34

## 2019-08-27 RX ADMIN — PROPOFOL 50 MG: 10 INJECTION, EMULSION INTRAVENOUS at 09:34

## 2019-08-27 RX ADMIN — Medication 3 G: at 09:35

## 2019-08-27 RX ADMIN — SODIUM CHLORIDE, SODIUM LACTATE, POTASSIUM CHLORIDE, AND CALCIUM CHLORIDE 75 ML/HR: 600; 310; 30; 20 INJECTION, SOLUTION INTRAVENOUS at 09:03

## 2019-08-27 NOTE — PROGRESS NOTES
's Pre-op visit requested by patient. Conveyed care and concern for patient and family. Offered prayer as requested for patient, family, and staff.     Silvano Paredes MDiv, BS  Board Certified

## 2019-08-27 NOTE — ANESTHESIA POSTPROCEDURE EVALUATION
Procedure(s):  RIGHT LOWER LEG LESION  EXCISION  WITH SPLIT THICKNESS SKIN GRAFT FROM RIGHT HIP TO RIGHT LOWER LEG.    total IV anesthesia    Anesthesia Post Evaluation      Multimodal analgesia: multimodal analgesia used between 6 hours prior to anesthesia start to PACU discharge  Patient location during evaluation: PACU  Patient participation: complete - patient participated  Level of consciousness: awake and alert  Pain management: adequate  Airway patency: patent  Anesthetic complications: no  Cardiovascular status: acceptable  Respiratory status: acceptable, spontaneous ventilation and nonlabored ventilation  Hydration status: acceptable  Post anesthesia nausea and vomiting:  none      Vitals Value Taken Time   /75 8/27/2019 10:39 AM   Temp 36.7 °C (98 °F) 8/27/2019 10:16 AM   Pulse 70 8/27/2019 10:41 AM   Resp 17 8/27/2019 10:41 AM   SpO2 92 % 8/27/2019 10:41 AM   Vitals shown include unvalidated device data.

## 2019-08-27 NOTE — PROGRESS NOTES
Wound Center Progress Note    Patient: Kd Hammonds MRN: [id number]  SSN: [id number]    YOB: 1935  Age: 81 y.o.   Sex: female      Subjective:     Chief Complaint:  Recurrent Venous leg ulcer BLE    History of Present Illness:       Wound Caused By: venous insufficiency  Associated Signs and Symptoms: drainage, pain  Timing: since June 2018  Quality: wound  Severity: full thickness  Modifying Factors: Dm2, obesity, venous insufficiency  Current Wound Care: Multilayer compression, CHF      Continued RLE drainage, wound    Past Medical History:   Diagnosis Date    Acute hyponatremia 1/21/2011    Acute on chronic diastolic congestive heart failure ([address] 1/19/2011    Acute renal failure ([address] 1/21/2011    AMI (acute myocardial infarction) ([address] 2009    Anemia 9/5/2012    Arthritis     Asthma     Asthma exacerbation 11/11/2011    CAD (coronary artery disease)     MI 2009, stents heart 2009    CAD (coronary artery disease), native coronary artery 9/2/2009    Previous stent to [facility] with Cypher 3.5x33mm SABA 8/09 9/09 LAD- Xience 2.5x23mm and 3.0x18mm SABA in mid-distal LAD      Cellulitis Bilateral Lower Extremities 1/20/2011    Chest discomfort, tightness, pressure 1/28/2016    Chronic diastolic heart failure ([address] 10/20/2011    Chronic venous insufficiency 10/24/2011    Diabetes (HCC)     checks QD, normal 200, no hyposymptoms     Diabetes Mellitus Type II-insulin dependent 9/2/2009    Diverticulitis 1/28/2016    Dyslipidemia 9/2/2009    Edema 1/28/2016    GERD (gastroesophageal reflux disease)     GLAUCOMA     BILATERAL    Heart failure ([address]     Hypertension     Hypokalemia 1/28/2016    Hypoxemia 9/7/2012    Morbid obesity ([address]     Nausea & vomiting     Neuropathy in diabetes ([address] 9/2/2009    NSTEMI (non-ST elevation myocardial infarction) ([address] 9/2/2009    Dr Camille Galloway    Obstructive sleep apnea (adult) (pediatric)- probably  11/11/2011    TRISH (obstructive sleep apnea) 9/7/2012    Intolerant of CPAP     Other dyspnea and respiratory abnormality 11/10/2011    Other ill-defined conditions(799.89)     benign tumor in lower abdomen- not removed, diabetic ulcers, edema, neuropathy    Peripheral vascular disease ([facility] 10/20/2011    Post PTCA 6/20/2014    PVD (peripheral vascular disease) ([facility]     Unspecified anemia 1/23/2011    Unspecified sleep apnea     oxygen at night    Venous stasis of lower extremity 2010    BILATERAL W RECURRENT ADMISSIONS    Volume overload 9/6/2012      Past Surgical History:   Procedure Laterality Date    BREAST SURGERY PROCEDURE UNLISTED  1973    benign tumor left breast    CARDIAC SURG PROCEDURE UNLIST      4 stents most recent 2 yrs ago    HX CHOLECYSTECTOMY      HX COLONOSCOPY      HX GI      PTCA EACH ADDL VESSEL      stentsx3     Family History   Problem Relation Age of Onset    Cancer Father     Lung Disease Father     Cancer Brother     Diabetes Paternal Aunt     Hypertension Maternal Grandmother     Hypertension Paternal Grandmother       Social History     Tobacco Use    Smoking status: Never Smoker    Smokeless tobacco: Never Used   Substance Use Topics    Alcohol use: No       Prior to Admission medications    Medication Sig Start Date End Date Taking? Authorizing Provider   cephALEXin (KEFLEX) 500 mg capsule Take 1 Cap by mouth four (4) times daily. Indications: skin infection due to Streptococcus pyogenes bacteria 8/16/19   Shyanne Nassar MD   metoprolol tartrate (LOPRESSOR) 50 mg tablet Take 1 Tab by mouth two (2) times a day. Patient taking differently: Take 100 mg by mouth two (2) times a day. 5/2/19   Salma Longoria MD   clopidogrel (PLAVIX) 75 mg tab Take 1 Tab by mouth daily. 5/2/19   Salma Longoria MD   DULoxetine (CYMBALTA) 60 mg capsule Take 1 Cap by mouth daily.  5/2/19   Salma Longoria MD   dulaglutide (TRULICITY) 9.95 EL/8.2 mL sub-q pen 0.75 mg by SubCUTAneous route every seven (7) days. Provider, Historical   bumetanide (BUMEX) 2 mg tablet Take 1 Tab by mouth daily. 8/17/18   Tomas Longoria MD   atorvastatin (LIPITOR) 80 mg tablet Take 1 Tab by mouth daily. 7/16/18   Tomas Longoria MD   metOLazone (ZAROXOLYN) 10 mg tablet Take 1 Tab by mouth daily. Patient taking differently: Take 10 mg by mouth daily. Indications: as needed 1/19/18   Tomas Longoria MD   aspirin 81 mg chewable tablet Take 1 Tab by mouth daily. 6/12/17   Tomas Longoria MD   insulin glargine (TOUJEO SOLOSTAR) 300 unit/mL (1.5 mL) inpn 220 Units by SubCUTAneous route daily. Provider, Historical   acetaminophen (TYLENOL EXTRA STRENGTH) 500 mg tablet Take  by mouth every six (6) hours as needed for Pain. Provider, Historical   insulin glulisine (APIDRA) 100 unit/mL injection 90 Units by SubCUTAneous route three (3) times daily. Provider, Historical   gabapentin (NEURONTIN) 400 mg capsule Take 800 mg by mouth two (2) times a day. Pt states 8 x daily      Other, MD Diego   albuterol (PROVENTIL VENTOLIN) 2.5 mg /3 mL (0.083 %) nebulizer solution 3 mL by Nebulization route three (3) times daily. 11/16/11   Carmen Mccarty MD   nitroglycerin (NITROSTAT) 0.4 mg SL tablet 0.4 mg by SubLINGual route every five (5) minutes as needed. Provider, Historical   ergocalciferol (VITAMIN D) 50,000 unit capsule Take 50,000 Units by mouth every seven (7) days. Takes on wed once a month     Provider, Historical   albuterol (PROVENTIL, VENTOLIN) 90 mcg/Actuation inhaler Take 2 Puffs by inhalation every four (4) hours as needed for Wheezing. Dispense: (1) inhaler with no refills.  12/19/10   Dell Breaux PA     Allergies   Allergen Reactions    Pcn [Penicillins] Hives, Swelling and Myalgia     Can take cephalosporins    Codeine Nausea Only    Dilaudid [Hydromorphone (Bulk)] Other (comments)    Lortab [Hydrocodone-Acetaminophen] Nausea Only        Review of Systems:  Pertinent items are noted in the History of Present Illness. Lab Results   Component Value Date/Time    Hemoglobin A1c 8.7 (H) 09/30/2011 01:07 PM        Immunization History   Administered Date(s) Administered    (RETIRED) Pneumococcal Vaccine (Unspecified Type) 09/20/2010    Influenza Vaccine Split 09/11/2012       There is no height or weight on file to calculate BMI. Counseling regarding nutrition done: Yes Pateint counsled about risks of smoking and cessation      Current medications:  Current Facility-Administered Medications   Medication Dose Route Frequency Provider Last Rate Last Dose    lidocaine (XYLOCAINE) 10 mg/mL (1 %) injection 0.1 mL  0.1 mL SubCUTAneous PRN Jefe Russell MD        lactated Ringers infusion  75 mL/hr IntraVENous CONTINUOUS Jefe Russell MD        fentaNYL citrate (PF) injection 100 mcg  100 mcg IntraVENous ONCE Jefe Russell MD             Objective:     Physical Exam:       General: Healthy appearing  Neuro: alert and oriented to person/place/situation. Otherwise nonfocal.  Derm: Normal turgor for age, otherwise per wound exam  HEENT: Normocephalic, atraumatic. EOMI. Conjunctiva clear. No scleral icterus. Neck: Normal range of motion. No masses. Chest: Resp unlabored  Cardio: Regular rate, rhythm  Abdomen: Soft, nontender  Ext. No hemosiderrosis. DP/PT 2+  Psych: cooperative. Pleasant. No anxiety or depression. Normal mood and affect.      RLE anterior wound      Assessment:     RLE [place]    Plan:     Excision and skin graft in OR

## 2019-08-27 NOTE — DISCHARGE INSTRUCTIONS
ACTIVITY  · As tolerated and as directed by your doctor. · Bathe or shower as directed by your doctor. DIET  · Clear liquids until no nausea or vomiting; then light diet for the first day. · Advance to regular diet on second day, unless your doctor orders otherwise. · If nausea and vomiting continues, call your doctor. PAIN  · Take pain medication as directed by your doctor. · Call your doctor if pain is NOT relieved by medication. ·     DRESSING CARE Keep all dressings in place and dry until seen in [address] [place] [address] on Friday      CALL YOUR DOCTOR IF   · Excessive bleeding that does not stop after holding pressure over the area  · Temperature of 101 degrees F or above  · Excessive redness, swelling or bruising, and/ or green or yellow, smelly discharge from incision    AFTER ANESTHESIA   · For the first 24 hours: DO NOT Drive, Drink alcoholic beverages, or Make important decisions. · Be aware of dizziness following anesthesia and while taking pain medication. [address] [employer] [address] on Friday    YOUR DOCTOR'S PHONE NUMBER [phone]      DISCHARGE SUMMARY from Nurse    PATIENT INSTRUCTIONS:    After general anesthesia or intravenous sedation, for 24 hours or while taking prescription Narcotics:  · Limit your activities  · Do not drive and operate hazardous machinery  · Do not make important personal or business decisions  · Do  not drink alcoholic beverages  · If you have not urinated within 8 hours after discharge, please contact your surgeon on call. *  Please give a list of your current medications to your Primary Care Provider. *  Please update this list whenever your medications are discontinued, doses are      changed, or new medications (including over-the-counter products) are added. *  Please carry medication information at all times in case of emergency situations.       These are general instructions for a healthy lifestyle:    No smoking/ No tobacco products/ Avoid exposure to second hand smoke    Surgeon General's Warning:  Quitting smoking now greatly reduces serious risk to your health. Obesity, smoking, and sedentary lifestyle greatly increases your risk for illness    A healthy diet, regular physical exercise & weight monitoring are important for maintaining a healthy lifestyle    You may be retaining fluid if you have a history of heart failure or if you experience any of the following symptoms:  Weight gain of 3 pounds or more overnight or 5 pounds in a week, increased swelling in our hands or feet or shortness of breath while lying flat in bed. Please call your doctor as soon as you notice any of these symptoms; do not wait until your next office visit. Recognize signs and symptoms of STROKE:    F-face looks uneven    A-arms unable to move or move unevenly    S-speech slurred or non-existent    T-time-call 911 as soon as signs and symptoms begin-DO NOT go       Back to bed or wait to see if you get better-TIME IS BRAIN.

## 2019-08-27 NOTE — ANESTHESIA PREPROCEDURE EVALUATION
Relevant Problems   No relevant active problems       Anesthetic History     PONV          Review of Systems / Medical History  Patient summary reviewed, nursing notes reviewed and pertinent labs reviewed    Pulmonary        Sleep apnea (intolerant of CPAP but wears O2 QHS)    Asthma        Neuro/Psych   Within defined limits           Cardiovascular    Hypertension  Valvular problems/murmurs: aortic stenosis        CAD, PAD (Bilat LE occlussive dz), cardiac stents (x4, most recent to RCA in 2014) and hyperlipidemia    Exercise tolerance: <4 METS  Comments: TTE May 2019- EF 21-24%, LVH, diastolic dysfxn, mild-mod AS, mild MR   GI/Hepatic/Renal     GERD    Renal disease: CRI       Endo/Other    Diabetes: type 2    Morbid obesity and arthritis     Other Findings   Comments: Diabetic neuropathy         Physical Exam    Airway  Mallampati: II           Cardiovascular    Rhythm: regular      Murmur: Grade 2, Aortic area     Dental         Pulmonary  Breath sounds clear to auscultation               Abdominal         Other Findings            Anesthetic Plan    ASA: 4  Anesthesia type: total IV anesthesia          Induction: Intravenous  Anesthetic plan and risks discussed with: Patient, Spouse and Son / Daughter

## 2019-08-28 NOTE — OP NOTES
OPERATIVE NOTE    Date of Procedure: 8/27/2019   Preoperative Diagnosis: Right lower leg lesion    Postoperative Diagnosis: Right lower leg lesion      Procedure(s):  RIGHT LOWER LEG LESION  EXCISION 2 x 3 cm  SPLIT THICKNESS SKIN GRAFT FROM RIGHT HIP TO RIGHT LOWER LEG 2 x 3 cm      Surgeon(s) and Role:     * Alie Hu MD - Primary         Prior to the procedure the patient was met in holding. Once again a discussion of the risks, benefits and alternatives was conducted including but not limited to bleeding, infection, failure of the surgery, need for further procedures, disappointing or unacceptable cosmesis, damage to adjacent structures was conducted. The patient again affirmed understanding and consent. The patient was marked in the upright and relaxed position. Patient brought to the OR, surgical timeout performed and anesthesia induced. Patient positioned and prepped and draped. Lesion and donor site infiltrated with lidocaine with epi 1%. Lesion circumferentially excised to level of subcutaneous tissue. Passed off field. Irrigated and hemostasis obtained. Template of defect transposed to hip. Skin graft harvest split thickness with 15 blade and then remaining dermis excised to allow for primary closure of defect. Hemostasis obtained and closure completed with deep dermal 3-0 vicryl and running nylon. Graft further thinned with iris scissors and fenestrated. Inset with staples and secured with bolster, kerlex coban wrap. Donor site dressed with xeroform, gauze, tegaderm.         Surgical Staff:  Circ-1: Norleen Galeazzi Z  Circ-2: Nadja Guevara RN  Scrub Tech-1: Maisha Carranza  Scrub Tech-2: Emerald Thompson Time In Time Out   Incision Start 7235    Incision Close 1005      Anesthesia: MAC   Estimated Blood Loss: 10 ml  Specimens:   ID Type Source Tests Collected by Time Destination   1 : Right lower extremity lesion Preservative Leg, Right  Alie Hu MD 8/27/2019 1922 Pathology      Findings: RLE chronic venous changes  Complications: Same  Implants: * No implants in log *

## 2019-08-30 ENCOUNTER — HOSPITAL ENCOUNTER (OUTPATIENT)
Dept: WOUND CARE | Age: 84
Discharge: HOME | End: 2019-08-30
Attending: SURGERY
Payer: MEDICARE

## 2019-08-30 VITALS
OXYGEN SATURATION: 93 % | TEMPERATURE: 97.1 F | DIASTOLIC BLOOD PRESSURE: 83 MMHG | SYSTOLIC BLOOD PRESSURE: 178 MMHG | HEART RATE: 73 BPM | RESPIRATION RATE: 20 BRPM

## 2019-08-30 PROCEDURE — 29580 STRAPPING UNNA BOOT: CPT

## 2019-08-30 NOTE — WOUND CARE
08/30/19 1514   [REMOVED] Wound Leg lower Left   Final Assessment Date: 08/30/19  Date First Assessed/Time First Assessed: 08/16/19 1427   Present on Hospital Admission: Yes  Primary Wound Type: Venous  Location: Leg lower  Wound Location Orientation: Left   Dressing Type   (xeroform, gauze, unna boot)   Wound Leg Lower Right   Date First Assessed/Time First Assessed: 08/17/18 1436   POA: Yes  Wound Type: Venous  Location: Leg Lower  Wound Description (Optional): 16  Orientation: Right   Dressing Status  Clean, dry, and intact   Dressing Type    (xeroform, gauze,staples, unna boot)   Wound Length (cm) 1.2 cm   Wound Width (cm) 0.9 cm   Wound Depth (cm) 0.1   Wound Surface area (cm^2) 1.08 cm^2   Change in Wound Size % -332   Condition of Base Other (comment)  (skin graft)   Drainage Amount  None   Wound Odor None   Periwound Skin Condition Erythema, blanchable     PATIENT IS ON ANTICOAGULANT

## 2019-08-30 NOTE — WOUND CARE
Clinic Level of Care Assessment    NAME:  Jelani Zamora OF BIRTH:  1935 GENDER: female  MEDICAL RECORD NUMBER:  [id number]   DATE:  8/30/2019      Wound Count Document in [address] [place] [address]  Number of Wounds Assessed Points   No Wounds/Ulcers []   0   Less than Three Wounds/Ulcers [x]   1   3-6 Wounds/Ulcers []   2   Greater than 6 Wounds/Ulcers []   3     Ambulation Status Document in Coord/ENMA/Mobility tab  Status Definition Points   Independent Independently able to ambulate. Fully able (without any assistance) to get on/off exam table/chair. []   0   Minimal Physical Assistance Requires physical assistance of one person to ambulate and/or position patient to be examined. Includes necessary physical assistance to position lower extremities on/off stool. []   1   Moderate Physical Assistance Requires at least one staff member to physically assist patient in ambulating into treatment room, and on/off exam table. [x]   2   Full Assistance Requires assistance of at least two staff members to transfer patient into treatment room and/or on/off exam table/chair. \"Total Transfer\". []   3     Dressing Complexity Document in LDA and Write Appropriate Order  Complexity Definition Points   No Dressing  []   0   Simple Minimal, simple dressing. i.e. Band-aid, gauze, simple wrap. []   1   Intermediate Moderately complicated requiring licensed personnel to apply i.e. collagen matrix, ointments, gels, alginates. [x]   2   Complex Complicated requiring licensed personnel to apply dressings 6 or more wounds. []   3     Teaching Effort Document in Education Tab   Effort Definition Points   No Teaching  []   0   Simple Reinforce two or less topics. Document in Education navigator. [x]   1   Intermediate Reinforce three to five topics and/or one additional   new topic. Document in Education navigator. []   2   Complex Teach more than one new topic.  New patient information   packet reviewed and/or reinforce more than three topics. Document in Education navigator. HBO initial instruction. []   3       Patient Assessment and Planning  Planning Definition Points   Simple Multiple System Simple: Simple follow-up with routine assessment and planning. If Discharged, instructions and long term/follow-up care given to patient/caregiver. Discharged, instructions and/or After Visit Summary given to patient/caregiver and instructions completed. [x]   1   Intermediate Multiple System Intermediate: Contact with outside resources; i.e. Telephone calls to home health, [facility]. May include filling out forms and writing letters, arranging transportation, communication with insurance , vendors, etc.  Discharged, instructions and/or After Visit Summary given to patient/caregiver and instructions completed. []   2   Complex Multiple System Complex: Full, comprehensive assessment and planning. Follow the entire navigator under Wound Visit charting filling out each tab which includes OP Adm Database Screening, Education and CarePlan  HBO risk assessment completed. Discharged, instructions and/or After Visit Summary given to patient/caregiver and instructions completed.    []   3           Is this the Patient's First Visit to the [address] [place] [address] Road  No      Is this Patient Established @ [facility]  Yes             Clinical Level of Care      Points  0-2  Level 1 []     Points  3-5  Level 2 []     Points  6-9  Level 3 [x]     Points  10-12  Level 4 []     Points  13-15  Level 5 []       Electronically signed by Abby Epley, RN on 8/30/2019 at 4:34 PM

## 2019-08-30 NOTE — WOUND CARE
[address]  [address], [address]  Phone: [phone]  Fax: [phone]    Patient: Pratima Cunningham MRN: [id number]  SSN: [id number]    YOB: 1935  Age: 81 y.o. Sex: female       Return Appointment: 1 week with Jason Pickett MD      Instructions: Bilateral lower legs:  Apply xeroform to right lower leg skin graft site, cover with ABD   Wrap bilateral lower legs with unna boots. Change twice weekly. Right upper thigh:  Xeroform- apply to wound bed.,cover with ABD. Change twice weekly. Home health for dressing changes. Plan for suture removal next week. Should you experience increased redness, swelling, pain, foul odor, size of wound(s), or have a temperature over 101 degrees please contact the [address] [place] [address] Road at [phone] or if after hours contact your primary care physician or go to the hospital emergency department.     Signed By: James Kirkpatrick RN     August 30, 2019

## 2019-08-30 NOTE — WOUND CARE
Any Application   Below Knee    NAME:  Franko Radford OF BIRTH:  1935  MEDICAL RECORD NUMBER:  [id number]  DATE:  8/30/2019    Remove old Unna Boot or Boots. Appied primary and secondary dressing as ordered. Applied Unna roll from toes to knee overlapping each time. Applied ace wrap or coban from toes to below the knee. Secured with tape and/or metal clips covered with tape. Instructed patient/caregiver to keep dressing dry and intact. DO NOT REMOVE DRESSING. Instructed pt/family/caregiver to report excessive draining, loose bandage, wet dressing, severe pain or tingling in toes. Applied [employer] dressing below the knee to bilateral lower legs. Unna Boot(s) were applied per  Guidelines.      Electronically signed by Asif Diego RN on 8/30/2019 at 4:35 PM

## 2019-08-30 NOTE — WOUND CARE
08/30/19 1514   [REMOVED] Wound Leg lower Left   Final Assessment Date: 08/30/19  Date First Assessed/Time First Assessed: 08/16/19 1427   Present on Hospital Admission: Yes  Primary Wound Type: Venous  Location: Leg lower  Wound Location Orientation: Left   Dressing Type   (xeroform, gauze, unna boot)   Wound Leg upper Right   Date First Assessed/Time First Assessed: 08/30/19 1516   Primary Wound Type: Closed incision/surgical site  Location: Leg upper  Wound Location Orientation: Right   Dressing Status   (abd, tape)   Dressing Type   (sutures, abd, tape)   Wound Length (cm) 1.2 cm   Wound Width (cm) 0.5 cm   Wound Depth (cm) 0 cm   Wound Volume (cm^3) 0 cm^3   Condition of Base Other (comment)  (closed/ sutures)   Condition of Edges Closed   Drainage Amount None   Wound Odor None   Cydney-wound Assessment Intact   Cleansing and Cleansing Agents  Soap and water   Wound Leg Lower Right   Date First Assessed/Time First Assessed: 08/17/18 1436   POA: Yes  Wound Type: Venous  Location: Leg Lower  Wound Description (Optional): 16  Orientation: Right   Dressing Status  Clean, dry, and intact   Dressing Type    (xeroform, gauze,staples, unna boot)   Wound Length (cm) 1.2 cm   Wound Width (cm) 0.9 cm   Wound Depth (cm) 0.1   Wound Surface area (cm^2) 1.08 cm^2   Change in Wound Size % -332   Condition of Base Other (comment)  (skin graft)   Drainage Amount  None   Wound Odor None   Periwound Skin Condition Erythema, blanchable           PATIENT IS ON ANTICOAGULANT

## 2019-09-04 NOTE — PROGRESS NOTES
Wound Center Progress Note    Patient: Grant Bain MRN: [id number]  SSN: [id number]    YOB: 1935  Age: 81 y.o. Sex: female      Subjective:     Chief Complaint:  Recurrent Venous leg ulcer BLE    History of Present Illness:       Wound Caused By: venous insufficiency  Associated Signs and Symptoms: drainage, pain  Timing: since June 2018  Quality: wound  Severity: full thickness  Modifying Factors: Dm2, obesity, venous insufficiency  Current Wound Care: Multilayer compression, CHF    3/11/2018  No new issues. Urinary soiling continues to be improved. Left 2nd toe stable. Getting [place] for wraps    6/14/2019  Continues to do well with dressings but still with substantial drainage. No further UTI issues. 7/19/2019  No new issues. Tolerating dressings. No further soiling.     7/26/2019  Doing well. No new issues. R leg wound not improving. 8/2/2019  Returns for re-eval. Bx proven [place]. Chest lesion benign. 8/30  S/P excision leg lesion and SG margins negative.      Past Medical History:   Diagnosis Date    Acute hyponatremia 1/21/2011    Acute on chronic diastolic congestive heart failure ([address] 1/19/2011    Acute renal failure ([address] 1/21/2011    AMI (acute myocardial infarction) ([address] 2009    Anemia 9/5/2012    Arthritis     Asthma     Asthma exacerbation 11/11/2011    CAD (coronary artery disease)     MI 2009, stents heart 2009    CAD (coronary artery disease), native coronary artery 9/2/2009    Previous stent to dRCA with Cypher 3.5x33mm SABA 8/09 9/09 LAD- Xience 2.5x23mm and 3.0x18mm SABA in mid-distal LAD      Cellulitis Bilateral Lower Extremities 1/20/2011    Chest discomfort, tightness, pressure 1/28/2016    Chronic diastolic heart failure ([address] 10/20/2011    Chronic venous insufficiency 10/24/2011    Diabetes (HCC)     checks QD, normal 200, no hyposymptoms     Diabetes Mellitus Type II-insulin dependent 9/2/2009    Diverticulitis 1/28/2016    Dyslipidemia 9/2/2009    Edema 1/28/2016    GERD (gastroesophageal reflux disease)     GLAUCOMA     BILATERAL    Heart failure ([address]     Hypertension     Hypokalemia 1/28/2016    Hypoxemia 9/7/2012    Morbid obesity (HCC)     Nausea & vomiting     Neuropathy in diabetes ([address] 9/2/2009    NSTEMI (non-ST elevation myocardial infarction) ([address] 9/2/2009    Dr Chava Pratt    Obstructive sleep apnea (adult) (pediatric)- probably  11/11/2011    TRISH (obstructive sleep apnea) 9/7/2012    Intolerant of CPAP     Other dyspnea and respiratory abnormality 11/10/2011    Other ill-defined conditions(799.89)     benign tumor in lower abdomen- not removed, diabetic ulcers, edema, neuropathy    Peripheral vascular disease ([address] 10/20/2011    Post PTCA 6/20/2014    PVD (peripheral vascular disease) ([address]     Unspecified anemia 1/23/2011    Unspecified sleep apnea     oxygen at night    Venous stasis of lower extremity 2010    BILATERAL W RECURRENT ADMISSIONS    Volume overload 9/6/2012      Past Surgical History:   Procedure Laterality Date    BREAST SURGERY PROCEDURE UNLISTED  1973    benign tumor left breast    CARDIAC SURG PROCEDURE UNLIST      4 stents most recent 2 yrs ago    HX CHOLECYSTECTOMY      HX COLONOSCOPY      HX GI      PTCA EACH ADDL VESSEL      stentsx3     Family History   Problem Relation Age of Onset    Cancer Father     Lung Disease Father     Cancer Brother     Diabetes Paternal Aunt     Hypertension Maternal Grandmother     Hypertension Paternal Grandmother       Social History     Tobacco Use    Smoking status: Never Smoker    Smokeless tobacco: Never Used   Substance Use Topics    Alcohol use: No       Prior to Admission medications    Medication Sig Start Date End Date Taking? Authorizing Provider   cephALEXin (KEFLEX) 500 mg capsule Take 1 Cap by mouth four (4) times daily.  Indications: skin infection due to Streptococcus pyogenes bacteria 8/16/19   Lelo Fontenot MD metoprolol tartrate (LOPRESSOR) 50 mg tablet Take 1 Tab by mouth two (2) times a day. Patient taking differently: Take 100 mg by mouth two (2) times a day. 5/2/19   Noelle Longoria MD   clopidogrel (PLAVIX) 75 mg tab Take 1 Tab by mouth daily. 5/2/19   Noelle Longoria MD   DULoxetine (CYMBALTA) 60 mg capsule Take 1 Cap by mouth daily. 5/2/19   Noelle Longoria MD   dulaglutide (TRULICITY) 3.83 BY/5.1 mL sub-q pen 0.75 mg by SubCUTAneous route every seven (7) days. Provider, Historical   bumetanide (BUMEX) 2 mg tablet Take 1 Tab by mouth daily. 8/17/18   Noelle Longoria MD   atorvastatin (LIPITOR) 80 mg tablet Take 1 Tab by mouth daily. 7/16/18   Noelle Longoria MD   metOLazone (ZAROXOLYN) 10 mg tablet Take 1 Tab by mouth daily. Patient taking differently: Take 10 mg by mouth daily. Indications: as needed 1/19/18   Noelle Longoria MD   aspirin 81 mg chewable tablet Take 1 Tab by mouth daily. 6/12/17   Noelle Longoria MD   insulin glargine (TOUJEO SOLOSTAR) 300 unit/mL (1.5 mL) inpn 220 Units by SubCUTAneous route daily. Provider, Historical   acetaminophen (TYLENOL EXTRA STRENGTH) 500 mg tablet Take  by mouth every six (6) hours as needed for Pain. Provider, Historical   insulin glulisine (APIDRA) 100 unit/mL injection 90 Units by SubCUTAneous route three (3) times daily. Provider, Historical   gabapentin (NEURONTIN) 400 mg capsule Take 800 mg by mouth two (2) times a day. Pt states 8 x daily      Other, MD Diego   albuterol (PROVENTIL VENTOLIN) 2.5 mg /3 mL (0.083 %) nebulizer solution 3 mL by Nebulization route three (3) times daily. 11/16/11   Rajiv Cardoso MD   nitroglycerin (NITROSTAT) 0.4 mg SL tablet 0.4 mg by SubLINGual route every five (5) minutes as needed. Provider, Historical   ergocalciferol (VITAMIN D) 50,000 unit capsule Take 50,000 Units by mouth every seven (7) days.  Takes on wed once a month     Provider, Historical   albuterol (PROVENTIL, VENTOLIN) 90 mcg/Actuation inhaler Take 2 Puffs by inhalation every four (4) hours as needed for Wheezing. Dispense: (1) inhaler with no refills. 12/19/10   Lorena Breaux PA     Allergies   Allergen Reactions    Pcn [Penicillins] Hives, Swelling and Myalgia     Can take cephalosporins    Bactrim [Sulfamethoprim] Other (comments)     \"potassium acted up\"    Codeine Nausea Only    Dilaudid [Hydromorphone (Bulk)] Other (comments)    Lortab [Hydrocodone-Acetaminophen] Nausea Only        Review of Systems:  Pertinent items are noted in the History of Present Illness. Lab Results   Component Value Date/Time    Hemoglobin A1c 8.7 (H) 09/30/2011 01:07 PM        Immunization History   Administered Date(s) Administered    (RETIRED) Pneumococcal Vaccine (Unspecified Type) 09/20/2010    Influenza Vaccine Split 09/11/2012       There is no height or weight on file to calculate BMI. Counseling regarding nutrition done: Yes Pateint counsled about risks of smoking and cessation      Current medications:  Current Outpatient Medications   Medication Sig Dispense Refill    cephALEXin (KEFLEX) 500 mg capsule Take 1 Cap by mouth four (4) times daily. Indications: skin infection due to Streptococcus pyogenes bacteria 28 Cap 0    metoprolol tartrate (LOPRESSOR) 50 mg tablet Take 1 Tab by mouth two (2) times a day. (Patient taking differently: Take 100 mg by mouth two (2) times a day.) 180 Tab 3    clopidogrel (PLAVIX) 75 mg tab Take 1 Tab by mouth daily. 90 Tab 3    DULoxetine (CYMBALTA) 60 mg capsule Take 1 Cap by mouth daily. 90 Cap 3    dulaglutide (TRULICITY) 8.42 [place]/7.4 mL sub-q pen 0.75 mg by SubCUTAneous route every seven (7) days.  bumetanide (BUMEX) 2 mg tablet Take 1 Tab by mouth daily. 90 Tab 3    atorvastatin (LIPITOR) 80 mg tablet Take 1 Tab by mouth daily. 90 Tab 3    metOLazone (ZAROXOLYN) 10 mg tablet Take 1 Tab by mouth daily. (Patient taking differently: Take 10 mg by mouth daily.  Indications: as needed) 90 Tab 3    aspirin 81 mg chewable tablet Take 1 Tab by mouth daily. 90 Tab 3    insulin glargine (TOUJEO SOLOSTAR) 300 unit/mL (1.5 mL) inpn 220 Units by SubCUTAneous route daily.  acetaminophen (TYLENOL EXTRA STRENGTH) 500 mg tablet Take  by mouth every six (6) hours as needed for Pain.  insulin glulisine (APIDRA) 100 unit/mL injection 90 Units by SubCUTAneous route three (3) times daily.  gabapentin (NEURONTIN) 400 mg capsule Take 800 mg by mouth two (2) times a day. Pt states 8 x daily        albuterol (PROVENTIL VENTOLIN) 2.5 mg /3 mL (0.083 %) nebulizer solution 3 mL by Nebulization route three (3) times daily. 1 Package 0    nitroglycerin (NITROSTAT) 0.4 mg SL tablet 0.4 mg by SubLINGual route every five (5) minutes as needed.  ergocalciferol (VITAMIN D) 50,000 unit capsule Take 50,000 Units by mouth every seven (7) days. Takes on wed once a month       albuterol (PROVENTIL, VENTOLIN) 90 mcg/Actuation inhaler Take 2 Puffs by inhalation every four (4) hours as needed for Wheezing. Dispense: (1) inhaler with no refills. 1 g 0         Objective:     Physical Exam:     Visit Vitals  /83 (BP 1 Location: Left arm)   Pulse 73   Temp 97.1 °F (36.2 °C)   Resp 20   SpO2 93%       General: Healthy appearing  Neuro: alert and oriented to person/place/situation. Otherwise nonfocal.  Derm: Normal turgor for age, otherwise per wound exam  HEENT: Normocephalic, atraumatic. EOMI. Conjunctiva clear. No scleral icterus. Neck: Normal range of motion. No masses. Chest: Resp unlabored  Cardio: Regular rate, rhythm  Abdomen: Soft, nontender  Ext. No hemosiderrosis. DP/PT 2+  Psych: cooperative. Pleasant. No anxiety or depression. Normal mood and affect.     Diabetic Foot Ulcer/Neuropathic   Is Wound Greater than 1.0 sq cm ? : Yes  Re-Current Wound with Skin Substitue within Last Year : No  X-Ray in Last 3 Months: No  VIBHA In Last 6 Months : No  Smoking Status: Non- Smoker  Wound Free from Infection : No Culture Done  Is Wound Free of Eschar, [place] , and / or Bio-[place]: Yes  Malignant Process in Wound : Biopsy in Last 3 Months  Type of off Loading: Wheelchair if area off surface of wheelchair  Compression Therapy of 20mm or greater ?: Yes      Assessment:     BLE VSU, slowly improving. L resolved. R 2nd toe diabetic/traumatic wound    Plan:     Margins negative. Continue dressings and compression BLE.

## 2019-09-13 ENCOUNTER — HOSPITAL ENCOUNTER (OUTPATIENT)
Dept: WOUND CARE | Age: 84
Discharge: HOME | End: 2019-09-13
Attending: SURGERY
Payer: MEDICARE

## 2019-09-13 VITALS
WEIGHT: 248 LBS | HEIGHT: 65 IN | TEMPERATURE: 98.2 F | RESPIRATION RATE: 18 BRPM | BODY MASS INDEX: 41.32 KG/M2 | DIASTOLIC BLOOD PRESSURE: 77 MMHG | HEART RATE: 81 BPM | SYSTOLIC BLOOD PRESSURE: 158 MMHG | OXYGEN SATURATION: 90 %

## 2019-09-13 PROCEDURE — 29580 STRAPPING UNNA BOOT: CPT

## 2019-09-13 NOTE — PROGRESS NOTES
Wound Center Progress Note    Patient: Steph Riley MRN: [id number]  SSN: [id number]    YOB: 1935  Age: 81 y.o. Sex: female      Subjective:     Chief Complaint:  Steph Riley is a 81 y.o. WHITE OR  female who presents with R lower leg anterior wound of 2 months duration. History of Present Illness:     Recent application of skin graft sutures from graft donor site are removed the staples on graft recipient site or left she's redressed with an Unna boot will see Dr. Flaca Fuchs no no in 1 week.   Wound Caused By: none  Associated Signs and Symptoms: some stinging  Timing: Intermittent  Quality: Sharp  Severity: 3/10  Modifying Factors: age and obesity  Current Wound Care:see nurse's notes    Past Medical History:   Diagnosis Date    Acute hyponatremia 1/21/2011    Acute on chronic diastolic congestive heart failure ([address] 1/19/2011    Acute renal failure ([address] 1/21/2011    AMI (acute myocardial infarction) ([address] 2009    Anemia 9/5/2012    Arthritis     Asthma     Asthma exacerbation 11/11/2011    CAD (coronary artery disease)     MI 2009, stents heart 2009    CAD (coronary artery disease), native coronary artery 9/2/2009    Previous stent to [facility] with Cypher 3.5x33mm SABA 8/09 9/09 LAD- Xience 2.5x23mm and 3.0x18mm SABA in mid-distal LAD      Cellulitis Bilateral Lower Extremities 1/20/2011    Chest discomfort, tightness, pressure 1/28/2016    Chronic diastolic heart failure ([address] 10/20/2011    Chronic venous insufficiency 10/24/2011    Diabetes ([address]     checks QD, normal 200, no hyposymptoms     Diabetes Mellitus Type II-insulin dependent 9/2/2009    Diverticulitis 1/28/2016    Dyslipidemia 9/2/2009    Edema 1/28/2016    GERD (gastroesophageal reflux disease)     GLAUCOMA     BILATERAL    Heart failure ([address]     Hypertension     Hypokalemia 1/28/2016    Hypoxemia 9/7/2012    Morbid obesity (HCC)     Nausea & vomiting     Neuropathy in diabetes ([facility] 9/2/2009    NSTEMI (non-ST elevation myocardial infarction) [facility] 9/2/2009    Dr Micaela Almendarez    Obstructive sleep apnea (adult) (pediatric)- probably  11/11/2011    TRISH (obstructive sleep apnea) 9/7/2012    Intolerant of CPAP     Other dyspnea and respiratory abnormality 11/10/2011    Other ill-defined conditions(799.89)     benign tumor in lower abdomen- not removed, diabetic ulcers, edema, neuropathy    Peripheral vascular disease ([facility] 10/20/2011    Post PTCA 6/20/2014    PVD (peripheral vascular disease) ([facility]     Unspecified anemia 1/23/2011    Unspecified sleep apnea     oxygen at night    Venous stasis of lower extremity 2010    BILATERAL W RECURRENT ADMISSIONS    Volume overload 9/6/2012      Past Surgical History:   Procedure Laterality Date    BREAST SURGERY PROCEDURE UNLISTED  1973    benign tumor left breast    CARDIAC SURG PROCEDURE UNLIST      4 stents most recent 2 yrs ago    HX CHOLECYSTECTOMY      HX COLONOSCOPY      HX GI      PTCA EACH ADDL VESSEL      stentsx3     Family History   Problem Relation Age of Onset    Cancer Father     Lung Disease Father     Cancer Brother     Diabetes Paternal Aunt     Hypertension Maternal Grandmother     Hypertension Paternal Grandmother       Social History     Tobacco Use    Smoking status: Never Smoker    Smokeless tobacco: Never Used   Substance Use Topics    Alcohol use: No       Prior to Admission medications    Medication Sig Start Date End Date Taking? Authorizing Provider   atorvastatin (LIPITOR) 80 mg tablet TAKE 1 TABLET BY MOUTH ONCE DAILY 9/9/19   Selene Longoria MD   cephALEXin (KEFLEX) 500 mg capsule Take 1 Cap by mouth four (4) times daily. Indications: skin infection due to Streptococcus pyogenes bacteria 8/16/19   Will Nassar MD   metoprolol tartrate (LOPRESSOR) 50 mg tablet Take 1 Tab by mouth two (2) times a day. Patient taking differently: Take 100 mg by mouth two (2) times a day.  5/2/19   Swati Noelle Chen MD   clopidogrel (PLAVIX) 75 mg tab Take 1 Tab by mouth daily. 5/2/19   Noelle Longoria MD   DULoxetine (CYMBALTA) 60 mg capsule Take 1 Cap by mouth daily. 5/2/19   Noelle Longoria MD   dulaglutide (TRULICITY) 1.20 UX/9.6 mL sub-q pen 0.75 mg by SubCUTAneous route every seven (7) days. Provider, Historical   bumetanide (BUMEX) 2 mg tablet Take 1 Tab by mouth daily. 8/17/18   Noelle Longoria MD   metOLazone (ZAROXOLYN) 10 mg tablet Take 1 Tab by mouth daily. Patient taking differently: Take 10 mg by mouth daily. Indications: as needed 1/19/18   Noelle Longoria MD   aspirin 81 mg chewable tablet Take 1 Tab by mouth daily. 6/12/17   Noelle Longoria MD   insulin glargine (TOUJEO SOLOSTAR) 300 unit/mL (1.5 mL) inpn 220 Units by SubCUTAneous route daily. Provider, Historical   acetaminophen (TYLENOL EXTRA STRENGTH) 500 mg tablet Take  by mouth every six (6) hours as needed for Pain. Provider, Historical   insulin glulisine (APIDRA) 100 unit/mL injection 90 Units by SubCUTAneous route three (3) times daily. Provider, Historical   gabapentin (NEURONTIN) 400 mg capsule Take 800 mg by mouth two (2) times a day. Pt states 8 x daily      OtherDiego MD   albuterol (PROVENTIL VENTOLIN) 2.5 mg /3 mL (0.083 %) nebulizer solution 3 mL by Nebulization route three (3) times daily. 11/16/11   Rajiv Cardoso MD   nitroglycerin (NITROSTAT) 0.4 mg SL tablet 0.4 mg by SubLINGual route every five (5) minutes as needed. Provider, Historical   ergocalciferol (VITAMIN D) 50,000 unit capsule Take 50,000 Units by mouth every seven (7) days. Takes on wed once a month     Provider, Historical   albuterol (PROVENTIL, VENTOLIN) 90 mcg/Actuation inhaler Take 2 Puffs by inhalation every four (4) hours as needed for Wheezing. Dispense: (1) inhaler with no refills.  12/19/10   Skyler Breaux PA     Allergies   Allergen Reactions    Pcn [Penicillins] Hives, Swelling and Myalgia     Can take cephalosporins    Bactrim [Sulfamethoprim] Other (comments)     \"potassium acted up\"    Codeine Nausea Only    Dilaudid [Hydromorphone (Bulk)] Other (comments)    Lortab [Hydrocodone-Acetaminophen] Nausea Only        Review of Systems:  A comprehensive review of systems was negative except for that written in the History of Present Illness. Lab Results   Component Value Date/Time    Hemoglobin A1c 8.7 (H) 09/30/2011 01:07 PM        Immunization History   Administered Date(s) Administered    (RETIRED) Pneumococcal Vaccine (Unspecified Type) 09/20/2010    Influenza Vaccine Split 09/11/2012       Body mass index is 41.27 kg/m². Counseling regarding nutrition done: No     Current medications:  Current Outpatient Medications   Medication Sig Dispense Refill    atorvastatin (LIPITOR) 80 mg tablet TAKE 1 TABLET BY MOUTH ONCE DAILY 90 Tab 3    cephALEXin (KEFLEX) 500 mg capsule Take 1 Cap by mouth four (4) times daily. Indications: skin infection due to Streptococcus pyogenes bacteria 28 Cap 0    metoprolol tartrate (LOPRESSOR) 50 mg tablet Take 1 Tab by mouth two (2) times a day. (Patient taking differently: Take 100 mg by mouth two (2) times a day.) 180 Tab 3    clopidogrel (PLAVIX) 75 mg tab Take 1 Tab by mouth daily. 90 Tab 3    DULoxetine (CYMBALTA) 60 mg capsule Take 1 Cap by mouth daily. 90 Cap 3    dulaglutide (TRULICITY) 5.88 EI/9.5 mL sub-q pen 0.75 mg by SubCUTAneous route every seven (7) days.  bumetanide (BUMEX) 2 mg tablet Take 1 Tab by mouth daily. 90 Tab 3    metOLazone (ZAROXOLYN) 10 mg tablet Take 1 Tab by mouth daily. (Patient taking differently: Take 10 mg by mouth daily. Indications: as needed) 90 Tab 3    aspirin 81 mg chewable tablet Take 1 Tab by mouth daily. 90 Tab 3    insulin glargine (TOUJEO SOLOSTAR) 300 unit/mL (1.5 mL) inpn 220 Units by SubCUTAneous route daily.       acetaminophen (TYLENOL EXTRA STRENGTH) 500 mg tablet Take  by mouth every six (6) hours as needed for Pain.  insulin glulisine (APIDRA) 100 unit/mL injection 90 Units by SubCUTAneous route three (3) times daily.  gabapentin (NEURONTIN) 400 mg capsule Take 800 mg by mouth two (2) times a day. Pt states 8 x daily        albuterol (PROVENTIL VENTOLIN) 2.5 mg /3 mL (0.083 %) nebulizer solution 3 mL by Nebulization route three (3) times daily. 1 Package 0    nitroglycerin (NITROSTAT) 0.4 mg SL tablet 0.4 mg by SubLINGual route every five (5) minutes as needed.  ergocalciferol (VITAMIN D) 50,000 unit capsule Take 50,000 Units by mouth every seven (7) days. Takes on wed once a month       albuterol (PROVENTIL, VENTOLIN) 90 mcg/Actuation inhaler Take 2 Puffs by inhalation every four (4) hours as needed for Wheezing. Dispense: (1) inhaler with no refills. 1 g 0         Objective:     Physical Exam:     Visit Vitals  /77 (BP 1 Location: Left arm)   Pulse 81   Temp 98.2 °F (36.8 °C)   Resp 18   Ht 5' 5\" (1.651 m)   Wt 112.5 kg (248 lb)   SpO2 90%   BMI 41.27 kg/m²       General: well developed, well nourished, pleasant , NAD. Hygiene good  Psych: cooperative. Pleasant. No anxiety or depression. Normal mood and affect. Neuro: alert and oriented to person/place/situation. Otherwise nonfocal.  Derm: Normal turgor for age, dry skin  HEENT: Normocephalic, atraumatic. EOMI. Conjunctiva clear. No scleral icterus. Neck: Normal range of motion. No masses. Chest: Good air entry bilaterally. Respirations nonlabored  Cardio: Normal heart sounds,no rubs, murmurs or gallops  Abdomen: Soft, nontender, nondistended, normoactive bowel sounds  Lower extremities: color normal; temperature normal. Hair growth is not present. Calves are supple, nontender, approximately equally sized in comparison.  Capillary refill <3 sec            Ulcer Description:   Wound Leg Lower Right (Active)   Dressing Status  Clean, dry, and intact 8/30/2019  3:14 PM   Dressing Type  Unna boot 10/5/2018  2:52 PM   Non-Pressure Injury Full thickness (subcut/muscle) 7/19/2019  3:09 PM   Wound Length (cm) 1.2 cm 8/30/2019  3:14 PM   Wound Width (cm) 0.9 cm 8/30/2019  3:14 PM   Wound Depth (cm) 0.1 8/30/2019  3:14 PM   Wound Surface area (cm^2) 1.08 cm^2 8/30/2019  3:14 PM   Change in Wound Size % -332 8/30/2019  3:14 PM   Condition of Base Other (comment) 8/30/2019  3:14 PM   Epithelialization (%) 100 8/23/2019  2:52 PM   Tissue Type Red 8/17/2018  2:36 PM   Tissue Type Percent Pink 100 11/2/2018 11:04 AM   Tissue Type Percent Red 100 8/16/2019  2:27 PM   Drainage Amount  None 8/30/2019  3:14 PM   Drainage Color Serous 8/16/2019  2:27 PM   Wound Odor None 8/30/2019  3:14 PM   Periwound Skin Condition Erythema, blanchable 8/30/2019  3:14 PM   Cleansing and Cleansing Agents  Soap and water 8/23/2019  2:52 PM   Dressing Type Applied Xeroform;ABD pad;Unna boot 8/31/2018  9:01 AM   Procedure Tolerated Well 7/19/2019  3:09 PM   Number of days: 392       Wound Leg Right (Active)   Number of days: 17       Wound Leg upper Right (Active)   Wound Length (cm) 1.2 cm 8/30/2019  3:14 PM   Wound Width (cm) 0.5 cm 8/30/2019  3:14 PM   Wound Depth (cm) 0 cm 8/30/2019  3:14 PM   Wound Volume (cm^3) 0 cm^3 8/30/2019  3:14 PM   Condition of Base Other (comment) 8/30/2019  3:14 PM   Condition of Edges Closed 8/30/2019  3:14 PM   Drainage Amount None 8/30/2019  3:14 PM   Wound Odor None 8/30/2019  3:14 PM   Cydney-wound Assessment Intact 8/30/2019  3:14 PM   Cleansing and Cleansing Agents  Soap and water 8/30/2019  3:14 PM   Dressing Changed Changed/New 8/30/2019  3:14 PM   Dressing Type Applied Xeroform;ABD pad 8/30/2019  3:14 PM   Number of days: 14       [REMOVED] Wound Leg Medial;Left;Right (Removed)   Number of days: 5372       [REMOVED] Wound Leg Lower Left (Removed)   Dressing Status  Clean, dry, and intact 9/18/2018  2:07 PM   Dressing Type  Marco Antonioa boot 9/18/2018  2:07 PM   Wound Length (cm) 0 cm 9/11/2018  4:01 PM   Wound Width (cm) 0 cm 9/11/2018  4:01 PM   Wound Depth (cm) 0 9/11/2018  4:01 PM   Wound Surface area (cm^2) 0 cm^2 9/11/2018  4:01 PM   Epithelialization (%) 100 9/11/2018  4:01 PM   Drainage Amount  None 9/11/2018  4:01 PM   Wound Odor None 9/11/2018  4:01 PM   Cleansing and Cleansing Agents  Normal saline; Soap and water 9/11/2018  4:01 PM   Dressing Type Applied Unna boot 9/11/2018  4:28 PM   Procedure Tolerated Well 9/11/2018  4:28 PM   Number of days: 32       [REMOVED] Wound Toe Left (Removed)   Wound Length (cm) 0 cm 11/2/2018 11:04 AM   Wound Width (cm) 0 cm 11/2/2018 11:04 AM   Wound Depth (cm) 0 11/2/2018 11:04 AM   Wound Surface area (cm^2) 0 cm^2 11/2/2018 11:04 AM   Change in Wound Size % 100 11/2/2018 11:04 AM   Tissue Type Percent Pink 100 10/19/2018  3:08 PM   Drainage Amount  None 11/2/2018 11:04 AM   Drainage Color Serosanguinous 10/19/2018  3:08 PM   Wound Odor None 11/2/2018 11:04 AM   Periwound Skin Condition Intact 10/19/2018  3:08 PM   Cleansing and Cleansing Agents  Normal saline 11/2/2018 11:04 AM   Procedure Tolerated Well 10/12/2018  4:23 PM   Number of days: 21       [REMOVED] Wound Toe (specify in comments) Left;Plantar (Removed)   Dressing Status  Clean, dry, and intact 11/2/2018 11:04 AM   Dressing Type  Adhesive wound dressing (Band-Aid) 11/2/2018 11:04 AM   Non-Pressure Injury Partial thickness (epider/derm) 10/19/2018  3:08 PM   Wound Length (cm) 0 cm 11/2/2018 11:04 AM   Wound Width (cm) 0 cm 11/2/2018 11:04 AM   Wound Depth (cm) 0 11/2/2018 11:04 AM   Wound Surface area (cm^2) 0 cm^2 11/2/2018 11:04 AM   Change in Wound Size % 100 11/2/2018 11:04 AM   Condition of Base Flushing 10/19/2018  3:08 PM   Drainage Amount  None 11/2/2018 11:04 AM   Wound Odor None 11/2/2018 11:04 AM   Periwound Skin Condition Intact 10/19/2018  3:08 PM   Cleansing and Cleansing Agents  Normal saline 11/2/2018 11:04 AM   Number of days: 14       [REMOVED] Wound Leg Lower Left;Medial (Removed)   Dressing Status  Clean, dry, and intact 3/1/2019  1:41 PM Wound Length (cm) 0 cm 3/1/2019  1:41 PM   Wound Width (cm) 0 cm 3/1/2019  1:41 PM   Wound Depth (cm) 0 3/1/2019  1:41 PM   Wound Surface area (cm^2) 0 cm^2 3/1/2019  1:41 PM   Change in Wound Size % 100 3/1/2019  1:41 PM   Condition of Base Epithelializing 3/1/2019  1:41 PM   Tissue Type Percent Pink 100 3/1/2019  1:41 PM   Tissue Type Percent Red 100 1/18/2019  1:17 PM   Drainage Amount  None 3/1/2019  1:41 PM   Drainage Color Serous 1/18/2019  1:17 PM   Wound Odor None 3/1/2019  1:41 PM   Periwound Skin Condition Intact 3/1/2019  1:41 PM   Cleansing and Cleansing Agents  Soap and water 3/1/2019  1:41 PM   Number of days: 42       [REMOVED] Wound Leg lower Left (Removed)   Dressing Status Clean, dry, and intact 8/23/2019  2:52 PM   Wound Length (cm) 0 cm 8/23/2019  2:52 PM   Wound Width (cm) 0 cm 8/23/2019  2:52 PM   Wound Depth (cm) 0 cm 8/23/2019  2:52 PM   Wound Volume (cm^3) 0 cm^3 8/23/2019  2:52 PM   Condition of Base Epithelializing 8/23/2019  2:52 PM   Epithelialization (%) 100 8/23/2019  2:52 PM   Tissue Type Percent Red 100 8/16/2019  2:27 PM   Drainage Amount None 8/23/2019  2:52 PM   Drainage Color Serous 8/16/2019  2:27 PM   Wound Odor None 8/23/2019  2:52 PM   Cydney-wound Assessment Blanchable erythema 8/23/2019  2:52 PM   Cleansing and Cleansing Agents  Soap and water 8/23/2019  2:52 PM   Number of days: 14         Data Review:   No results found for this or any previous visit (from the past 24 hour(s)). Assessment:     81 y.o. female with R lower leg anterior combined ulcer. Problem List  Date Reviewed: 7/9/2018          Codes Class Noted    Nonrheumatic aortic valve stenosis ICD-10-CM: I35.0  ICD-9-CM: 424.1  5/2/2019    Overview Signed 5/8/2019  6:15 PM by Clyde Schilling MD     Echo (5/3/19):  EF 60-65%. Normal RV. Mild/moderate AS. MG 15. PG 26.  Mild MR/TR/AR             Essential hypertension ICD-10-CM: I10  ICD-9-CM: 401.9  5/2/2019        Non-pressure chronic ulcer of left calf, limited to breakdown of skin [facility]) ICD-10-CM: I31.551  ICD-9-CM: 707.12  8/21/2018        Edema ICD-10-CM: R60.9  ICD-9-CM: 782.3  1/28/2016        Diverticulitis ICD-10-CM: C14.70  ICD-9-CM: 562.11  1/28/2016    Overview Signed 1/28/2016 12:10 PM by Kelton Mcdaniels     1. CT of abdomen (7/9/14): Evidence of uncomplicated diverticulitis at the junction of the descending and sigmoid colon. Extensive diverticular are seen with some pericolonic fat stranding. TRISH (obstructive sleep apnea) (Chronic) ICD-10-CM: G47.33  ICD-9-CM: 327.23  9/7/2012    Overview Signed 9/7/2012 11:46 AM by Sandy Pierre NP     Intolerant of CPAP             Anemia ICD-10-CM: D64.9  ICD-9-CM: 285.9  9/5/2012        Obstructive sleep apnea (adult) (pediatric)- probably  ICD-10-CM: G47.33  ICD-9-CM: 327.23  11/11/2011        Chronic venous insufficiency ICD-10-CM: I87.2  ICD-9-CM: 459.81  10/24/2011        Chronic diastolic heart failure (HCC) (Chronic) ICD-10-CM: I50.32  ICD-9-CM: 428.32  10/20/2011    Overview Signed 1/28/2016 12:05 PM by Kelton Mcdaniels     1. Echo (3/5/10): EF 55%. No wall motion abnormalities. Mild LVH. Mild diastolic dysfunction. Mild bi-atrial enlargement. Mild mitral regurgitation. RVSP 32.    2. Echo (4/26/11): Normal LV systolic function. EF 60%. Mild LVH. Mild diastolic dysfunction. Mild left atrial enlargement. Mild aortic regurgitation. Peripheral vascular disease ([address] (Chronic) ICD-10-CM: I73.9  ICD-9-CM: 443.9  10/20/2011    Overview Signed 1/28/2016 12:08 PM by Kelton Mcdaniels     1. Lower extremity duplex (9/2/10): Right lower extremity duplex suggested moderate to severe reduction with multilevel disease. Stenotic SFA most pronounced distally. Right VIBHA 0.5. Left VIBHA suggests mild reduction VIBHA of 0.86.              GERD (gastroesophageal reflux disease) (Chronic) ICD-10-CM: K21.9  ICD-9-CM: 530.81  10/20/2011        Anemia, unspecified ICD-10-CM: D64.9  ICD-9-CM: 285.9 1/23/2011        Acute hyponatremia ICD-10-CM: E87.1  ICD-9-CM: 276.1  1/21/2011        CAD (coronary artery disease), native coronary artery (Chronic) ICD-10-CM: I25.10  ICD-9-CM: 414.01  9/2/2009    Overview Addendum 6/11/2017  9:05 PM by Verna Calderon MD     1. NSTEMI (8/17/09):  EF 60%. PCI of dRCA with  Cypher 3.5x 33 mm SABA. Residual 80-90% LAD stenosis. 2.  Staged PCI of LAD (9/1/09): Xience 2.5x23mm and 3.0x18mm SABA in mid-distal LAD . Adjunctive POBA of D1 and D2.    3.  PCI of mid RCA (6/20/14): Promus 4 x 20 mm SABA (overlapped with previous stent)             Diabetes mellitus, type 2 (HCC) (Chronic) ICD-10-CM: E11.9  ICD-9-CM: 250.00  9/2/2009        Morbid obesity (HCC) (Chronic) ICD-10-CM: E66.01  ICD-9-CM: 278.01  9/2/2009        Dyslipidemia (Chronic) ICD-10-CM: E78.5  ICD-9-CM: 272.4  9/2/2009        Neuropathy in diabetes ([facility] (Chronic) ICD-10-CM: E11.40  ICD-9-CM: 250.60, 357.2  9/2/2009               Needs:  Good local wound care  Edema management  Nutrition optimization  Good Diabetic control    Plan:     No orders of the defined types were placed in this encounter. Patient understood and agrees with plan. Questions answered. Follow-up Information    None            Any procedures done today in the [facility],[address] are documented in a separate note in [address] [facility] and made part of this record by reference.      Dictated using voice recognition software; proofread, but unrecognized errors may exist.    Signed By: Boby Jay MD     September 13, 2019

## 2019-09-13 NOTE — WOUND CARE
[employer] Application   Below Knee    NAME:  Emma Cosby OF BIRTH:  1935  MEDICAL RECORD NUMBER:  [id number]  DATE:  9/13/2019    Remove old Unna Boot or Boots. Appied primary and secondary dressing as ordered. Applied Unna roll from toes to knee overlapping each time. Applied ace wrap or coban from toes to below the knee. Secured with tape and/or metal clips covered with tape. Instructed patient/caregiver to keep dressing dry and intact. DO NOT REMOVE DRESSING. Instructed pt/family/caregiver to report excessive draining, loose bandage, wet dressing, severe pain or tingling in toes. Applied [employer] dressing below the knee to bilateral lower legs. Unna Boot(s) were applied per  Guidelines.      Electronically signed by Ame Tamayo RN on 9/13/2019 at 4:11 PM

## 2019-09-13 NOTE — WOUND CARE
[address]  [address], [address]  Phone: [phone]  Fax: [phone]    Patient: Nickie Ventura MRN: [id number]  SSN: [id number]    YOB: 1935  Age: 81 y.o. Sex: female       Return Appointment: 1 week with Estrellita Mcbride MD    Instructions:   Bilateral lower legs:  Apply xeroform to right lower leg skin graft site, cover with ABD   Wrap bilateral lower legs with unna boots. Right upper thigh:  Cover with bordered foam.      Change twice weekly. Home health for dressing changes. Sutures were removed this visit from Right upper thigh. Should you experience increased redness, swelling, pain, foul odor, size of wound(s), or have a temperature over 101 degrees please contact the [address] [place] [address] Road at [phone] or if after hours contact your primary care physician or go to the hospital emergency department.     Signed By: Candice Alas RN     September 13, 2019

## 2019-09-13 NOTE — WOUND CARE
09/13/19 1510   Wound Leg upper Right   Date First Assessed/Time First Assessed: 08/30/19 1516   Primary Wound Type: Closed incision/surgical site  Location: Leg upper  Wound Location Orientation: Right   Dressing Status Clean, dry, and intact   Dressing Type   (bandaid)   Wound Length (cm) 1.2 cm   Wound Width (cm) 0.1 cm   Wound Depth (cm) 0 cm   Wound Volume (cm^3) 0 cm^3   Condition of Base   (sutures in place and then removed)   Condition of Edges Closed   Drainage Amount None   Wound Odor None   Cydney-wound Assessment Intact   Cleansing and Cleansing Agents  Normal saline   Dressing Changed Changed/New   Dressing Type Applied   (bordered foam)   Procedure Tolerated Well   Wound Leg Lower Right   Date First Assessed/Time First Assessed: 08/17/18 1436   POA: Yes  Wound Type: Venous  Location: Leg Lower  Wound Description (Optional): 16  Orientation: Right   Dressing Status  Old drainage;Clean, dry, and intact   Dressing Type    (xeroform, unna boot)   Wound Length (cm) 1.2 cm   Wound Width (cm) 0.9 cm   Wound Depth (cm) 0.1   Wound Surface area (cm^2) 1.08 cm^2   Change in Wound Size % -332   Condition of Base Other (comment)  (staples in place)   Tissue Type Percent Other (comment)   (skin graft and staples in place)   Drainage Amount  Scant   Drainage Color Serosanguinous   Wound Odor None   Periwound Skin Condition Macerated   Cleansing and Cleansing Agents  Normal saline   Dressing Type Applied   (xeroform, abd, unna boot)   Procedure Tolerated Well             PATIENT IS ON ANTICOAGULANT PLAVIS AND ASA.

## 2019-09-13 NOTE — WOUND CARE
Clinic Level of Care Assessment    NAME:  Soco Vasquez OF BIRTH:  1935 GENDER: female  MEDICAL RECORD NUMBER:  [id number]   DATE:  9/13/2019      Wound Count Document in [address] [place] [address]  Number of Wounds Assessed Points   No Wounds/Ulcers []   0   Less than Three Wounds/Ulcers [x]   1   3-6 Wounds/Ulcers []   2   Greater than 6 Wounds/Ulcers []   3     Ambulation Status Document in Coord/ENMA/Mobility tab  Status Definition Points   Independent Independently able to ambulate. Fully able (without any assistance) to get on/off exam table/chair. []   0   Minimal Physical Assistance Requires physical assistance of one person to ambulate and/or position patient to be examined. Includes necessary physical assistance to position lower extremities on/off stool. []   1   Moderate Physical Assistance Requires at least one staff member to physically assist patient in ambulating into treatment room, and on/off exam table. [x]   2   Full Assistance Requires assistance of at least two staff members to transfer patient into treatment room and/or on/off exam table/chair. \"Total Transfer\". []   3     Dressing Complexity Document in LDA and Write Appropriate Order  Complexity Definition Points   No Dressing  []   0   Simple Minimal, simple dressing. i.e. Band-aid, gauze, simple wrap. []   1   Intermediate Moderately complicated requiring licensed personnel to apply i.e. collagen matrix, ointments, gels, alginates. [x]   2   Complex Complicated requiring licensed personnel to apply dressings 6 or more wounds. []   3     Teaching Effort Document in Education Tab   Effort Definition Points   No Teaching  []   0   Simple Reinforce two or less topics. Document in Education navigator. [x]   1   Intermediate Reinforce three to five topics and/or one additional   new topic. Document in Education navigator. []   2   Complex Teach more than one new topic.  New patient information   packet reviewed and/or reinforce more than three topics. Document in Education navigator. HBO initial instruction. []   3       Patient Assessment and Planning  Planning Definition Points   Simple Multiple System Simple: Simple follow-up with routine assessment and planning. If Discharged, instructions and long term/follow-up care given to patient/caregiver. Discharged, instructions and/or After Visit Summary given to patient/caregiver and instructions completed. [x]   1   Intermediate Multiple System Intermediate: Contact with outside resources; i.e. Telephone calls to home health, [facility]. May include filling out forms and writing letters, arranging transportation, communication with insurance , vendors, etc.  Discharged, instructions and/or After Visit Summary given to patient/caregiver and instructions completed. []   2   Complex Multiple System Complex: Full, comprehensive assessment and planning. Follow the entire navigator under Wound Visit charting filling out each tab which includes OP Adm Database Screening, Education and CarePlan  HBO risk assessment completed. Discharged, instructions and/or After Visit Summary given to patient/caregiver and instructions completed.    []   3           Is this the Patient's First Visit to the [address] [place] [address] Road  No      Is this Patient Established @ [facility]  Yes             Clinical Level of Care      Points  0-2  Level 1 []     Points  3-5  Level 2 []     Points  6-9  Level 3 [x]     Points  10-12  Level 4 []     Points  13-15  Level 5 []       Electronically signed by Savanna Shaver RN on 9/13/2019 at 4:11 PM

## 2019-09-27 ENCOUNTER — HOSPITAL ENCOUNTER (OUTPATIENT)
Dept: WOUND CARE | Age: 84
Discharge: HOME | End: 2019-09-27
Attending: SURGERY
Payer: MEDICARE

## 2019-09-27 VITALS
HEIGHT: 65 IN | DIASTOLIC BLOOD PRESSURE: 78 MMHG | SYSTOLIC BLOOD PRESSURE: 164 MMHG | HEART RATE: 75 BPM | TEMPERATURE: 97 F | BODY MASS INDEX: 40.25 KG/M2 | OXYGEN SATURATION: 95 % | WEIGHT: 241.6 LBS | RESPIRATION RATE: 20 BRPM

## 2019-09-27 PROCEDURE — 29580 STRAPPING UNNA BOOT: CPT

## 2019-09-27 NOTE — WOUND CARE
Any Application   Below Knee    NAME:  Jose Landa OF BIRTH:  1935  MEDICAL RECORD NUMBER:  [id number]  DATE:  9/27/2019    Remove old Unna Boot or Boots. Appied primary and secondary dressing as ordered. Applied Unna roll from toes to knee overlapping each time. Applied ace wrap or coban from toes to below the knee. Secured with tape and/or metal clips covered with tape. Instructed patient/caregiver to keep dressing dry and intact. DO NOT REMOVE DRESSING. Instructed pt/family/caregiver to report excessive draining, loose bandage, wet dressing, severe pain or tingling in toes. Applied [employer] dressing below the knee to bilateral lower legs. Unna Boot(s) were applied per  Guidelines.      Electronically signed by Yaniv Andrew RN on 9/27/2019 at 4:25 PM

## 2019-09-27 NOTE — WOUND CARE
Clinic Level of Care Assessment    NAME:  Ortiz Madera OF BIRTH:  1935 GENDER: female  MEDICAL RECORD NUMBER:  [id number]   DATE:  9/27/2019      Wound Count Document in [facility]  Number of Wounds Assessed Points   No Wounds/Ulcers []   0   Less than Three Wounds/Ulcers [x]   1   3-6 Wounds/Ulcers []   2   Greater than 6 Wounds/Ulcers []   3     Ambulation Status Document in Coord/ENMA/Mobility tab  Status Definition Points   Independent Independently able to ambulate. Fully able (without any assistance) to get on/off exam table/chair. []   0   Minimal Physical Assistance Requires physical assistance of one person to ambulate and/or position patient to be examined. Includes necessary physical assistance to position lower extremities on/off stool. []   1   Moderate Physical Assistance Requires at least one staff member to physically assist patient in ambulating into treatment room, and on/off exam table. [x]   2   Full Assistance Requires assistance of at least two staff members to transfer patient into treatment room and/or on/off exam table/chair. \"Total Transfer\". []   3     Dressing Complexity Document in LDA and Write Appropriate Order  Complexity Definition Points   No Dressing  []   0   Simple Minimal, simple dressing. i.e. Band-aid, gauze, simple wrap. []   1   Intermediate Moderately complicated requiring licensed personnel to apply i.e. collagen matrix, ointments, gels, alginates. [x]   2   Complex Complicated requiring licensed personnel to apply dressings 6 or more wounds. []   3     Teaching Effort Document in Education Tab   Effort Definition Points   No Teaching  []   0   Simple Reinforce two or less topics. Document in Education navigator.  []   1   Intermediate Reinforce three to five topics and/or one additional   new topic. Document in Education navigator. [x]   2   Complex Teach more than one new topic.  New patient information   packet reviewed and/or reinforce more than three topics. Document in Education navigator. HBO initial instruction. []   3       Patient Assessment and Planning  Planning Definition Points   Simple Multiple System Simple: Simple follow-up with routine assessment and planning. If Discharged, instructions and long term/follow-up care given to patient/caregiver. Discharged, instructions and/or After Visit Summary given to patient/caregiver and instructions completed. [x]   1   Intermediate Multiple System Intermediate: Contact with outside resources; i.e. Telephone calls to home health, [facility]. May include filling out forms and writing letters, arranging transportation, communication with insurance , vendors, etc.  Discharged, instructions and/or After Visit Summary given to patient/caregiver and instructions completed. []   2   Complex Multiple System Complex: Full, comprehensive assessment and planning. Follow the entire navigator under Wound Visit charting filling out each tab which includes OP Adm Database Screening, Education and CarePlan  HBO risk assessment completed. Discharged, instructions and/or After Visit Summary given to patient/caregiver and instructions completed.    []   3           Is this the Patient's First Visit to the [address] [place] [address] Road  No      Is this Patient Established @ [facility]  Yes             Clinical Level of Care      Points  0-2  Level 1 []     Points  3-5  Level 2 []     Points  6-9  Level 3 [x]     Points  10-12  Level 4 []     Points  13-15  Level 5 []       Electronically signed by Frenando Parsons RN on 9/27/2019 at 4:25 PM

## 2019-09-27 NOTE — WOUND CARE
09/27/19 1406   Wound Leg upper Right   Date First Assessed/Time First Assessed: 08/30/19 1516   Primary Wound Type: Closed incision/surgical site  Location: Leg upper  Wound Location Orientation: Right   Dressing Type Open to air   Wound Length (cm) 0 cm   Wound Width (cm) 0 cm   Wound Depth (cm) 0 cm   Wound Volume (cm^3) 0 cm^3   Condition of Edges Closed   Epithelialization (%) 100   Drainage Amount None   Wound Odor None   Cydney-wound Assessment Intact   Cleansing and Cleansing Agents  Normal saline   Wound Leg Lower Right   Date First Assessed/Time First Assessed: 08/17/18 1436   POA: Yes  Wound Type: Venous  Location: Leg Lower  Wound Description (Optional): 16  Orientation: Right   Dressing Status  Clean, dry, and intact   Dressing Type    (xeroform, abd, unna boot)   Wound Length (cm) 1.2 cm   Wound Width (cm) 0.8 cm   Wound Depth (cm) 0.1   Wound Surface area (cm^2) 0.96 cm^2   Change in Wound Size % -284   Condition of Base Other (comment)  (skin graft and staples in place)   Tissue Type Percent Pink 90   Tissue Type Percent Red 10   Drainage Amount  Scant   Drainage Color Serous   Wound Odor None   Periwound Skin Condition Intact   Cleansing and Cleansing Agents  Soap and water;Normal saline             PATIENT IS ON ANTICOAGULANT PLAVIX AND ASA.

## 2019-09-27 NOTE — WOUND CARE
[address]  [address], [address]  Phone: [phone]  Fax: [phone]    Patient: Stephen Galvan MRN: [id number]  SSN: [id number]    YOB: 1935  Age: 81 y.o. Sex: female       Return Appointment: 2 weeks with Tanna Smith MD    Instructions:   Bilateral lower legs:  Apply xeroform to right lower leg skin graft site, cover with ABD. Wrap bilateral lower legs with unna boots. Change dressing twice per week. Home health for dressing changes.     Staples were removed this visit from Right lower leg graft site. Patient instructed on the following: Follow all wound dressing instructions. Do not get dressing or wound wet. May shower if wound can be effectively kept dry. Cast covers may be purchased at [facility] and [facility]. Should you experience increased redness, swelling, pain, foul odor, size of wound(s), or have a temperature over 101 degrees please contact the [address] [place] [address] Road at [phone] or if after hours contact your primary care physician or go to the hospital emergency department.     Signed By: Cristy Servin RN     September 27, 2019

## 2019-10-02 NOTE — PROGRESS NOTES
Wound Center Progress Note    Patient: Jossie Garland MRN: [id number]  SSN: [id number]    YOB: 1935  Age: 81 y.o. Sex: female      Subjective:     Chief Complaint:  Recurrent Venous leg ulcer BLE    History of Present Illness:       Wound Caused By: venous insufficiency  Associated Signs and Symptoms: drainage, pain  Timing: since June 2018  Quality: wound  Severity: full thickness  Modifying Factors: Dm2, obesity, venous insufficiency  Current Wound Care: Multilayer compression, CHF    3/11/2018  No new issues. Urinary soiling continues to be improved. Left 2nd toe stable. Getting [place] for wraps    6/14/2019  Continues to do well with dressings but still with substantial drainage. No further UTI issues. 7/19/2019  No new issues. Tolerating dressings. No further soiling.     7/26/2019  Doing well. No new issues. R leg wound not improving. 8/2/2019  Returns for re-eval. Bx proven [place]. Chest lesion benign. 9/27/2019  S/P excision leg lesion and SG margins negative.      Past Medical History:   Diagnosis Date    Acute hyponatremia 1/21/2011    Acute on chronic diastolic congestive heart failure ([address] 1/19/2011    Acute renal failure ([address] 1/21/2011    AMI (acute myocardial infarction) ([address] 2009    Anemia 9/5/2012    Arthritis     Asthma     Asthma exacerbation 11/11/2011    CAD (coronary artery disease)     MI 2009, stents heart 2009    CAD (coronary artery disease), native coronary artery 9/2/2009    Previous stent to dRCA with Cypher 3.5x33mm SABA 8/09 9/09 LAD- Xience 2.5x23mm and 3.0x18mm SABA in mid-distal LAD      Cellulitis Bilateral Lower Extremities 1/20/2011    Chest discomfort, tightness, pressure 1/28/2016    Chronic diastolic heart failure ([address] 10/20/2011    Chronic venous insufficiency 10/24/2011    Diabetes (HCC)     checks QD, normal 200, no hyposymptoms     Diabetes Mellitus Type II-insulin dependent 9/2/2009    Diverticulitis 1/28/2016    Dyslipidemia 9/2/2009    Edema 1/28/2016    GERD (gastroesophageal reflux disease)     GLAUCOMA     BILATERAL    Heart failure [facility]     Hypertension     Hypokalemia 1/28/2016    Hypoxemia 9/7/2012    Morbid obesity (HCC)     Nausea & vomiting     Neuropathy in diabetes ([facility] 9/2/2009    NSTEMI (non-ST elevation myocardial infarction) ([facility] 9/2/2009    Dr Sarah Good    Obstructive sleep apnea (adult) (pediatric)- probably  11/11/2011    TRISH (obstructive sleep apnea) 9/7/2012    Intolerant of CPAP     Other dyspnea and respiratory abnormality 11/10/2011    Other ill-defined conditions(799.89)     benign tumor in lower abdomen- not removed, diabetic ulcers, edema, neuropathy    Peripheral vascular disease ([facility] 10/20/2011    Post PTCA 6/20/2014    PVD (peripheral vascular disease) ([facility]     Unspecified anemia 1/23/2011    Unspecified sleep apnea     oxygen at night    Venous stasis of lower extremity 2010    BILATERAL W RECURRENT ADMISSIONS    Volume overload 9/6/2012      Past Surgical History:   Procedure Laterality Date    BREAST SURGERY PROCEDURE UNLISTED  1973    benign tumor left breast    CARDIAC SURG PROCEDURE UNLIST      4 stents most recent 2 yrs ago    HX CHOLECYSTECTOMY      HX COLONOSCOPY      HX GI      PTCA EACH ADDL VESSEL      stentsx3     Family History   Problem Relation Age of Onset    Cancer Father     Lung Disease Father     Cancer Brother     Diabetes Paternal Aunt     Hypertension Maternal Grandmother     Hypertension Paternal Grandmother       Social History     Tobacco Use    Smoking status: Never Smoker    Smokeless tobacco: Never Used   Substance Use Topics    Alcohol use: No       Prior to Admission medications    Medication Sig Start Date End Date Taking?  Authorizing Provider   atorvastatin (LIPITOR) 80 mg tablet TAKE 1 TABLET BY MOUTH ONCE DAILY 9/9/19   Nessmith, Doran Najjar, MD   metoprolol tartrate (LOPRESSOR) 50 mg tablet Take 1 Tab by mouth two (2) times a day. Patient taking differently: Take 100 mg by mouth two (2) times a day. 5/2/19   Saravanan Longoria MD   clopidogrel (PLAVIX) 75 mg tab Take 1 Tab by mouth daily. 5/2/19   Saravanan Longoria MD   DULoxetine (CYMBALTA) 60 mg capsule Take 1 Cap by mouth daily. 5/2/19   Saravanan Longoria MD   dulaglutide (TRULICITY) 2.77 ST/9.5 mL sub-q pen 0.75 mg by SubCUTAneous route every seven (7) days. Provider, Historical   bumetanide (BUMEX) 2 mg tablet Take 1 Tab by mouth daily. 8/17/18   Saravanan Longoria MD   metOLazone (ZAROXOLYN) 10 mg tablet Take 1 Tab by mouth daily. Patient taking differently: Take 10 mg by mouth daily. Indications: as needed 1/19/18   Saravanan Longoria MD   aspirin 81 mg chewable tablet Take 1 Tab by mouth daily. 6/12/17   Saravanan Longoria MD   insulin glargine (TOUJEO SOLOSTAR) 300 unit/mL (1.5 mL) inpn 220 Units by SubCUTAneous route daily. Provider, Historical   acetaminophen (TYLENOL EXTRA STRENGTH) 500 mg tablet Take  by mouth every six (6) hours as needed for Pain. Provider, Historical   insulin glulisine (APIDRA) 100 unit/mL injection 90 Units by SubCUTAneous route three (3) times daily. Provider, Historical   gabapentin (NEURONTIN) 400 mg capsule Take 800 mg by mouth two (2) times a day. Pt states 8 x daily      Other, MD Diego   albuterol (PROVENTIL VENTOLIN) 2.5 mg /3 mL (0.083 %) nebulizer solution 3 mL by Nebulization route three (3) times daily. 11/16/11   Genaro Dumont MD   nitroglycerin (NITROSTAT) 0.4 mg SL tablet 0.4 mg by SubLINGual route every five (5) minutes as needed. Provider, Historical   ergocalciferol (VITAMIN D) 50,000 unit capsule Take 50,000 Units by mouth every seven (7) days. Takes on wed once a month     Provider, Historical   albuterol (PROVENTIL, VENTOLIN) 90 mcg/Actuation inhaler Take 2 Puffs by inhalation every four (4) hours as needed for Wheezing. Dispense: (1) inhaler with no refills.  12/19/10   Rosalio Breaux, PA     Allergies   Allergen Reactions    Pcn [Penicillins] Hives, Swelling and Myalgia     Can take cephalosporins    Bactrim [Sulfamethoprim] Other (comments)     \"potassium acted up\"    Codeine Nausea Only    Dilaudid [Hydromorphone (Bulk)] Other (comments)    Lortab [Hydrocodone-Acetaminophen] Nausea Only        Review of Systems:  Pertinent items are noted in the History of Present Illness. Lab Results   Component Value Date/Time    Hemoglobin A1c 8.7 (H) 09/30/2011 01:07 PM        Immunization History   Administered Date(s) Administered    (RETIRED) Pneumococcal Vaccine (Unspecified Type) 09/20/2010    Influenza Vaccine Split 09/11/2012       Body mass index is 40.2 kg/m². Counseling regarding nutrition done: Yes Pateint counsled about risks of smoking and cessation      Current medications:  Current Outpatient Medications   Medication Sig Dispense Refill    atorvastatin (LIPITOR) 80 mg tablet TAKE 1 TABLET BY MOUTH ONCE DAILY 90 Tab 3    metoprolol tartrate (LOPRESSOR) 50 mg tablet Take 1 Tab by mouth two (2) times a day. (Patient taking differently: Take 100 mg by mouth two (2) times a day.) 180 Tab 3    clopidogrel (PLAVIX) 75 mg tab Take 1 Tab by mouth daily. 90 Tab 3    DULoxetine (CYMBALTA) 60 mg capsule Take 1 Cap by mouth daily. 90 Cap 3    dulaglutide (TRULICITY) 1.50 NH/0.8 mL sub-q pen 0.75 mg by SubCUTAneous route every seven (7) days.  bumetanide (BUMEX) 2 mg tablet Take 1 Tab by mouth daily. 90 Tab 3    metOLazone (ZAROXOLYN) 10 mg tablet Take 1 Tab by mouth daily. (Patient taking differently: Take 10 mg by mouth daily. Indications: as needed) 90 Tab 3    aspirin 81 mg chewable tablet Take 1 Tab by mouth daily. 90 Tab 3    insulin glargine (TOUJEO SOLOSTAR) 300 unit/mL (1.5 mL) inpn 220 Units by SubCUTAneous route daily.  acetaminophen (TYLENOL EXTRA STRENGTH) 500 mg tablet Take  by mouth every six (6) hours as needed for Pain.       insulin glulisine (APIDRA) 100 unit/mL injection 90 Units by SubCUTAneous route three (3) times daily.  gabapentin (NEURONTIN) 400 mg capsule Take 800 mg by mouth two (2) times a day. Pt states 8 x daily        albuterol (PROVENTIL VENTOLIN) 2.5 mg /3 mL (0.083 %) nebulizer solution 3 mL by Nebulization route three (3) times daily. 1 Package 0    nitroglycerin (NITROSTAT) 0.4 mg SL tablet 0.4 mg by SubLINGual route every five (5) minutes as needed.  ergocalciferol (VITAMIN D) 50,000 unit capsule Take 50,000 Units by mouth every seven (7) days. Takes on wed once a month       albuterol (PROVENTIL, VENTOLIN) 90 mcg/Actuation inhaler Take 2 Puffs by inhalation every four (4) hours as needed for Wheezing. Dispense: (1) inhaler with no refills. 1 g 0         Objective:     Physical Exam:     Visit Vitals  /78 (BP 1 Location: Left arm, BP Patient Position: At rest;Sitting)   Pulse 75   Temp 97 °F (36.1 °C)   Resp 20   Ht 5' 5\" (1.651 m)   Wt 109.6 kg (241 lb 9.6 oz)   SpO2 95%   BMI 40.20 kg/m²       General: Healthy appearing  Neuro: alert and oriented to person/place/situation. Otherwise nonfocal.  Derm: Normal turgor for age, otherwise per wound exam  HEENT: Normocephalic, atraumatic. EOMI. Conjunctiva clear. No scleral icterus. Neck: Normal range of motion. No masses. Chest: Resp unlabored  Cardio: Regular rate, rhythm  Abdomen: Soft, nontender  Ext. No hemosiderrosis. DP/PT 2+  Psych: cooperative. Pleasant. No anxiety or depression. Normal mood and affect.     Diabetic Foot Ulcer/Neuropathic   Is Wound Greater than 1.0 sq cm ? : Yes  Re-Current Wound with Skin Substitue within Last Year : Yes(skin graft applied surgically)  X-Ray in Last 3 Months: No  VIBHA In Last 6 Months : No  Smoking Status: Non- Smoker  Wound Free from Infection : No Culture Done  Is Wound Free of Eschar, Slough , and / or Bio-Vilas: Yes  Malignant Process in Wound : No Biopsy Done  Hemoglobin A1Cin Last 3 Months: No  Type of off Loading: Wheelchair if area off surface of wheelchair  Compression Therapy of 20mm or greater ?: Yes(bilateral unna boots)      Assessment:     BLE VSU, slowly improving. L resolved. R 2nd toe diabetic/traumatic wound    Plan:     Margins negative. WV complete take. Continue wraps. SG with 100% take.

## 2019-10-11 ENCOUNTER — HOSPITAL ENCOUNTER (OUTPATIENT)
Dept: WOUND CARE | Age: 84
Discharge: HOME | End: 2019-10-11
Attending: SURGERY
Payer: MEDICARE

## 2019-10-11 VITALS
WEIGHT: 241 LBS | RESPIRATION RATE: 18 BRPM | TEMPERATURE: 97 F | DIASTOLIC BLOOD PRESSURE: 62 MMHG | SYSTOLIC BLOOD PRESSURE: 138 MMHG | HEART RATE: 68 BPM | HEIGHT: 65 IN | BODY MASS INDEX: 40.15 KG/M2 | OXYGEN SATURATION: 91 %

## 2019-10-11 PROCEDURE — 29581 APPL MULTLAYER CMPRN SYS LEG: CPT

## 2019-10-11 NOTE — WOUND CARE
10/11/19 1516   Wound Leg Lower Right   Date First Assessed/Time First Assessed: 08/17/18 1436   POA: Yes  Wound Type: Venous  Location: Leg Lower  Wound Description (Optional): 16  Orientation: Right   Dressing Status  Clean, dry, and intact   Dressing Type    (xeroform, ABD, bilateral unna boot)   Wound Length (cm) 1 cm   Wound Width (cm) 3.5 cm   Wound Depth (cm) 0.1   Wound Surface area (cm^2) 3.5 cm^2   Change in Wound Size % -1300   Condition of Base Pink   Tissue Type Percent Pink 100   Drainage Amount  Scant   Drainage Color Serous   Wound Odor None   Periwound Skin Condition Intact   Cleansing and Cleansing Agents  Normal saline       Patient is currently taking aspirin and plavix

## 2019-10-11 NOTE — WOUND CARE
Clinic Level of Care Assessment    NAME:  Gaurav Cardoza OF BIRTH:  1935 GENDER: female  MEDICAL RECORD NUMBER:  [id number]   DATE:  10/11/2019      Wound Count Document in [address] [place] [address]  Number of Wounds Assessed Points   No Wounds/Ulcers []   0   Less than Three Wounds/Ulcers [x]   1   3-6 Wounds/Ulcers []   2   Greater than 6 Wounds/Ulcers []   3     Ambulation Status Document in Coord/ENMA/Mobility tab  Status Definition Points   Independent Independently able to ambulate. Fully able (without any assistance) to get on/off exam table/chair. []   0   Minimal Physical Assistance Requires physical assistance of one person to ambulate and/or position patient to be examined. Includes necessary physical assistance to position lower extremities on/off stool. []   1   Moderate Physical Assistance Requires at least one staff member to physically assist patient in ambulating into treatment room, and on/off exam table. [x]   2   Full Assistance Requires assistance of at least two staff members to transfer patient into treatment room and/or on/off exam table/chair. \"Total Transfer\". []   3     Dressing Complexity Document in LDA and Write Appropriate Order  Complexity Definition Points   No Dressing  []   0   Simple Minimal, simple dressing. i.e. Band-aid, gauze, simple wrap. []   1   Intermediate Moderately complicated requiring licensed personnel to apply i.e. collagen matrix, ointments, gels, alginates. [x]   2   Complex Complicated requiring licensed personnel to apply dressings 6 or more wounds. []   3     Teaching Effort Document in Education Tab   Effort Definition Points   No Teaching  []   0   Simple Reinforce two or less topics. Document in Education navigator.  []   1   Intermediate Reinforce three to five topics and/or one additional   new topic. Document in Education navigator. [x]   2   Complex Teach more than one new topic.  New patient information   packet reviewed and/or reinforce more than three topics. Document in Education navigator. HBO initial instruction. []   3       Patient Assessment and Planning  Planning Definition Points   Simple Multiple System Simple: Simple follow-up with routine assessment and planning. If Discharged, instructions and long term/follow-up care given to patient/caregiver. Discharged, instructions and/or After Visit Summary given to patient/caregiver and instructions completed. []   1   Intermediate Multiple System Intermediate: Contact with outside resources; i.e. [place] calls [place], [facility] May include filling out forms and writing letters, arranging transportation, communication with insurance , vendors, etc.  Discharged, instructions and/or After Visit Summary given to patient/caregiver and instructions completed. [x]   2   Complex Multiple System Complex: Full, comprehensive assessment and planning. Follow the entire navigator under Wound Visit charting filling out each tab which includes OP Adm Database Screening, Education and CarePlan  HBO risk assessment completed. Discharged, instructions and/or After Visit Summary given to patient/caregiver and instructions completed.    []   3           Is this the Patient's First Visit to the [address] [place] [address] Road  No      Is this Patient Established @ [facility]  Yes             Clinical Level of Care      Points  0-2  Level 1 []     Points  3-5  Level 2 []     Points  6-9  Level 3 [x]     Points  10-12  Level 4 []     Points  13-15  Level 5 []       Electronically signed by Sandra De La Rosa RN on 10/11/2019 at 3:41 PM

## 2019-10-11 NOTE — WOUND CARE
[address]  [address], [address]  Phone: [phone]  Fax: [phone]    Patient: Reyna Roberts MRN: [id number]  SSN: [id number]    YOB: 1935  Age: 81 y.o. Sex: female       Return Appointment: 2 weeks with Ravinder Pantoja MD    Instructions: Right lower leg:  Clean wound with saline. Acticoat Flex 3: cut top approximate size of wound, apply to wound bed. Cover with ABD and wrap bilateral lower legs with 2 layer compression. Change twice weekly. Home health for dressing changes. Patient instructed on the following: Follow all wound dressing instructions. Do not get dressing or wound wet. May shower if wound can be effectively kept dry. Cast covers may be purchased at [facility]. Should you experience increased redness, swelling, pain, foul odor, size of wound(s), or have a temperature over 101 degrees please contact the [address] [place] [address] Road at [phone] or if after hours contact your primary care physician or go to the hospital emergency department.     Signed By: Tania Mendoza RN     October 11, 2019

## 2019-10-11 NOTE — WOUND CARE
Multilayer Compression Wrap   (Not Unna) Below the Knee    NAME:  Tova Sinclair OF BIRTH:  1935  MEDICAL RECORD NUMBER:  [id number]  DATE:  10/11/2019    Removed old Multilayer wrap if indicated and wash leg with mild soap/water. Applied moisturizing agent to dry skin as needed. Applied primary and secondary dressing as ordered. Applied multilayered dressing below the knee to right lower leg. Applied multilayered dressing below the knee to left lower leg. Instructed patient/caregiver not to remove dressing and to keep it clean and dry. Instructed patient/caregiver on complications to report to provider, such as pain, numbness in toes, heavy drainage, and slippage of dressing. Instructed patient on purpose of compression dressing and on activity and exercise recommendations.       Electronically signed by Denzel Clifford RN on 10/11/2019 at 3:42 PM

## 2019-10-16 NOTE — PROGRESS NOTES
Wound Center Progress Note    Patient: Janet Wharton MRN: [id number]  SSN: [id number]    YOB: 1935  Age: 81 y.o. Sex: female      Subjective:     Chief Complaint:  Recurrent Venous leg ulcer BLE    History of Present Illness:       Wound Caused By: venous insufficiency  Associated Signs and Symptoms: drainage, pain  Timing: since June 2018  Quality: wound  Severity: full thickness  Modifying Factors: Dm2, obesity, venous insufficiency  Current Wound Care: Multilayer compression, CHF    3/11/2018  No new issues. Urinary soiling continues to be improved. Left 2nd toe stable. Getting [facility] for wraps    6/14/2019  Continues to do well with dressings but still with substantial drainage. No further UTI issues. 7/19/2019  No new issues. Tolerating dressings. No further soiling.     7/26/2019  Doing well. No new issues. R leg wound not improving. 8/2/2019  Returns for re-eval. Bx proven bas[place]quam. Chest lesion benign. 9/27/2019  S/P excision leg lesion and SG margins negative. 10/11/2019  Tolerating wraps. No new issues. Pain controlled. Minimal soiling.      Past Medical History:   Diagnosis Date    Acute hyponatremia 1/21/2011    Acute on chronic diastolic congestive heart failure ([address] 1/19/2011    Acute renal failure ([address] 1/21/2011    AMI (acute myocardial infarction) ([address] 2009    Anemia 9/5/2012    Arthritis     Asthma     Asthma exacerbation 11/11/2011    CAD (coronary artery disease)     MI 2009, stents heart 2009    CAD (coronary artery disease), native coronary artery 9/2/2009    Previous stent to dRCA with Cypher 3.5x33mm SABA 8/09 9/09 LAD- Xience 2.5x23mm and 3.0x18mm SABA in mid-distal LAD      Cellulitis Bilateral Lower Extremities 1/20/2011    Chest discomfort, tightness, pressure 1/28/2016    Chronic diastolic heart failure ([address] 10/20/2011    Chronic venous insufficiency 10/24/2011    Diabetes (HCC)     checks QD, normal 200, no hyposymptoms     Diabetes Mellitus Type II-insulin dependent 9/2/2009    Diverticulitis 1/28/2016    Dyslipidemia 9/2/2009    Edema 1/28/2016    GERD (gastroesophageal reflux disease)     GLAUCOMA     BILATERAL    Heart failure ([address]     Hypertension     Hypokalemia 1/28/2016    Hypoxemia 9/7/2012    Morbid obesity (HCC)     Nausea & vomiting     Neuropathy in diabetes ([address] 9/2/2009    NSTEMI (non-ST elevation myocardial infarction) ([address] 9/2/2009    Dr Noni De La Fuente    Obstructive sleep apnea (adult) (pediatric)- probably  11/11/2011    TRISH (obstructive sleep apnea) 9/7/2012    Intolerant of CPAP     Other dyspnea and respiratory abnormality 11/10/2011    Other ill-defined conditions(799.89)     benign tumor in lower abdomen- not removed, diabetic ulcers, edema, neuropathy    Peripheral vascular disease ([address] 10/20/2011    Post PTCA 6/20/2014    P[facility]peripheral [facility] disease) [facility]     Unspecified anemia 1/23/2011    Unspecified sleep apnea     oxygen at night    Venous stasis of lower extremity 2010    BILATERAL W RECURRENT ADMISSIONS    Volume overload 9/6/2012      Past Surgical History:   Procedure Laterality Date    BREAST SURGERY PROCEDURE UNLISTED  1973    benign tumor left breast    CARDIAC SURG PROCEDURE UNLIST      4 stents most recent 2 yrs ago    HX CHOLECYSTECTOMY      HX COLONOSCOPY      HX GI      PTCA EACH ADDL VESSEL      stentsx3     Family History   Problem Relation Age of Onset    Cancer Father     Lung Disease Father     Cancer Brother     Diabetes Paternal Aunt     Hypertension Maternal Grandmother     Hypertension Paternal Grandmother       Social History     Tobacco Use    Smoking status: Never Smoker    Smokeless tobacco: Never Used   Substance Use Topics    Alcohol use: No       Prior to Admission medications    Medication Sig Start Date End Date Taking?  Authorizing Provider   atorvastatin (LIPITOR) 80 mg tablet TAKE 1 TABLET BY MOUTH ONCE DAILY 9/9/19   Swati Christy Kitchen MD   metoprolol tartrate (LOPRESSOR) 50 mg tablet Take 1 Tab by mouth two (2) times a day. Patient taking differently: Take 100 mg by mouth two (2) times a day. 5/2/19   Christy Longoria MD   clopidogrel (PLAVIX) 75 mg tab Take 1 Tab by mouth daily. 5/2/19   Christy Longoria MD   DULoxetine (CYMBALTA) 60 mg capsule Take 1 Cap by mouth daily. 5/2/19   Christy Longoria MD   dulaglutide (TRULICITY) 6.59 DR/6.8 mL sub-q pen 0.75 mg by SubCUTAneous route every seven (7) days. Provider, Historical   bumetanide (BUMEX) 2 mg tablet Take 1 Tab by mouth daily. 8/17/18   Christy Longoria MD   metOLazone (ZAROXOLYN) 10 mg tablet Take 1 Tab by mouth daily. Patient taking differently: Take 10 mg by mouth daily. Indications: as needed 1/19/18   Christy Longoria MD   aspirin 81 mg chewable tablet Take 1 Tab by mouth daily. 6/12/17   Christy Longoria MD   insulin glargine (TOUJEO SOLOSTAR) 300 unit/mL (1.5 mL) inpn 220 Units by SubCUTAneous route daily. Provider, Historical   acetaminophen (TYLENOL EXTRA STRENGTH) 500 mg tablet Take  by mouth every six (6) hours as needed for Pain. Provider, Historical   insulin glulisine (APIDRA) 100 unit/mL injection 90 Units by SubCUTAneous route three (3) times daily. Provider, Historical   gabapentin (NEURONTIN) 400 mg capsule Take 800 mg by mouth two (2) times a day. Pt states 8 x daily      Other, MD Diego   albuterol (PROVENTIL VENTOLIN) 2.5 mg /3 mL (0.083 %) nebulizer solution 3 mL by Nebulization route three (3) times daily. 11/16/11   Daily Patel MD   nitroglycerin (NITROSTAT) 0.4 mg SL tablet 0.4 mg by SubLINGual route every five (5) minutes as needed. Provider, Historical   ergocalciferol (VITAMIN D) 50,000 unit capsule Take 50,000 Units by mouth every seven (7) days.  Takes on wed once a month     Provider, Historical   albuterol (PROVENTIL, VENTOLIN) 90 mcg/Actuation inhaler Take 2 Puffs by inhalation every four (4) hours as needed for Wheezing. Dispense: (1) inhaler with no refills. 12/19/10   Isaac Breaux PA     Allergies   Allergen Reactions    Pcn [Penicillins] Hives, Swelling and Myalgia     Can take cephalosporins    Bactrim [Sulfamethoprim] Other (comments)     \"potassium acted up\"    Codeine Nausea Only    Dilaudid [Hydromorphone (Bulk)] Other (comments)    Lortab [Hydrocodone-Acetaminophen] Nausea Only        Review of Systems:  Pertinent items are noted in the History of Present Illness. Lab Results   Component Value Date/Time    Hemoglobin A1c 8.7 (H) 09/30/2011 01:07 PM        Immunization History   Administered Date(s) Administered    (RETIRED) Pneumococcal Vaccine (Unspecified Type) 09/20/2010    Influenza Vaccine Split 09/11/2012       Body mass index is 40.1 kg/m². Counseling regarding nutrition done: Yes Pateint counsled about risks of smoking and cessation      Current medications:  Current Outpatient Medications   Medication Sig Dispense Refill    atorvastatin (LIPITOR) 80 mg tablet TAKE 1 TABLET BY MOUTH ONCE DAILY 90 Tab 3    metoprolol tartrate (LOPRESSOR) 50 mg tablet Take 1 Tab by mouth two (2) times a day. (Patient taking differently: Take 100 mg by mouth two (2) times a day.) 180 Tab 3    clopidogrel (PLAVIX) 75 mg tab Take 1 Tab by mouth daily. 90 Tab 3    DULoxetine (CYMBALTA) 60 mg capsule Take 1 Cap by mouth daily. 90 Cap 3    dulaglutide (TRULICITY) 9.89 TV/0.3 mL sub-q pen 0.75 mg by SubCUTAneous route every seven (7) days.  bumetanide (BUMEX) 2 mg tablet Take 1 Tab by mouth daily. 90 Tab 3    metOLazone (ZAROXOLYN) 10 mg tablet Take 1 Tab by mouth daily. (Patient taking differently: Take 10 mg by mouth daily. Indications: as needed) 90 Tab 3    aspirin 81 mg chewable tablet Take 1 Tab by mouth daily. 90 Tab 3    insulin glargine (TOUJEO SOLOSTAR) 300 unit/mL (1.5 mL) inpn 220 Units by SubCUTAneous route daily.       acetaminophen (TYLENOL EXTRA STRENGTH) 500 mg tablet Take  by mouth every six (6) hours as needed for Pain.  insulin glulisine (APIDRA) 100 unit/mL injection 90 Units by SubCUTAneous route three (3) times daily.  gabapentin (NEURONTIN) 400 mg capsule Take 800 mg by mouth two (2) times a day. Pt states 8 x daily        albuterol (PROVENTIL VENTOLIN) 2.5 mg /3 mL (0.083 %) nebulizer solution 3 mL by Nebulization route three (3) times daily. 1 Package 0    nitroglycerin (NITROSTAT) 0.4 mg SL tablet 0.4 mg by SubLINGual route every five (5) minutes as needed.  ergocalciferol (VITAMIN D) 50,000 unit capsule Take 50,000 Units by mouth every seven (7) days. Takes on wed once a month       albuterol (PROVENTIL, VENTOLIN) 90 mcg/Actuation inhaler Take 2 Puffs by inhalation every four (4) hours as needed for Wheezing. Dispense: (1) inhaler with no refills. 1 g 0         Objective:     Physical Exam:     Visit Vitals  /62 (BP 1 Location: Right arm, BP Patient Position: Sitting)   Pulse 68   Temp 97 °F (36.1 °C)   Resp 18   Ht 5' 5\" (1.651 m)   Wt 109.3 kg (241 lb)   SpO2 91%   BMI 40.10 kg/m²       General: Healthy appearing  Neuro: alert and oriented to person/place/situation. Otherwise nonfocal.  Derm: Normal turgor for age, otherwise per wound exam  HEENT: Normocephalic, atraumatic. EOMI. Conjunctiva clear. No scleral icterus. Neck: Normal range of motion. No masses. Chest: Resp unlabored  Cardio: Regular rate, rhythm  Abdomen: Soft, nontender  Ext. No hemosiderrosis. DP/PT 2+  Psych: cooperative. Pleasant. No anxiety or depression. Normal mood and affect.     Diabetic Foot Ulcer/Neuropathic   Is Wound Greater than 1.0 sq cm ? : Yes  Re-Current Wound with Skin Substitue within Last Year : Yes  X-Ray in Last 3 Months: No  VIBHA In Last 6 Months : No  Smoking Status: Non- Smoker  Wound Free from Infection : No Culture Done  Is Wound Free of Eschar, Slough , and / or Bio-[place]: Yes  Malignant Process in Wound : No Biopsy Done  Hemoglobin A1Cin Last 3 Months: No  Type of off Loading: Wheelchair if area off surface of wheelchair  Compression Therapy of 20mm or greater ?: Yes      Assessment:     BLE VSU, slowly improving. L resolved. R 2nd toe diabetic/traumatic wound    Plan:     Margins negative. Continue compression. Bring wraps next appt.

## 2019-11-08 ENCOUNTER — HOSPITAL ENCOUNTER (OUTPATIENT)
Dept: WOUND CARE | Age: 84
Discharge: HOME | End: 2019-11-08
Attending: SURGERY
Payer: MEDICARE

## 2019-11-08 VITALS
RESPIRATION RATE: 18 BRPM | SYSTOLIC BLOOD PRESSURE: 140 MMHG | TEMPERATURE: 98.2 F | OXYGEN SATURATION: 94 % | DIASTOLIC BLOOD PRESSURE: 56 MMHG | HEART RATE: 72 BPM

## 2019-11-08 PROCEDURE — 29580 STRAPPING UNNA BOOT: CPT

## 2019-11-08 NOTE — PROGRESS NOTES
11/08/19 1513   Wound Leg Lower Right   Date First Assessed/Time First Assessed: 08/17/18 1436   POA: Yes  Wound Type: Venous  Location: Leg Lower  Wound Description (Optional): 16  Orientation: Right   Dressing Status  Clean, dry, and intact   Dressing Type    (Coflex TLC bilaterally)   Non-Pressure Injury Full thickness (subcut/muscle)   Wound Length (cm) 0.5 cm   Wound Width (cm) 0.5 cm   Wound Depth (cm) 0.1   Wound Surface area (cm^2) 0.25 cm^2   Change in Wound Size % 0   Tissue Type Percent Red 100   Drainage Amount  Scant   Drainage Color Serous   Wound Odor None   Periwound Skin Condition Intact   Cleansing and Cleansing Agents  Soap and water   Dressing Type Applied   (Acticoat, bilateral unna boots)   Procedure Tolerated Well       Patient is currently taking Plavix and Aspirin 81 mg

## 2019-11-08 NOTE — WOUND CARE
[employer] Application   Below Knee    NAME:  Kamila Rodriguez OF BIRTH:  1935  MEDICAL RECORD NUMBER:  [id number]  DATE:  11/8/2019    Remove old Unna Boot or Boots. Appied primary and secondary dressing as ordered. Applied Unna roll from toes to knee overlapping each time. Applied ace wrap or coban from toes to below the knee. Secured with tape and/or metal clips covered with tape. Instructed patient/caregiver to keep dressing dry and intact. DO NOT REMOVE DRESSING. Instructed pt/family/caregiver to report excessive draining, loose bandage, wet dressing, severe pain or tingling in toes. Applied [employer] dressing below the knee to bilateral lower legs. Unna Boot(s) were applied per  Guidelines.      Electronically signed by Lynette Trevizo PT, [facility] on 11/8/2019 at 4:15 PM

## 2019-11-13 NOTE — PROGRESS NOTES
Wound Center Progress Note    Patient: Sd Muller MRN: [id number]  SSN: [id number]    YOB: 1935  Age: 81 y.o. Sex: female      Subjective:     Chief Complaint:  Recurrent Venous leg ulcer BLE    History of Present Illness:       Wound Caused By: venous insufficiency  Associated Signs and Symptoms: drainage, pain  Timing: since June 2018  Quality: wound  Severity: full thickness  Modifying Factors: Dm2, obesity, venous insufficiency  Current Wound Care: Multilayer compression, CHF    3/11/2018 No new issues. Urinary soiling continues to be improved. Left 2nd toe stable. Getting [place] for wraps  6/14/2019 Continues to do well with dressings but still with substantial drainage. No further UTI issues. 7/19/2019 No new issues. Tolerating dressings. No further soiling.   7/26/2019 Doing well. No new issues. R leg wound not improving. 8/2/2019 Returns for re-eval. Bx proven [place]. Chest lesion benign. 9/27/2019 S/P excision leg lesion and SG margins negative. 10/11/2019 Tolerating wraps. No new issues. Pain controlled. Minimal soiling.      Past Medical History:   Diagnosis Date    Acute hyponatremia 1/21/2011    Acute on chronic diastolic congestive heart failure ([address] 1/19/2011    Acute renal failure ([address] 1/21/2011    AMI (acute myocardial infarction) ([address] 2009    Anemia 9/5/2012    Arthritis     Asthma     Asthma exacerbation 11/11/2011    CAD (coronary artery disease)     MI 2009, stents heart 2009    CAD (coronary artery disease), native coronary artery 9/2/2009    Previous stent to dRCA with Cypher 3.5x33mm SABA 8/09 9/09 LAD- Xience 2.5x23mm and 3.0x18mm SABA in mid-distal LAD      Cellulitis Bilateral Lower Extremities 1/20/2011    Chest discomfort, tightness, pressure 1/28/2016    Chronic diastolic heart failure ([address] 10/20/2011    Chronic venous insufficiency 10/24/2011    Diabetes (HCC)     checks QD, normal 200, no hyposymptoms     Diabetes Mellitus Type II-insulin dependent 9/2/2009    Diverticulitis 1/28/2016    Dyslipidemia 9/2/2009    Edema 1/28/2016    GERD (gastroesophageal reflux disease)     GLAUCOMA     BILATERAL    Heart failure ([address]     Hypertension     Hypokalemia 1/28/2016    Hypoxemia 9/7/2012    Morbid obesity (HCC)     Nausea & vomiting     Neuropathy in diabetes ([address] 9/2/2009    NSTEMI (non-ST elevation myocardial infarction) ([facility] 9/2/2009    Dr Shaneka Wilkins    Obstructive sleep apnea (adult) (pediatric)- probably  11/11/2011    TRISH (obstructive sleep apnea) 9/7/2012    Intolerant of CPAP     Other dyspnea and respiratory abnormality 11/10/2011    Other ill-defined conditions(799.89)     benign tumor in lower abdomen- not removed, diabetic ulcers, edema, neuropathy    Peripheral vascular disease ([facility] 10/20/2011    Post PTCA 6/20/2014    PVD (peripheral vascular disease) ([facility]     Unspecified anemia 1/23/2011    Unspecified sleep apnea     oxygen at night    Venous stasis of lower extremity 2010    BILATERAL W RECURRENT ADMISSIONS    Volume overload 9/6/2012      Past Surgical History:   Procedure Laterality Date    BREAST SURGERY PROCEDURE UNLISTED  1973    benign tumor left breast    CARDIAC SURG PROCEDURE UNLIST      4 stents most recent 2 yrs ago    HX CHOLECYSTECTOMY      HX COLONOSCOPY      HX GI      PTCA EACH ADDL VESSEL      stentsx3     Family History   Problem Relation Age of Onset    Cancer Father     Lung Disease Father     Cancer Brother     Diabetes Paternal Aunt     Hypertension Maternal Grandmother     Hypertension Paternal Grandmother       Social History     Tobacco Use    Smoking status: Never Smoker    Smokeless tobacco: Never Used   Substance Use Topics    Alcohol use: No       Prior to Admission medications    Medication Sig Start Date End Date Taking? Authorizing Provider   bumetanide (BUMEX) 2 mg tablet Take 1 Tab by mouth daily.  11/4/19   Noa Longoria MD   atorvastatin (LIPITOR) 80 mg tablet TAKE 1 TABLET BY MOUTH ONCE DAILY 9/9/19   Ramona Longoria MD   metoprolol tartrate (LOPRESSOR) 50 mg tablet Take 1 Tab by mouth two (2) times a day. Patient taking differently: Take 100 mg by mouth two (2) times a day. 5/2/19   Ramona Longoria MD   clopidogrel (PLAVIX) 75 mg tab Take 1 Tab by mouth daily. 5/2/19   Ramona Longoria MD   DULoxetine (CYMBALTA) 60 mg capsule Take 1 Cap by mouth daily. 5/2/19   Ramona Longoria MD   dulaglutide (TRULICITY) 4.94 AG/7.9 mL sub-q pen 0.75 mg by SubCUTAneous route every seven (7) days. Provider, Historical   metOLazone (ZAROXOLYN) 10 mg tablet Take 1 Tab by mouth daily. Patient taking differently: Take 10 mg by mouth daily. Indications: as needed 1/19/18   Ramona Longoria MD   aspirin 81 mg chewable tablet Take 1 Tab by mouth daily. 6/12/17   Ramona Longoria MD   insulin glargine (TOUJEO SOLOSTAR) 300 unit/mL (1.5 mL) inpn 220 Units by SubCUTAneous route daily. Provider, Historical   acetaminophen (TYLENOL EXTRA STRENGTH) 500 mg tablet Take  by mouth every six (6) hours as needed for Pain. Provider, Historical   insulin glulisine (APIDRA) 100 unit/mL injection 90 Units by SubCUTAneous route three (3) times daily. Provider, Historical   gabapentin (NEURONTIN) 400 mg capsule Take 800 mg by mouth two (2) times a day. Pt states 8 x daily      Other, MD Diego   albuterol (PROVENTIL VENTOLIN) 2.5 mg /3 mL (0.083 %) nebulizer solution 3 mL by Nebulization route three (3) times daily. 11/16/11   Zulay Pittman MD   nitroglycerin (NITROSTAT) 0.4 mg SL tablet 0.4 mg by SubLINGual route every five (5) minutes as needed. Provider, Historical   ergocalciferol (VITAMIN D) 50,000 unit capsule Take 50,000 Units by mouth every seven (7) days. Takes on wed once a month     Provider, Historical   albuterol (PROVENTIL, VENTOLIN) 90 mcg/Actuation inhaler Take 2 Puffs by inhalation every four (4) hours as needed for Wheezing. Dispense: (1) inhaler with no refills. 12/19/10   Yesica Breaux PA     Allergies   Allergen Reactions    Pcn [Penicillins] Hives, Swelling and Myalgia     Can take cephalosporins    Bactrim [Sulfamethoprim] Other (comments)     \"potassium acted up\"    Codeine Nausea Only    Dilaudid [Hydromorphone (Bulk)] Other (comments)    Lortab [Hydrocodone-Acetaminophen] Nausea Only        Review of Systems:  Pertinent items are noted in the History of Present Illness. Lab Results   Component Value Date/Time    Hemoglobin A1c 8.7 (H) 09/30/2011 01:07 PM        Immunization History   Administered Date(s) Administered    (RETIRED) Pneumococcal Vaccine (Unspecified Type) 09/20/2010    Influenza Vaccine Split 09/11/2012       There is no height or weight on file to calculate BMI. Counseling regarding nutrition done: Yes Pateint counsled about risks of smoking and cessation      Current medications:  Current Outpatient Medications   Medication Sig Dispense Refill    bumetanide (BUMEX) 2 mg tablet Take 1 Tab by mouth daily. 90 Tab 3    atorvastatin (LIPITOR) 80 mg tablet TAKE 1 TABLET BY MOUTH ONCE DAILY 90 Tab 3    metoprolol tartrate (LOPRESSOR) 50 mg tablet Take 1 Tab by mouth two (2) times a day. (Patient taking differently: Take 100 mg by mouth two (2) times a day.) 180 Tab 3    clopidogrel (PLAVIX) 75 mg tab Take 1 Tab by mouth daily. 90 Tab 3    DULoxetine (CYMBALTA) 60 mg capsule Take 1 Cap by mouth daily. 90 Cap 3    dulaglutide (TRULICITY) 1.31 RU/2.4 mL sub-q pen 0.75 mg by SubCUTAneous route every seven (7) days.  metOLazone (ZAROXOLYN) 10 mg tablet Take 1 Tab by mouth daily. (Patient taking differently: Take 10 mg by mouth daily. Indications: as needed) 90 Tab 3    aspirin 81 mg chewable tablet Take 1 Tab by mouth daily. 90 Tab 3    insulin glargine (TOUJEO SOLOSTAR) 300 unit/mL (1.5 mL) inpn 220 Units by SubCUTAneous route daily.       acetaminophen (TYLENOL EXTRA STRENGTH) 500 mg tablet Take  by mouth every six (6) hours as needed for Pain.  insulin glulisine (APIDRA) 100 unit/mL injection 90 Units by SubCUTAneous route three (3) times daily.  gabapentin (NEURONTIN) 400 mg capsule Take 800 mg by mouth two (2) times a day. Pt states 8 x daily        albuterol (PROVENTIL VENTOLIN) 2.5 mg /3 mL (0.083 %) nebulizer solution 3 mL by Nebulization route three (3) times daily. 1 Package 0    nitroglycerin (NITROSTAT) 0.4 mg SL tablet 0.4 mg by SubLINGual route every five (5) minutes as needed.  ergocalciferol (VITAMIN D) 50,000 unit capsule Take 50,000 Units by mouth every seven (7) days. Takes on wed once a month       albuterol (PROVENTIL, VENTOLIN) 90 mcg/Actuation inhaler Take 2 Puffs by inhalation every four (4) hours as needed for Wheezing. Dispense: (1) inhaler with no refills. 1 g 0         Objective:     Physical Exam:     Visit Vitals  /56 (BP 1 Location: Right arm, BP Patient Position: Sitting)   Pulse 72   Temp 98.2 °F (36.8 °C)   Resp 18   SpO2 94%       General: Healthy appearing  Neuro: alert and oriented to person/place/situation. Otherwise nonfocal.  Derm: Normal turgor for age, otherwise per wound exam  HEENT: Normocephalic, atraumatic. EOMI. Conjunctiva clear. No scleral icterus. Neck: Normal range of motion. No masses. Chest: Resp unlabored  Cardio: Regular rate, rhythm  Abdomen: Soft, nontender  Ext. No hemosiderrosis. DP/PT 2+  Psych: cooperative. Pleasant. No anxiety or depression. Normal mood and affect.     Diabetic Foot Ulcer/Neuropathic   Is Wound Greater than 1.0 sq cm ? : No  Re-Current Wound with Skin Substitue within Last Year : Yes  X-Ray in Last 3 Months: No  VIBHA In Last 6 Months : No  Smoking Status: Non- Smoker  Wound Free from Infection : No Culture Done  Is Wound Free of Eschar, Slough , and / or Bio-[place]: Yes  Malignant Process in Wound : No Biopsy Done  Hemoglobin A1Cin Last 3 Months: No  Type of off Loading: Wheelchair if area off surface of wheelchair  Compression Therapy of 20mm or greater ?: Yes      Assessment:     BLE VSU, slowly improving. L resolved. R 2nd toe diabetic/traumatic wound    Plan:     Margins negative. Continue compression. Not ready for faros yet.

## 2019-12-06 ENCOUNTER — HOSPITAL ENCOUNTER (OUTPATIENT)
Dept: WOUND CARE | Age: 84
Discharge: HOME | End: 2019-12-06
Attending: SURGERY
Payer: MEDICARE

## 2019-12-06 VITALS
TEMPERATURE: 97.8 F | HEART RATE: 78 BPM | WEIGHT: 246.4 LBS | BODY MASS INDEX: 41.05 KG/M2 | HEIGHT: 65 IN | DIASTOLIC BLOOD PRESSURE: 65 MMHG | RESPIRATION RATE: 18 BRPM | OXYGEN SATURATION: 95 % | SYSTOLIC BLOOD PRESSURE: 151 MMHG

## 2019-12-06 PROCEDURE — 29580 STRAPPING UNNA BOOT: CPT

## 2019-12-06 NOTE — WOUND CARE
12/06/19 1453   Wound Leg Lower Right   Date First Assessed/Time First Assessed: 08/17/18 1436   POA: Yes  Wound Type: Venous  Location: Leg Lower  Wound Description (Optional): 16  Orientation: Right   Dressing Status  Clean, dry, and intact   Dressing Type    (acitcoat, abd, bilateral unna boots)   Wound Length (cm) 0 cm   Wound Width (cm) 0 cm   Wound Depth (cm) 0   Wound Surface area (cm^2) 0 cm^2   Change in Wound Size % 100   Tissue Type Percent Pink 100   Drainage Amount  None   Wound Odor None   Periwound Skin Condition Intact   Cleansing and Cleansing Agents  Soap and water       Patient is taking aspirin and plavix daily.

## 2019-12-06 NOTE — WOUND CARE
[employer] Application   Below Knee    NAME:  Fe Lopez OF BIRTH:  1935  MEDICAL RECORD NUMBER:  [id number]  DATE:  12/6/2019    Remove old Unna Boot or Boots. Appied primary and secondary dressing as ordered. Applied Unna roll from toes to knee overlapping each time. Applied ace wrap or coban from toes to below the knee. Secured with tape and/or metal clips covered with tape. Instructed patient/caregiver to keep dressing dry and intact. DO NOT REMOVE DRESSING. Instructed pt/family/caregiver to report excessive draining, loose bandage, wet dressing, severe pain or tingling in toes. Applied [employer] dressing below the knee to bilateral lower legs. Unna Boot(s) were applied per  Guidelines.      Electronically signed by Tomas Ponce RN on 12/6/2019 at 2:54 PM

## 2019-12-06 NOTE — WOUND CARE
[address]  [address], [address]  Phone: [phone]  Fax: [phone]    Patient: Gerardo Dominguez MRN: [id number]  SSN: [id number]    YOB: 1935  Age: 81 y.o. Sex: female       Return Appointment: 1 week with Usha Campos MD    Instructions:   Right lower leg:  Clean wound with saline. Acticoat Flex 3: cut top approximate size of wound, apply to wound bed. Cover with ABD and wrap bilateral lower legs with unna boots. Return to wound center weekly. Discharge home health.      Do not get dressing or wound wet.  May shower if wound can be effectively kept dry.  Cast covers may be purchased at [facility] and [facility]. Should you experience increased redness, swelling, pain, foul odor, size of wound(s), or have a temperature over 101 degrees please contact the [address] [place] [address] Road at [phone] or if after hours contact your primary care physician or go to the hospital emergency department.     Signed By: Kenneth Corley RN     December 6, 2019

## 2019-12-06 NOTE — WOUND CARE
Clinic Level of Care Assessment    NAME:  Buffy Dufft OF BIRTH:  1935 GENDER: female  MEDICAL RECORD NUMBER:  [id number]   DATE:  12/6/2019      Wound Count Document in [address] [place] [address]  Number of Wounds Assessed Points   No Wounds/Ulcers []   0   Less than Three Wounds/Ulcers [x]   1   3-6 Wounds/Ulcers []   2   Greater than 6 Wounds/Ulcers []   3     Ambulation Status Document in Coord/ENMA/Mobility tab  Status Definition Points   Independent Independently able to ambulate. Fully able (without any assistance) to get on/off exam table/chair. [x]   0   Minimal Physical Assistance Requires physical assistance of one person to ambulate and/or position patient to be examined. Includes necessary physical assistance to position lower extremities on/off stool. []   1   Moderate Physical Assistance Requires at least one staff member to physically assist patient in ambulating into treatment room, and on/off exam table. []   2   Full Assistance Requires assistance of at least two staff members to transfer patient into treatment room and/or on/off exam table/chair. \"Total Transfer\". []   3     Dressing Complexity Document in LDA and Write Appropriate Order  Complexity Definition Points   No Dressing  []   0   Simple Minimal, simple dressing. i.e. Band-aid, gauze, simple wrap. []   1   Intermediate Moderately complicated requiring licensed personnel to apply i.e. collagen matrix, ointments, gels, alginates. [x]   2   Complex Complicated requiring licensed personnel to apply dressings 6 or more wounds. []   3     Teaching Effort Document in Education Tab   Effort Definition Points   No Teaching  []   0   Simple Reinforce two or less topics. Document in Education navigator. [x]   1   Intermediate Reinforce three to five topics and/or one additional   new topic. Document in Education navigator. []   2   Complex Teach more than one new topic.  New patient information   packet reviewed and/or reinforce more than three topics. Document in Education navigator. HBO initial instruction. []   3       Patient Assessment and Planning  Planning Definition Points   Simple Multiple System Simple: Simple follow-up with routine assessment and planning. If Discharged, instructions and long term/follow-up care given to patient/caregiver. Discharged, instructions and/or After Visit Summary given to patient/caregiver and instructions completed. []   1   Intermediate Multiple System Intermediate: Contact with outside resources; i.e. Telephone calls to home health, [facility]. May include filling out forms and writing letters, arranging transportation, communication with insurance , vendors, etc.  Discharged, instructions and/or After Visit Summary given to patient/caregiver and instructions completed. [x]   2   Complex Multiple System Complex: Full, comprehensive assessment and planning. Follow the entire navigator under Wound Visit charting filling out each tab which includes OP Adm Database Screening, Education and CarePlan  HBO risk assessment completed. Discharged, instructions and/or After Visit Summary given to patient/caregiver and instructions completed.    []   3           Is this the Patient's First Visit to the [address] [place] [address] Road  No      Is this Patient Established @ [facility]  Yes             Clinical Level of Care      Points  0-2  Level 1 []     Points  3-5  Level 2 []     Points  6-9  Level 3 [x]     Points  10-12  Level 4 []     Points  13-15  Level 5 []       Electronically signed by Winston Soto RN on 12/6/2019 at 2:54 PM

## 2019-12-06 NOTE — DISCHARGE INSTRUCTIONS
Right lower leg:  Clean wound with saline. Acticoat Flex 3: cut top approximate size of wound, apply to wound bed. Cover with ABD and wrap bilateral lower legs with unna boots. Return to wound center weekly. Discharge home health.      Do not get dressing or wound wet.  May shower if wound can be effectively kept dry.  Cast covers may be purchased at [facility] and [facility].

## 2019-12-11 NOTE — PROGRESS NOTES
Wound Center Progress Note    Patient: Elana Sanchez MRN: [id number]  SSN: [id number]    YOB: 1935  Age: 81 y.o. Sex: female      Subjective:     Chief Complaint:  Recurrent Venous leg ulcer BLE    History of Present Illness:       Wound Caused By: venous insufficiency  Associated Signs and Symptoms: drainage, pain  Timing: since June 2018  Quality: wound  Severity: full thickness  Modifying Factors: Dm2, obesity, venous insufficiency  Current Wound Care: Multilayer compression, CHF    3/11/2018 No new issues. Urinary soiling continues to be improved. Left 2nd toe stable. Getting [place] for wraps  6/14/2019 Continues to do well with dressings but still with substantial drainage. No further UTI issues. 7/19/2019 No new issues. Tolerating dressings. No further soiling.   7/26/2019 Doing well. No new issues. R leg wound not improving. 8/2/2019 Returns for re-eval. Bx proven [place]. Chest lesion benign. 9/27/2019 S/P excision leg lesion and SG margins negative. 10/11/2019 Tolerating wraps. No new issues. Pain controlled. Minimal soiling. 12/06/2019  One month since last seen. No new issues. . No tunnelling.        Past Medical History:   Diagnosis Date    Acute hyponatremia 1/21/2011    Acute on chronic diastolic congestive heart failure ([address] 1/19/2011    Acute renal failure ([address] 1/21/2011    AMI (acute myocardial infarction) ([address] 2009    Anemia 9/5/2012    Arthritis     Asthma     Asthma exacerbation 11/11/2011    CAD (coronary artery disease)     MI 2009, stents heart 2009    CAD (coronary artery disease), native coronary artery 9/2/2009    Previous stent to dRCA with Cypher 3.5x33mm SABA 8/09 9/09 LAD- Xience 2.5x23mm and 3.0x18mm SABA in mid-distal LAD      Cellulitis Bilateral Lower Extremities 1/20/2011    Chest discomfort, tightness, pressure 1/28/2016    Chronic diastolic heart failure ([address] 10/20/2011    Chronic venous insufficiency 10/24/2011    Diabetes ([address]     checks QD, normal 200, no hyposymptoms     Diabetes Mellitus Type II-insulin dependent 9/2/2009    Diverticulitis 1/28/2016    Dyslipidemia 9/2/2009    Edema 1/28/2016    GERD (gastroesophageal reflux disease)     GLAUCOMA     BILATERAL    Heart failure ([address]     Hypertension     Hypokalemia 1/28/2016    Hypoxemia 9/7/2012    Morbid obesity ([address]     Nausea & vomiting     Neuropathy in diabetes ([address] 9/2/2009    NSTEMI (non-ST elevation myocardial infarction) ([address] 9/2/2009    Dr Surekha Schaeffer    Obstructive sleep apnea (adult) (pediatric)- probably  11/11/2011    TRISH (obstructive sleep apnea) 9/7/2012    Intolerant of CPAP     Other dyspnea and respiratory abnormality 11/10/2011    Other ill-defined conditions(799.89)     benign tumor in lower abdomen- not removed, diabetic ulcers, edema, neuropathy    Peripheral vascular disease ([address] 10/20/2011    Post PTCA 6/20/2014    PVD (peripheral vascular disease) ([address]     Unspecified anemia 1/23/2011    Unspecified sleep apnea     oxygen at night    Venous stasis of lower extremity 2010    BILATERAL W RECURRENT ADMISSIONS    Volume overload 9/6/2012      Past Surgical History:   Procedure Laterality Date    BREAST SURGERY PROCEDURE UNLISTED  1973    benign tumor left breast    CARDIAC SURG PROCEDURE UNLIST      4 stents most recent 2 yrs ago    HX CHOLECYSTECTOMY      HX COLONOSCOPY      HX GI      PTCA EACH ADDL VESSEL      stentsx3     Family History   Problem Relation Age of Onset    Cancer Father     Lung Disease Father     Cancer Brother     Diabetes Paternal Aunt     Hypertension Maternal Grandmother     Hypertension Paternal Grandmother       Social History     Tobacco Use    Smoking status: Never Smoker    Smokeless tobacco: Never Used   Substance Use Topics    Alcohol use: No       Prior to Admission medications    Medication Sig Start Date End Date Taking?  Authorizing Provider   bumetanide (BUMEX) 2 mg tablet Take 1 Tab by mouth daily. 11/4/19   Serge Longoria MD   atorvastatin (LIPITOR) 80 mg tablet TAKE 1 TABLET BY MOUTH ONCE DAILY 9/9/19   Serge Longoria MD   metoprolol tartrate (LOPRESSOR) 50 mg tablet Take 1 Tab by mouth two (2) times a day. Patient taking differently: Take 100 mg by mouth two (2) times a day. 5/2/19   Serge Longoria MD   clopidogrel (PLAVIX) 75 mg tab Take 1 Tab by mouth daily. 5/2/19   Serge Longoria MD   DULoxetine (CYMBALTA) 60 mg capsule Take 1 Cap by mouth daily. 5/2/19   Serge Longoria MD   dulaglutide (TRULICITY) 3.68 SZ/1.6 mL sub-q pen 0.75 mg by SubCUTAneous route every seven (7) days. Provider, Historical   metOLazone (ZAROXOLYN) 10 mg tablet Take 1 Tab by mouth daily. Patient taking differently: Take 10 mg by mouth daily. Indications: as needed 1/19/18   Serge Longoria MD   aspirin 81 mg chewable tablet Take 1 Tab by mouth daily. 6/12/17   Serge Longoria MD   insulin glargine (TOUJEO SOLOSTAR) 300 unit/mL (1.5 mL) inpn 220 Units by SubCUTAneous route daily. Provider, Historical   acetaminophen (TYLENOL EXTRA STRENGTH) 500 mg tablet Take  by mouth every six (6) hours as needed for Pain. Provider, Historical   insulin glulisine (APIDRA) 100 unit/mL injection 90 Units by SubCUTAneous route three (3) times daily. Provider, Historical   gabapentin (NEURONTIN) 400 mg capsule Take 800 mg by mouth two (2) times a day. Pt states 8 x daily      Other, MD Diego   albuterol (PROVENTIL VENTOLIN) 2.5 mg /3 mL (0.083 %) nebulizer solution 3 mL by Nebulization route three (3) times daily. 11/16/11   Scott Maldonado MD   nitroglycerin (NITROSTAT) 0.4 mg SL tablet 0.4 mg by SubLINGual route every five (5) minutes as needed. Provider, Historical   ergocalciferol (VITAMIN D) 50,000 unit capsule Take 50,000 Units by mouth every seven (7) days.  Takes on wed once a month     Provider, Historical   albuterol (PROVENTIL, VENTOLIN) 90 mcg/Actuation inhaler Take 2 Puffs by inhalation every four (4) hours as needed for Wheezing. Dispense: (1) inhaler with no refills. 12/19/10   Veto Breaux PA     Allergies   Allergen Reactions    Pcn [Penicillins] Hives, Swelling and Myalgia     Can take cephalosporins    Bactrim [Sulfamethoprim] Other (comments)     \"potassium acted up\"    Codeine Nausea Only    Dilaudid [Hydromorphone (Bulk)] Other (comments)    Lortab [Hydrocodone-Acetaminophen] Nausea Only        Review of Systems:  Pertinent items are noted in the History of Present Illness. Lab Results   Component Value Date/Time    Hemoglobin A1c 8.7 (H) 09/30/2011 01:07 PM        Immunization History   Administered Date(s) Administered    (RETIRED) Pneumococcal Vaccine (Unspecified Type) 09/20/2010    Influenza Vaccine Split 09/11/2012       Body mass index is 41 kg/m². Counseling regarding nutrition done: Yes Pateint counsled about risks of smoking and cessation      Current medications:  Current Outpatient Medications   Medication Sig Dispense Refill    bumetanide (BUMEX) 2 mg tablet Take 1 Tab by mouth daily. 90 Tab 3    atorvastatin (LIPITOR) 80 mg tablet TAKE 1 TABLET BY MOUTH ONCE DAILY 90 Tab 3    metoprolol tartrate (LOPRESSOR) 50 mg tablet Take 1 Tab by mouth two (2) times a day. (Patient taking differently: Take 100 mg by mouth two (2) times a day.) 180 Tab 3    clopidogrel (PLAVIX) 75 mg tab Take 1 Tab by mouth daily. 90 Tab 3    DULoxetine (CYMBALTA) 60 mg capsule Take 1 Cap by mouth daily. 90 Cap 3    dulaglutide (TRULICITY) 1.44 ZE/5.0 mL sub-q pen 0.75 mg by SubCUTAneous route every seven (7) days.  metOLazone (ZAROXOLYN) 10 mg tablet Take 1 Tab by mouth daily. (Patient taking differently: Take 10 mg by mouth daily. Indications: as needed) 90 Tab 3    aspirin 81 mg chewable tablet Take 1 Tab by mouth daily. 90 Tab 3    insulin glargine (TOUJEO SOLOSTAR) 300 unit/mL (1.5 mL) inpn 220 Units by SubCUTAneous route daily.       acetaminophen (TYLENOL EXTRA STRENGTH) 500 mg tablet Take  by mouth every six (6) hours as needed for Pain.  insulin glulisine (APIDRA) 100 unit/mL injection 90 Units by SubCUTAneous route three (3) times daily.  gabapentin (NEURONTIN) 400 mg capsule Take 800 mg by mouth two (2) times a day. Pt states 8 x daily        albuterol (PROVENTIL VENTOLIN) 2.5 mg /3 mL (0.083 %) nebulizer solution 3 mL by Nebulization route three (3) times daily. 1 Package 0    nitroglycerin (NITROSTAT) 0.4 mg SL tablet 0.4 mg by SubLINGual route every five (5) minutes as needed.  ergocalciferol (VITAMIN D) 50,000 unit capsule Take 50,000 Units by mouth every seven (7) days. Takes on wed once a month       albuterol (PROVENTIL, VENTOLIN) 90 mcg/Actuation inhaler Take 2 Puffs by inhalation every four (4) hours as needed for Wheezing. Dispense: (1) inhaler with no refills. 1 g 0         Objective:     Physical Exam:     Visit Vitals  /65 (BP 1 Location: Right arm)   Pulse 78   Temp 97.8 °F (36.6 °C)   Resp 18   Ht 5' 5\" (1.651 m)   Wt 111.8 kg (246 lb 6.4 oz)   SpO2 95%   BMI 41.00 kg/m²       General: Healthy appearing  Neuro: alert and oriented to person/place/situation. Otherwise nonfocal.  Derm: Normal turgor for age, otherwise per wound exam  HEENT: Normocephalic, atraumatic. EOMI. Conjunctiva clear. No scleral icterus. Neck: Normal range of motion. No masses. Chest: Resp unlabored  Cardio: Regular rate, rhythm  Abdomen: Soft, nontender  Ext. No hemosiderrosis. DP/PT 2+  Psych: cooperative. Pleasant. No anxiety or depression. Normal mood and affect. Assessment:     BLE VSU resolved. R 2nd toe diabetic/traumatic wound    Plan:     Get faro's. Apply next week.

## 2019-12-13 ENCOUNTER — APPOINTMENT (OUTPATIENT)
Dept: WOUND CARE | Age: 84
End: 2019-12-13
Attending: SURGERY
Payer: MEDICARE

## 2019-12-20 ENCOUNTER — HOSPITAL ENCOUNTER (OUTPATIENT)
Dept: WOUND CARE | Age: 84
End: 2019-12-20
Attending: SURGERY
Payer: MEDICARE

## 2020-01-03 ENCOUNTER — APPOINTMENT (OUTPATIENT)
Dept: WOUND CARE | Age: 85
End: 2020-01-03
Attending: SURGERY
Payer: MEDICARE

## 2020-01-10 ENCOUNTER — HOSPITAL ENCOUNTER (OUTPATIENT)
Dept: WOUND CARE | Age: 85
Discharge: HOME | End: 2020-01-10
Attending: SURGERY
Payer: MEDICARE

## 2020-01-10 VITALS
WEIGHT: 246 LBS | DIASTOLIC BLOOD PRESSURE: 77 MMHG | HEIGHT: 65 IN | BODY MASS INDEX: 40.98 KG/M2 | SYSTOLIC BLOOD PRESSURE: 148 MMHG | HEART RATE: 84 BPM | RESPIRATION RATE: 18 BRPM | TEMPERATURE: 97.6 F

## 2020-01-10 PROCEDURE — 29580 STRAPPING UNNA BOOT: CPT

## 2020-01-10 NOTE — WOUND CARE
[address]  [address], [address]   Phone: [phone]  Fax: [phone]    Patient: Bakari Willams MRN: [id number]  SSN: [id number]    YOB: 1935  Age: 81 y.o. Sex: female       Return Appointment: 1 week with Gadiel Hester MD    Instructions: Bilateral lower legs:  Acticoat Flex 3: cut top approximate size of wound, apply to wound beds , cover with ABD. Wrap bilateral lower legs with unna boots with calamine. Change dressing twice per week. Home health for dressing changes. DO NOT GET DRESSINGS WET. Should you experience increased redness, swelling, pain, foul odor, size of wound(s), or have a temperature over 101 degrees please contact the [address] [place] [address] Road at [phone] or if after hours contact your primary care physician or go to the hospital emergency department.     Signed By: Sancho Evans RN     January 10, 2020

## 2020-01-10 NOTE — WOUND CARE
Clinic Level of Care Assessment    NAME:  Rosemary Witt OF BIRTH:  1935 GENDER: female  MEDICAL RECORD NUMBER:  [id number]   DATE:  1/10/2020      Wound Count Document in [facility]  Number of Wounds Assessed Points   No Wounds/Ulcers []   0   Less than Three Wounds/Ulcers [x]   1   3-6 Wounds/Ulcers []   2   Greater than 6 Wounds/Ulcers []   3     Ambulation Status Document in Coord/ENMA/Mobility tab  Status Definition Points   Independent Independently able to ambulate. Fully able (without any assistance) to get on/off exam table/chair. []   0   Minimal Physical Assistance Requires physical assistance of one person to ambulate and/or position patient to be examined. Includes necessary physical assistance to position lower extremities on/off stool. []   1   Moderate Physical Assistance Requires at least one staff member to physically assist patient in ambulating into treatment room, and on/off exam table. [x]   2   Full Assistance Requires assistance of at least two staff members to transfer patient into treatment room and/or on/off exam table/chair. \"Total Transfer\". []   3     Dressing Complexity Document in LDA and Write Appropriate Order  Complexity Definition Points   No Dressing  []   0   Simple Minimal, simple dressing. i.e. Band-aid, gauze, simple wrap. []   1   Intermediate Moderately complicated requiring licensed personnel to apply i.e. collagen matrix, ointments, gels, alginates. []   2   Complex Complicated requiring licensed personnel to apply dressings 6 or more wounds. [x]   3     Teaching Effort Document in Education Tab   Effort Definition Points   No Teaching  []   0   Simple Reinforce two or less topics. Document in Education navigator.  []   1   Intermediate Reinforce three to five topics and/or one additional   new topic. Document in Education navigator. [x]   2   Complex Teach more than one new topic.  New patient information   packet reviewed and/or reinforce more than three topics. Document in Education navigator. HBO initial instruction. []   3       Patient Assessment and Planning  Planning Definition Points   Simple Multiple System Simple: Simple follow-up with routine assessment and planning. If Discharged, instructions and long term/follow-up care given to patient/caregiver. Discharged, instructions and/or After Visit Summary given to patient/caregiver and instructions completed. []   1   Intermediate Multiple System Intermediate: Contact with outside resources; i.e. Telephone calls to home health, [facility]. May include filling out forms and writing letters, arranging transportation, communication with insurance , vendors, etc.  Discharged, instructions and/or After Visit Summary given to patient/caregiver and instructions completed. [x]   2   Complex Multiple System Complex: Full, comprehensive assessment and planning. Follow the entire navigator under Wound Visit charting filling out each tab which includes OP Adm Database Screening, Education and CarePlan  HBO risk assessment completed. Discharged, instructions and/or After Visit Summary given to patient/caregiver and instructions completed.    []   3           Is this the Patient's First Visit to the [address] [place] [address] Road  No      Is this Patient Established @ [facility]  Yes             Clinical Level of Care      Points  0-2  Level 1 []     Points  3-5  Level 2 []     Points  6-9  Level 3 []     Points  10-12  Level 4 [x]     Points  13-15  Level 5 []       Electronically signed by Karen Harvey RN on 1/10/2020 at 3:45 PM

## 2020-01-10 NOTE — WOUND CARE
01/10/20 1458   Wound Leg lower Left   Date First Assessed/Time First Assessed: 01/10/20 1500   Primary Wound Type: Venous  Location: Leg lower  Wound Location Orientation: Left   Dressing Status Clean, dry, and intact   Dressing Type   (xeroform, bandaid)   Wound Length (cm) 5.5 cm   Wound Width (cm) 3.4 cm   Wound Depth (cm) 0.1 cm   Wound Surface Area (cm^2) 18.7 cm^2   Wound Volume (cm^3) 1.87 cm^3   Condition of Base Epithelializing;Granulation   Tissue Type Percent Red 100   Drainage Amount Small   Drainage Color Serous   Wound Odor None   Wound Leg Lower Right   Date First Assessed/Time First Assessed: 08/17/18 1436   POA: Yes  Wound Type: Venous  Location: Leg Lower  Wound Description (Optional): 16  Orientation: Right   Dressing Status  Clean, dry, and intact   Dressing Type    (xeroform, bandaid)   Wound Length (cm) 1.5 cm   Wound Width (cm) 2 cm   Wound Depth (cm) 0.1   Wound Surface area (cm^2) 3 cm^2   Change in Wound Size % -1100   Epithelialization (%) 50   Tissue Type Percent Pink 50   Drainage Amount  Scant   Drainage Color Serous   Wound Odor None   Periwound Skin Condition Intact

## 2020-01-11 ENCOUNTER — TELEPHONE (OUTPATIENT)
Dept: CARDIOLOGY | Age: 85
End: 2020-01-11

## 2020-01-11 NOTE — TELEPHONE ENCOUNTER
Pt called into report HTN. NP spoke with Pts daughter. States that Pts BP has been high \"all day. \" BPs 172/89, 174/92 etc. States Pt took 100mg Lopressor around 2030. Then ate some beef stew. Daughter states that she has been checking her BP every few minutes and it is still \"high. \"    Discsused that checking BP multiple times is not helping. Soup is volume and full of salt (daughter stated that she put a \"good amount\" of salt in the soup). Explained this will not help elevated BP. Discussed metoprolol and asked if Pt taking all her meds. Daughter states that Pt only taking the metoprolol 50mg 1x daily -- discussed should be 2x daily. Pt will start taking metoprolol 2 x daily starting in the AM and see how BP responds. Instructed daughter to call back with problems. Avoid salt and watch volume intake. Daughter v/u.        Anne-Marie Tong, ADRIENNE

## 2020-01-15 NOTE — PROGRESS NOTES
Wound Center Progress Note    Patient: Jacqueline Maldonado MRN: [id number]  SSN: [id number]    YOB: 1935  Age: 81 y.o. Sex: female      Subjective:     Chief Complaint:  Recurrent Venous leg ulcer BLE    History of Present Illness:       Wound Caused By: venous insufficiency  Associated Signs and Symptoms: drainage, pain  Timing: since June 2018  Quality: wound  Severity: full thickness  Modifying Factors: Dm2, obesity, venous insufficiency  Current Wound Care: Multilayer compression, CHF    3/11/2018 No new issues. Urinary soiling continues to be improved. Left 2nd toe stable. Getting [facility] for wraps  6/14/2019 Continues to do well with dressings but still with substantial drainage. No further UTI issues. 7/19/2019 No new issues. Tolerating dressings. No further soiling.   7/26/2019 Doing well. No new issues. R leg wound not improving. 8/2/2019 Returns for re-eval. Bx proven [place]. Chest lesion benign. 9/27/2019 S/P excision leg lesion and SG margins negative. 10/11/2019 Tolerating wraps. No new issues. Pain controlled. Minimal soiling. 12/06/2019 One month since last seen. No new issues. . No tunnelling. 1/10/2020 Returns after being discharged with new opening. Seen today with daughter. No changes in heart or CHF status. Has had some increased swelling. Has had trouble with LE wraps.        Past Medical History:   Diagnosis Date    Acute hyponatremia 1/21/2011    Acute on chronic diastolic congestive heart failure ([address] 1/19/2011    Acute renal failure ([address] 1/21/2011    AMI (acute myocardial infarction) ([address] 2009    Anemia 9/5/2012    Arthritis     Asthma     Asthma exacerbation 11/11/2011    CAD (coronary artery disease)     MI 2009, stents heart 2009    CAD (coronary artery disease), native coronary artery 9/2/2009    Previous stent to [place] with Cypher 3.5x33mm SABA 8/09 9/09 LAD- Xience 2.5x23mm and 3.0x18mm SABA in mid-distal LAD      Cellulitis Bilateral Lower Extremities 1/20/2011    Chest discomfort, tightness, pressure 1/28/2016    Chronic diastolic heart failure ([address] 10/20/2011    Chronic venous insufficiency 10/24/2011    Diabetes ([address]     checks QD, normal 200, no hyposymptoms     Diabetes Mellitus Type II-insulin dependent 9/2/2009    Diverticulitis 1/28/2016    Dyslipidemia 9/2/2009    Edema 1/28/2016    GERD (gastroesophageal reflux disease)     GLAUCOMA     BILATERAL    Heart failure ([address]     Hypertension     Hypokalemia 1/28/2016    Hypoxemia 9/7/2012    Morbid obesity ([address]     Nausea & vomiting     Neuropathy in diabetes ([address] 9/2/2009    NSTEMI (non-ST elevation myocardial infarction) ([address] 9/2/2009    Dr Juan Luis Medina    Obstructive sleep apnea (adult) (pediatric)- probably  11/11/2011    TRISH (obstructive sleep apnea) 9/7/2012    Intolerant of CPAP     Other dyspnea and respiratory abnormality 11/10/2011    Other ill-defined conditions(799.89)     benign tumor in lower abdomen- not removed, diabetic ulcers, edema, neuropathy    Peripheral vascular disease ([address] 10/20/2011    Post PTCA 6/20/2014    PVD (peripheral vascular disease) ([address]     Unspecified anemia 1/23/2011    Unspecified sleep apnea     oxygen at night    Venous stasis of lower extremity 2010    BILATERAL W RECURRENT ADMISSIONS    Volume overload 9/6/2012      Past Surgical History:   Procedure Laterality Date    BREAST SURGERY PROCEDURE UNLISTED  1973    benign tumor left breast    CARDIAC SURG PROCEDURE UNLIST      4 stents most recent 2 yrs ago    HX CHOLECYSTECTOMY      HX COLONOSCOPY      HX GI      PTCA EACH ADDL VESSEL      stentsx3     Family History   Problem Relation Age of Onset    Cancer Father     Lung Disease Father     Cancer Brother     Diabetes Paternal Aunt     Hypertension Maternal Grandmother     Hypertension Paternal Grandmother       Social History     Tobacco Use    Smoking status: Never Smoker    Smokeless tobacco: Never Used Substance Use Topics    Alcohol use: No       Prior to Admission medications    Medication Sig Start Date End Date Taking? Authorizing Provider   bumetanide (BUMEX) 2 mg tablet Take 1 Tab by mouth daily. 11/4/19   Susie Longoria MD   atorvastatin (LIPITOR) 80 mg tablet TAKE 1 TABLET BY MOUTH ONCE DAILY 9/9/19   Susie Longoria MD   metoprolol tartrate (LOPRESSOR) 50 mg tablet Take 1 Tab by mouth two (2) times a day. Patient taking differently: Take 100 mg by mouth two (2) times a day. 5/2/19   Susie Longoria MD   clopidogrel (PLAVIX) 75 mg tab Take 1 Tab by mouth daily. 5/2/19   Susie Longoria MD   DULoxetine (CYMBALTA) 60 mg capsule Take 1 Cap by mouth daily. 5/2/19   Susie Longoria MD   dulaglutide (TRULICITY) 0.99 LE/5.7 mL sub-q pen 0.75 mg by SubCUTAneous route every seven (7) days. Provider, Historical   metOLazone (ZAROXOLYN) 10 mg tablet Take 1 Tab by mouth daily. Patient taking differently: Take 10 mg by mouth daily. Indications: as needed 1/19/18   Susie Longoria MD   aspirin 81 mg chewable tablet Take 1 Tab by mouth daily. 6/12/17   Susie Longoria MD   insulin glargine (TOUJEO SOLOSTAR) 300 unit/mL (1.5 mL) inpn 220 Units by SubCUTAneous route daily. Provider, Historical   acetaminophen (TYLENOL EXTRA STRENGTH) 500 mg tablet Take  by mouth every six (6) hours as needed for Pain. Provider, Historical   insulin glulisine (APIDRA) 100 unit/mL injection 90 Units by SubCUTAneous route three (3) times daily. Provider, Historical   gabapentin (NEURONTIN) 400 mg capsule Take 800 mg by mouth two (2) times a day. Pt states 8 x daily      Other, MD Diego   albuterol (PROVENTIL VENTOLIN) 2.5 mg /3 mL (0.083 %) nebulizer solution 3 mL by Nebulization route three (3) times daily. 11/16/11   Denise Jameson MD   nitroglycerin (NITROSTAT) 0.4 mg SL tablet 0.4 mg by SubLINGual route every five (5) minutes as needed.     Provider, Historical   ergocalciferol (VITAMIN D) 50,000 unit capsule Take 50,000 Units by mouth every seven (7) days. Takes on wed once a month     Provider, Historical   albuterol (PROVENTIL, VENTOLIN) 90 mcg/Actuation inhaler Take 2 Puffs by inhalation every four (4) hours as needed for Wheezing. Dispense: (1) inhaler with no refills. 12/19/10   Farooq Breaux PA     Allergies   Allergen Reactions    Pcn [Penicillins] Hives, Swelling and Myalgia     Can take cephalosporins    Bactrim [Sulfamethoprim] Other (comments)     \"potassium acted up\"    Codeine Nausea Only    Dilaudid [Hydromorphone (Bulk)] Other (comments)    Lortab [Hydrocodone-Acetaminophen] Nausea Only        Review of Systems:  Pertinent items are noted in the History of Present Illness. Lab Results   Component Value Date/Time    Hemoglobin A1c 8.7 (H) 09/30/2011 01:07 PM        Immunization History   Administered Date(s) Administered    (RETIRED) Pneumococcal Vaccine (Unspecified Type) 09/20/2010    Influenza Vaccine Split 09/11/2012       Body mass index is 40.94 kg/m². Counseling regarding nutrition done: Yes Pateint counsled about risks of smoking and cessation      Current medications:  Current Outpatient Medications   Medication Sig Dispense Refill    bumetanide (BUMEX) 2 mg tablet Take 1 Tab by mouth daily. 90 Tab 3    atorvastatin (LIPITOR) 80 mg tablet TAKE 1 TABLET BY MOUTH ONCE DAILY 90 Tab 3    metoprolol tartrate (LOPRESSOR) 50 mg tablet Take 1 Tab by mouth two (2) times a day. (Patient taking differently: Take 100 mg by mouth two (2) times a day.) 180 Tab 3    clopidogrel (PLAVIX) 75 mg tab Take 1 Tab by mouth daily. 90 Tab 3    DULoxetine (CYMBALTA) 60 mg capsule Take 1 Cap by mouth daily. 90 Cap 3    dulaglutide (TRULICITY) 6.56 EK/5.6 mL sub-q pen 0.75 mg by SubCUTAneous route every seven (7) days.  metOLazone (ZAROXOLYN) 10 mg tablet Take 1 Tab by mouth daily. (Patient taking differently: Take 10 mg by mouth daily.  Indications: as needed) 90 Tab 3    aspirin 81 mg chewable tablet Take 1 Tab by mouth daily. 90 Tab 3    insulin glargine (TOUJEO SOLOSTAR) 300 unit/mL (1.5 mL) inpn 220 Units by SubCUTAneous route daily.  acetaminophen (TYLENOL EXTRA STRENGTH) 500 mg tablet Take  by mouth every six (6) hours as needed for Pain.  insulin glulisine (APIDRA) 100 unit/mL injection 90 Units by SubCUTAneous route three (3) times daily.  gabapentin (NEURONTIN) 400 mg capsule Take 800 mg by mouth two (2) times a day. Pt states 8 x daily        albuterol (PROVENTIL VENTOLIN) 2.5 mg /3 mL (0.083 %) nebulizer solution 3 mL by Nebulization route three (3) times daily. 1 Package 0    nitroglycerin (NITROSTAT) 0.4 mg SL tablet 0.4 mg by SubLINGual route every five (5) minutes as needed.  ergocalciferol (VITAMIN D) 50,000 unit capsule Take 50,000 Units by mouth every seven (7) days. Takes on wed once a month       albuterol (PROVENTIL, VENTOLIN) 90 mcg/Actuation inhaler Take 2 Puffs by inhalation every four (4) hours as needed for Wheezing. Dispense: (1) inhaler with no refills. 1 g 0         Objective:     Physical Exam:     Visit Vitals  /77 (BP 1 Location: Right arm)   Pulse 84   Temp 97.6 °F (36.4 °C)   Resp 18   Ht 5' 5\" (1.651 m)   Wt 111.6 kg (246 lb)   BMI 40.94 kg/m²       General: Healthy appearing  Neuro: alert and oriented to person/place/situation. Otherwise nonfocal.  Derm: Normal turgor for age, otherwise per wound exam  HEENT: Normocephalic, atraumatic. EOMI. Conjunctiva clear. No scleral icterus. Neck: Normal range of motion. No masses. Chest: Resp unlabored  Cardio: Regular rate, rhythm  Abdomen: Soft, nontender  Ext. No hemosiderrosis. DP/PT 2+  Psych: cooperative. Pleasant. No anxiety or depression. Normal mood and affect.     Diabetic Foot Ulcer/Neuropathic   Is Wound Greater than 1.0 sq cm ? : Yes  Re-Current Wound with Skin Substitue within Last Year : No  X-Ray in Last 3 Months: No  VIBHA In Last 6 Months : No  Smoking Status: Non- Smoker  Wound Free from Infection : No Culture Done  Is Wound Free of [address] [place] , and / or Bio-Redig: Yes  Malignant Process in Wound : No Biopsy Done  Hemoglobin A1Cin Last 3 Months: No  Type of off Loading: Wheelchair if area off surface of wheelchair  Compression Therapy of 20mm or greater ?: Yes      Assessment:     VSU recurrence. Plan: Will restart LE wraps. Plan to heal and then will observe for period applying faro wraps to ensure durable result.

## 2020-01-17 ENCOUNTER — HOSPITAL ENCOUNTER (OUTPATIENT)
Dept: WOUND CARE | Age: 85
Discharge: HOME | End: 2020-01-17
Attending: SURGERY
Payer: MEDICARE

## 2020-01-17 VITALS
DIASTOLIC BLOOD PRESSURE: 85 MMHG | RESPIRATION RATE: 18 BRPM | SYSTOLIC BLOOD PRESSURE: 151 MMHG | HEART RATE: 72 BPM | TEMPERATURE: 98 F | WEIGHT: 251.6 LBS | HEIGHT: 65 IN | BODY MASS INDEX: 41.92 KG/M2

## 2020-01-17 PROCEDURE — 29580 STRAPPING UNNA BOOT: CPT

## 2020-01-17 NOTE — WOUND CARE
[address]  [address], [address]  Phone: [phone]  Fax: [phone]    Patient: Bianka Pritchett MRN: [id number]  SSN: [id number]    YOB: 1935  Age: 81 y.o. Sex: female       Return Appointment: 2 weeks with Charly Mckenzie MD    Instructions: Bilateral lower legs:  Wash with soap and water. Xeroform- apply to wound beds. Cover with ABD and wrap with calamine 2 layer wraps. Change 2 times weekly. Home health for dressing changes. DO NOT GET WRAPS WET. Should you experience increased redness, swelling, pain, foul odor, size of wound(s), or have a temperature over 101 degrees please contact the [address] [place] [address] Road at [phone] or if after hours contact your primary care physician or go to the hospital emergency department.     Signed By: Velma Yao RN     January 17, 2020

## 2020-01-17 NOTE — WOUND CARE
01/17/20 1458   Wound Leg lower Left   Date First Assessed/Time First Assessed: 01/10/20 1500   Primary Wound Type: Venous  Location: Leg lower  Wound Location Orientation: Left   Dressing Status Clean, dry, and intact   Dressing Type   (xeroform, abd, calamine 2 layer wrap)   Wound Length (cm) 0.1 cm   Wound Width (cm) 0.1 cm   Wound Depth (cm) 0.1 cm   Wound Surface Area (cm^2) 0.01 cm^2   Wound Volume (cm^3) 0 cm^3   Drainage Amount Scant   Drainage Color Serous   Cleansing and Cleansing Agents  Normal saline   Dressing Changed Changed/New   Wound Leg Lower Right   Date First Assessed/Time First Assessed: 08/17/18 1436   POA: Yes  Wound Type: Venous  Location: Leg Lower  Wound Description (Optional): 16  Orientation: Right   Dressing Status  Clean, dry, and intact   Dressing Type    (xeroform, abd, calamine 2 layer wrap)   Wound Length (cm) 0.1 cm   Wound Width (cm) 0.1 cm   Wound Depth (cm) 0.1   Wound Surface area (cm^2) 0.01 cm^2   Change in Wound Size % 96   Drainage Amount  Scant   Drainage Color Serous   Cleansing and Cleansing Agents  Normal saline           Is this patient on anticoagulation therapy? no

## 2020-01-17 NOTE — WOUND CARE
[facility] Application   Below Knee    NAME:  Elly Segovia OF BIRTH:  1935  MEDICAL RECORD NUMBER:  [id number]  DATE:  1/17/2020    Remove old Unna Boot or Boots. Applied moisturizing agent to dry skin as needed. Appied primary and secondary dressing as ordered. Applied Unna roll from toes to knee overlapping each time. Applied ace wrap or coban from toes to below the knee. Secured with tape and/or metal clips covered with tape. Instructed patient/caregiver to keep dressing dry and intact. DO NOT REMOVE DRESSING. Instructed pt/family/caregiver to report excessive draining, loose bandage, wet dressing, severe pain or tingling in toes. Applied [employer] dressing below the knee to bilateral lower legs. Unna Boot(s) were applied per  Guidelines.      Electronically signed by Carito Avendano RN on 1/17/2020 at 3:30 PM

## 2020-01-22 NOTE — PROGRESS NOTES
Wound Center Progress Note    Patient: Matthew Dukes MRN: [id number]  SSN: [id number]    YOB: 1935  Age: 81 y.o. Sex: female      Subjective:     Chief Complaint:  Recurrent Venous leg ulcer BLE    History of Present Illness:       Wound Caused By: venous insufficiency  Associated Signs and Symptoms: drainage, pain  Timing: since June 2018  Quality: wound  Severity: full thickness  Modifying Factors: Dm2, obesity, venous insufficiency  Current Wound Care: Multilayer compression, CHF    3/11/2018 No new issues. Urinary soiling continues to be improved. Left 2nd toe stable. Getting [facility] for wraps  6/14/2019 Continues to do well with dressings but still with substantial drainage. No further UTI issues. 7/19/2019 No new issues. Tolerating dressings. No further soiling.   7/26/2019 Doing well. No new issues. R leg wound not improving. 8/2/2019 Returns for re-eval. Bx proven [place]. Chest lesion benign. 9/27/2019 S/P excision leg lesion and SG margins negative. 10/11/2019 Tolerating wraps. No new issues. Pain controlled. Minimal soiling. 12/06/2019 One month since last seen. No new issues. . No tunnelling. 1/10/2020 Returns after being discharged with new opening. Seen today with daughter. No changes in heart or CHF status. Has had some increased swelling. Has had trouble with LE wraps. 1/17/2020 Doing better since last visit and re-admit. Keeping elevated. No new breakdown. Cardiac and renal status stable.        Past Medical History:   Diagnosis Date    Acute hyponatremia 1/21/2011    Acute on chronic diastolic congestive heart failure ([address] 1/19/2011    Acute renal failure ([address] 1/21/2011    AMI (acute myocardial infarction) ([address] 2009    Anemia 9/5/2012    Arthritis     Asthma     Asthma exacerbation 11/11/2011    CAD (coronary artery disease)     MI 2009, stents heart 2009    CAD (coronary artery disease), native coronary artery 9/2/2009    Previous stent to [facility] with Cypher 3.5x33mm SABA 8/09 9/09 LAD- Xience 2.5x23mm and 3.0x18mm SABA in mid-distal LAD      Cellulitis Bilateral Lower Extremities 1/20/2011    Chest discomfort, tightness, pressure 1/28/2016    Chronic diastolic heart failure ([address] 10/20/2011    Chronic venous insufficiency 10/24/2011    Diabetes ([address]     checks QD, normal 200, no hyposymptoms     Diabetes Mellitus Type II-insulin dependent 9/2/2009    Diverticulitis 1/28/2016    Dyslipidemia 9/2/2009    Edema 1/28/2016    GERD (gastroesophageal reflux disease)     GLAUCOMA     BILATERAL    Heart failure ([address]     Hypertension     Hypokalemia 1/28/2016    Hypoxemia 9/7/2012    Morbid obesity (HCC)     Nausea & vomiting     Neuropathy in diabetes ([address] 9/2/2009    NSTEMI (non-ST elevation myocardial infarction) ([address] 9/2/2009    Dr Pat Estevez    Obstructive sleep apnea (adult) (pediatric)- probably  11/11/2011    TRISH (obstructive sleep apnea) 9/7/2012    Intolerant of CPAP     Other dyspnea and respiratory abnormality 11/10/2011    Other ill-defined conditions(799.89)     benign tumor in lower abdomen- not removed, diabetic ulcers, edema, neuropathy    Peripheral vascular disease ([address] 10/20/2011    Post PTCA 6/20/2014    PVD (peripheral vascular disease) ([address]     Unspecified anemia 1/23/2011    Unspecified sleep apnea     oxygen at night    Venous stasis of lower extremity 2010    BILATERAL W RECURRENT ADMISSIONS    Volume overload 9/6/2012      Past Surgical History:   Procedure Laterality Date    BREAST SURGERY PROCEDURE UNLISTED  1973    benign tumor left breast    CARDIAC SURG PROCEDURE UNLIST      4 stents most recent 2 yrs ago    HX CHOLECYSTECTOMY      HX COLONOSCOPY      HX GI      PTCA EACH ADDL VESSEL      stentsx3     Family History   Problem Relation Age of Onset    Cancer Father     Lung Disease Father     Cancer Brother     Diabetes Paternal Aunt     Hypertension Maternal Grandmother     Hypertension Paternal Grandmother       Social History     Tobacco Use    Smoking status: Never Smoker    Smokeless tobacco: Never Used   Substance Use Topics    Alcohol use: No       Prior to Admission medications    Medication Sig Start Date End Date Taking? Authorizing Provider   bumetanide (BUMEX) 2 mg tablet Take 1 Tab by mouth daily. 11/4/19   Kalani Longoria MD   atorvastatin (LIPITOR) 80 mg tablet TAKE 1 TABLET BY MOUTH ONCE DAILY 9/9/19   Kalani Longoria MD   metoprolol tartrate (LOPRESSOR) 50 mg tablet Take 1 Tab by mouth two (2) times a day. Patient taking differently: Take 100 mg by mouth two (2) times a day. 5/2/19   Kalani Longoria MD   clopidogrel (PLAVIX) 75 mg tab Take 1 Tab by mouth daily. 5/2/19   Kalani Longoria MD   DULoxetine (CYMBALTA) 60 mg capsule Take 1 Cap by mouth daily. 5/2/19   Kalani Longoria MD   dulaglutide (TRULICITY) 2.60 BJ/7.7 mL sub-q pen 0.75 mg by SubCUTAneous route every seven (7) days. Provider, Historical   metOLazone (ZAROXOLYN) 10 mg tablet Take 1 Tab by mouth daily. Patient taking differently: Take 10 mg by mouth daily. Indications: as needed 1/19/18   Kalani Longoria MD   aspirin 81 mg chewable tablet Take 1 Tab by mouth daily. 6/12/17   Kalani Longoria MD   insulin glargine (TOUJEO SOLOSTAR) 300 unit/mL (1.5 mL) inpn 220 Units by SubCUTAneous route daily. Provider, Historical   acetaminophen (TYLENOL EXTRA STRENGTH) 500 mg tablet Take  by mouth every six (6) hours as needed for Pain. Provider, Historical   insulin glulisine (APIDRA) 100 unit/mL injection 90 Units by SubCUTAneous route three (3) times daily. Provider, Historical   gabapentin (NEURONTIN) 400 mg capsule Take 800 mg by mouth two (2) times a day. Pt states 8 x daily      Other, MD Diego   albuterol (PROVENTIL VENTOLIN) 2.5 mg /3 mL (0.083 %) nebulizer solution 3 mL by Nebulization route three (3) times daily.  11/16/11   Jojo Seo MD   nitroglycerin (NITROSTAT) 0.4 mg SL tablet 0.4 mg by SubLINGual route every five (5) minutes as needed. Provider, Historical   ergocalciferol (VITAMIN D) 50,000 unit capsule Take 50,000 Units by mouth every seven (7) days. Takes on wed once a month     Provider, Historical   albuterol (PROVENTIL, VENTOLIN) 90 mcg/Actuation inhaler Take 2 Puffs by inhalation every four (4) hours as needed for Wheezing. Dispense: (1) inhaler with no refills. 12/19/10   Farooq Breaux PA     Allergies   Allergen Reactions    Pcn [Penicillins] Hives, Swelling and Myalgia     Can take cephalosporins    Bactrim [Sulfamethoprim] Other (comments)     \"potassium acted up\"    Codeine Nausea Only    Dilaudid [Hydromorphone (Bulk)] Other (comments)    Lortab [Hydrocodone-Acetaminophen] Nausea Only        Review of Systems:  Pertinent items are noted in the History of Present Illness. Lab Results   Component Value Date/Time    Hemoglobin A1c 8.7 (H) 09/30/2011 01:07 PM        Immunization History   Administered Date(s) Administered    (RETIRED) Pneumococcal Vaccine (Unspecified Type) 09/20/2010    Influenza Vaccine Split 09/11/2012       Body mass index is 41.87 kg/m². Counseling regarding nutrition done: Yes Pateint counsled about risks of smoking and cessation      Current medications:  Current Outpatient Medications   Medication Sig Dispense Refill    bumetanide (BUMEX) 2 mg tablet Take 1 Tab by mouth daily. 90 Tab 3    atorvastatin (LIPITOR) 80 mg tablet TAKE 1 TABLET BY MOUTH ONCE DAILY 90 Tab 3    metoprolol tartrate (LOPRESSOR) 50 mg tablet Take 1 Tab by mouth two (2) times a day. (Patient taking differently: Take 100 mg by mouth two (2) times a day.) 180 Tab 3    clopidogrel (PLAVIX) 75 mg tab Take 1 Tab by mouth daily. 90 Tab 3    DULoxetine (CYMBALTA) 60 mg capsule Take 1 Cap by mouth daily. 90 Cap 3    dulaglutide (TRULICITY) 7.20 LS/5.9 mL sub-q pen 0.75 mg by SubCUTAneous route every seven (7) days.       metOLazone (ZAROXOLYN) 10 mg tablet Take 1 Tab by mouth daily. (Patient taking differently: Take 10 mg by mouth daily. Indications: as needed) 90 Tab 3    aspirin 81 mg chewable tablet Take 1 Tab by mouth daily. 90 Tab 3    insulin glargine (TOUJEO SOLOSTAR) 300 unit/mL (1.5 mL) inpn 220 Units by SubCUTAneous route daily.  acetaminophen (TYLENOL EXTRA STRENGTH) 500 mg tablet Take  by mouth every six (6) hours as needed for Pain.  insulin glulisine (APIDRA) 100 unit/mL injection 90 Units by SubCUTAneous route three (3) times daily.  gabapentin (NEURONTIN) 400 mg capsule Take 800 mg by mouth two (2) times a day. Pt states 8 x daily        albuterol (PROVENTIL VENTOLIN) 2.5 mg /3 mL (0.083 %) nebulizer solution 3 mL by Nebulization route three (3) times daily. 1 Package 0    nitroglycerin (NITROSTAT) 0.4 mg SL tablet 0.4 mg by SubLINGual route every five (5) minutes as needed.  ergocalciferol (VITAMIN D) 50,000 unit capsule Take 50,000 Units by mouth every seven (7) days. Takes on wed once a month       albuterol (PROVENTIL, VENTOLIN) 90 mcg/Actuation inhaler Take 2 Puffs by inhalation every four (4) hours as needed for Wheezing. Dispense: (1) inhaler with no refills. 1 g 0         Objective:     Physical Exam:     Visit Vitals  /85 (BP 1 Location: Right arm)   Pulse 72   Temp 98 °F (36.7 °C)   Resp 18   Ht 5' 5\" (1.651 m)   Wt 114.1 kg (251 lb 9.6 oz)   BMI 41.87 kg/m²       General: Healthy appearing  Neuro: alert and oriented to person/place/situation. Otherwise nonfocal.  Derm: Normal turgor for age, otherwise per wound exam  HEENT: Normocephalic, atraumatic. EOMI. Conjunctiva clear. No scleral icterus. Neck: Normal range of motion. No masses. Chest: Resp unlabored  Cardio: Regular rate, rhythm  Abdomen: Soft, nontender  Ext. No hemosiderrosis. DP/PT 2+  Psych: cooperative. Pleasant. No anxiety or depression. Normal mood and affect. Assessment:     VSU recurrence.     Plan: Continue LE wraps. Plan to heal and then will observe for period applying faro wraps to ensure durable result.

## 2020-01-23 NOTE — WOUND CARE
[facility] Application   Below Knee    NAME:  Rodolph Babinski OF BIRTH:  1935  MEDICAL RECORD NUMBER:  [id number]  DATE:  1/10/2020    Remove old Unna Boot or Boots. Appied primary and secondary dressing as ordered. Applied Unna roll from toes to knee overlapping each time. Applied ace wrap or coban from toes to below the knee. Secured with tape and/or metal clips covered with tape. Instructed patient/caregiver to keep dressing dry and intact. DO NOT REMOVE DRESSING. Instructed pt/family/caregiver to report excessive draining, loose bandage, wet dressing, severe pain or tingling in toes. Applied [employer] dressing below the knee to bilateral lower legs. Unna Boot(s) were applied per  Guidelines.      Electronically signed by Fredy Eastman RN on 1/23/2020 at 9:17 AM

## 2020-01-24 ENCOUNTER — HOSPITAL ENCOUNTER (OUTPATIENT)
Dept: WOUND CARE | Age: 85
End: 2020-01-24
Attending: SURGERY
Payer: MEDICARE

## 2020-02-14 ENCOUNTER — HOSPITAL ENCOUNTER (OUTPATIENT)
Dept: WOUND CARE | Age: 85
Discharge: HOME | End: 2020-02-14
Attending: SURGERY
Payer: MEDICARE

## 2020-02-14 VITALS
TEMPERATURE: 97.7 F | DIASTOLIC BLOOD PRESSURE: 65 MMHG | HEART RATE: 83 BPM | WEIGHT: 251 LBS | HEIGHT: 65 IN | SYSTOLIC BLOOD PRESSURE: 152 MMHG | BODY MASS INDEX: 41.82 KG/M2 | RESPIRATION RATE: 18 BRPM

## 2020-02-14 PROCEDURE — 29580 STRAPPING UNNA BOOT: CPT

## 2020-02-14 NOTE — WOUND CARE
[employer] Application   Below Knee    NAME:  Charli Oseguera OF BIRTH:  1935  MEDICAL RECORD NUMBER:  [id number]  DATE:  2/14/2020    Remove old [employer] or Boots. Appied primary and secondary dressing as ordered. Applied Unna roll from toes to knee overlapping each time. Applied ace wrap or coban from toes to below the knee. Secured with tape and/or metal clips covered with tape. Instructed patient/caregiver to keep dressing dry and intact. DO NOT REMOVE DRESSING. Instructed pt/family/caregiver to report excessive draining, loose bandage, wet dressing, severe pain or tingling in toes. Applied [employer] dressing below the knee to bilateral lower legs. Unna Boot(s) were applied per  Guidelines.      Electronically signed by Rocio Graham PT, [address] [place] [address],[address] [place] [address] on 2/14/2020 at 4:11 PM

## 2020-02-14 NOTE — WOUND CARE
[address]  [address] [facility], [address]  Phone: [phone]  Fax: [phone]    Patient: Bebo Sanchez MRN: [id number]  SSN: [id number]    YOB: 1935  Age: 81 y.o. Sex: female       Return Appointment: 2 weeks with Gopi Romano MD    Instructions: Bilateral lower legs:  Wash with soap and water. Wrap with calamine 2 layer wraps (May use Zinc unna boot if calamine unavailable). Change 2 times weekly. Home health for dressing changes.     DO NOT GET WRAPS WET. Should you experience increased redness, swelling, pain, foul odor, size of wound(s), or have a temperature over 101 degrees please contact the [address] [place] [address] Road at [phone] or if after hours contact your primary care physician or go to the hospital emergency department.     Signed By: Verónica Feldman PT, [facility]     February 14, 2020

## 2020-02-14 NOTE — PROGRESS NOTES
02/14/20 1505   Wound Leg lower Left   Date First Assessed/Time First Assessed: 01/10/20 1500   Primary Wound Type: Venous  Location: Leg lower  Wound Location Orientation: Left   Dressing Status Clean, dry, and intact   Dressing Type   (unna boots bilaterally)   Wound Length (cm) 0 cm   Wound Width (cm) 0 cm   Wound Depth (cm) 0 cm   Wound Surface Area (cm^2) 0 cm^2   Wound Volume (cm^3) 0 cm^3   Drainage Amount None   Wound Odor None   Cleansing and Cleansing Agents  Soap and water   Wound Leg Right   Date First Assessed/Time First Assessed: 08/27/19 0959   Primary Wound Type:  Incision  Location: Leg  Wound Location Orientation: Right   Dressing Status Clean, dry, and intact   Dressing Type   (unna boot bilaterally)   Wound Length (cm) 0 cm   Wound Width (cm) 0 cm   Wound Depth (cm) 0 cm   Wound Surface Area (cm^2) 0 cm^2   Wound Volume (cm^3) 0 cm^3   Drainage Amount None   Wound Odor None   Cleansing and Cleansing Agents  Soap and water   Procedure Tolerated Well           Patient is currently taking Plavix and Aspirin 81 mg

## 2020-02-19 NOTE — PROGRESS NOTES
Wound Center Progress Note    Patient: Jackie Gamboa MRN: [id number]  SSN: [id number]    YOB: 1935  Age: 81 y.o. Sex: female      Subjective:     Chief Complaint:  Recurrent Venous leg ulcer BLE    History of Present Illness:       Wound Caused By: venous insufficiency  Associated Signs and Symptoms: drainage, pain  Timing: since June 2018  Quality: wound  Severity: full thickness  Modifying Factors: Dm2, obesity, venous insufficiency  Current Wound Care: Multilayer compression, CHF    3/11/2018 No new issues. Urinary soiling continues to be improved. Left 2nd toe stable. Getting [place] for wraps  6/14/2019 Continues to do well with dressings but still with substantial drainage. No further UTI issues. 7/19/2019 No new issues. Tolerating dressings. No further soiling.   7/26/2019 Doing well. No new issues. R leg wound not improving. 8/2/2019 Returns for re-eval. Bx proven [place]. Chest lesion benign. 9/27/2019 S/P excision leg lesion and SG margins negative. 10/11/2019 Tolerating wraps. No new issues. Pain controlled. Minimal soiling. 12/06/2019 One month since last seen. No new issues. . No tunnelling. 1/10/2020 Returns after being discharged with new opening. Seen today with daughter. No changes in heart or CHF status. Has had some increased swelling. Has had trouble with LE wraps. 1/17/2020 Doing better since last visit and re-admit. Keeping elevated. No new breakdown. Cardiac and renal status stable. 2/14/2020 No new issues. Swelling stable. No recent CHF or other deterioration. Tolerating Unna without deterioration.         Past Medical History:   Diagnosis Date    Acute hyponatremia 1/21/2011    Acute on chronic diastolic congestive heart failure ([address] 1/19/2011    Acute renal failure ([address] 1/21/2011    AMI (acute myocardial infarction) ([address] 2009    Anemia 9/5/2012    Arthritis     Asthma     Asthma exacerbation 11/11/2011    CAD (coronary artery disease) MI 2009, stents heart 2009    CAD (coronary artery disease), native coronary artery 9/2/2009    Previous stent to dRCA with Cypher 3.5x33mm SABA 8/09 9/09 LAD- Xience 2.5x23mm and 3.0x18mm SABA in mid-distal LAD      Cellulitis Bilateral Lower Extremities 1/20/2011    Chest discomfort, tightness, pressure 1/28/2016    Chronic diastolic heart failure ([address] 10/20/2011    Chronic venous insufficiency 10/24/2011    Diabetes (HCC)     checks QD, normal 200, no hyposymptoms     Diabetes Mellitus Type II-insulin dependent 9/2/2009    Diverticulitis 1/28/2016    Dyslipidemia 9/2/2009    Edema 1/28/2016    GERD (gastroesophageal reflux disease)     GLAUCOMA     BILATERAL    Heart failure ([address]     Hypertension     Hypokalemia 1/28/2016    Hypoxemia 9/7/2012    Morbid obesity (HCC)     Nausea & vomiting     Neuropathy in diabetes ([address] 9/2/2009    NSTEMI (non-ST elevation myocardial infarction) ([address] 9/2/2009    Dr Smooth Moy    Obstructive sleep apnea (adult) (pediatric)- probably  11/11/2011    TRISH (obstructive sleep apnea) 9/7/2012    Intolerant of CPAP     Other dyspnea and respiratory abnormality 11/10/2011    Other ill-defined conditions(799.89)     benign tumor in lower abdomen- not removed, diabetic ulcers, edema, neuropathy    Peripheral vascular disease ([address] 10/20/2011    Post PTCA 6/20/2014    PVD (peripheral vascular disease) ([address]     Unspecified anemia 1/23/2011    Unspecified sleep apnea     oxygen at night    Venous stasis of lower extremity 2010    BILATERAL W RECURRENT ADMISSIONS    Volume overload 9/6/2012      Past Surgical History:   Procedure Laterality Date    BREAST SURGERY PROCEDURE UNLISTED  1973    benign tumor left breast    CARDIAC SURG PROCEDURE UNLIST      4 stents most recent 2 yrs ago    HX CHOLECYSTECTOMY      HX COLONOSCOPY      HX GI      PTCA EACH ADDL VESSEL      stentsx3     Family History   Problem Relation Age of Onset    Cancer Father     Lung Disease Father     Cancer Brother     Diabetes Paternal Aunt     Hypertension Maternal Grandmother     Hypertension Paternal Grandmother       Social History     Tobacco Use    Smoking status: Never Smoker    Smokeless tobacco: Never Used   Substance Use Topics    Alcohol use: No       Prior to Admission medications    Medication Sig Start Date End Date Taking? Authorizing Provider   bumetanide (BUMEX) 2 mg tablet Take 1 Tab by mouth daily. 11/4/19   Genia Longoria MD   atorvastatin (LIPITOR) 80 mg tablet TAKE 1 TABLET BY MOUTH ONCE DAILY 9/9/19   Genia Longoria MD   metoprolol tartrate (LOPRESSOR) 50 mg tablet Take 1 Tab by mouth two (2) times a day. Patient taking differently: Take 100 mg by mouth two (2) times a day. 5/2/19   Genia Longoria MD   clopidogrel (PLAVIX) 75 mg tab Take 1 Tab by mouth daily. 5/2/19   Genia Longoria MD   DULoxetine (CYMBALTA) 60 mg capsule Take 1 Cap by mouth daily. 5/2/19   Genia Longoria MD   dulaglutide (TRULICITY) 1.06 RQ/5.8 mL sub-q pen 0.75 mg by SubCUTAneous route every seven (7) days. Provider, Historical   metOLazone (ZAROXOLYN) 10 mg tablet Take 1 Tab by mouth daily. Patient taking differently: Take 10 mg by mouth daily. Indications: as needed 1/19/18   Genia Longoria MD   aspirin 81 mg chewable tablet Take 1 Tab by mouth daily. 6/12/17   Genia Longoria MD   insulin glargine (TOUJEO SOLOSTAR) 300 unit/mL (1.5 mL) inpn 220 Units by SubCUTAneous route daily. Provider, Historical   acetaminophen (TYLENOL EXTRA STRENGTH) 500 mg tablet Take  by mouth every six (6) hours as needed for Pain. Provider, Historical   insulin glulisine (APIDRA) 100 unit/mL injection 90 Units by SubCUTAneous route three (3) times daily. Provider, Historical   gabapentin (NEURONTIN) 400 mg capsule Take 800 mg by mouth two (2) times a day.  Pt states 8 x daily      Other, MD Diego   albuterol (PROVENTIL VENTOLIN) 2.5 mg /3 mL (0.083 %) nebulizer solution 3 mL by Nebulization route three (3) times daily. 11/16/11   Rachael Soto MD   nitroglycerin (NITROSTAT) 0.4 mg SL tablet 0.4 mg by SubLINGual route every five (5) minutes as needed. Provider, Historical   ergocalciferol (VITAMIN D) 50,000 unit capsule Take 50,000 Units by mouth every seven (7) days. Takes on wed once a month     Provider, Historical   albuterol (PROVENTIL, VENTOLIN) 90 mcg/Actuation inhaler Take 2 Puffs by inhalation every four (4) hours as needed for Wheezing. Dispense: (1) inhaler with no refills. 12/19/10   Kaylan Breaux PA     Allergies   Allergen Reactions    Pcn [Penicillins] Hives, Swelling and Myalgia     Can take cephalosporins    Bactrim [Sulfamethoprim] Other (comments)     \"potassium acted up\"    Codeine Nausea Only    Dilaudid [Hydromorphone (Bulk)] Other (comments)    Lortab [Hydrocodone-Acetaminophen] Nausea Only        Review of Systems:  Pertinent items are noted in the History of Present Illness. Lab Results   Component Value Date/Time    Hemoglobin A1c 8.7 (H) 09/30/2011 01:07 PM        Immunization History   Administered Date(s) Administered    (RETIRED) Pneumococcal Vaccine (Unspecified Type) 09/20/2010    Influenza Vaccine Split 09/11/2012       Body mass index is 41.77 kg/m². Counseling regarding nutrition done: Yes Pateint counsled about risks of smoking and cessation      Current medications:  Current Outpatient Medications   Medication Sig Dispense Refill    bumetanide (BUMEX) 2 mg tablet Take 1 Tab by mouth daily. 90 Tab 3    atorvastatin (LIPITOR) 80 mg tablet TAKE 1 TABLET BY MOUTH ONCE DAILY 90 Tab 3    metoprolol tartrate (LOPRESSOR) 50 mg tablet Take 1 Tab by mouth two (2) times a day. (Patient taking differently: Take 100 mg by mouth two (2) times a day.) 180 Tab 3    clopidogrel (PLAVIX) 75 mg tab Take 1 Tab by mouth daily. 90 Tab 3    DULoxetine (CYMBALTA) 60 mg capsule Take 1 Cap by mouth daily.  90 Cap 3    dulaglutide (TRULICITY) 1.43 XY/8.3 mL sub-q pen 0.75 mg by SubCUTAneous route every seven (7) days.  metOLazone (ZAROXOLYN) 10 mg tablet Take 1 Tab by mouth daily. (Patient taking differently: Take 10 mg by mouth daily. Indications: as needed) 90 Tab 3    aspirin 81 mg chewable tablet Take 1 Tab by mouth daily. 90 Tab 3    insulin glargine (TOUJEO SOLOSTAR) 300 unit/mL (1.5 mL) inpn 220 Units by SubCUTAneous route daily.  acetaminophen (TYLENOL EXTRA STRENGTH) 500 mg tablet Take  by mouth every six (6) hours as needed for Pain.  insulin glulisine (APIDRA) 100 unit/mL injection 90 Units by SubCUTAneous route three (3) times daily.  gabapentin (NEURONTIN) 400 mg capsule Take 800 mg by mouth two (2) times a day. Pt states 8 x daily        albuterol (PROVENTIL VENTOLIN) 2.5 mg /3 mL (0.083 %) nebulizer solution 3 mL by Nebulization route three (3) times daily. 1 Package 0    nitroglycerin (NITROSTAT) 0.4 mg SL tablet 0.4 mg by SubLINGual route every five (5) minutes as needed.  ergocalciferol (VITAMIN D) 50,000 unit capsule Take 50,000 Units by mouth every seven (7) days. Takes on wed once a month       albuterol (PROVENTIL, VENTOLIN) 90 mcg/Actuation inhaler Take 2 Puffs by inhalation every four (4) hours as needed for Wheezing. Dispense: (1) inhaler with no refills. 1 g 0         Objective:     Physical Exam:     Visit Vitals  /65 (BP 1 Location: Right arm)   Pulse 83   Temp 97.7 °F (36.5 °C)   Resp 18   Ht 5' 5\" (1.651 m)   Wt 113.9 kg (251 lb)   BMI 41.77 kg/m²       General: Healthy appearing  Neuro: alert and oriented to person/place/situation. Otherwise nonfocal.  Derm: Normal turgor for age, otherwise per wound exam  HEENT: Normocephalic, atraumatic. EOMI. Conjunctiva clear. No scleral icterus. Neck: Normal range of motion. No masses. Chest: Resp unlabored  Cardio: Regular rate, rhythm  Abdomen: Soft, nontender  Ext. No hemosiderrosis. DP/PT 2+  Psych: cooperative. Pleasant.  No anxiety or depression. Normal mood and affect. Assessment:     VSU recurrence. Plan:     Continue LE wraps. Plan to heal and then will observe for period applying faro wraps to ensure durable result, not ready for faro yet.

## 2020-02-28 ENCOUNTER — HOSPITAL ENCOUNTER (OUTPATIENT)
Dept: WOUND CARE | Age: 85
Discharge: HOME | End: 2020-02-28
Attending: SURGERY
Payer: MEDICARE

## 2020-02-28 VITALS
HEART RATE: 84 BPM | OXYGEN SATURATION: 95 % | TEMPERATURE: 98.1 F | WEIGHT: 247.6 LBS | RESPIRATION RATE: 18 BRPM | DIASTOLIC BLOOD PRESSURE: 64 MMHG | SYSTOLIC BLOOD PRESSURE: 146 MMHG | HEIGHT: 65 IN | BODY MASS INDEX: 41.25 KG/M2

## 2020-02-28 PROCEDURE — 29580 STRAPPING UNNA BOOT: CPT

## 2020-02-28 NOTE — DISCHARGE INSTRUCTIONS
[address]  [address], [address]  Phone: [phone]  Fax: [phone]    Patient: Matthew Dukes MRN: [id number]  SSN: [id number]    YOB: 1935  Age: 81 y.o. Sex: female       Return Appointment: 2 weeks with Ronald Frey MD    Instructions: Bilateral lower legs:  Wash with soap and water. Wrap with calamine 2 layer wraps (May use Zinc unna boot if calamine unavailable). Change 2 times weekly. Home health for dressing changes. Follow up in wound center in 2 weeks      Should you experience increased redness, swelling, pain, foul odor, size of wound(s), or have a temperature over 101 degrees please contact the [address] [place] [address] Road at [phone] or if after hours contact your primary care physician or go to the hospital emergency department.     Signed By: Willow Borrero     February 28, 2020

## 2020-02-28 NOTE — WOUND CARE
02/28/20 1504   Wound Leg lower Left   Date First Assessed/Time First Assessed: 01/10/20 1500   Primary Wound Type: Venous  Location: Leg lower  Wound Location Orientation: Left   Dressing Status Clean, dry, and intact   Dressing Type   (calamine coflex)   Non-staged Wound Description Partial thickness   Wound Length (cm) 0 cm   Wound Width (cm) 0 cm   Wound Depth (cm) 0 cm   Wound Surface Area (cm^2) 0 cm^2   Wound Volume (cm^3) 0 cm^3   Change in Wound Size % 100   Condition of Base Epithelializing   Drainage Amount None   Wound Odor None   Cleansing and Cleansing Agents  Soap and water   Dressing Changed Changed/New   Wound Leg Right   Date First Assessed/Time First Assessed: 08/27/19 0959   Primary Wound Type: Incision  Location: Leg  Wound Location Orientation: Right   Dressing Status Clean, dry, and intact   Dressing Type   (calamine coflex)   Non-staged Wound Description Partial thickness   Wound Length (cm) 0 cm   Wound Width (cm) 0 cm   Wound Depth (cm) 0 cm   Wound Surface Area (cm^2) 0 cm^2   Wound Volume (cm^3) 0 cm^3   Condition of Base Epithelializing   Drainage Amount None   Wound Odor None   Dressing Changed Changed/New           Patient is on an anti coagulant daily ASA81 and plavix.

## 2020-02-28 NOTE — WOUND CARE
[employer] Application   Below Knee    NAME:  Maxi Khalil OF BIRTH:  1935  MEDICAL RECORD NUMBER:  [id number]  DATE:  2/28/2020    Remove old [employer] or Boots. Appied primary and secondary dressing as ordered. Applied Unna roll from toes to knee overlapping each time. Applied ace wrap or coban from toes to below the knee. Secured with tape and/or metal clips covered with tape. Instructed patient/caregiver to keep dressing dry and intact. DO NOT REMOVE DRESSING. Instructed pt/family/caregiver to report excessive draining, loose bandage, wet dressing, severe pain or tingling in toes. Applied [employer] dressing below the knee to bilateral lower legs. Unna Boot(s) were applied per  Guidelines.      Electronically signed by Sina Javier on 2/28/2020 at 3:21 PM

## 2020-03-04 NOTE — PROGRESS NOTES
Wound Center Progress Note    Patient: Alicia Ulrich MRN: [id number]  SSN: [id number]    YOB: 1935  Age: 81 y.o. Sex: female      Subjective:     Chief Complaint:  Recurrent Venous leg ulcer BLE    History of Present Illness:       Wound Caused By: venous insufficiency  Associated Signs and Symptoms: drainage, pain  Timing: since June 2018  Quality: wound  Severity: full thickness  Modifying Factors: Dm2, obesity, venous insufficiency  Current Wound Care: Multilayer compression, CHF    3/11/2018 No new issues. Urinary soiling continues to be improved. Left 2nd toe stable. Getting [place] for wraps  6/14/2019 Continues to do well with dressings but still with substantial drainage. No further UTI issues. 7/19/2019 No new issues. Tolerating dressings. No further soiling.   7/26/2019 Doing well. No new issues. R leg wound not improving. 8/2/2019 Returns for re-eval. Bx proven [place]. Chest lesion benign. 9/27/2019 S/P excision leg lesion and SG margins negative. 10/11/2019 Tolerating wraps. No new issues. Pain controlled. Minimal soiling. 12/06/2019 One month since last seen. No new issues. . No tunnelling. 1/10/2020 Returns after being discharged with new opening. Seen today with daughter. No changes in heart or CHF status. Has had some increased swelling. Has had trouble with LE wraps. 1/17/2020 Doing better since last visit and re-admit. Keeping elevated. No new breakdown. Cardiac and renal status stable. 2/14/2020 No new issues. Swelling stable. No recent CHF or other deterioration. Tolerating Unna without deterioration. 2/28/2020 Continued swelling and some bullae.         Past Medical History:   Diagnosis Date    Acute hyponatremia 1/21/2011    Acute on chronic diastolic congestive heart failure ([address] 1/19/2011    Acute renal failure ([address] 1/21/2011    [facility]) [facility] 2009    Anemia 9/5/2012    Arthritis     Asthma     Asthma exacerbation 11/11/2011    CAD (coronary artery disease)     MI 2009, stents heart 2009    CAD (coronary artery disease), native coronary artery 9/2/2009    Previous stent to dRCA with Cypher 3.5x33mm SABA 8/09 9/09 LAD- Xience 2.5x23mm and 3.0x18mm SABA in mid-distal LAD      Cellulitis Bilateral Lower Extremities 1/20/2011    Chest discomfort, tightness, pressure 1/28/2016    Chronic diastolic heart failure ([address] 10/20/2011    Chronic venous insufficiency 10/24/2011    Diabetes (HCC)     checks QD, normal 200, no hyposymptoms     Diabetes Mellitus Type II-insulin dependent 9/2/2009    Diverticulitis 1/28/2016    Dyslipidemia 9/2/2009    Edema 1/28/2016    GERD (gastroesophageal reflux disease)     GLAUCOMA     BILATERAL    Heart failure ([address]     Hypertension     Hypokalemia 1/28/2016    Hypoxemia 9/7/2012    Morbid obesity (HCC)     Nausea & vomiting     Neuropathy in diabetes ([address] 9/2/2009    NSTEMI (non-ST elevation myocardial infarction) ([address] 9/2/2009    Dr Michael Vega    Obstructive sleep apnea (adult) (pediatric)- probably  11/11/2011    TRISH (obstructive sleep apnea) 9/7/2012    Intolerant of CPAP     Other dyspnea and respiratory abnormality 11/10/2011    Other ill-defined conditions(799.89)     benign tumor in lower abdomen- not removed, diabetic ulcers, edema, neuropathy    Peripheral vascular disease ([address] 10/20/2011    Post PTCA 6/20/2014    PVD (peripheral vascular disease) ([address]     Unspecified anemia 1/23/2011    Unspecified sleep apnea     oxygen at night    Venous stasis of lower extremity 2010    BILATERAL W RECURRENT ADMISSIONS    Volume overload 9/6/2012      Past Surgical History:   Procedure Laterality Date    BREAST SURGERY PROCEDURE UNLISTED  1973    benign tumor left breast    CARDIAC SURG PROCEDURE UNLIST      4 stents most recent 2 yrs ago    HX CHOLECYSTECTOMY      HX COLONOSCOPY      HX GI      PTCA EACH ADDL VESSEL      stentsx3     Family History   Problem Relation Age of Onset    Cancer Father     Lung Disease Father     Cancer Brother     Diabetes Paternal Aunt     Hypertension Maternal Grandmother     Hypertension Paternal Grandmother       Social History     Tobacco Use    Smoking status: Never Smoker    Smokeless tobacco: Never Used   Substance Use Topics    Alcohol use: No       Prior to Admission medications    Medication Sig Start Date End Date Taking? Authorizing Provider   bumetanide (BUMEX) 2 mg tablet Take 1 Tab by mouth daily. 11/4/19   Gustavo Longoria MD   atorvastatin (LIPITOR) 80 mg tablet TAKE 1 TABLET BY MOUTH ONCE DAILY 9/9/19   Gustavo Longoria MD   metoprolol tartrate (LOPRESSOR) 50 mg tablet Take 1 Tab by mouth two (2) times a day. Patient taking differently: Take 100 mg by mouth two (2) times a day. 5/2/19   Gustavo Longoria MD   clopidogrel (PLAVIX) 75 mg tab Take 1 Tab by mouth daily. 5/2/19   Gustavo Longoria MD   DULoxetine (CYMBALTA) 60 mg capsule Take 1 Cap by mouth daily. 5/2/19   Gustavo Longoria MD   dulaglutide (TRULICITY) 2.59 MP/0.9 mL sub-q pen 0.75 mg by SubCUTAneous route every seven (7) days. Provider, Historical   metOLazone (ZAROXOLYN) 10 mg tablet Take 1 Tab by mouth daily. Patient taking differently: Take 10 mg by mouth daily. Indications: as needed 1/19/18   Gustavo Longoria MD   aspirin 81 mg chewable tablet Take 1 Tab by mouth daily. 6/12/17   Gustavo Longoria MD   insulin glargine (TOUJEO SOLOSTAR) 300 unit/mL (1.5 mL) inpn 220 Units by SubCUTAneous route daily. Provider, Historical   acetaminophen (TYLENOL EXTRA STRENGTH) 500 mg tablet Take  by mouth every six (6) hours as needed for Pain. Provider, Historical   insulin glulisine (APIDRA) 100 unit/mL injection 90 Units by SubCUTAneous route three (3) times daily. Provider, Historical   gabapentin (NEURONTIN) 400 mg capsule Take 800 mg by mouth two (2) times a day.  Pt states 8 x daily      Other, MD Diego   albuterol (PROVENTIL VENTOLIN) 2.5 mg /3 mL (0.083 %) nebulizer solution 3 mL by Nebulization route three (3) times daily. 11/16/11   Xi Appiah MD   nitroglycerin (NITROSTAT) 0.4 mg SL tablet 0.4 mg by SubLINGual route every five (5) minutes as needed. Provider, Historical   ergocalciferol (VITAMIN D) 50,000 unit capsule Take 50,000 Units by mouth every seven (7) days. Takes on wed once a month     Provider, Historical   albuterol (PROVENTIL, VENTOLIN) 90 mcg/Actuation inhaler Take 2 Puffs by inhalation every four (4) hours as needed for Wheezing. Dispense: (1) inhaler with no refills. 12/19/10   Heron Breaux PA     Allergies   Allergen Reactions    Pcn [Penicillins] Hives, Swelling and Myalgia     Can take cephalosporins    Bactrim [Sulfamethoprim] Other (comments)     \"potassium acted up\"    Codeine Nausea Only    Dilaudid [Hydromorphone (Bulk)] Other (comments)    Lortab [Hydrocodone-Acetaminophen] Nausea Only        Review of Systems:  Pertinent items are noted in the History of Present Illness. Lab Results   Component Value Date/Time    Hemoglobin A1c 8.7 (H) 09/30/2011 01:07 PM        Immunization History   Administered Date(s) Administered    (RETIRED) Pneumococcal Vaccine (Unspecified Type) 09/20/2010    Influenza Vaccine Split 09/11/2012       Body mass index is 41.2 kg/m². Counseling regarding nutrition done: Yes Pateint counsled about risks of smoking and cessation      Current medications:  Current Outpatient Medications   Medication Sig Dispense Refill    bumetanide (BUMEX) 2 mg tablet Take 1 Tab by mouth daily. 90 Tab 3    atorvastatin (LIPITOR) 80 mg tablet TAKE 1 TABLET BY MOUTH ONCE DAILY 90 Tab 3    metoprolol tartrate (LOPRESSOR) 50 mg tablet Take 1 Tab by mouth two (2) times a day. (Patient taking differently: Take 100 mg by mouth two (2) times a day.) 180 Tab 3    clopidogrel (PLAVIX) 75 mg tab Take 1 Tab by mouth daily.  90 Tab 3    DULoxetine (CYMBALTA) 60 mg capsule Take 1 Cap by mouth daily. 90 Cap 3    dulaglutide (TRULICITY) 6.78 RA/5.3 mL sub-q pen 0.75 mg by SubCUTAneous route every seven (7) days.  metOLazone (ZAROXOLYN) 10 mg tablet Take 1 Tab by mouth daily. (Patient taking differently: Take 10 mg by mouth daily. Indications: as needed) 90 Tab 3    aspirin 81 mg chewable tablet Take 1 Tab by mouth daily. 90 Tab 3    insulin glargine (TOUJEO SOLOSTAR) 300 unit/mL (1.5 mL) inpn 220 Units by SubCUTAneous route daily.  acetaminophen (TYLENOL EXTRA STRENGTH) 500 mg tablet Take  by mouth every six (6) hours as needed for Pain.  insulin glulisine (APIDRA) 100 unit/mL injection 90 Units by SubCUTAneous route three (3) times daily.  gabapentin (NEURONTIN) 400 mg capsule Take 800 mg by mouth two (2) times a day. Pt states 8 x daily        albuterol (PROVENTIL VENTOLIN) 2.5 mg /3 mL (0.083 %) nebulizer solution 3 mL by Nebulization route three (3) times daily. 1 Package 0    nitroglycerin (NITROSTAT) 0.4 mg SL tablet 0.4 mg by SubLINGual route every five (5) minutes as needed.  ergocalciferol (VITAMIN D) 50,000 unit capsule Take 50,000 Units by mouth every seven (7) days. Takes on wed once a month       albuterol (PROVENTIL, VENTOLIN) 90 mcg/Actuation inhaler Take 2 Puffs by inhalation every four (4) hours as needed for Wheezing. Dispense: (1) inhaler with no refills. 1 g 0         Objective:     Physical Exam:     Visit Vitals  /64 (BP 1 Location: Right arm)   Pulse 84   Temp 98.1 °F (36.7 °C)   Resp 18   Ht 5' 5\" (1.651 m)   Wt 112.3 kg (247 lb 9.6 oz)   SpO2 95%   BMI 41.20 kg/m²       General: Healthy appearing  Neuro: alert and oriented to person/place/situation. Otherwise nonfocal.  Derm: Normal turgor for age, otherwise per wound exam  HEENT: Normocephalic, atraumatic. EOMI. Conjunctiva clear. No scleral icterus. Neck: Normal range of motion. No masses. Chest: Resp unlabored  Cardio: Regular rate, rhythm  Abdomen: Soft, nontender  Ext. No hemosiderrosis. DP/PT 2+  Psych: cooperative. Pleasant. No anxiety or depression. Normal mood and affect. Assessment:     VSU recurrence. Plan:     Continue LE wraps. Plan to heal and then will observe for period applying faro wraps to ensure durable result, not ready for faro yet.

## 2020-03-13 ENCOUNTER — HOSPITAL ENCOUNTER (OUTPATIENT)
Dept: WOUND CARE | Age: 85
Discharge: HOME | End: 2020-03-13
Attending: SURGERY
Payer: MEDICARE

## 2020-03-13 VITALS
TEMPERATURE: 98 F | BODY MASS INDEX: 41.15 KG/M2 | DIASTOLIC BLOOD PRESSURE: 76 MMHG | WEIGHT: 247 LBS | HEART RATE: 84 BPM | HEIGHT: 65 IN | SYSTOLIC BLOOD PRESSURE: 126 MMHG | RESPIRATION RATE: 18 BRPM

## 2020-03-13 PROCEDURE — 29580 STRAPPING UNNA BOOT: CPT

## 2020-03-13 NOTE — PROGRESS NOTES
03/13/20 1349   Wound Leg Right   Date First Assessed/Time First Assessed: 08/27/19 0959   Primary Wound Type:  Incision  Location: Leg  Wound Location Orientation: Right   Dressing Status Clean, dry, and intact   Dressing Type   (Bilateral Coflex Calamine wraps)   Wound Length (cm) 0 cm   Wound Width (cm) 0 cm   Wound Depth (cm) 0 cm   Wound Surface Area (cm^2) 0 cm^2   Wound Volume (cm^3) 0 cm^3   Condition of Base Epithelializing   Epithelialization (%) 100   Drainage Amount None   Wound Odor None   [place]-wound Assessment Edema   Cleansing and Cleansing Agents  Soap and water   Dressing Type Applied   (Bilateral Coflex Calamine wraps)   Procedure Tolerated Well   Wound Leg lower Left   Date First Assessed/Time First Assessed: 01/10/20 1500   Primary Wound Type: Venous  Location: Leg lower  Wound Location Orientation: Left   Dressing Status Clean, dry, and intact   Dressing Type   (Bilateral Coflex Calamine wraps)   Wound Length (cm) 0 cm   Wound Width (cm) 0 cm   Wound Depth (cm) 0 cm   Wound Surface Area (cm^2) 0 cm^2   Wound Volume (cm^3) 0 cm^3   Change in Wound Size % 100   Condition of Base Epithelializing   Epithelialization (%) 100   Drainage Amount None   Wound Odor None   Cleansing and Cleansing Agents  Soap and water   Dressing Type Applied   (Bilateral Coflex Calamine wraps)   Procedure Tolerated Well           Patient is currently taking Plavix and Aspirin 81 mg

## 2020-03-13 NOTE — WOUND CARE
[employer] Application   Below Knee    NAME:  Lauren Eduardo OF BIRTH:  1935  MEDICAL RECORD NUMBER:  [id number]  DATE:  3/13/2020    Remove old [place] or Boots. Appied primary and secondary dressing as ordered. Applied Unna roll from toes to knee overlapping each time. Applied ace wrap or coban from toes to below the knee. Secured with tape and/or metal clips covered with tape. Instructed patient/caregiver to keep dressing dry and intact. DO NOT REMOVE DRESSING. Instructed pt/family/caregiver to report excessive draining, loose bandage, wet dressing, severe pain or tingling in toes. Applied [employer] dressing below the knee to bilateral lower legs. Unna Boot(s) were applied per  Guidelines.      Electronically signed by Maya Espinosa PT, [address] [place] [address],[address] [place] [address] on 3/13/2020 at 1:57 PM

## 2020-03-13 NOTE — DISCHARGE INSTRUCTIONS
[address]  [address], [address] [place]  Phone: [phone]  Fax: [phone]    Patient: Davonte Ryan MRN: [id number]  SSN: [id number]    YOB: 1935  Age: 81 y.o. Sex: female       Return Appointment: 1 week with Ora Coughlin MD    Instructions: Bilateral lower legs:   Wash with soap and water. Wrap with calamine 2 layer wraps (May use Zinc unna boot if calamine unavailable). Change dressing at wound center in 1 week. Patient to bring [place] wraps to next appointment. Should you experience increased redness, swelling, pain, foul odor, size of wound(s), or have a temperature over 101 degrees please contact the [address] [place] [address] Road at [phone] or if after hours contact your primary care physician or go to the hospital emergency department.     Signed By: Mercedes Mendez PT, [facility]     March 13, 2020

## 2020-03-18 RX ORDER — NYSTATIN 100000 [USP'U]/G
POWDER TOPICAL 2 TIMES DAILY
Qty: 1 BOTTLE | Refills: 2 | Status: SHIPPED | OUTPATIENT
Start: 2020-03-18 | End: 2020-04-16 | Stop reason: SDUPTHER

## 2020-03-18 NOTE — PROGRESS NOTES
Wound Center Progress Note    Patient: Bianka Pritchett MRN: [id number]  SSN: [id number]    YOB: 1935  Age: 81 y.o. Sex: female      Subjective:     Chief Complaint:  Recurrent Venous leg ulcer BLE    History of Present Illness:       Wound Caused By: venous insufficiency  Associated Signs and Symptoms: drainage, pain  Timing: since June 2018  Quality: wound  Severity: full thickness  Modifying Factors: Dm2, obesity, venous insufficiency  Current Wound Care: Multilayer compression, CHF    3/11/2018 No new issues. Urinary soiling continues to be improved. Left 2nd toe stable. Getting [place] for wraps  6/14/2019 Continues to do well with dressings but still with substantial drainage. No further UTI issues. 7/19/2019 No new issues. Tolerating dressings. No further soiling.   7/26/2019 Doing well. No new issues. R leg wound not improving. 8/2/2019 Returns for re-eval. Bx proven [place]. Chest lesion benign. 9/27/2019 S/P excision leg lesion and SG margins negative. 10/11/2019 Tolerating wraps. No new issues. Pain controlled. Minimal soiling. 12/06/2019 One month since last seen. No new issues. . No tunnelling. 1/10/2020 Returns after being discharged with new opening. Seen today with daughter. No changes in heart or CHF status. Has had some increased swelling. Has had trouble with LE wraps. 1/17/2020 Doing better since last visit and re-admit. Keeping elevated. No new breakdown. Cardiac and renal status stable. 2/14/2020 No new issues. Swelling stable. No recent CHF or other deterioration. Tolerating Unna without deterioration. 3/13/2020 Continued swelling but now bullae. Did not bring wraps today. Some issues at groin with candida.          Past Medical History:   Diagnosis Date    Acute hyponatremia 1/21/2011    Acute on chronic diastolic congestive heart failure ([address] 1/19/2011    Acute renal failure ([address] 1/21/2011    AMI (acute myocardial infarction) ([address] 2009    Anemia 9/5/2012    Arthritis     Asthma     Asthma exacerbation 11/11/2011    CAD (coronary artery disease)     MI 2009, stents heart 2009    CAD (coronary artery disease), native coronary artery 9/2/2009    Previous stent to [place] with Cypher 3.5x33mm SABA 8/09 9/09 LAD- Xience 2.5x23mm and 3.0x18mm SABA in mid-distal LAD      Cellulitis Bilateral Lower Extremities 1/20/2011    Chest discomfort, tightness, pressure 1/28/2016    Chronic diastolic heart failure ([address] 10/20/2011    Chronic venous insufficiency 10/24/2011    Diabetes (HCC)     checks QD, normal 200, no hyposymptoms     Diabetes Mellitus Type II-insulin dependent 9/2/2009    Diverticulitis 1/28/2016    Dyslipidemia 9/2/2009    Edema 1/28/2016    GERD (gastroesophageal reflux disease)     GLAUCOMA     BILATERAL    Heart failure ([address]     Hypertension     Hypokalemia 1/28/2016    Hypoxemia 9/7/2012    Morbid obesity ([facility])     Nausea & vomiting     Neuropathy in diabetes ([address] 9/2/2009    NSTEMI (non-ST elevation myocardial infarction) ([address] 9/2/2009    Dr Cam Garcia    Obstructive sleep apnea (adult) (pediatric)- probably  11/11/2011    TRISH (obstructive sleep apnea) 9/7/2012    Intolerant of CPAP     Other dyspnea and respiratory abnormality 11/10/2011    Other ill-defined conditions(799.89)     benign tumor in lower abdomen- not removed, diabetic ulcers, edema, neuropathy    Peripheral vascular disease ([address] 10/20/2011    Post PTCA 6/20/2014    PVD (peripheral vascular disease) ([address]     Unspecified anemia 1/23/2011    Unspecified sleep apnea     oxygen at night    Venous stasis of lower extremity 2010    BILATERAL W RECURRENT ADMISSIONS    Volume overload 9/6/2012      Past Surgical History:   Procedure Laterality Date    BREAST SURGERY PROCEDURE UNLISTED  1973    benign tumor left breast    CARDIAC SURG PROCEDURE UNLIST      4 stents most recent 2 yrs ago    HX CHOLECYSTECTOMY      HX COLONOSCOPY      HX GI      PTCA [facility] ADDL VESSEL      stentsx3     Family History   Problem Relation Age of Onset    Cancer Father     Lung Disease Father     Cancer Brother     Diabetes Paternal Aunt     Hypertension Maternal Grandmother     Hypertension Paternal Grandmother       Social History     Tobacco Use    Smoking status: Never Smoker    Smokeless tobacco: Never Used   Substance Use Topics    Alcohol use: No       Prior to Admission medications    Medication Sig Start Date End Date Taking? Authorizing Provider   bumetanide (BUMEX) 2 mg tablet Take 1 Tab by mouth daily. 11/4/19  Yes Kalani Longoria MD   atorvastatin (LIPITOR) 80 mg tablet TAKE 1 TABLET BY MOUTH ONCE DAILY 9/9/19  Yes Kalani Longoria MD   metoprolol tartrate (LOPRESSOR) 50 mg tablet Take 1 Tab by mouth two (2) times a day. Patient taking differently: Take 100 mg by mouth two (2) times a day. 5/2/19  Yes Kalani Longoria MD   clopidogrel (PLAVIX) 75 mg tab Take 1 Tab by mouth daily. 5/2/19  Yes Kalani Longoria MD   DULoxetine (CYMBALTA) 60 mg capsule Take 1 Cap by mouth daily. 5/2/19  Yes Kalani Longoria MD   dulaglutide (TRULICITY) 1.57 TE/0.0 mL sub-q pen 0.75 mg by SubCUTAneous route every seven (7) days. Yes Provider, Historical   metOLazone (ZAROXOLYN) 10 mg tablet Take 1 Tab by mouth daily. Patient taking differently: Take 10 mg by mouth daily. Indications: as needed 1/19/18  Yes Kalani Longoria MD   aspirin 81 mg chewable tablet Take 1 Tab by mouth daily. 6/12/17  Yes Kalani Longoria MD   insulin glargine (TOUJEO SOLOSTAR) 300 unit/mL (1.5 mL) inpn 220 Units by SubCUTAneous route daily. Yes Provider, Historical   acetaminophen (TYLENOL EXTRA STRENGTH) 500 mg tablet Take  by mouth every six (6) hours as needed for Pain. Yes Provider, Historical   insulin glulisine (APIDRA) 100 unit/mL injection 90 Units by SubCUTAneous route three (3) times daily.    Yes Provider, Historical   gabapentin (NEURONTIN) 400 mg capsule Take 800 mg by mouth two (2) times a day. Pt states 8 x daily     Yes Other, MD Diego   albuterol (PROVENTIL VENTOLIN) 2.5 mg /3 mL (0.083 %) nebulizer solution 3 mL by Nebulization route three (3) times daily. 11/16/11  Yes Pierce Arceo MD   nitroglycerin (NITROSTAT) 0.4 mg SL tablet 0.4 mg by SubLINGual route every five (5) minutes as needed. Yes Provider, Historical   ergocalciferol (VITAMIN D) 50,000 unit capsule Take 50,000 Units by mouth every seven (7) days. Takes on wed once a month    Yes Provider, Historical   albuterol (PROVENTIL, VENTOLIN) 90 mcg/Actuation inhaler Take 2 Puffs by inhalation every four (4) hours as needed for Wheezing. Dispense: (1) inhaler with no refills. 12/19/10  Yes Michelle Breaux, [address]     Allergies   Allergen Reactions    Pcn [Penicillins] Hives, Swelling and Myalgia     Can take cephalosporins    Bactrim [Sulfamethoprim] Other (comments)     \"potassium acted up\"    Codeine Nausea Only    Dilaudid [Hydromorphone (Bulk)] Other (comments)    Lortab [Hydrocodone-Acetaminophen] Nausea Only        Review of Systems:  Pertinent items are noted in the History of Present Illness. Lab Results   Component Value Date/Time    Hemoglobin A1c 8.7 (H) 09/30/2011 01:07 PM        Immunization History   Administered Date(s) Administered    (RETIRED) Pneumococcal Vaccine (Unspecified Type) 09/20/2010    Influenza Vaccine Split 09/11/2012       Body mass index is 41.1 kg/m². Counseling regarding nutrition done: Yes Pateint counsled about risks of smoking and cessation      Current medications:  Current Outpatient Medications   Medication Sig Dispense Refill    bumetanide (BUMEX) 2 mg tablet Take 1 Tab by mouth daily. 90 Tab 3    atorvastatin (LIPITOR) 80 mg tablet TAKE 1 TABLET BY MOUTH ONCE DAILY 90 Tab 3    metoprolol tartrate (LOPRESSOR) 50 mg tablet Take 1 Tab by mouth two (2) times a day.  (Patient taking differently: Take 100 mg by mouth two (2) times a day.) 180 Tab 3    clopidogrel (PLAVIX) 75 mg tab Take 1 Tab by mouth daily. 90 Tab 3    DULoxetine (CYMBALTA) 60 mg capsule Take 1 Cap by mouth daily. 90 Cap 3    dulaglutide (TRULICITY) 4.03 CF/2.1 mL sub-q pen 0.75 mg by SubCUTAneous route every seven (7) days.  metOLazone (ZAROXOLYN) 10 mg tablet Take 1 Tab by mouth daily. (Patient taking differently: Take 10 mg by mouth daily. Indications: as needed) 90 Tab 3    aspirin 81 mg chewable tablet Take 1 Tab by mouth daily. 90 Tab 3    insulin glargine (TOUJEO SOLOSTAR) 300 unit/mL (1.5 mL) inpn 220 Units by SubCUTAneous route daily.  acetaminophen (TYLENOL EXTRA STRENGTH) 500 mg tablet Take  by mouth every six (6) hours as needed for Pain.  insulin glulisine (APIDRA) 100 unit/mL injection 90 Units by SubCUTAneous route three (3) times daily.  gabapentin (NEURONTIN) 400 mg capsule Take 800 mg by mouth two (2) times a day. Pt states 8 x daily        albuterol (PROVENTIL VENTOLIN) 2.5 mg /3 mL (0.083 %) nebulizer solution 3 mL by Nebulization route three (3) times daily. 1 Package 0    nitroglycerin (NITROSTAT) 0.4 mg SL tablet 0.4 mg by SubLINGual route every five (5) minutes as needed.  ergocalciferol (VITAMIN D) 50,000 unit capsule Take 50,000 Units by mouth every seven (7) days. Takes on wed once a month       albuterol (PROVENTIL, VENTOLIN) 90 mcg/Actuation inhaler Take 2 Puffs by inhalation every four (4) hours as needed for Wheezing. Dispense: (1) inhaler with no refills. 1 g 0         Objective:     Physical Exam:     Visit Vitals  /76 (BP 1 Location: Right arm)   Pulse 84   Temp 98 °F (36.7 °C)   Resp 18   Ht 5' 5\" (1.651 m)   Wt 112 kg (247 lb)   BMI 41.10 kg/m²       General: Healthy appearing  Neuro: alert and oriented to person/place/situation. Otherwise nonfocal.  Derm: Normal turgor for age, otherwise per wound exam  HEENT: Normocephalic, atraumatic. EOMI. Conjunctiva clear. No scleral icterus. Neck: Normal range of motion.  No masses. Chest: Resp unlabored  Cardio: Regular rate, rhythm  Abdomen: Soft, nontender  Ext. No hemosiderrosis. DP/PT 2+  Psych: cooperative. Pleasant. No anxiety or depression. Normal mood and affect. Assessment:     VSU recurrence. Plan:     Continue LE wraps. Plan to heal and then will observe for period applying faro wraps to ensure durable result, not ready for faro yet but will order  Nystatin power to groin. Discussed COVID 19 precautions, guidelines, social distancing, handwashing precautions. Patient in high risk population. Patient can be followed with telehealth if necessary.

## 2020-04-17 ENCOUNTER — HOSPITAL ENCOUNTER (OUTPATIENT)
Dept: WOUND CARE | Age: 85
Discharge: HOME | End: 2020-04-17
Attending: SURGERY
Payer: MEDICARE

## 2020-04-17 PROCEDURE — 99212 OFFICE O/P EST SF 10 MIN: CPT

## 2020-04-17 NOTE — WOUND CARE
04/17/20 1302   Wound Leg lower Left   Date First Assessed/Time First Assessed: 01/10/20 1500   Primary Wound Type: Venous  Location: Leg lower  Wound Location Orientation: Left   Dressing Type   (None)   Wound Length (cm)   (Unable to Measure-Virtual Visit)   Drainage Amount Moderate   Wound Leg Right   Date First Assessed/Time First Assessed: 08/27/19 0959   Primary Wound Type: Incision  Location: Leg  Wound Location Orientation: Right   Dressing Status   (No Dressing)   Dressing Type   (Unable to Measure-Virtual Visit)   Drainage Amount Moderate     Left Leg      Right Leg    Unable to measure wounds-virtual visit  Patient takes Plavix and ASA 81 mg Daily    Virtual Visit-Daughter States that Patient wore compression stockings x1 week and blisters developed. MD saw patient and order for Home Health to start again, will place in [employer] [facility] [employer]. Called and Spoke with [facility]-will plan to start [address] this weekend.  All information faxed to [facility]

## 2020-04-17 NOTE — WOUND CARE
Virtual Visit Wound Care   Clinic Level of Care  NAME:  Yary Hicks OF BIRTH:  1935 GENDER: female  MEDICAL RECORD NUMBER:  [id number]   DATE:  4/17/2020     Patient Type Points   No documentation completed by nursing staff. []   0   Nursing staff documented in the navigator for an ESTABLISHED patient including Episode, Patient ID, Chief Complaint, Travel Screen, Allergies, Latex Allergy, Home Medication, History, Psychosocial Screen, C-SSRS Screen, Fall Risk, Nutritional Screen, Advanced Directive, Education and Plan of Care, and Discharge Instructions. The Functional Screening tab is only required if the patient's status changes. [x]   1   Nursing staff documented in the navigator for a NEW patient including Patient ID, Chief Complaint, Travel Screen, Allergies, Latex Allergy, Home Medication, History, Psychosocial Screen, C-SSRS Screen, Fall Risk, Nutritional Screen, Advanced Directive, Functional Screen, Education and Plan of Care, and Discharge Instructions. []   2   Nursing staff documented in the navigator for a CONSULT patient including Episode, Patient ID, Chief Complaint, Travel Screen, Allergies, Latex Allergy, Home Medication, History, Psychosocial Screen, C-SSRS Screen, Fall Risk, Nutritional Screen, Advanced Directive, Functional Screen, Education and Plan of Care, and Discharge Instructions. []   2     Wound Description Points   Unable to obtain image of Wound. For example, patient/caregiver is instructed not to remove dressing, is unable to correctly position smart phone, no smart phone is available, patient is unable to maintain connectivity or the patient's wound is healed. []   0   1-3 wound images annotated. Images of the wound(s) is obtained and annotated along with completed description in [address] [place] [address]. [x]   1   4-5 wound images annotated. Images of the wound(s) is obtained and annotated along with completed description in [address] [place] [address]. []   2   Greater than 6 wound images annotated. Images of the wound(s) is obtained and annotated along with completed description in [address] [place] [address]. []   3     Education Points   No Education completed by nursing staff.    []   0   Patient/caregiver is educated on 1-4 topics. Nursing staff identifies learner, confirms understanding of information (verbal, demonstration, written) and documents details. May include Discharge Instructions/AVS, available documents in My Chart, or Web-based learning. [x]   1   Patient/caregiver is educated on 5-9 topics. Nursing staff identifies learner, confirms understanding of information (verbal, demonstration, written) and documents details. May include Discharge Instructions/AVS, available documents in My Chart, or Web-based learning. []   2   Patient/caregiver is educated on 10 or more topics. Nursing staff identifies learner, confirms understanding of information (verbal, demonstration, written) and documents details. May include Discharge Instructions/AVS, available documents in My Chart, or Web-based learning. []   3     Follow-up Virtual Visit Points   No contact with outside resources made. []   0   Nursing staff contacts 1-2 outside resource. For example, telephone call made to home health, primary care provider, pharmacy, or DME. May include filling out forms and writing letters, arranging transportation, communication with insurance , vendors, etc.  Discharge, instructions and/or After Visit Summary given to patient/caregiver and instructions completed. [x]   1   Nursing staff contacts 3-4 outside resource. For example, telephone calls made to home health, primary care provider, pharmacy, or DME. May include filling out forms and writing letters, arranging transportation, communication with insurance , vendors, etc.  Discharge, instructions and/or After Visit Summary given to patient/caregiver and instructions completed. []   2   Nursing contacts 5 or more outside resource.  For example, telephone calls made to home health, primary care providers, pharmacy, or DME. May include filling out forms and writing letters, arranging transportation, communication with insurance , vendors, etc.  Discharge, instructions and/or After Visit Summary given to patient/caregiver and instructions completed.    []   3     Is this the Patient's First Visit to the [address] [facility] [address]  No    Is this Patient Established @ [facility]  Yes             Virtual Visit Clinical Level of Care Wound Care      Points  1-3  Level 1 []     Points  4-5  Level 2 [x]     Points  6-7  Level 3 []     Points  8-9  Level 4 []     Points  10-11  Level 5 []       Electronically signed by Nolberto Ceja RN on 4/17/2020 at @NO

## 2020-04-17 NOTE — WOUND CARE
[address]  [address], [address]  Phone: [phone]  Fax: [phone]    Patient: Fadi Roberson MRN: [id number]  SSN: [id number]    YOB: 1935  Age: 81 y.o. Sex: female       Return Appointment: 1 month with Maryjo Lynn MD    Instructions: Bilateral lower legs:   Wash with soap and water. Wrap with calamine 2 layer wraps (May use Zinc unna boot if calamine unavailable). Preferably Calamine or Zinc CoFlex Unna Boot  Change 2x/week    Home Health to See for Wound Care and Assessment    Should you experience increased redness, swelling, pain, foul odor, size of wound(s), or have a temperature over 101 degrees please contact the [address] [place] [address] Road at [phone] or if after hours contact your primary care physician or go to the hospital emergency department.     Signed By: Tanika Barry RN     April 17, 2020

## 2020-04-17 NOTE — DISCHARGE INSTRUCTIONS
[address]  [address], [address] [facility]  Phone: [phone]  Fax: [phone]    Patient: Miranda Shepard MRN: [id number]  SSN: [id number]    YOB: 1935  Age: 81 y.o. Sex: female       Return Appointment: 1 month with Kimmie Modi MD    Instructions: Bilateral lower legs:   Wash with soap and water. Wrap with calamine 2 layer wraps (May use Zinc unna boot if calamine unavailable). Preferably Calamine or Zinc CoFlex Unna Boot  Change 2x/week    Home Health to See for Wound Care and Assessment    Should you experience increased redness, swelling, pain, foul odor, size of wound(s), or have a temperature over 101 degrees please contact the [address] [place] [address] Road at [phone] or if after hours contact your primary care physician or go to the hospital emergency department.     Signed By: Monique Stein RN     April 17, 2020
normal...

## 2020-04-18 ENCOUNTER — TELEPHONE (OUTPATIENT)
Dept: CARDIOLOGY | Age: 85
End: 2020-04-18

## 2020-04-19 NOTE — TELEPHONE ENCOUNTER
Pts daughter - Alfred - called to report that Pt is having increased leg swelling since the Unna boots were removed last week. States Pt had a virtual visit with the wound RN yesterday and was re-referred back to [facility] to have the wraps replaced. Alfred states that this is a constant cycle for Pt. She is followed by Dr. Vanna Desouza in the wound center. Alfred reports her legs are currently red, swollen and painful, She states the wound RN instructed her on how to clean and bandage the legs with gauze and ACE wraps. She reports no obvious purulence but reports Pt have fever 99F today. She states this has happened multiple times previously and Pt has been given PO Keflex with improvement. She is requested Rx called in for Abx for Pt to take until she can be seen by [facility] or Wound [place] on Mon. He has tried to call the [address] and no after hours providers or call service is available. She reports she has tried to call Dr. Vanna Desouza and the office is not taking calls until Mon. She tried to call the [facility] but the referral has not been completed. Thus she called our service as apparently Dr. Berkley Cheng had referred her to the wound center previously. She states she is scared to bring the Pt to the ED due to the current Coronavirus and Pts already poor health and advanced age. Discussed that Cardiology is no the appropriate provider for this as Pt is seen by the wound center and Dr. Vanna Desouza. However, as this is a recurrent problem and was documented 8/2019 by Dr. Vanna Desouza -- will call in Rx for Keflex as per Dr. Luisa Valdez last Rx since no other options available to Pt other than ED. Instructed Star that is legs continue to swell, fever or other s/s of infection worsen Pt needs to go to ED. Star v/u.      Called in to [facility]   [phone]  Keflex 500mg QID x7 days #28 R0      Clarence OLGA AvendanoC

## 2020-04-22 NOTE — PROGRESS NOTES
Wound Center Progress Note    Patient: Fadi Roberson MRN: [id number]  SSN: [id number]    YOB: 1935  Age: 81 y.o. Sex: female      Subjective:     Chief Complaint:  Recurrent Venous leg ulcer BLE    History of Present Illness:       Wound Caused By: venous insufficiency  Associated Signs and Symptoms: drainage, pain  Timing: since June 2018  Quality: wound  Severity: full thickness  Modifying Factors: Dm2, obesity, venous insufficiency  Current Wound Care: Multilayer compression, CHF    3/11/2018 No new issues. Urinary soiling continues to be improved. Left 2nd toe stable. Getting [facility] for wraps  6/14/2019 Continues to do well with dressings but still with substantial drainage. No further UTI issues. 7/19/2019 No new issues. Tolerating dressings. No further soiling.   7/26/2019 Doing well. No new issues. R leg wound not improving. 8/2/2019 Returns for re-eval. Bx proven [place]. Chest lesion benign. 9/27/2019 S/P excision leg lesion and SG margins negative. 10/11/2019 Tolerating wraps. No new issues. Pain controlled. Minimal soiling. 12/06/2019 One month since last seen. No new issues. . No tunnelling. 1/10/2020 Returns after being discharged with new opening. Seen today with daughter. No changes in heart or CHF status. Has had some increased swelling. Has had trouble with LE wraps. 1/17/2020 Doing better since last visit and re-admit. Keeping elevated. No new breakdown. Cardiac and renal status stable. 2/14/2020 No new issues. Swelling stable. No recent CHF or other deterioration. Tolerating Unna without deterioration. 3/13/2020 Continued swelling but now bullae. Did not bring wraps today. Some issues at groin with candida. 4/17/2020 Seen via telehealth today. Increased swelling in legs. Has been trying to use faro wraps but worsening despite. Candida better. Blood sugars have been relatively well controlled.        Past Medical History:   Diagnosis Date    Acute hyponatremia 1/21/2011    Acute on chronic diastolic congestive heart failure ([address] 1/19/2011    Acute renal failure ([address] 1/21/2011    AMI (acute myocardial infarction) ([address] 2009    Anemia 9/5/2012    Arthritis     Asthma     Asthma exacerbation 11/11/2011    CAD (coronary artery disease)     MI 2009, stents heart 2009    CAD (coronary artery disease), native coronary artery 9/2/2009    Previous stent to dRCA with Cypher 3.5x33mm SABA 8/09 9/09 LAD- Xience 2.5x23mm and 3.0x18mm SABA in mid-distal LAD      Cellulitis Bilateral Lower Extremities 1/20/2011    Chest discomfort, tightness, pressure 1/28/2016    Chronic diastolic heart failure ([address] 10/20/2011    Chronic venous insufficiency 10/24/2011    Diabetes (HCC)     checks QD, normal 200, no hyposymptoms     Diabetes Mellitus Type II-insulin dependent 9/2/2009    Diverticulitis 1/28/2016    Dyslipidemia 9/2/2009    Edema 1/28/2016    GERD (gastroesophageal reflux disease)     GLAUCOMA     BILATERAL    Heart failure ([address]     Hypertension     Hypokalemia 1/28/2016    Hypoxemia 9/7/2012    Morbid obesity (HCC)     Nausea & vomiting     Neuropathy in diabetes ([address] 9/2/2009    NSTEMI (non-ST elevation myocardial infarction) ([address] 9/2/2009    Dr Ashley Jeff    Obstructive sleep apnea (adult) (pediatric)- probably  11/11/2011    TRISH (obstructive sleep apnea) 9/7/2012    Intolerant of CPAP     Other dyspnea and respiratory abnormality 11/10/2011    Other ill-defined conditions(799.89)     benign tumor in lower abdomen- not removed, diabetic ulcers, edema, neuropathy    Peripheral vascular disease ([address] 10/20/2011    Post PTCA 6/20/2014    PVD (peripheral vascular disease) ([address]     Unspecified anemia 1/23/2011    Unspecified sleep apnea     oxygen at night    Venous stasis of lower extremity 2010    BILATERAL W RECURRENT ADMISSIONS    Volume overload 9/6/2012      Past Surgical History:   Procedure Laterality Date    BREAST SURGERY PROCEDURE UNLISTED  1973    benign tumor left breast    CARDIAC SURG PROCEDURE UNLIST      4 stents most recent 2 yrs ago    HX CHOLECYSTECTOMY      HX COLONOSCOPY      HX GI      PTCA EACH ADDL VESSEL      stentsx3     Family History   Problem Relation Age of Onset    Cancer Father     Lung Disease Father     Cancer Brother     Diabetes Paternal Aunt     Hypertension Maternal Grandmother     Hypertension Paternal Grandmother       Social History     Tobacco Use    Smoking status: Never Smoker    Smokeless tobacco: Never Used   Substance Use Topics    Alcohol use: No       Prior to Admission medications    Medication Sig Start Date End Date Taking? Authorizing Provider   nitroglycerin (NITROLINGUAL) 400 mcg/spray spray 1 Spray by SubLINGual route every five (5) minutes as needed for Chest Pain. 4/17/20   Lauren Longoria MD   clopidogreL (PLAVIX) 75 mg tab Take 1 Tab by mouth daily. 4/16/20   Lauren Longoria MD   atorvastatin (LIPITOR) 80 mg tablet Take 1 Tab by mouth daily. 4/16/20   Lauren Longoria MD   nystatin (MYCOSTATIN) powder Apply  to affected area two (2) times a day. 4/16/20   Lauren Longoria MD   DULoxetine (CYMBALTA) 60 mg capsule Take 1 Cap by mouth daily. 4/16/20   Lauren Longoria MD   ergocalciferol (Vitamin D2) 1,250 mcg (50,000 unit) capsule Take 1 Cap by mouth every seven (7) days. Takes on wed once a month 4/16/20   Lauren Longoria MD   metoprolol tartrate (LOPRESSOR) 50 mg tablet Take 1 Tab by mouth two (2) times a day. 4/16/20   Lauren Longoria MD   bumetanide (BUMEX) 2 mg tablet Take 1 Tab by mouth daily. 11/4/19   Lauren Longoria MD   dulaglutide (TRULICITY) 7.17 YT/2.1 mL sub-q pen 0.75 mg by SubCUTAneous route every seven (7) days. Provider, Historical   metOLazone (ZAROXOLYN) 10 mg tablet Take 1 Tab by mouth daily. Patient taking differently: Take 10 mg by mouth daily.  Indications: as needed 1/19/18   Lauren Longoria MD aspirin 81 mg chewable tablet Take 1 Tab by mouth daily. 6/12/17   Nessmith, Montel Merlin, MD   insulin glargine (TOUJEO SOLOSTAR) 300 unit/mL (1.5 mL) inpn 220 Units by SubCUTAneous route daily. Provider, Historical   acetaminophen (TYLENOL EXTRA STRENGTH) 500 mg tablet Take  by mouth every six (6) hours as needed for Pain. Provider, Historical   insulin glulisine (APIDRA) 100 unit/mL injection 90 Units by SubCUTAneous route three (3) times daily. Provider, Historical   albuterol (PROVENTIL VENTOLIN) 2.5 mg /3 mL (0.083 %) nebulizer solution 3 mL by Nebulization route three (3) times daily. 11/16/11   Norris Nam MD   nitroglycerin (NITROSTAT) 0.4 mg SL tablet 0.4 mg by SubLINGual route every five (5) minutes as needed. Provider, Historical   albuterol (PROVENTIL, VENTOLIN) 90 mcg/Actuation inhaler Take 2 Puffs by inhalation every four (4) hours as needed for Wheezing. Dispense: (1) inhaler with no refills. 12/19/10   Kimmie Breaux PA     Allergies   Allergen Reactions    Pcn [Penicillins] Hives, Swelling and Myalgia     Can take cephalosporins    Bactrim [Sulfamethoprim] Other (comments)     \"potassium acted up\"    Codeine Nausea Only    Dilaudid [Hydromorphone (Bulk)] Other (comments)    Lortab [Hydrocodone-Acetaminophen] Nausea Only        Review of Systems:  Pertinent items are noted in the History of Present Illness. Lab Results   Component Value Date/Time    Hemoglobin A1c 8.7 (H) 09/30/2011 01:07 PM        Immunization History   Administered Date(s) Administered    (RETIRED) Pneumococcal Vaccine (Unspecified Type) 09/20/2010    Influenza Vaccine Split 09/11/2012       There is no height or weight on file to calculate BMI.     Counseling regarding nutrition done: Yes Pateint counsled about risks of smoking and cessation      Current medications:  Current Outpatient Medications   Medication Sig Dispense Refill    nitroglycerin (NITROLINGUAL) 400 mcg/spray spray 1 Spray by SubLINGual route every five (5) minutes as needed for Chest Pain. 1 Bottle 3    clopidogreL (PLAVIX) 75 mg tab Take 1 Tab by mouth daily. 90 Tab 3    atorvastatin (LIPITOR) 80 mg tablet Take 1 Tab by mouth daily. 90 Tab 3    nystatin (MYCOSTATIN) powder Apply  to affected area two (2) times a day. 1 Bottle 2    DULoxetine (CYMBALTA) 60 mg capsule Take 1 Cap by mouth daily. 90 Cap 3    ergocalciferol (Vitamin D2) 1,250 mcg (50,000 unit) capsule Take 1 Cap by mouth every seven (7) days. Takes on wed once a month 13 Cap 1    metoprolol tartrate (LOPRESSOR) 50 mg tablet Take 1 Tab by mouth two (2) times a day. 180 Tab 3    bumetanide (BUMEX) 2 mg tablet Take 1 Tab by mouth daily. 90 Tab 3    dulaglutide (TRULICITY) 2.68 QC/9.5 mL sub-q pen 0.75 mg by SubCUTAneous route every seven (7) days.  metOLazone (ZAROXOLYN) 10 mg tablet Take 1 Tab by mouth daily. (Patient taking differently: Take 10 mg by mouth daily. Indications: as needed) 90 Tab 3    aspirin 81 mg chewable tablet Take 1 Tab by mouth daily. 90 Tab 3    insulin glargine (TOUJEO SOLOSTAR) 300 unit/mL (1.5 mL) inpn 220 Units by SubCUTAneous route daily.  acetaminophen (TYLENOL EXTRA STRENGTH) 500 mg tablet Take  by mouth every six (6) hours as needed for Pain.  insulin glulisine (APIDRA) 100 unit/mL injection 90 Units by SubCUTAneous route three (3) times daily.  albuterol (PROVENTIL VENTOLIN) 2.5 mg /3 mL (0.083 %) nebulizer solution 3 mL by Nebulization route three (3) times daily. 1 Package 0    nitroglycerin (NITROSTAT) 0.4 mg SL tablet 0.4 mg by SubLINGual route every five (5) minutes as needed.  albuterol (PROVENTIL, VENTOLIN) 90 mcg/Actuation inhaler Take 2 Puffs by inhalation every four (4) hours as needed for Wheezing. Dispense: (1) inhaler with no refills. 1 g 0         Objective:     Physical Exam:     There were no vitals taken for this visit.     General: Healthy appearing  Neuro: alert and oriented to person/place/situation. Otherwise nonfocal.  HEENT: Normocephalic, atraumatic. Neck: Normal range of motion. No masses. Chest: Resp unlabored  Abdomen: Soft, nontender  Ext. No hemosiderrosis. Psych: cooperative. Pleasant. No anxiety or depression. Normal mood and affect. Assessment:     VSU recurrence. Plan:     Some deterioration with faro wraps. Change back to multilayer compression. Start nystatin. Monitor for any fevers, systemic signs of infection.

## 2020-08-10 ENCOUNTER — HOSPITAL ENCOUNTER (OUTPATIENT)
Dept: WOUND CARE | Age: 85
Discharge: HOME | End: 2020-08-10
Attending: SURGERY
Payer: MEDICARE

## 2020-08-10 VITALS
SYSTOLIC BLOOD PRESSURE: 142 MMHG | RESPIRATION RATE: 18 BRPM | DIASTOLIC BLOOD PRESSURE: 81 MMHG | BODY MASS INDEX: 41.15 KG/M2 | WEIGHT: 247 LBS | HEIGHT: 65 IN | TEMPERATURE: 98.2 F | HEART RATE: 73 BPM

## 2020-08-10 PROCEDURE — 87205 SMEAR GRAM STAIN: CPT

## 2020-08-10 PROCEDURE — 29580 STRAPPING UNNA BOOT: CPT

## 2020-08-10 PROCEDURE — 87186 SC STD MICRODIL/AGAR DIL: CPT

## 2020-08-10 PROCEDURE — 87077 CULTURE AEROBIC IDENTIFY: CPT

## 2020-08-10 NOTE — WOUND CARE
[employer] Below Knee NAME:  Sebastian Buchanan YOB: 1935 MEDICAL RECORD NUMBER:  [id number] DATE:  8/10/2020 Remove old Unna Boot or Boots. Applied Unna roll from toes to knee overlapping each time. Applied ace wrap or coban from toes to below the knee. Secured with tape and/or metal clips covered with tape. Instructed patient/caregiver to keep dressing dry and intact. DO NOT REMOVE DRESSING. Instructed pt/family/caregiver to report excessive draining, loose bandage, wet dressing, severe pain or tingling in toes. Applied [employer] dressing below the knee to bilateral lower legs. Unna Boot(s) were applied per  Guidelines.  
 
 Electronically signed by Renee Owens RN on 8/10/2020 at 2:17 PM

## 2020-08-10 NOTE — PROGRESS NOTES
Skin cancer removed by Dr. Eryn Denise subsequently has developed some other problems and has bilateral venous insufficiency and lymphedema and has developed a new wound on her left second toe. These were dressed cultures were taken and she will be seen again in 1 week             Wound Center Progress Note    Patient: Jake Yeung MRN: [id number]  SSN: [id number]    YOB: 1935  Age: 81 y.o. Sex: female      Subjective:     Chief Complaint:  Jake Yeung is a 81 y.o. WHITE OR  female who presents with R lower leg lateral, toes left, 2nd toe wound of 2 weeks duration.     History of Present Illness:     See above note  Wound Caused By: non-healed surgical wound for skin cancer removal  Associated Signs and Symptoms pain and drainage  Timing: Intermittent  Quality: Aching  Severity: 3/10  Modifying Factors: Obesity and venous insufficiency  Current Wound Care see nurses notes  Past Medical History:   Diagnosis Date    Acute hyponatremia 1/21/2011    Acute on chronic diastolic congestive heart failure ([address] 1/19/2011    Acute renal failure ([address] 1/21/2011    AMI (acute myocardial infarction) ([address] 2009    Anemia 9/5/2012    Arthritis     Asthma     Asthma exacerbation 11/11/2011    CAD (coronary artery disease)     MI 2009, stents heart 2009    CAD (coronary artery disease), native coronary artery 9/2/2009    Previous stent to [facility] with Cypher 3.5x33mm SABA 8/09 9/09 LAD- Xience 2.5x23mm and 3.0x18mm SABA in mid-distal LAD      Cellulitis Bilateral Lower Extremities 1/20/2011    Chest discomfort, tightness, pressure 1/28/2016    Chronic diastolic heart failure ([address] 10/20/2011    Chronic venous insufficiency 10/24/2011    Diabetes ([address]     checks QD, normal 200, no hyposymptoms     Diabetes Mellitus Type II-insulin dependent 9/2/2009    Diverticulitis 1/28/2016    Dyslipidemia 9/2/2009    Edema 1/28/2016    GERD (gastroesophageal reflux disease)     GLAUCOMA     BILATERAL    [facility] failure [facility]     Hypertension     Hypokalemia 1/28/2016    Hypoxemia 9/7/2012    Morbid obesity (HCC)     Nausea & vomiting     Neuropathy in diabetes ([facility] 9/2/2009    NSTEMI (non-ST elevation myocardial infarction) ([facility] 9/2/2009    Dr Surekha Schaeffer    Obstructive sleep apnea (adult) (pediatric)- probably  11/11/2011    TRISH (obstructive sleep apnea) 9/7/2012    Intolerant of CPAP     Other dyspnea and respiratory abnormality 11/10/2011    Other ill-defined conditions(799.89)     benign tumor in lower abdomen- not removed, diabetic ulcers, edema, neuropathy    Peripheral vascular disease ([facility] 10/20/2011    Post PTCA 6/20/2014    PVD [facility]) [facility]     Unspecified anemia 1/23/2011    Unspecified sleep apnea     oxygen at night    Venous stasis of lower extremity 2010    BILATERAL W RECURRENT ADMISSIONS    Volume overload 9/6/2012      Past Surgical History:   Procedure Laterality Date    BREAST SURGERY PROCEDURE UNLISTED  1973    benign tumor left breast    CARDIAC SURG PROCEDURE UNLIST      4 stents most recent 2 yrs ago    HX CHOLECYSTECTOMY      HX COLONOSCOPY      HX GI      PTCA EACH ADDL VESSEL      stentsx3     Family History   Problem Relation Age of Onset    Cancer Father     Lung Disease Father     Cancer Brother     Diabetes Paternal Aunt     Hypertension Maternal Grandmother     Hypertension Paternal Grandmother       Social History     Tobacco Use    Smoking status: Never Smoker    Smokeless tobacco: Never Used   Substance Use Topics    Alcohol use: No       Prior to Admission medications    Medication Sig Start Date End Date Taking? Authorizing Provider   clopidogreL (PLAVIX) 75 mg tab Take 1 Tab by mouth daily. 4/16/20  Yes Hermelindo Longoria MD   atorvastatin (LIPITOR) 80 mg tablet Take 1 Tab by mouth daily.  4/16/20  Yes Hermelindo Longoria MD   nystatin (MYCOSTATIN) powder Apply  to affected area two (2) times a day. 4/16/20  Yes Serge Longoria MD   DULoxetine (CYMBALTA) 60 mg capsule Take 1 Cap by mouth daily. 4/16/20  Yes Serge Longoria MD   ergocalciferol (Vitamin D2) 1,250 mcg (50,000 unit) capsule Take 1 Cap by mouth every seven (7) days. Takes on wed once a month 4/16/20  Yes Serge Longoria MD   metoprolol tartrate (LOPRESSOR) 50 mg tablet Take 1 Tab by mouth two (2) times a day. 4/16/20  Yes Serge Longoria MD   bumetanide (BUMEX) 2 mg tablet Take 1 Tab by mouth daily. 11/4/19  Yes Serge Longoria MD   dulaglutide (TRULICITY) 7.35 TO/5.2 mL sub-q pen 0.75 mg by SubCUTAneous route every seven (7) days. Yes Provider, Historical   metOLazone (ZAROXOLYN) 10 mg tablet Take 1 Tab by mouth daily. Patient taking differently: Take 10 mg by mouth daily. Indications: as needed 1/19/18  Yes Serge Longoria MD   aspirin 81 mg chewable tablet Take 1 Tab by mouth daily. 6/12/17  Yes Serge Longoria MD   insulin glargine (TOUJEO SOLOSTAR) 300 unit/mL (1.5 mL) inpn 220 Units by SubCUTAneous route daily. Yes Provider, Historical   acetaminophen (TYLENOL EXTRA STRENGTH) 500 mg tablet Take  by mouth every six (6) hours as needed for Pain. Yes Provider, Historical   insulin glulisine (APIDRA) 100 unit/mL injection 90 Units by SubCUTAneous route three (3) times daily. Yes Provider, Historical   albuterol (PROVENTIL VENTOLIN) 2.5 mg /3 mL (0.083 %) nebulizer solution 3 mL by Nebulization route three (3) times daily. 11/16/11  Yes Scott Maldonado MD   albuterol (PROVENTIL, VENTOLIN) 90 mcg/Actuation inhaler Take 2 Puffs by inhalation every four (4) hours as needed for Wheezing. Dispense: (1) inhaler with no refills. 12/19/10  Yes Abbi Breaux PA   cephALEXin (KEFLEX) 500 mg capsule Take 1 Cap by mouth four (4) times daily. 7/21/20   Serge Longoria MD   nitroglycerin (NITROLINGUAL) 400 mcg/spray spray 1 Spray by SubLINGual route every five (5) minutes as needed for Chest Pain. 4/17/20   Lonny Longoria MD   nitroglycerin (NITROSTAT) 0.4 mg SL tablet 0.4 mg by SubLINGual route every five (5) minutes as needed. Provider, Historical     Allergies   Allergen Reactions    Pcn [Penicillins] Hives, Swelling and Myalgia     Can take cephalosporins    Bactrim [Sulfamethoprim] Other (comments)     \"potassium acted up\"    Codeine Nausea Only    Dilaudid [Hydromorphone (Bulk)] Other (comments)    Lortab [Hydrocodone-Acetaminophen] Nausea Only        Review of Systems:  A comprehensive review of systems was negative except for that written in the History of Present Illness. Lab Results   Component Value Date/Time    Hemoglobin A1c 8.7 (H) 09/30/2011 01:07 PM        Immunization History   Administered Date(s) Administered    (RETIRED) Pneumococcal Vaccine (Unspecified Type) 09/20/2010    Influenza Vaccine Split 09/11/2012       Body mass index is 41.1 kg/m². Counseling regarding nutrition done: No     Current medications:  Current Outpatient Medications   Medication Sig Dispense Refill    clopidogreL (PLAVIX) 75 mg tab Take 1 Tab by mouth daily. 90 Tab 3    atorvastatin (LIPITOR) 80 mg tablet Take 1 Tab by mouth daily. 90 Tab 3    nystatin (MYCOSTATIN) powder Apply  to affected area two (2) times a day. 1 Bottle 2    DULoxetine (CYMBALTA) 60 mg capsule Take 1 Cap by mouth daily. 90 Cap 3    ergocalciferol (Vitamin D2) 1,250 mcg (50,000 unit) capsule Take 1 Cap by mouth every seven (7) days. Takes on wed once a month 13 Cap 1    metoprolol tartrate (LOPRESSOR) 50 mg tablet Take 1 Tab by mouth two (2) times a day. 180 Tab 3    bumetanide (BUMEX) 2 mg tablet Take 1 Tab by mouth daily. 90 Tab 3    dulaglutide (TRULICITY) 4.56 JX/1.7 mL sub-q pen 0.75 mg by SubCUTAneous route every seven (7) days.  metOLazone (ZAROXOLYN) 10 mg tablet Take 1 Tab by mouth daily. (Patient taking differently: Take 10 mg by mouth daily.  Indications: as needed) 90 Tab 3    aspirin 81 mg chewable tablet Take 1 Tab by mouth daily. 90 Tab 3    insulin glargine (TOUJEO SOLOSTAR) 300 unit/mL (1.5 mL) inpn 220 Units by SubCUTAneous route daily.  acetaminophen (TYLENOL EXTRA STRENGTH) 500 mg tablet Take  by mouth every six (6) hours as needed for Pain.  insulin glulisine (APIDRA) 100 unit/mL injection 90 Units by SubCUTAneous route three (3) times daily.  albuterol (PROVENTIL VENTOLIN) 2.5 mg /3 mL (0.083 %) nebulizer solution 3 mL by Nebulization route three (3) times daily. 1 Package 0    albuterol (PROVENTIL, VENTOLIN) 90 mcg/Actuation inhaler Take 2 Puffs by inhalation every four (4) hours as needed for Wheezing. Dispense: (1) inhaler with no refills. 1 g 0    cephALEXin (KEFLEX) 500 mg capsule Take 1 Cap by mouth four (4) times daily. 120 Cap 2    nitroglycerin (NITROLINGUAL) 400 mcg/spray spray 1 Spray by SubLINGual route every five (5) minutes as needed for Chest Pain. 1 Bottle 3    nitroglycerin (NITROSTAT) 0.4 mg SL tablet 0.4 mg by SubLINGual route every five (5) minutes as needed. Objective:     Physical Exam:     Visit Vitals  /81 (BP 1 Location: Right arm, BP Patient Position: Sitting)   Pulse 73   Temp 98.2 °F (36.8 °C)   Resp 18   Ht 5' 5\" (1.651 m)   Wt 112 kg (247 lb)   BMI 41.10 kg/m²       General: well developed, well nourished, pleasant , NAD. Hygiene good  Psych: cooperative. Pleasant. No anxiety or depression. Normal mood and affect. Neuro: alert and oriented to person/place/situation. Otherwise nonfocal.  Derm: Normal turgor for age, dry skin  HEENT: Normocephalic, atraumatic. EOMI. Conjunctiva clear. No scleral icterus. Neck: Normal range of motion. No masses. Chest: Good air entry bilaterally. Respirations nonlabored  Cardio: Normal heart sounds,no rubs, murmurs or gallops  Abdomen: Soft, nontender, nondistended, normoactive bowel sounds  Lower extremities: color normal; temperature normal. Hair growth is not present.  Calves are supple, nontender, approximately equally sized in comparison.  Capillary refill <3 sec            Ulcer Description:   Wound Leg Right (Active)   Dressing Status Clean, dry, and intact 08/10/20 1329   Non-staged Wound Description Partial thickness 08/10/20 1329   Wound Length (cm) 4 cm 08/10/20 1329   Wound Width (cm) 2 cm 08/10/20 1329   Wound Depth (cm) 0.1 cm 08/10/20 1329   Wound Surface Area (cm^2) 8 cm^2 08/10/20 1329   Wound Volume (cm^3) 0.8 cm^3 08/10/20 1329   Condition of Base [place] 08/10/20 1329   Epithelialization (%) 100 03/13/20 1349   Tissue Type Percent Pink 100 08/10/20 1329   Drainage Amount Moderate 08/10/20 1329   Drainage Color Serous 08/10/20 1329   Wound Odor Mild 08/10/20 1329   Cydney-wound Assessment Swelling 08/10/20 1329   Cleansing and Cleansing Agents  Soap and water 08/10/20 1329   Dressing Changed Changed/New 02/28/20 1504   Procedure Tolerated Well 03/13/20 1349   Number of days: 349       Wound Leg lower Left (Active)   Dressing Status Clean, dry, and intact 08/10/20 1329   Non-staged Wound Description Partial thickness 08/10/20 1329   Wound Length (cm) 0 cm 08/10/20 1329   Wound Width (cm) 0 cm 08/10/20 1329   Wound Depth (cm) 0 cm 08/10/20 1329   Wound Surface Area (cm^2) 0 cm^2 08/10/20 1329   Wound Volume (cm^3) 0 cm^3 08/10/20 1329   Change in Wound Size % 100 08/10/20 1329   Condition of Base Epithelializing 03/13/20 1349   Epithelialization (%) 100 03/13/20 1349   Tissue Type Percent Pink 100 08/10/20 1329   Tissue Type Percent Red 100 01/10/20 1458   Drainage Amount Small 08/10/20 1329   Drainage Color Serous 08/10/20 1329   Wound Odor Mild 08/10/20 1329   Cydney-wound Assessment Swelling 08/10/20 1329   Cleansing and Cleansing Agents  Soap and water 08/10/20 1329   Dressing Changed Changed/New 02/28/20 1504   Procedure Tolerated Well 03/13/20 1349   Number of days: 213       Wound Toe (Comment  which one) Anterior left 2nd (Active)   Dressing Status Other (Comment) 08/10/20 1293 Non-staged Wound Description Partial thickness 08/10/20 1329   Wound Length (cm) 0.3 cm 08/10/20 1329   Wound Width (cm) 0.3 cm 08/10/20 1329   Wound Depth (cm) 0.1 cm 08/10/20 1329   Wound Surface Area (cm^2) 0.09 cm^2 08/10/20 1329   Wound Volume (cm^3) 0.01 cm^3 08/10/20 1329   Condition of Base [place] 08/10/20 1329   Tissue Type Percent Pink 100 08/10/20 1329   Drainage Amount Small 08/10/20 1329   Drainage Color Serous 08/10/20 1329   Wound Odor Mild 08/10/20 1329   Cleansing and Cleansing Agents  Soap and water 08/10/20 1329   Number of days: 0       [REMOVED] Wound Leg Medial;Left;Right (Removed)   Number of days: 7217       [REMOVED] Wound Leg Lower Left (Removed)   Dressing Status  Clean, dry, and intact 09/18/18 1407   Dressing Type  Unna boot 09/18/18 1407   Wound Length (cm) 0 cm 09/11/18 1601   Wound Width (cm) 0 cm 09/11/18 1601   Wound Depth (cm) 0 09/11/18 1601   Wound Surface area (cm^2) 0 cm^2 09/11/18 1601   Epithelialization (%) 100 09/11/18 1601   Drainage Amount  None 09/11/18 1601   Wound Odor None 09/11/18 1601   Cleansing and Cleansing Agents  Normal saline; Soap and water 09/11/18 1601   Dressing Type Applied Unna boot 09/11/18 1628   Procedure Tolerated Well 09/11/18 1628   Number of days: 32       [REMOVED] Wound Leg Lower Right (Removed)   Dressing Status  Clean, dry, and intact 01/17/20 1458   Dressing Type  Unna boot 10/05/18 1452   Non-Pressure Injury Full thickness (subcut/muscle) 11/08/19 1513   Wound Length (cm) 0.1 cm 01/17/20 1458   Wound Width (cm) 0.1 cm 01/17/20 1458   Wound Depth (cm) 0.1 01/17/20 1458   Wound Surface area (cm^2) 0.01 cm^2 01/17/20 1458   Change in Wound Size % 96 01/17/20 1458   Condition of Base [place] 10/11/19 1516   Epithelialization (%) 50 01/10/20 1458   Tissue Type Red 08/17/18 1436   Tissue Type Percent Pink 50 01/10/20 1458   Tissue Type Percent Red 100 11/08/19 1513   Drainage Amount  Scant 01/17/20 1458   Drainage Color Serous 01/17/20 1458   Wound Odor None 01/10/20 1458   Periwound Skin Condition Intact 01/10/20 1458   Cleansing and Cleansing Agents  Normal saline 01/17/20 1458   Dressing Type Applied Xeroform;ABD pad;Unna boot 08/31/18 0901   Procedure Tolerated Well 11/08/19 1513   Number of days: 560       [REMOVED] Wound Toe Left (Removed)   Wound Length (cm) 0 cm 11/02/18 1104   Wound Width (cm) 0 cm 11/02/18 1104   Wound Depth (cm) 0 11/02/18 1104   Wound Surface area (cm^2) 0 cm^2 11/02/18 1104   Change in Wound Size % 100 11/02/18 1104   Tissue Type Percent Pink 100 10/19/18 1508   Drainage Amount  None 11/02/18 1104   Drainage Color Serosanguinous 10/19/18 1508   Wound Odor None 11/02/18 1104   Periwound Skin Condition Intact 10/19/18 1508   Cleansing and Cleansing Agents  Normal saline 11/02/18 1104   Procedure Tolerated Well 10/12/18 1623   Number of days: 21       [REMOVED] Wound Toe (specify in comments) Left;Plantar (Removed)   Dressing Status  Clean, dry, and intact 11/02/18 1104   Dressing Type  Adhesive wound dressing (Band-Aid) 11/02/18 1104   Non-Pressure Injury Partial thickness (epider/derm) 10/19/18 1508   Wound Length (cm) 0 cm 11/02/18 1104   Wound Width (cm) 0 cm 11/02/18 1104   Wound Depth (cm) 0 11/02/18 1104   Wound Surface area (cm^2) 0 cm^2 11/02/18 1104   Change in Wound Size % 100 11/02/18 1104   Condition of Base [place] 10/19/18 1508   Drainage Amount  None 11/02/18 1104   Wound Odor None 11/02/18 1104   Periwound Skin Condition Intact 10/19/18 1508   Cleansing and Cleansing Agents  Normal saline 11/02/18 1104   Number of days: 14       [REMOVED] Wound Leg Lower Left;Medial (Removed)   Dressing Status  Clean, dry, and intact 03/01/19 1341   Wound Length (cm) 0 cm 03/01/19 1341   Wound Width (cm) 0 cm 03/01/19 1341   Wound Depth (cm) 0 03/01/19 1341   Wound Surface area (cm^2) 0 cm^2 03/01/19 1341   Change in Wound Size % 100 03/01/19 1341   Condition of Base Epithelializing 03/01/19 1341   Tissue Type Percent Pink 100 03/01/19 1341   Tissue Type Percent Red 100 01/18/19 1317   Drainage Amount  None 03/01/19 1341   Drainage Color Serous 01/18/19 1317   Wound Odor None 03/01/19 1341   Periwound Skin Condition Intact 03/01/19 1341   Cleansing and Cleansing Agents  Soap and water 03/01/19 1341   Number of days: 42       [REMOVED] Wound Leg lower Left (Removed)   Dressing Status Clean, dry, and intact 08/23/19 1452   Wound Length (cm) 0 cm 08/23/19 1452   Wound Width (cm) 0 cm 08/23/19 1452   Wound Depth (cm) 0 cm 08/23/19 1452   Wound Volume (cm^3) 0 cm^3 08/23/19 1452   Condition of Base Epithelializing 08/23/19 1452   Epithelialization (%) 100 08/23/19 1452   Tissue Type Percent Red 100 08/16/19 1427   Drainage Amount None 08/23/19 1452   Drainage Color Serous 08/16/19 1427   Wound Odor None 08/23/19 1452   Cydney-wound Assessment Blanchable erythema 08/23/19 1452   Cleansing and Cleansing Agents  Soap and water 08/23/19 1452   Number of days: 14       [REMOVED] Wound Leg upper Right (Removed)   Dressing Status Clean, dry, and intact 09/13/19 1510   Dressing Type Open to air 09/27/19 1406   Wound Length (cm) 0 cm 09/27/19 1406   Wound Width (cm) 0 cm 09/27/19 1406   Wound Depth (cm) 0 cm 09/27/19 1406   Wound Volume (cm^3) 0 cm^3 09/27/19 1406   Condition of Base Other (comment) 08/30/19 1514   Condition of Edges Closed 09/27/19 1406   Epithelialization (%) 100 09/27/19 1406   Drainage Amount None 09/27/19 1406   Wound Odor None 09/27/19 1406   Cydney-wound Assessment Intact 09/27/19 1406   Cleansing and Cleansing Agents  Normal saline 09/27/19 1406   Dressing Changed Changed/New 09/13/19 1510   Dressing Type Applied Open to air 09/27/19 1406   Procedure Tolerated Well 09/13/19 1510   Number of days: 28         Data Review:   No results found for this or any previous visit (from the past 24 hour(s)). Assessment:     81 y.o. female with R lower leg lateral combined ulcer.     Problem List  Date Reviewed: 7/9/2018          Codes Class Noted Nonrheumatic aortic valve stenosis ICD-10-CM: I35.0  ICD-9-CM: 424.1  5/2/2019    Overview Signed 5/8/2019  6:15 PM by Kaylee Santana MD     Echo (5/3/19):  EF 60-65%. Normal RV. Mild/moderate AS. MG 15. PG 26. Mild MR/TR/AR             Essential hypertension ICD-10-CM: I10  ICD-9-CM: 401.9  5/2/2019        Non-pressure chronic ulcer of left calf, limited to breakdown of skin [facility]) ICD-10-CM: F56.299  ICD-9-CM: 707.12  8/21/2018        Edema ICD-10-CM: R60.9  ICD-9-CM: 782.3  1/28/2016        Diverticulitis ICD-10-CM: K57.92  ICD-9-CM: 562.11  1/28/2016    Overview Signed 1/28/2016 12:10 PM by Cathlyn Fothergill     1. CT of abdomen (7/9/14): Evidence of uncomplicated diverticulitis at the junction of the descending and sigmoid colon. Extensive diverticular are seen with some pericolonic fat stranding. TRISH (obstructive sleep apnea) (Chronic) ICD-10-CM: G47.33  ICD-9-CM: 327.23  9/7/2012    Overview Signed 9/7/2012 11:46 AM by Gonzales Tsang NP     Intolerant of CPAP             Anemia ICD-10-CM: D64.9  ICD-9-CM: 285.9  9/5/2012        Obstructive sleep apnea (adult) (pediatric)- probably  ICD-10-CM: G47.33  ICD-9-CM: 327.23  11/11/2011        Chronic venous insufficiency ICD-10-CM: I87.2  ICD-9-CM: 459.81  10/24/2011        Chronic diastolic heart failure (HCC) (Chronic) ICD-10-CM: I50.32  ICD-9-CM: 428.32  10/20/2011    Overview Signed 1/28/2016 12:05 PM by Cathlyn Fothergill     1. Echo (3/5/10): EF 55%. No wall motion abnormalities. Mild LVH. Mild diastolic dysfunction. Mild bi-atrial enlargement. Mild mitral regurgitation. RVSP 32.    2. Echo (4/26/11): Normal LV systolic function. EF 60%. Mild LVH. Mild diastolic dysfunction. Mild left atrial enlargement. Mild aortic regurgitation. Peripheral vascular disease ([address] (Chronic) ICD-10-CM: I73.9  ICD-9-CM: 443.9  10/20/2011    Overview Signed 1/28/2016 12:08 PM by Cathlyn Fothergill     1. Lower extremity duplex (9/2/10):  Right lower extremity duplex suggested moderate to severe reduction with multilevel disease. Stenotic SFA most pronounced distally. Right VIBHA 0.5. Left VIBHA suggests mild reduction VIBHA of 0.86. GERD (gastroesophageal reflux disease) (Chronic) ICD-10-CM: K21.9  ICD-9-CM: 530.81  10/20/2011        Anemia, unspecified ICD-10-CM: D64.9  ICD-9-CM: 285.9  1/23/2011        Acute hyponatremia ICD-10-CM: E87.1  ICD-9-CM: 276.1  1/21/2011        CAD (coronary artery disease), native coronary artery (Chronic) ICD-10-CM: I25.10  ICD-9-CM: 414.01  9/2/2009    Overview Addendum 6/11/2017  9:05 PM by Davie Mark MD     1. NSTEMI (8/17/09):  EF 60%. PCI of dRCA with  Cypher 3.5x 33 mm SABA. Residual 80-90% LAD stenosis. 2.  Staged PCI of LAD (9/1/09): Xience 2.5x23mm and 3.0x18mm SABA in mid-distal LAD . Adjunctive POBA of D1 and D2.    3.  PCI of mid RCA (6/20/14): Promus 4 x 20 mm SABA (overlapped with previous stent)             Diabetes mellitus, type 2 (HCC) (Chronic) ICD-10-CM: E11.9  ICD-9-CM: 250.00  9/2/2009        Morbid obesity ([address] (Chronic) ICD-10-CM: E66.01  ICD-9-CM: 278.01  9/2/2009        Dyslipidemia (Chronic) ICD-10-CM: E78.5  ICD-9-CM: 272.4  9/2/2009        Neuropathy in diabetes ([address] (Chronic) ICD-10-CM: E11.40  ICD-9-CM: 250.60, 357.2  9/2/2009               Plan:     No orders of the defined types were placed in this encounter. Patient understood and agrees with plan. Questions answered. Follow-up Information    None            Any procedures done today in the [address] [facility] are documented in a separate note in [facility] and made part of this record by reference.      Dictated using voice recognition software; proofread, but unrecognized errors may exist.    Signed By: Yojana Gonzalez MD     August 10, 2020

## 2020-08-10 NOTE — WOUND CARE
08/10/20 1329 Wound Leg lower Left Date First Assessed/Time First Assessed: 01/10/20 1500   Primary Wound Type: Venous  Location: Leg lower  Wound Location Orientation: Left Dressing Status Clean, dry, and intact Dressing Type  
(unna boot) Non-staged Wound Description Partial thickness Wound Length (cm) 0 cm Wound Width (cm) 0 cm Wound Depth (cm) 0 cm Wound Surface Area (cm^2) 0 cm^2 Wound Volume (cm^3) 0 cm^3 Change in Wound Size % 100 Tissue Type Percent Pink 100 Drainage Amount Small Drainage Color Serous Wound Odor Mild Cydney-wound Assessment Swelling Cleansing and Cleansing Agents  Soap and water Wound Leg Right Date First Assessed/Time First Assessed: 08/27/19 0959   Primary Wound Type: Incision  Location: Leg  Wound Location Orientation: Right Dressing Status Clean, dry, and intact Dressing Type  
(unna boot) Non-staged Wound Description Partial thickness Wound Length (cm) 4 cm Wound Width (cm) 2 cm Wound Depth (cm) 0.1 cm Wound Surface Area (cm^2) 8 cm^2 Wound Volume (cm^3) 0.8 cm^3 Condition of Base Pink Tissue Type Percent Pink 100 Drainage Amount Moderate Drainage Color Serous Wound Odor Mild Cydney-wound Assessment Swelling Cleansing and Cleansing Agents  Soap and water Wound Toe (Comment  which one) Anterior left 2nd Date First Assessed/Time First Assessed: 08/10/20 1340   Location: Toe (Comment  which one)  Wound Location Orientation: Anterior  Wound Description: left 2nd Dressing Status Other (Comment) (no dressing) Non-staged Wound Description Partial thickness Wound Length (cm) 0.3 cm Wound Width (cm) 0.3 cm Wound Depth (cm) 0.1 cm Wound Surface Area (cm^2) 0.09 cm^2 Wound Volume (cm^3) 0.01 cm^3 Condition of Base Pink Tissue Type Percent Pink 100 Drainage Amount Small Drainage Color Serous Wound Odor Mild Cleansing and Cleansing Agents  Soap and water Patient is taking Aspirin and Plavix daily

## 2020-08-10 NOTE — DISCHARGE INSTRUCTIONS
[address]  [address], [address]  Phone: [phone]  Fax: [phone]    Patient: Jose Chen MRN: [id number]  SSN: [id number]    YOB: 1935  Age: 81 y.o. Sex: female       Return Appointment: 1 week with Aleja Hightower MD    Instructions:  Bilateral lower legs:   Wash with soap and water. Wrap with calamine 2 layer wraps (May use Zinc unna boot if calamine unavailable). Preferably Calamine or Zinc CoFlex Unna Boot  Change 2x/week     Home Health to See for Wound Care and Assessment    C&S obtained today. Should you experience increased redness, swelling, pain, foul odor, size of wound(s), or have a temperature over 101 degrees please contact the [address] [place] [address] Road at [phone] or if after hours contact your primary care physician or go to the hospital emergency department.     Signed By: Ahsan Mcdonald RN     August 10, 2020

## 2020-08-14 NOTE — WOUND CARE
[address] [place] [address] [place], [address] Phone: [phone] Fax: [phone] Patient: Ra Galeas MRN: [id number]  SSN: [id number] YOB: 1935  Age: 81 y.o. Sex: female Return Appointment: 1 week with Rosemary Mendes MD 
 
Instructions:  Bilateral lower legs:  
Wash with soap and water. Wrap with calamine 2 layer wraps (May use Zinc unna boot if calamine unavailable). Preferably Calamine or Zinc CoFlex [facility] Change 2x/week 
  
Home Health to See for Wound Care and Assessment C&S obtained today. Should you experience increased redness, swelling, pain, foul odor, size of wound(s), or have a temperature over 101 degrees please contact the [address] [place] [address] Road at [phone] or if after hours contact your primary care physician or go to the hospital emergency department. Signed By: Carmelita Roland RN August 14, 2020

## 2020-08-17 ENCOUNTER — HOSPITAL ENCOUNTER (OUTPATIENT)
Dept: WOUND CARE | Age: 85
Discharge: HOME | End: 2020-08-17
Attending: SURGERY
Payer: MEDICARE

## 2020-08-17 ENCOUNTER — HOSPITAL ENCOUNTER (OUTPATIENT)
Dept: LAB | Age: 85
Discharge: HOME | End: 2020-08-17
Payer: MEDICARE

## 2020-08-17 VITALS
TEMPERATURE: 98.4 F | SYSTOLIC BLOOD PRESSURE: 134 MMHG | OXYGEN SATURATION: 97 % | DIASTOLIC BLOOD PRESSURE: 66 MMHG | HEART RATE: 68 BPM | HEIGHT: 65 IN | BODY MASS INDEX: 41.15 KG/M2 | RESPIRATION RATE: 20 BRPM | WEIGHT: 247 LBS

## 2020-08-17 DIAGNOSIS — L97.221 NON-PRESSURE CHRONIC ULCER OF LEFT CALF, LIMITED TO BREAKDOWN OF SKIN (HCC): Primary | ICD-10-CM

## 2020-08-17 DIAGNOSIS — L97.221 NON-PRESSURE CHRONIC ULCER OF LEFT CALF, LIMITED TO BREAKDOWN OF SKIN (HCC): ICD-10-CM

## 2020-08-17 LAB
ANION GAP SERPL CALC-SCNC: 7 MMOL/L (ref 7–16)
BUN SERPL-MCNC: 19 MG/DL (ref 8–23)
CALCIUM SERPL-MCNC: 9.6 MG/DL (ref 8.3–10.4)
CHLORIDE SERPL-SCNC: 106 MMOL/L (ref 98–107)
CO2 SERPL-SCNC: 27 MMOL/L (ref 21–32)
CREAT SERPL-MCNC: 1.01 MG/DL (ref 0.6–1)
ERYTHROCYTE [DISTWIDTH] IN BLOOD BY AUTOMATED COUNT: 16 % (ref 11.9–14.6)
GLUCOSE SERPL-MCNC: 244 MG/DL (ref 65–100)
HCT VFR BLD AUTO: 37.3 % (ref 35.8–46.3)
HGB BLD-MCNC: 10.7 G/DL (ref 11.7–15.4)
MCH RBC QN AUTO: 27.2 PG (ref 26.1–32.9)
MCHC RBC AUTO-ENTMCNC: 28.7 G/DL (ref 31.4–35)
MCV RBC AUTO: 94.7 FL (ref 79.6–97.8)
NRBC # BLD: 0 K/UL (ref 0–0.2)
PLATELET # BLD AUTO: 327 K/UL (ref 150–450)
PMV BLD AUTO: 8.9 FL (ref 9.4–12.3)
POTASSIUM SERPL-SCNC: 4.6 MMOL/L (ref 3.5–5.1)
RBC # BLD AUTO: 3.94 M/UL (ref 4.05–5.2)
SODIUM SERPL-SCNC: 140 MMOL/L (ref 136–145)
WBC # BLD AUTO: 7.4 K/UL (ref 4.3–11.1)

## 2020-08-17 PROCEDURE — 29580 STRAPPING UNNA BOOT: CPT

## 2020-08-17 PROCEDURE — 85027 COMPLETE CBC AUTOMATED: CPT

## 2020-08-17 PROCEDURE — 36415 COLL VENOUS BLD VENIPUNCTURE: CPT

## 2020-08-17 PROCEDURE — 80048 BASIC METABOLIC PNL TOTAL CA: CPT

## 2020-08-17 RX ORDER — NYSTATIN 100000 [USP'U]/G
POWDER TOPICAL 4 TIMES DAILY
Qty: 60 G | Refills: 1 | Status: SHIPPED | OUTPATIENT
Start: 2020-08-17 | End: 2020-08-27

## 2020-08-17 NOTE — DISCHARGE INSTRUCTIONS
Bilateral Lower Legs:   Wash with soap and water. Wrap with calamine 2 layer wraps (May use Zinc unna boot if calamine unavailable). Preferably Calamine or Zinc CoFlex Unna Boot  Change 2x/week     Home Health to See for Wound Care and Assessment     *Dr. Lukasz Najera to order lab work to be completed prior to starting antibiotics. *Dr. Lukasz Najera ordered Nystatin powder for groin.

## 2020-08-17 NOTE — PROGRESS NOTES
Her last culture was multiple organisms and all of them were sensitive to Bactrim but I do not know what her renal function is and therefore I will order a basic metabolic profile or CBC and we will prescribe her antibiotics as soon as we get that result. Her wounds look better but her legs are both red below the knee and show evidence of continued infection    She also has a perineal yeast infection which has not been responding to Diflucan I prescribed a statin powder. Wound Center Progress Note    Patient: Juanito Weaver MRN: [id number]  SSN: [id number]    YOB: 1935  Age: 81 y.o. Sex: female      Subjective:     Chief Complaint:  Juanito Weaver is a 81 y.o. WHITE OR  female who presents with both legs below the knee wound of {lengthy duration.     History of Present Illness:     See above note  Wound Caused By: chronic pressure/irritation due to venous insufficiency  Associated Signs and Symptoms: Pain and drainage  Timing: Intermittent  Quality: Burning  Severity: 3/10  Modifying Factors: Obesity and venous insufficiency  Current Wound Care: See nurses notes  Past Medical History:   Diagnosis Date    Acute hyponatremia 1/21/2011    Acute on chronic diastolic congestive heart failure ([address] 1/19/2011    Acute renal failure ([address] 1/21/2011    AMI (acute myocardial infarction) ([address] 2009    Anemia 9/5/2012    Arthritis     Asthma     Asthma exacerbation 11/11/2011    CAD (coronary artery disease)     MI 2009, stents heart 2009    CAD (coronary artery disease), native coronary artery 9/2/2009    Previous stent to dRCA with Cypher 3.5x33mm SABA 8/09 9/09 LAD- Xience 2.5x23mm and 3.0x18mm SABA in mid-distal LAD      Cellulitis Bilateral Lower Extremities 1/20/2011    Chest discomfort, tightness, pressure 1/28/2016    Chronic diastolic heart failure ([address] 10/20/2011    Chronic venous insufficiency 10/24/2011    Diabetes (HCC)     checks QD, normal 200, no hyposymptoms     Diabetes Mellitus Type II-insulin dependent 9/2/2009    Diverticulitis 1/28/2016    Dyslipidemia 9/2/2009    Edema 1/28/2016    GERD (gastroesophageal reflux disease)     GLAUCOMA     BILATERAL    Heart failure ([address]     Hypertension     Hypokalemia 1/28/2016    Hypoxemia 9/7/2012    Morbid obesity ([address]     Nausea & vomiting     Neuropathy in diabetes ([address] 9/2/2009    NSTEMI (non-ST elevation myocardial infarction) ([address] 9/2/2009    Dr Stephani Cheatham    Obstructive sleep apnea (adult) (pediatric)- probably  11/11/2011    TRISH (obstructive sleep apnea) 9/7/2012    Intolerant of CPAP     Other dyspnea and respiratory abnormality 11/10/2011    Other ill-defined conditions(799.89)     benign tumor in lower abdomen- not removed, diabetic ulcers, edema, neuropathy    Peripheral vascular disease ([address] 10/20/2011    Post PTCA 6/20/2014    PVD [facility]per[facility]     Unspecified anemia 1/23/2011    Unspecified sleep apnea     oxygen at night    Venous stasis of lower extremity 2010    BILATERAL W RECURRENT ADMISSIONS    Volume overload 9/6/2012      Past Surgical History:   Procedure Laterality Date    BREAST SURGERY PROCEDURE UNLISTED  1973    benign tumor left breast    CARDIAC SURG PROCEDURE UNLIST      4 stents most recent 2 yrs ago    HX CHOLECYSTECTOMY      HX COLONOSCOPY      HX GI      PTCA EACH ADDL VESSEL      stentsx3     Family History   Problem Relation Age of Onset    Cancer Father     Lung Disease Father     Cancer Brother     Diabetes Paternal Aunt     Hypertension Maternal Grandmother     Hypertension Paternal Grandmother       Social History     Tobacco Use    Smoking status: Never Smoker    Smokeless tobacco: Never Used   Substance Use Topics    Alcohol use: No       Prior to Admission medications    Medication Sig Start Date End Date Taking?  Authorizing Provider   nitroglycerin (NITROLINGUAL) 400 mcg/spray spray 1 Spray by SubLINGual route every five (5) minutes as needed for Chest Pain. 4/17/20   Nessmith, Doran Najjar, MD   clopidogreL (PLAVIX) 75 mg tab Take 1 Tab by mouth daily. 4/16/20   Nessmith, Doran Najjar, MD   atorvastatin (LIPITOR) 80 mg tablet Take 1 Tab by mouth daily. 4/16/20   Nessmith, Doran Najjar, MD   nystatin (MYCOSTATIN) powder Apply  to affected area two (2) times a day. 4/16/20   Nessmith, Doran Najjar, MD   DULoxetine (CYMBALTA) 60 mg capsule Take 1 Cap by mouth daily. 4/16/20   Nessmith, Doran Najjar, MD   ergocalciferol (Vitamin D2) 1,250 mcg (50,000 unit) capsule Take 1 Cap by mouth every seven (7) days. Takes on wed once a month 4/16/20   Nessmith, Doran Najjar, MD   metoprolol tartrate (LOPRESSOR) 50 mg tablet Take 1 Tab by mouth two (2) times a day. 4/16/20   Nessmith, Doran Najjar, MD   bumetanide (BUMEX) 2 mg tablet Take 1 Tab by mouth daily. 11/4/19   Nessmith, Doran Najjar, MD   dulaglutide (TRULICITY) 5.91 EL/3.3 mL sub-q pen 0.75 mg by SubCUTAneous route every seven (7) days. Provider, Historical   metOLazone (ZAROXOLYN) 10 mg tablet Take 1 Tab by mouth daily. Patient taking differently: Take 10 mg by mouth daily. Indications: as needed 1/19/18   Nessmith, Doran Najjar, MD   aspirin 81 mg chewable tablet Take 1 Tab by mouth daily. 6/12/17   Nessmith, Doran Najjar, MD   insulin glargine (TOUJEO SOLOSTAR) 300 unit/mL (1.5 mL) inpn 220 Units by SubCUTAneous route daily. Provider, Historical   acetaminophen (TYLENOL EXTRA STRENGTH) 500 mg tablet Take  by mouth every six (6) hours as needed for Pain. Provider, Historical   insulin glulisine (APIDRA) 100 unit/mL injection 90 Units by SubCUTAneous route three (3) times daily. Provider, Historical   albuterol (PROVENTIL VENTOLIN) 2.5 mg /3 mL (0.083 %) nebulizer solution 3 mL by Nebulization route three (3) times daily. 11/16/11   Larry Magdaleno MD   nitroglycerin (NITROSTAT) 0.4 mg SL tablet 0.4 mg by SubLINGual route every five (5) minutes as needed.     Provider, Historical albuterol (PROVENTIL, VENTOLIN) 90 mcg/Actuation inhaler Take 2 Puffs by inhalation every four (4) hours as needed for Wheezing. Dispense: (1) inhaler with no refills. 12/19/10   Pricila Breaux PA     Allergies   Allergen Reactions    Pcn [Penicillins] Hives, Swelling and Myalgia     Can take cephalosporins    Bactrim [Sulfamethoprim] Other (comments)     \"potassium acted up\"    Codeine Nausea Only    Dilaudid [Hydromorphone (Bulk)] Other (comments)    Lortab [Hydrocodone-Acetaminophen] Nausea Only        Review of Systems:  A comprehensive review of systems was negative except for that written in the History of Present Illness. Lab Results   Component Value Date/Time    Hemoglobin A1c 8.7 (H) 09/30/2011 01:07 PM        Immunization History   Administered Date(s) Administered    (RETIRED) Pneumococcal Vaccine (Unspecified Type) 09/20/2010    Influenza Vaccine Split 09/11/2012       Body mass index is 41.1 kg/m². Counseling regarding nutrition done: No     Current medications:  Current Outpatient Medications   Medication Sig Dispense Refill    nitroglycerin (NITROLINGUAL) 400 mcg/spray spray 1 Spray by SubLINGual route every five (5) minutes as needed for Chest Pain. 1 Bottle 3    clopidogreL (PLAVIX) 75 mg tab Take 1 Tab by mouth daily. 90 Tab 3    atorvastatin (LIPITOR) 80 mg tablet Take 1 Tab by mouth daily. 90 Tab 3    nystatin (MYCOSTATIN) powder Apply  to affected area two (2) times a day. 1 Bottle 2    DULoxetine (CYMBALTA) 60 mg capsule Take 1 Cap by mouth daily. 90 Cap 3    ergocalciferol (Vitamin D2) 1,250 mcg (50,000 unit) capsule Take 1 Cap by mouth every seven (7) days. Takes on wed once a month 13 Cap 1    metoprolol tartrate (LOPRESSOR) 50 mg tablet Take 1 Tab by mouth two (2) times a day. 180 Tab 3    bumetanide (BUMEX) 2 mg tablet Take 1 Tab by mouth daily. 90 Tab 3    dulaglutide (TRULICITY) 1.53 EE/5.5 mL sub-q pen 0.75 mg by SubCUTAneous route every seven (7) days.       metOLazone (ZAROXOLYN) 10 mg tablet Take 1 Tab by mouth daily. (Patient taking differently: Take 10 mg by mouth daily. Indications: as needed) 90 Tab 3    aspirin 81 mg chewable tablet Take 1 Tab by mouth daily. 90 Tab 3    insulin glargine (TOUJEO SOLOSTAR) 300 unit/mL (1.5 mL) inpn 220 Units by SubCUTAneous route daily.  acetaminophen (TYLENOL EXTRA STRENGTH) 500 mg tablet Take  by mouth every six (6) hours as needed for Pain.  insulin glulisine (APIDRA) 100 unit/mL injection 90 Units by SubCUTAneous route three (3) times daily.  albuterol (PROVENTIL VENTOLIN) 2.5 mg /3 mL (0.083 %) nebulizer solution 3 mL by Nebulization route three (3) times daily. 1 Package 0    nitroglycerin (NITROSTAT) 0.4 mg SL tablet 0.4 mg by SubLINGual route every five (5) minutes as needed.  albuterol (PROVENTIL, VENTOLIN) 90 mcg/Actuation inhaler Take 2 Puffs by inhalation every four (4) hours as needed for Wheezing. Dispense: (1) inhaler with no refills. 1 g 0         Objective:     Physical Exam:     Visit Vitals  /66 (BP 1 Location: Left arm, BP Patient Position: At rest;Sitting)   Pulse 68   Temp 98.4 °F (36.9 °C)   Resp 20   Ht 5' 5\" (1.651 m)   Wt 112 kg (247 lb)   SpO2 97%   BMI 41.10 kg/m²       General: well developed, well nourished, pleasant , NAD. Hygiene good  Psych: cooperative. Pleasant. No anxiety or depression. Normal mood and affect. Neuro: alert and oriented to person/place/situation. Otherwise nonfocal.  Derm: Normal turgor for age, dry skin  HEENT: Normocephalic, atraumatic. EOMI. Conjunctiva clear. No scleral icterus. Neck: Normal range of motion. No masses. Chest: Good air entry bilaterally. Respirations nonlabored  Cardio: Normal heart sounds,no rubs, murmurs or gallops  Abdomen: Soft, nontender, nondistended, normoactive bowel sounds  Lower extremities: color normal; temperature normal. Hair growth is not present.  Calves are supple, nontender, approximately equally sized in comparison.  Capillary refill <3 sec            Ulcer Description:   Wound Leg Right (Active)   Dressing Status Old drainage 08/17/20 1328   Non-staged Wound Description Partial thickness 08/17/20 1328   Wound Length (cm) 3 cm 08/17/20 1328   Wound Width (cm) 0.5 cm 08/17/20 1328   Wound Depth (cm) 0.1 cm 08/17/20 1328   Wound Surface Area (cm^2) 1.5 cm^2 08/17/20 1328   Wound Volume (cm^3) 0.15 cm^3 08/17/20 1328   Condition of Base Granulation;Slough 08/17/20 1328   Epithelialization (%) 100 03/13/20 1349   Assessment Edema 08/17/20 1328   Tissue Type Percent Pink 100 08/10/20 1329   Tissue Type Percent Red 50 08/17/20 1328   Tissue Type Percent Yellow 50 08/17/20 1328   Drainage Amount Moderate 08/17/20 1328   Drainage Color Serosanguinous 08/17/20 1328   Wound Odor Mild 08/10/20 1329   Cydney-wound Assessment Swelling;Blanchable erythema 08/17/20 1328   Cleansing and Cleansing Agents  Soap and water 08/17/20 1328   Dressing Changed Changed/New 08/17/20 1328   Procedure Tolerated Well 03/13/20 1349   Number of days: 356       Wound Leg lower Left (Active)   Dressing Status Old drainage 08/17/20 1328   Non-staged Wound Description Partial thickness 08/17/20 1328   Wound Length (cm) 0.5 cm 08/17/20 1328   Wound Width (cm) 0.5 cm 08/17/20 1328   Wound Depth (cm) 0.2 cm 08/17/20 1328   Wound Surface Area (cm^2) 0.25 cm^2 08/17/20 1328   Wound Volume (cm^3) 0.05 cm^3 08/17/20 1328   Change in Wound Size % 98.66 08/17/20 1328   Condition of Base Granulation 08/17/20 1328   Condition of Edges Open 08/17/20 1328   Epithelialization (%) 100 03/13/20 1349   Assessment Edema 08/17/20 1328   Tissue Type Percent Pink 100 08/10/20 1329   Tissue Type Percent Red 100 08/17/20 1328   Drainage Amount Small 08/17/20 1328   Drainage Color Serosanguinous 08/17/20 1328   Wound Odor None 08/17/20 1328   Cydney-wound Assessment Edema;Blanchable erythema 08/17/20 1328   Cleansing and Cleansing Agents  Soap and water 08/17/20 1328   Dressing Changed Changed/New 08/17/20 1328   Procedure Tolerated Well 03/13/20 1349   Number of days: 220       Wound Toe (Comment  which one) Anterior left 2nd (Active)   Dressing Status Dry 08/17/20 1328   Non-staged Wound Description Partial thickness 08/17/20 1328   Wound Length (cm) 0.3 cm 08/17/20 1328   Wound Width (cm) 0.3 cm 08/17/20 1328   Wound Depth (cm) 0.1 cm 08/17/20 1328   Wound Surface Area (cm^2) 0.09 cm^2 08/17/20 1328   Wound Volume (cm^3) 0.01 cm^3 08/17/20 1328   Change in Wound Size % 0 08/17/20 1328   Condition of Base [place] 08/17/20 1328   Tissue Type Percent Pink 100 08/10/20 1329   Tissue Type Percent Other (comment) 100 08/17/20 1328   Drainage Amount None 08/17/20 1328   Drainage Color Serous 08/10/20 1329   Wound Odor None 08/17/20 1328   Cydney-wound Assessment Dry 08/17/20 1328   Cleansing and Cleansing Agents  Soap and water 08/17/20 1328   Dressing Changed Changed/New 08/17/20 1328   Number of days: 7       [REMOVED] Wound Leg Medial;Left;Right (Removed)   Number of days: 4822       [REMOVED] Wound Leg Lower Left (Removed)   Dressing Status  Clean, dry, and intact 09/18/18 1407   Dressing Type  Unna boot 09/18/18 1407   Wound Length (cm) 0 cm 09/11/18 1601   Wound Width (cm) 0 cm 09/11/18 1601   Wound Depth (cm) 0 09/11/18 1601   Wound Surface area (cm^2) 0 cm^2 09/11/18 1601   Epithelialization (%) 100 09/11/18 1601   Drainage Amount  None 09/11/18 1601   Wound Odor None 09/11/18 1601   Cleansing and Cleansing Agents  Normal saline; Soap and water 09/11/18 1601   Dressing Type Applied Unna boot 09/11/18 1628   Procedure Tolerated Well 09/11/18 1628   Number of days: 32       [REMOVED] Wound Leg Lower Right (Removed)   Dressing Status  Clean, dry, and intact 01/17/20 1458   Dressing Type  Unna boot 10/05/18 1452   Non-Pressure Injury Full thickness (subcut/muscle) 11/08/19 1513   Wound Length (cm) 0.1 cm 01/17/20 1458   Wound Width (cm) 0.1 cm 01/17/20 1458   Wound Depth (cm) 0.1 01/17/20 1458 Wound Surface area (cm^2) 0.01 cm^2 01/17/20 1458   Change in Wound Size % 96 01/17/20 1458   Condition of Base [place] 10/11/19 1516   Epithelialization (%) 50 01/10/20 1458   Tissue Type Red 08/17/18 1436   Tissue Type Percent Pink 50 01/10/20 1458   Tissue Type Percent Red 100 11/08/19 1513   Drainage Amount  Scant 01/17/20 1458   Drainage Color Serous 01/17/20 1458   Wound Odor None 01/10/20 1458   Periwound Skin Condition Intact 01/10/20 1458   Cleansing and Cleansing Agents  Normal saline 01/17/20 1458   Dressing Type Applied Xeroform;ABD pad;Unna boot 08/31/18 0901   Procedure Tolerated Well 11/08/19 1513   Number of days: 560       [REMOVED] Wound Toe Left (Removed)   Wound Length (cm) 0 cm 11/02/18 1104   Wound Width (cm) 0 cm 11/02/18 1104   Wound Depth (cm) 0 11/02/18 1104   Wound Surface area (cm^2) 0 cm^2 11/02/18 1104   Change in Wound Size % 100 11/02/18 1104   Tissue Type Percent Pink 100 10/19/18 1508   Drainage Amount  None 11/02/18 1104   Drainage Color Serosanguinous 10/19/18 1508   Wound Odor None 11/02/18 1104   Periwound Skin Condition Intact 10/19/18 1508   Cleansing and Cleansing Agents  Normal saline 11/02/18 1104   Procedure Tolerated Well 10/12/18 1623   Number of days: 21       [REMOVED] Wound Toe (specify in comments) Left;Plantar (Removed)   Dressing Status  Clean, dry, and intact 11/02/18 1104   Dressing Type  Adhesive wound dressing (Band-Aid) 11/02/18 1104   Non-Pressure Injury Partial thickness (epider/derm) 10/19/18 1508   Wound Length (cm) 0 cm 11/02/18 1104   Wound Width (cm) 0 cm 11/02/18 1104   Wound Depth (cm) 0 11/02/18 1104   Wound Surface area (cm^2) 0 cm^2 11/02/18 1104   Change in Wound Size % 100 11/02/18 1104   Condition of Base [place] 10/19/18 1508   Drainage Amount  None 11/02/18 1104   Wound Odor None 11/02/18 1104   Periwound Skin Condition Intact 10/19/18 1508   Cleansing and Cleansing Agents  Normal saline 11/02/18 1104   Number of days: 14       [REMOVED] Wound Leg Lower Left;Medial (Removed)   Dressing Status  Clean, dry, and intact 03/01/19 1341   Wound Length (cm) 0 cm 03/01/19 1341   Wound Width (cm) 0 cm 03/01/19 1341   Wound Depth (cm) 0 03/01/19 1341   Wound Surface area (cm^2) 0 cm^2 03/01/19 1341   Change in Wound Size % 100 03/01/19 1341   Condition of Base Epithelializing 03/01/19 1341   Tissue Type Percent Pink 100 03/01/19 1341   Tissue Type Percent Red 100 01/18/19 1317   Drainage Amount  None 03/01/19 1341   Drainage Color Serous 01/18/19 1317   Wound Odor None 03/01/19 1341   Periwound Skin Condition Intact 03/01/19 1341   Cleansing and Cleansing Agents  Soap and water 03/01/19 1341   Number of days: 42       [REMOVED] Wound Leg lower Left (Removed)   Dressing Status Clean, dry, and intact 08/23/19 1452   Wound Length (cm) 0 cm 08/23/19 1452   Wound Width (cm) 0 cm 08/23/19 1452   Wound Depth (cm) 0 cm 08/23/19 1452   Wound Volume (cm^3) 0 cm^3 08/23/19 1452   Condition of Base Epithelializing 08/23/19 1452   Epithelialization (%) 100 08/23/19 1452   Tissue Type Percent Red 100 08/16/19 1427   Drainage Amount None 08/23/19 1452   Drainage Color Serous 08/16/19 1427   Wound Odor None 08/23/19 1452   Cydney-wound Assessment Blanchable erythema 08/23/19 1452   Cleansing and Cleansing Agents  Soap and water 08/23/19 1452   Number of days: 14       [REMOVED] Wound Leg upper Right (Removed)   Dressing Status Clean, dry, and intact 09/13/19 1510   Dressing Type Open to air 09/27/19 1406   Wound Length (cm) 0 cm 09/27/19 1406   Wound Width (cm) 0 cm 09/27/19 1406   Wound Depth (cm) 0 cm 09/27/19 1406   Wound Volume (cm^3) 0 cm^3 09/27/19 1406   Condition of Base Other (comment) 08/30/19 1514   Condition of Edges Closed 09/27/19 1406   Epithelialization (%) 100 09/27/19 1406   Drainage Amount None 09/27/19 1406   Wound Odor None 09/27/19 1406   Cydney-wound Assessment Intact 09/27/19 1406   Cleansing and Cleansing Agents  Normal saline 09/27/19 1406   Dressing Changed Changed/New 09/13/19 1510   Dressing Type Applied Open to air 09/27/19 1406   Procedure Tolerated Well 09/13/19 1510   Number of days: 28         Data Review:   No results found for this or any previous visit (from the past 24 hour(s)). Assessment:     81 y.o. female with both legs below the knee combined ulcer. Problem List  Date Reviewed: 7/9/2018          Codes Class Noted    Nonrheumatic aortic valve stenosis ICD-10-CM: I35.0  ICD-9-CM: 424.1  5/2/2019    Overview Signed 5/8/2019  6:15 PM by Daphne Cole MD     Echo (5/3/19):  EF 60-65%. Normal RV. Mild/moderate AS. MG 15. PG 26. Mild MR/TR/AR             Essential hypertension ICD-10-CM: I10  ICD-9-CM: 401.9  5/2/2019        Non-pressure chronic ulcer of left calf, limited to breakdown of skin [facility]) ICD-10-CM: B10.273  ICD-9-CM: 707.12  8/21/2018        Edema ICD-10-CM: R60.9  ICD-9-CM: 782.3  1/28/2016        Diverticulitis ICD-10-CM: K57.92  ICD-9-CM: 562.11  1/28/2016    Overview Signed 1/28/2016 12:10 PM by Audi June 1. CT of abdomen (7/9/14): Evidence of uncomplicated diverticulitis at the junction of the descending and sigmoid colon. Extensive diverticular are seen with some pericolonic fat stranding. TRISH (obstructive sleep apnea) (Chronic) ICD-10-CM: G47.33  ICD-9-CM: 327.23  9/7/2012    Overview Signed 9/7/2012 11:46 AM by Celina Pérez NP     Intolerant of CPAP             Anemia ICD-10-CM: D64.9  ICD-9-CM: 285.9  9/5/2012        Obstructive sleep apnea (adult) (pediatric)- probably  ICD-10-CM: G47.33  ICD-9-CM: 327.23  11/11/2011        Chronic venous insufficiency ICD-10-CM: I87.2  ICD-9-CM: 459.81  10/24/2011        Chronic diastolic heart failure (HCC) (Chronic) ICD-10-CM: I50.32  ICD-9-CM: 428.32  10/20/2011    Overview Signed 1/28/2016 12:05 PM by Audi June 1. Echo (3/5/10): EF 55%. No wall motion abnormalities. Mild LVH. Mild diastolic dysfunction. Mild bi-atrial enlargement.  Mild mitral regurgitation. RVSP 32.    2. Echo (4/26/11): Normal LV systolic function. EF 60%. Mild LVH. Mild diastolic dysfunction. Mild left atrial enlargement. Mild aortic regurgitation. Peripheral vascular disease ([facility] (Chronic) ICD-10-CM: I73.9  ICD-9-CM: 443.9  10/20/2011    Overview Signed 1/28/2016 12:08 PM by Alvin Duarte     1. Lower extremity duplex (9/2/10): Right lower extremity duplex suggested moderate to severe reduction with multilevel disease. Stenotic SFA most pronounced distally. Right VIBHA 0.5. Left VIBHA suggests mild reduction VIBHA of 0.86. GERD (gastroesophageal reflux disease) (Chronic) ICD-10-CM: K21.9  ICD-9-CM: 530.81  10/20/2011        Anemia, unspecified ICD-10-CM: D64.9  ICD-9-CM: 285.9  1/23/2011        Acute hyponatremia ICD-10-CM: E87.1  ICD-9-CM: 276.1  1/21/2011        CAD (coronary artery disease), native coronary artery (Chronic) ICD-10-CM: I25.10  ICD-9-CM: 414.01  9/2/2009    Overview Addendum 6/11/2017  9:05 PM by Mejia Eddy MD     1. NSTEMI (8/17/09):  EF 60%. PCI of dRCA with  Cypher 3.5x 33 mm SABA. Residual 80-90% LAD stenosis. 2.  Staged PCI of LAD (9/1/09): Xience 2.5x23mm and 3.0x18mm SABA in mid-distal LAD . Adjunctive POBA of D1 and D2.    3.  PCI of mid RCA (6/20/14):  Promus 4 x 20 mm SABA (overlapped with previous stent)             Diabetes mellitus, type 2 (HCC) (Chronic) ICD-10-CM: E11.9  ICD-9-CM: 250.00  9/2/2009        Morbid obesity (HCC) (Chronic) ICD-10-CM: E66.01  ICD-9-CM: 278.01  9/2/2009        Dyslipidemia (Chronic) ICD-10-CM: E78.5  ICD-9-CM: 272.4  9/2/2009        Neuropathy in diabetes ([address] (Chronic) ICD-10-CM: E11.40  ICD-9-CM: 250.60, 357.2  9/2/2009               Plan:     Orders Placed This Encounter    CBC W/O DIFF     Standing Status:   Future     Standing Expiration Date:   7/73/4323    METABOLIC PANEL, BASIC     Standing Status:   Future     Standing Expiration Date:   8/18/2021    WOUND CARE, DRESSING CHANGE Bilateral Lower Legs:   Wash with soap and water. Wrap with calamine 2 layer wraps (May use Zinc unna boot if calamine unavailable). Preferably Calamine or Zinc CoFlex Unna Boot  Change 2x/week     Home Health to See for Wound Care and Assessment     *Dr. Guille López to order lab work to be completed prior to starting antibiotics. *Dr. Guille López ordered Nystatin powder for groin. Standing Status:   Standing     Number of Occurrences:   1        Patient understood and agrees with plan. Questions answered. Follow-up Information    None            Any procedures done today in the [address] [facility],[address] are documented in a separate note in [address] [place] [address] and made part of this record by reference.      Dictated using voice recognition software; proofread, but unrecognized errors may exist.    Signed By: Nuno Castillo MD     August 17, 2020

## 2020-08-17 NOTE — WOUND CARE
[address] [place] [address] [address] Phone: [phone] Fax: [phone] Patient: Jose Chen MRN: [id number]  SSN: [id number] YOB: 1935  Age: 81 y.o. Sex: female Return Appointment: 1 week with Aleja Hightower MD 
 
Instructions:  
Bilateral Lower Legs:  
Wash with soap and water. Wrap with calamine 2 layer wraps (May use Zinc unna boot if calamine unavailable). Preferably Calamine or Zinc CoFlex [facility] Change 2x/week 
  
Home Health to See for Wound Care and Assessment 
  
*Dr. Nickolas Johnson to order lab work to be completed prior to starting antibiotics. *Dr. Nickolas Johnson ordered Nystatin powder for groin. Should you experience increased redness, swelling, pain, foul odor, size of wound(s), or have a temperature over 101 degrees please contact the [address] [place] [address] Road at [phone] or if after hours contact your primary care physician or go to the hospital emergency department. Signed By: Sharri Blackmon RN August 17, 2020

## 2020-08-17 NOTE — WOUND CARE
08/17/20 1328 Wound Leg Right Date First Assessed/Time First Assessed: 08/27/19 0959   Primary Wound Type: Incision  Location: Leg  Wound Location Orientation: Right Dressing Status Old drainage Dressing Type  
(bilateral unna boot) Non-staged Wound Description Partial thickness Wound Length (cm) 3 cm Wound Width (cm) 0.5 cm Wound Depth (cm) 0.1 cm Wound Surface Area (cm^2) 1.5 cm^2 Wound Volume (cm^3) 0.15 cm^3 Condition of Base Granulation;Slough Assessment Edema Tissue Type Percent Red 50 Tissue Type Percent Yellow 50 Drainage Amount Moderate Drainage Color Serosanguinous Cydney-wound Assessment Swelling;Blanchable erythema Cleansing and Cleansing Agents  Soap and water Dressing Changed Changed/New Wound Toe (Comment  which one) Anterior left 2nd Date First Assessed/Time First Assessed: 08/10/20 1340   Location: Toe (Comment  which one)  Wound Location Orientation: Anterior  Wound Description: left 2nd Dressing Status Dry Dressing Type  
(no dressing) Non-staged Wound Description Partial thickness Wound Length (cm) 0.3 cm Wound Width (cm) 0.3 cm Wound Depth (cm) 0.1 cm Wound Surface Area (cm^2) 0.09 cm^2 Wound Volume (cm^3) 0.01 cm^3 Change in Wound Size % 0 Condition of Base Eschar Tissue Type Percent Other (comment) 100 Drainage Amount None Wound Odor None Cydney-wound Assessment Dry Cleansing and Cleansing Agents  Soap and water Dressing Changed Changed/New Wound Leg lower Left Date First Assessed/Time First Assessed: 01/10/20 1500   Primary Wound Type: Venous  Location: Leg lower  Wound Location Orientation: Left Dressing Status Old drainage Dressing Type  
(bilateral unna boot) Non-staged Wound Description Partial thickness Wound Length (cm) 0.5 cm Wound Width (cm) 0.5 cm Wound Depth (cm) 0.2 cm Wound Surface Area (cm^2) 0.25 cm^2 Wound Volume (cm^3) 0.05 cm^3 Change in Wound Size % 98.66  
 Condition of Base Granulation Condition of Edges Open Assessment Edema Tissue Type Percent Red 100 Drainage Amount Small Drainage Color Serosanguinous Wound Odor None Cydney-wound Assessment Edema;Blanchable erythema Cleansing and Cleansing Agents  Soap and water Dressing Changed Changed/New Patient is taking Plavix daily.

## 2020-08-17 NOTE — WOUND CARE
[employer] Below Knee NAME:  Jossie Garland YOB: 1935 MEDICAL RECORD NUMBER:  [id number] DATE:  8/17/2020 Remove old Unna Boot or Boots. Applied moisturizing agent to dry skin as needed. Appied primary and secondary dressing as ordered. Applied Unna roll from toes to knee overlapping each time. Applied ace wrap or coban from toes to below the knee. Secured with tape and/or metal clips covered with tape. Instructed patient/caregiver to keep dressing dry and intact. DO NOT REMOVE DRESSING. Instructed pt/family/caregiver to report excessive draining, loose bandage, wet dressing, severe pain or tingling in toes. Applied [employer] dressing below the knee to bilateral lower legs. Unna Boot(s) were applied per  Guidelines.  
 
 Electronically signed by Boo Tryalor RN on 8/17/2020 at 1:26 PM

## 2020-08-18 ENCOUNTER — TELEPHONE (OUTPATIENT)
Dept: WOUND CARE | Age: 85
End: 2020-08-18

## 2020-08-18 RX ORDER — CIPROFLOXACIN 500 MG/1
500 TABLET ORAL 2 TIMES DAILY
Qty: 20 TAB | Refills: 0 | Status: SHIPPED | OUTPATIENT
Start: 2020-08-18 | End: 2020-08-28

## 2020-08-18 NOTE — TELEPHONE ENCOUNTER
08.18.2020-Call placed to patient to inform that Antibiotics were escribed to to pharmacy in [address] [place] [address] Rd

## 2020-08-22 LAB
ANTIMICROBIAL SUSCEPTIBILITY, 080575: ABNORMAL
BACTERIA ISLT: NORMAL
RESULT 1, 080571: ABNORMAL
SPECIMEN SOURCE: NORMAL

## 2020-08-23 LAB
BACTERIA SPEC CULT: ABNORMAL
GRAM STN SPEC: ABNORMAL
SERVICE CMNT-IMP: ABNORMAL

## 2020-08-24 ENCOUNTER — HOSPITAL ENCOUNTER (OUTPATIENT)
Dept: WOUND CARE | Age: 85
Discharge: HOME | End: 2020-08-24
Attending: SURGERY
Payer: MEDICARE

## 2020-08-24 VITALS
BODY MASS INDEX: 42.62 KG/M2 | SYSTOLIC BLOOD PRESSURE: 160 MMHG | TEMPERATURE: 97.8 F | RESPIRATION RATE: 18 BRPM | OXYGEN SATURATION: 99 % | HEART RATE: 85 BPM | DIASTOLIC BLOOD PRESSURE: 62 MMHG | WEIGHT: 255.8 LBS | HEIGHT: 65 IN

## 2020-08-24 PROCEDURE — 29580 STRAPPING UNNA BOOT: CPT

## 2020-08-24 RX ORDER — FLUCONAZOLE 150 MG/1
150 TABLET ORAL DAILY
Qty: 1 TAB | Refills: 0 | Status: SHIPPED | OUTPATIENT
Start: 2020-08-24 | End: 2020-08-25

## 2020-08-24 NOTE — WOUND CARE
08/24/20 1459 Wound Toe (Comment  which one) Anterior left 2nd Date First Assessed/Time First Assessed: 08/10/20 1340   Location: Toe (Comment  which one)  Wound Location Orientation: Anterior  Wound Description: left 2nd Dressing Status Intact Dressing Type Open to air Non-staged Wound Description Partial thickness Wound Length (cm) 0 cm Wound Width (cm) 0 cm Wound Depth (cm) 0 cm Wound Surface Area (cm^2) 0 cm^2 Wound Volume (cm^3) 0 cm^3 Change in Wound Size % 100 Condition of Base Eschar Tissue Type Percent Other (comment) 100 (Scab) Drainage Amount None Wound Odor None Cleansing and Cleansing Agents  Soap and water Wound Leg lower Left Date First Assessed/Time First Assessed: 01/10/20 1500   Primary Wound Type: Venous  Location: Leg lower  Wound Location Orientation: Left Dressing Status Clean, dry, and intact Dressing Type  
([place]) Non-staged Wound Description Partial thickness Wound Length (cm) 0 cm Wound Width (cm) 0 cm Wound Depth (cm) 0 cm Wound Surface Area (cm^2) 0 cm^2 Wound Volume (cm^3) 0 cm^3 Change in Wound Size % 100 Condition of Base Epithelializing Assessment Edema Tissue Type Percent Pink 100 Drainage Amount None Wound Odor None Cydney-wound Assessment Blanchable erythema;Edema Cleansing and Cleansing Agents  Soap and water Wound Leg Right Date First Assessed/Time First Assessed: 08/27/19 0959   Primary Wound Type: Incision  Location: Leg  Wound Location Orientation: Right Dressing Status Clean, dry, and intact Dressing Type  
([place]) Non-staged Wound Description Partial thickness Wound Length (cm) 0 cm Wound Width (cm) 0 cm Wound Depth (cm) 0 cm Wound Surface Area (cm^2) 0 cm^2 Wound Volume (cm^3) 0 cm^3 Condition of Base Epithelializing Assessment Edema Tissue Type Percent Pink 100 Drainage Amount None Cydney-wound Assessment Blanchable erythema;Edema Cleansing and Cleansing Agents  Soap and water Dressing Changed Changed/New Patient takes Plavix and ASA

## 2020-08-24 NOTE — PROGRESS NOTES
She also has a fungal infection of the groin week for which I will prescribe Diflucan and Goldbond powder. Her legs look good without any lesions she is to be put back in a calamine [employer] and we will see her again in 2 weeks. Wound Center Progress Note    Patient: Gerardo Dominguez MRN: [id number]  SSN: [id number]    YOB: 1935  Age: 81 y.o. Sex: female      Subjective:     Chief Complaint:  Gerardo Dominguez is a 81 y.o. WHITE OR  female who presents with both lower extremities below the knee wound of 2 months duration.     History of Present Illness:       Wound Caused By: Venous insufficiency/lymphedema/obesity  Associated Signs and Symptoms: Mild pain and itching timing: Intermittent  Quality: Needle-like   Severity: 2/10  Modifying Factors: Obesity and venous insufficiency  Current Wound Care: See nurses notes  Past Medical History:   Diagnosis Date    Acute hyponatremia 1/21/2011    Acute on chronic diastolic congestive heart failure ([address] 1/19/2011    Acute renal failure ([address] 1/21/2011    AMI (acute myocardial infarction) ([address] 2009    Anemia 9/5/2012    Arthritis     Asthma     Asthma exacerbation 11/11/2011    CAD (coronary artery disease)     MI 2009, stents heart 2009    CAD (coronary artery disease), native coronary artery 9/2/2009    Previous stent to dRCA with Cypher 3.5x33mm SABA 8/09 9/09 LAD- Xience 2.5x23mm and 3.0x18mm SABA in mid-distal LAD      Cellulitis Bilateral Lower Extremities 1/20/2011    Chest discomfort, tightness, pressure 1/28/2016    Chronic diastolic heart failure ([address] 10/20/2011    Chronic venous insufficiency 10/24/2011    Diabetes ([address]     checks QD, normal 200, no hyposymptoms     Diabetes Mellitus Type II-insulin dependent 9/2/2009    Diverticulitis 1/28/2016    Dyslipidemia 9/2/2009    Edema 1/28/2016    GERD (gastroesophageal reflux disease)     GLAUCOMA     BILATERAL    Heart failure ([address]     Hypertension     Hypokalemia 1/28/2016    Hypoxemia 9/7/2012    Morbid obesity (HCC)     Nausea & vomiting     Neuropathy in diabetes ([facility] 9/2/2009    NSTEMI (non-ST elevation myocardial infarction) ([facility] 9/2/2009    Dr Michael Solano    Obstructive sleep apnea (adult) (pediatric)- probably  11/11/2011    TRISH (obstructive sleep apnea) 9/7/2012    Intolerant of CPAP     Other dyspnea and respiratory abnormality 11/10/2011    Other ill-defined conditions(799.89)     benign tumor in lower abdomen- not removed, diabetic ulcers, edema, neuropathy    Peripheral vascular disease ([facility] 10/20/2011    Post PTCA 6/20/2014    PVD (peripheral vascular disease) ([facility]     Unspecified anemia 1/23/2011    Unspecified sleep apnea     oxygen at night    Venous stasis of lower extremity 2010    BILATERAL W RECURRENT ADMISSIONS    Volume overload 9/6/2012      Past Surgical History:   Procedure Laterality Date    BREAST SURGERY PROCEDURE UNLISTED  1973    benign tumor left breast    CARDIAC SURG PROCEDURE UNLIST      4 stents most recent 2 yrs ago    HX CHOLECYSTECTOMY      HX COLONOSCOPY      HX GI      PTCA EACH ADDL VESSEL      stentsx3     Family History   Problem Relation Age of Onset    Cancer Father     Lung Disease Father     Cancer Brother     Diabetes Paternal Aunt     Hypertension Maternal Grandmother     Hypertension Paternal Grandmother       Social History     Tobacco Use    Smoking status: Never Smoker    Smokeless tobacco: Never Used   Substance Use Topics    Alcohol use: No       Prior to Admission medications    Medication Sig Start Date End Date Taking? Authorizing Provider   fluconazole (DIFLUCAN) 150 mg tablet Take 1 Tab by mouth daily for 1 day. FDA advises cautious prescribing of oral fluconazole in pregnancy. 8/24/20 8/25/20 Yes Matt Coles MD   ciprofloxacin HCl (CIPRO) 500 mg tablet Take 1 Tab by mouth two (2) times a day for 10 days.  8/18/20 8/28/20  Shantel Segundo Lizzie Arango MD   nystatin (MYCOSTATIN) powder Apply  to affected area four (4) times daily for 10 days. 8/17/20 8/27/20  Sirkanth Sanchez MD   nitroglycerin (NITROLINGUAL) 400 mcg/spray spray 1 Spray by SubLINGual route every five (5) minutes as needed for Chest Pain. 4/17/20   Yrn Longoria MD   clopidogreL (PLAVIX) 75 mg tab Take 1 Tab by mouth daily. 4/16/20   Yrn Longoria MD   atorvastatin (LIPITOR) 80 mg tablet Take 1 Tab by mouth daily. 4/16/20   Yrn Longoria MD   DULoxetine (CYMBALTA) 60 mg capsule Take 1 Cap by mouth daily. 4/16/20   Yrn Longoria MD   ergocalciferol (Vitamin D2) 1,250 mcg (50,000 unit) capsule Take 1 Cap by mouth every seven (7) days. Takes on wed once a month 4/16/20   Yrn Longoria MD   metoprolol tartrate (LOPRESSOR) 50 mg tablet Take 1 Tab by mouth two (2) times a day. 4/16/20   Yrn Longoria MD   bumetanide (BUMEX) 2 mg tablet Take 1 Tab by mouth daily. 11/4/19   Yrn Longoria MD   dulaglutide (TRULICITY) 0.81 HV/4.4 mL sub-q pen 0.75 mg by SubCUTAneous route every seven (7) days. Provider, Historical   metOLazone (ZAROXOLYN) 10 mg tablet Take 1 Tab by mouth daily. Patient taking differently: Take 10 mg by mouth daily. Indications: as needed 1/19/18   Yrn Longoria MD   aspirin 81 mg chewable tablet Take 1 Tab by mouth daily. 6/12/17   Yrn Longoria MD   insulin glargine (TOUJEO SOLOSTAR) 300 unit/mL (1.5 mL) inpn 220 Units by SubCUTAneous route daily. Provider, Historical   acetaminophen (TYLENOL EXTRA STRENGTH) 500 mg tablet Take  by mouth every six (6) hours as needed for Pain. Provider, Historical   insulin glulisine (APIDRA) 100 unit/mL injection 90 Units by SubCUTAneous route three (3) times daily. Provider, Historical   albuterol (PROVENTIL VENTOLIN) 2.5 mg /3 mL (0.083 %) nebulizer solution 3 mL by Nebulization route three (3) times daily.  11/16/11   Angieudegeoffrey Sue MD nitroglycerin (NITROSTAT) 0.4 mg SL tablet 0.4 mg by SubLINGual route every five (5) minutes as needed. Provider, Historical   albuterol (PROVENTIL, VENTOLIN) 90 mcg/Actuation inhaler Take 2 Puffs by inhalation every four (4) hours as needed for Wheezing. Dispense: (1) inhaler with no refills. 12/19/10   Nicolle Breaux PA     Allergies   Allergen Reactions    Pcn [Penicillins] Hives, Swelling and Myalgia     Can take cephalosporins    Bactrim [Sulfamethoprim] Other (comments)     \"potassium acted up\"    Codeine Nausea Only    Dilaudid [Hydromorphone (Bulk)] Other (comments)    Lortab [Hydrocodone-Acetaminophen] Nausea Only        Review of Systems:  A comprehensive review of systems was negative except for that written in the History of Present Illness. Lab Results   Component Value Date/Time    Hemoglobin A1c 8.7 (H) 09/30/2011 01:07 PM        Immunization History   Administered Date(s) Administered    (RETIRED) Pneumococcal Vaccine (Unspecified Type) 09/20/2010    Influenza Vaccine Split 09/11/2012       Body mass index is 42.57 kg/m². Counseling regarding nutrition done: No     Current medications:  Current Outpatient Medications   Medication Sig Dispense Refill    fluconazole (DIFLUCAN) 150 mg tablet Take 1 Tab by mouth daily for 1 day. FDA advises cautious prescribing of oral fluconazole in pregnancy. 1 Tab 0    ciprofloxacin HCl (CIPRO) 500 mg tablet Take 1 Tab by mouth two (2) times a day for 10 days. 20 Tab 0    nystatin (MYCOSTATIN) powder Apply  to affected area four (4) times daily for 10 days. 60 g 1    nitroglycerin (NITROLINGUAL) 400 mcg/spray spray 1 Spray by SubLINGual route every five (5) minutes as needed for Chest Pain. 1 Bottle 3    clopidogreL (PLAVIX) 75 mg tab Take 1 Tab by mouth daily. 90 Tab 3    atorvastatin (LIPITOR) 80 mg tablet Take 1 Tab by mouth daily. 90 Tab 3    DULoxetine (CYMBALTA) 60 mg capsule Take 1 Cap by mouth daily.  90 Cap 3    ergocalciferol (Vitamin D2) 1,250 mcg (50,000 unit) capsule Take 1 Cap by mouth every seven (7) days. Takes on wed once a month 13 Cap 1    metoprolol tartrate (LOPRESSOR) 50 mg tablet Take 1 Tab by mouth two (2) times a day. 180 Tab 3    bumetanide (BUMEX) 2 mg tablet Take 1 Tab by mouth daily. 90 Tab 3    dulaglutide (TRULICITY) 6.91 XI/9.1 mL sub-q pen 0.75 mg by SubCUTAneous route every seven (7) days.  metOLazone (ZAROXOLYN) 10 mg tablet Take 1 Tab by mouth daily. (Patient taking differently: Take 10 mg by mouth daily. Indications: as needed) 90 Tab 3    aspirin 81 mg chewable tablet Take 1 Tab by mouth daily. 90 Tab 3    insulin glargine (TOUJEO SOLOSTAR) 300 unit/mL (1.5 mL) inpn 220 Units by SubCUTAneous route daily.  acetaminophen (TYLENOL EXTRA STRENGTH) 500 mg tablet Take  by mouth every six (6) hours as needed for Pain.  insulin glulisine (APIDRA) 100 unit/mL injection 90 Units by SubCUTAneous route three (3) times daily.  albuterol (PROVENTIL VENTOLIN) 2.5 mg /3 mL (0.083 %) nebulizer solution 3 mL by Nebulization route three (3) times daily. 1 Package 0    nitroglycerin (NITROSTAT) 0.4 mg SL tablet 0.4 mg by SubLINGual route every five (5) minutes as needed.  albuterol (PROVENTIL, VENTOLIN) 90 mcg/Actuation inhaler Take 2 Puffs by inhalation every four (4) hours as needed for Wheezing. Dispense: (1) inhaler with no refills. 1 g 0         Objective:     Physical Exam:     Visit Vitals  /62 (BP 1 Location: Right arm)   Pulse 85   Temp 97.8 °F (36.6 °C)   Resp 18   Ht 5' 5\" (1.651 m)   Wt 116 kg (255 lb 12.8 oz)   SpO2 99%   BMI 42.57 kg/m²       General: well developed, well nourished, pleasant , NAD. Hygiene good  Psych: cooperative. Pleasant. No anxiety or depression. Normal mood and affect. Neuro: alert and oriented to person/place/situation. Otherwise nonfocal.  Derm: Normal turgor for age, dry skin  HEENT: Normocephalic, atraumatic. EOMI. Conjunctiva clear.  No scleral icterus. Neck: Normal range of motion. No masses. Chest: Good air entry bilaterally. Respirations nonlabored  Cardio: Normal heart sounds,no rubs, murmurs or gallops  Abdomen: Soft, nontender, nondistended, normoactive bowel sounds  Lower extremities: color normal; temperature normal. Hair growth is not present. Calves are supple, nontender, approximately equally sized in comparison.  Capillary refill <3 sec            Ulcer Description:   Wound Leg Right (Active)   Dressing Status Clean, dry, and intact 08/24/20 1459   Non-staged Wound Description Partial thickness 08/24/20 1459   Wound Length (cm) 0 cm 08/24/20 1459   Wound Width (cm) 0 cm 08/24/20 1459   Wound Depth (cm) 0 cm 08/24/20 1459   Wound Surface Area (cm^2) 0 cm^2 08/24/20 1459   Wound Volume (cm^3) 0 cm^3 08/24/20 1459   Condition of Base Epithelializing 08/24/20 1459   Epithelialization (%) 100 03/13/20 1349   Assessment Edema 08/24/20 1459   Tissue Type Percent Pink 100 08/24/20 1459   Tissue Type Percent Red 50 08/17/20 1328   Tissue Type Percent Yellow 50 08/17/20 1328   Drainage Amount None 08/24/20 1459   Drainage Color Serosanguinous 08/17/20 1328   Wound Odor Mild 08/10/20 1329   Cydney-wound Assessment Blanchable erythema;Edema 08/24/20 1459   Cleansing and Cleansing Agents  Soap and water 08/24/20 1459   Dressing Changed Changed/New 08/24/20 1459   Procedure Tolerated Well 03/13/20 1349   Number of days: 363       Wound Leg lower Left (Active)   Dressing Status Clean, dry, and intact 08/24/20 1459   Non-staged Wound Description Partial thickness 08/24/20 1459   Wound Length (cm) 0 cm 08/24/20 1459   Wound Width (cm) 0 cm 08/24/20 1459   Wound Depth (cm) 0 cm 08/24/20 1459   Wound Surface Area (cm^2) 0 cm^2 08/24/20 1459   Wound Volume (cm^3) 0 cm^3 08/24/20 1459   Change in Wound Size % 100 08/24/20 1459   Condition of Base Epithelializing 08/24/20 1459   Condition of Edges Open 08/17/20 1328   Epithelialization (%) 100 03/13/20 1344 Assessment Edema 08/24/20 1459   Tissue Type Percent Pink 100 08/24/20 1459   Tissue Type Percent Red 100 08/17/20 1328   Drainage Amount None 08/24/20 1459   Drainage Color Serosanguinous 08/17/20 1328   Wound Odor None 08/24/20 1459   Cydney-wound Assessment Blanchable erythema;Edema 08/24/20 1459   Cleansing and Cleansing Agents  Soap and water 08/24/20 1459   Dressing Changed Changed/New 08/17/20 1328   Procedure Tolerated Well 03/13/20 1349   Number of days: 227       Wound Toe (Comment  which one) Anterior left 2nd (Active)   Dressing Status Intact 08/24/20 1459   Dressing Type Open to air 08/24/20 1459   Non-staged Wound Description Partial thickness 08/24/20 1459   Wound Length (cm) 0 cm 08/24/20 1459   Wound Width (cm) 0 cm 08/24/20 1459   Wound Depth (cm) 0 cm 08/24/20 1459   Wound Surface Area (cm^2) 0 cm^2 08/24/20 1459   Wound Volume (cm^3) 0 cm^3 08/24/20 1459   Change in Wound Size % 100 08/24/20 1459   Condition of Base Eschar 08/24/20 1459   Tissue Type Percent Pink 100 08/10/20 1329   Tissue Type Percent Other (comment) 100 08/24/20 1459   Drainage Amount None 08/24/20 1459   Drainage Color Serous 08/10/20 1329   Wound Odor None 08/24/20 1459   Cydney-wound Assessment Dry 08/17/20 1328   Cleansing and Cleansing Agents  Soap and water 08/24/20 1459   Dressing Changed Changed/New 08/17/20 1328   Number of days: 14       [REMOVED] Wound Leg Medial;Left;Right (Removed)   Number of days: 6682       [REMOVED] Wound Leg Lower Left (Removed)   Dressing Status  Clean, dry, and intact 09/18/18 1407   Dressing Type  Unna boot 09/18/18 1407   Wound Length (cm) 0 cm 09/11/18 1601   Wound Width (cm) 0 cm 09/11/18 1601   Wound Depth (cm) 0 09/11/18 1601   Wound Surface area (cm^2) 0 cm^2 09/11/18 1601   Epithelialization (%) 100 09/11/18 1601   Drainage Amount  None 09/11/18 1601   Wound Odor None 09/11/18 1601   Cleansing and Cleansing Agents  Normal saline; Soap and water 09/11/18 1601   Dressing Type Applied Unna boot 09/11/18 1628   Procedure Tolerated Well 09/11/18 1628   Number of days: 32       [REMOVED] Wound Leg Lower Right (Removed)   Dressing Status  Clean, dry, and intact 01/17/20 1458   Dressing Type  Unna boot 10/05/18 1452   Non-Pressure Injury Full thickness (subcut/muscle) 11/08/19 1513   Wound Length (cm) 0.1 cm 01/17/20 1458   Wound Width (cm) 0.1 cm 01/17/20 1458   Wound Depth (cm) 0.1 01/17/20 1458   Wound Surface area (cm^2) 0.01 cm^2 01/17/20 1458   Change in Wound Size % 96 01/17/20 1458   Condition of Base Vader 10/11/19 1516   Epithelialization (%) 50 01/10/20 1458   Tissue Type Red 08/17/18 1436   Tissue Type Percent Pink 50 01/10/20 1458   Tissue Type Percent Red 100 11/08/19 1513   Drainage Amount  Scant 01/17/20 1458   Drainage Color Serous 01/17/20 1458   Wound Odor None 01/10/20 1458   Periwound Skin Condition Intact 01/10/20 1458   Cleansing and Cleansing Agents  Normal saline 01/17/20 1458   Dressing Type Applied Xeroform;ABD pad;Unna boot 08/31/18 0901   Procedure Tolerated Well 11/08/19 1513   Number of days: 560       [REMOVED] Wound Toe Left (Removed)   Wound Length (cm) 0 cm 11/02/18 1104   Wound Width (cm) 0 cm 11/02/18 1104   Wound Depth (cm) 0 11/02/18 1104   Wound Surface area (cm^2) 0 cm^2 11/02/18 1104   Change in Wound Size % 100 11/02/18 1104   Tissue Type Percent Pink 100 10/19/18 1508   Drainage Amount  None 11/02/18 1104   Drainage Color Serosanguinous 10/19/18 1508   Wound Odor None 11/02/18 1104   Periwound Skin Condition Intact 10/19/18 1508   Cleansing and Cleansing Agents  Normal saline 11/02/18 1104   Procedure Tolerated Well 10/12/18 1623   Number of days: 21       [REMOVED] Wound Toe (specify in comments) Left;Plantar (Removed)   Dressing Status  Clean, dry, and intact 11/02/18 1104   Dressing Type  Adhesive wound dressing (Band-Aid) 11/02/18 1104   Non-Pressure Injury Partial thickness (epider/derm) 10/19/18 1508   Wound Length (cm) 0 cm 11/02/18 1104   Wound Width (cm) 0 cm 11/02/18 1104   Wound Depth (cm) 0 11/02/18 1104   Wound Surface area (cm^2) 0 cm^2 11/02/18 1104   Change in Wound Size % 100 11/02/18 1104   Condition of Base Melvin 10/19/18 1508   Drainage Amount  None 11/02/18 1104   Wound Odor None 11/02/18 1104   Periwound Skin Condition Intact 10/19/18 1508   Cleansing and Cleansing Agents  Normal saline 11/02/18 1104   Number of days: 14       [REMOVED] Wound Leg Lower Left;Medial (Removed)   Dressing Status  Clean, dry, and intact 03/01/19 1341   Wound Length (cm) 0 cm 03/01/19 1341   Wound Width (cm) 0 cm 03/01/19 1341   Wound Depth (cm) 0 03/01/19 1341   Wound Surface area (cm^2) 0 cm^2 03/01/19 1341   Change in Wound Size % 100 03/01/19 1341   Condition of Base Epithelializing 03/01/19 1341   Tissue Type Percent Pink 100 03/01/19 1341   Tissue Type Percent Red 100 01/18/19 1317   Drainage Amount  None 03/01/19 1341   Drainage Color Serous 01/18/19 1317   Wound Odor None 03/01/19 1341   Periwound Skin Condition Intact 03/01/19 1341   Cleansing and Cleansing Agents  Soap and water 03/01/19 1341   Number of days: 42       [REMOVED] Wound Leg lower Left (Removed)   Dressing Status Clean, dry, and intact 08/23/19 1452   Wound Length (cm) 0 cm 08/23/19 1452   Wound Width (cm) 0 cm 08/23/19 1452   Wound Depth (cm) 0 cm 08/23/19 1452   Wound Volume (cm^3) 0 cm^3 08/23/19 1452   Condition of Base Epithelializing 08/23/19 1452   Epithelialization (%) 100 08/23/19 1452   Tissue Type Percent Red 100 08/16/19 1427   Drainage Amount None 08/23/19 1452   Drainage Color Serous 08/16/19 1427   Wound Odor None 08/23/19 1452   Cydney-wound Assessment Blanchable erythema 08/23/19 1452   Cleansing and Cleansing Agents  Soap and water 08/23/19 1452   Number of days: 14       [REMOVED] Wound Leg upper Right (Removed)   Dressing Status Clean, dry, and intact 09/13/19 1510   Dressing Type Open to air 09/27/19 1406   Wound Length (cm) 0 cm 09/27/19 1406   Wound Width (cm) 0 cm 09/27/19 1406 Wound Depth (cm) 0 cm 09/27/19 1406   Wound Volume (cm^3) 0 cm^3 09/27/19 1406   Condition of Base Other (comment) 08/30/19 1514   Condition of Edges Closed 09/27/19 1406   Epithelialization (%) 100 09/27/19 1406   Drainage Amount None 09/27/19 1406   Wound Odor None 09/27/19 1406   Cydney-wound Assessment Intact 09/27/19 1406   Cleansing and Cleansing Agents  Normal saline 09/27/19 1406   Dressing Changed Changed/New 09/13/19 1510   Dressing Type Applied Open to air 09/27/19 1406   Procedure Tolerated Well 09/13/19 1510   Number of days: 28         Data Review:   No results found for this or any previous visit (from the past 24 hour(s)). Assessment:     81 y.o. female with both lower extremities below the knee combined ulcer. Problem List  Date Reviewed: 7/9/2018          Codes Class Noted    Nonrheumatic aortic valve stenosis ICD-10-CM: I35.0  ICD-9-CM: 424.1  5/2/2019    Overview Signed 5/8/2019  6:15 PM by Jose Garcia MD     Echo (5/3/19):  EF 60-65%. Normal RV. Mild/moderate AS. MG 15. PG 26. Mild MR/TR/AR             Essential hypertension ICD-10-CM: I10  ICD-9-CM: 401.9  5/2/2019        Non-pressure chronic ulcer of left calf, limited to breakdown of skin [facility]) ICD-10-CM: Q58.615  ICD-9-CM: 707.12  8/21/2018        Edema ICD-10-CM: R60.9  ICD-9-CM: 782.3  1/28/2016        Diverticulitis ICD-10-CM: K57.92  ICD-9-CM: 562.11  1/28/2016    Overview Signed 1/28/2016 12:10 PM by Viktor Velazquez     1. CT of abdomen (7/9/14): Evidence of uncomplicated diverticulitis at the junction of the descending and sigmoid colon. Extensive diverticular are seen with some pericolonic fat stranding.              TRISH (obstructive sleep apnea) (Chronic) ICD-10-CM: G47.33  ICD-9-CM: 327.23  9/7/2012    Overview Signed 9/7/2012 11:46 AM by Claudean Quail, NP     Intolerant of CPAP             Anemia ICD-10-CM: D64.9  ICD-9-CM: 285.9  9/5/2012        Obstructive sleep apnea (adult) (pediatric)- probably ICD-10-CM: G47.33  ICD-9-CM: 327.23  11/11/2011        Chronic venous insufficiency ICD-10-CM: I87.2  ICD-9-CM: 459.81  10/24/2011        Chronic diastolic heart failure (HCC) (Chronic) ICD-10-CM: I50.32  ICD-9-CM: 428.32  10/20/2011    Overview Signed 1/28/2016 12:05 PM by Carmencita Eddy     1. Echo (3/5/10): EF 55%. No wall motion abnormalities. Mild LVH. Mild diastolic dysfunction. Mild bi-atrial enlargement. Mild mitral regurgitation. RVSP 32.    2. Echo (4/26/11): Normal LV systolic function. EF 60%. Mild LVH. Mild diastolic dysfunction. Mild left atrial enlargement. Mild aortic regurgitation. Peripheral vascular disease ([address] (Chronic) ICD-10-CM: I73.9  ICD-9-CM: 443.9  10/20/2011    Overview Signed 1/28/2016 12:08 PM by Carmencita Eddy     1. Lower extremity duplex (9/2/10): Right lower extremity duplex suggested moderate to severe reduction with multilevel disease. Stenotic SFA most pronounced distally. Right VIBHA 0.5. Left VIBAH suggests mild reduction VIBHA of 0.86. GERD (gastroesophageal reflux disease) (Chronic) ICD-10-CM: K21.9  ICD-9-CM: 530.81  10/20/2011        Anemia, unspecified ICD-10-CM: D64.9  ICD-9-CM: 285.9  1/23/2011        Acute hyponatremia ICD-10-CM: E87.1  ICD-9-CM: 276.1  1/21/2011        CAD (coronary artery disease), native coronary artery (Chronic) ICD-10-CM: I25.10  ICD-9-CM: 414.01  9/2/2009    Overview Addendum 6/11/2017  9:05 PM by Demond Smyth MD     1. NSTEMI (8/17/09):  EF 60%. PCI of dRCA with  Cypher 3.5x 33 mm SABA. Residual 80-90% LAD stenosis. 2.  Staged PCI of LAD (9/1/09): Xience 2.5x23mm and 3.0x18mm SABA in mid-distal LAD . Adjunctive POBA of D1 and D2.    3.  PCI of mid RCA (6/20/14):  Promus 4 x 20 mm SABA (overlapped with previous stent)             Diabetes mellitus, type 2 (HCC) (Chronic) ICD-10-CM: E11.9  ICD-9-CM: 250.00  9/2/2009        Morbid obesity ([address] (Chronic) ICD-10-CM: E66.01  ICD-9-CM: 278.01  9/2/2009        Dyslipidemia (Chronic) ICD-10-CM: E78.5  ICD-9-CM: 272.4  9/2/2009        Neuropathy in diabetes ([facility] (Chronic) ICD-10-CM: E11.40  ICD-9-CM: 250.60, 357.2  9/2/2009               Plan:     Orders Placed This Encounter    WOUND CARE, DRESSING CHANGE     Bilateral Lower Legs:   Wash with soap and water. Wrap with calamine 2 layer wraps (May use Zinc unna boot if calamine unavailable). Preferably Calamine or Zinc CoFlex Unna Boot  Change 2x/week     Home Health to See for Wound Care and Assessment     Standing Status:   Standing     Number of Occurrences:   1    fluconazole (DIFLUCAN) 150 mg tablet     Sig: Take 1 Tab by mouth daily for 1 day. FDA advises cautious prescribing of oral fluconazole in pregnancy. Dispense:  1 Tab     Refill:  0        Patient understood and agrees with plan. Questions answered. Follow-up Information     Follow up With Specialties Details Why Contact Info    [address] In 2 weeks For wound re-check [facility] [place] [id number] [phone]             Any procedures done today in the [address] [facility] are documented in a separate note in [address] [place] [address] and made part of this record by reference.      Dictated using voice recognition software; proofread, but unrecognized errors may exist.    Signed By: Bonnie Lopez MD     August 24, 2020

## 2020-08-24 NOTE — WOUND CARE
[address] [place] [address] [place], [address] Phone: [phone] Fax: [phone] Patient: Berkley Phelps MRN: [id number]  SSN: [id number] YOB: 1935  Age: 81 y.o. Sex: female Return Appointment: 2 weeks with Lisa Will MD 
 
Instructions: Bilateral Lower Legs:  
Wash with soap and water. Wrap with calamine 2 layer wraps (May use Zinc unna boot if calamine unavailable). Preferably Calamine or Zinc CoFlex [employer] Change 2x/week If using Traditional [employer], Must Fan Fold Up The Leg 
  
Home Health to See for Wound Care and Assessment Should you experience increased redness, swelling, pain, foul odor, size of wound(s), or have a temperature over 101 degrees please contact the [address] [place] [address] Road at [phone] or if after hours contact your primary care physician or go to the hospital emergency department. Signed By: George Cunningham RN August 24, 2020

## 2020-08-24 NOTE — DISCHARGE INSTRUCTIONS
[address]  [address], [address]  Phone: [phone]  Fax: [phone]    Patient: Juanito Weaver MRN: [id number]  SSN: [id number]    YOB: 1935  Age: 81 y.o. Sex: female       Return Appointment: 2 weeks with Katelyn Youssef MD    Instructions: Bilateral Lower Legs:   Wash with soap and water. Wrap with calamine 2 layer wraps (May use Zinc unna boot if calamine unavailable). Preferably Calamine or Zinc CoFlex Unna Boot  Change 2x/week  If using Traditional Unna Boot Must Fan Fold Up the Front of Leg    [address] [facility] [address] to See for Wound Care and Assessment    Should you experience increased redness, swelling, pain, foul odor, size of wound(s), or have a temperature over 101 degrees please contact the [address] [place] [address] Road at [phone] or if after hours contact your primary care physician or go to the hospital emergency department.     Signed By: Deborah Fry RN     August 24, 2020

## 2020-08-24 NOTE — WOUND CARE
[employer] Below Knee NAME:  Carmencita Scanlon YOB: 1935 MEDICAL RECORD NUMBER:  [id number] DATE:  8/24/2020 Remove old Unna Boot or Boots. Applied Unna roll from toes to knee overlapping each time. Applied ace wrap or coban from toes to below the knee. Secured with tape and/or metal clips covered with tape. Instructed patient/caregiver to keep dressing dry and intact. DO NOT REMOVE DRESSING. Instructed pt/family/caregiver to report excessive draining, loose bandage, wet dressing, severe pain or tingling in toes. Applied [employer] dressing below the knee to bilateral lower legs. Unna Boot(s) were applied per  Guidelines.  
 
 Electronically signed by Dana Hogan RN on 8/24/2020 at 3:31 PM

## 2020-09-25 ENCOUNTER — HOSPITAL ENCOUNTER (OUTPATIENT)
Dept: WOUND CARE | Age: 85
Discharge: HOME | End: 2020-09-25
Attending: SURGERY
Payer: MEDICARE

## 2020-09-25 VITALS
OXYGEN SATURATION: 100 % | SYSTOLIC BLOOD PRESSURE: 129 MMHG | BODY MASS INDEX: 42.49 KG/M2 | WEIGHT: 255 LBS | HEART RATE: 79 BPM | DIASTOLIC BLOOD PRESSURE: 63 MMHG | RESPIRATION RATE: 18 BRPM | HEIGHT: 65 IN | TEMPERATURE: 98.4 F

## 2020-09-25 PROCEDURE — 29581 APPL MULTLAYER CMPRN SYS LEG: CPT

## 2020-09-25 NOTE — WOUND CARE
Multilayer Compression Wrap 
 (Not Unna) Below the Knee NAME:  Carmencita Scanlon YOB: 1935 MEDICAL RECORD NUMBER:  [id number] DATE:  9/25/2020 Removed old Multilayer wrap if indicated and wash leg with mild soap/water. Applied primary and secondary dressing as ordered. Applied multilayered dressing below the knee to right lower leg. Applied multilayered dressing below the knee to left lower leg. Instructed patient/caregiver not to remove dressing and to keep it clean and dry. Instructed patient/caregiver on complications to report to provider, such as pain, numbness in toes, heavy drainage, and slippage of dressing. Instructed patient on purpose of compression dressing and on activity and exercise recommendations.  
 
 
Electronically signed by Leia De Guzman PT, [address] [facility],[address] [facility] on 9/25/2020 at 4:12 PM

## 2020-09-25 NOTE — DISCHARGE INSTRUCTIONS
[address]  [address], [address]   Phone: [phone]  Fax: [phone]    Patient: Sebastian Buchanan MRN: [id number]  SSN: [id number]    YOB: 1935  Age: 81 y.o. Sex: female       Return Appointment: 3 weeks with Oralia Prescott MD    Instructions: Bilateral Lower Legs and Left Lateral base of great toe:   Wash with soap and water. Alginate with silver(sheets); cut to size, apply to wound beds of open areas. Cover with Optilock pads or ABD pads. 2 layer compression with wound and lower extremity assessment. If there is any problem with the dressing (too tight, slides down, etc.) Patient to return to wound clinic to have re-wrapped by clinician. (Preferably Coflex TLC)   Change dressing 2 times per week.      Home Health to See for Wound Care and Assessment    Should you experience increased redness, swelling, pain, foul odor, size of wound(s), or have a temperature over 101 degrees please contact the [address] [place] [address] Road at [phone] or if after hours contact your primary care physician or go to the hospital emergency department.     Signed By: Lainey Bose, PT, [address] [facility],[address]     September 25, 2020

## 2020-09-25 NOTE — WOUND CARE
[address] [place] [address], [address] Phone: [phone] Fax: [phone] Patient: Nella Liz MRN: [id number]  SSN: [id number] YOB: 1935  Age: 81 y.o. Sex: female Return Appointment: 3 weeks with Zahra Vora MD 
 
Instructions: Bilateral Lower Legs and Left Lateral base of great toe: Wash with soap and water. Alginate with silver(sheets); cut to size, apply to wound beds of open areas. Cover with Optilock pads or ABD pads. 2 layer compression with wound and lower extremity assessment. If there is any problem with the dressing (too tight, slides down, etc.) Patient to return to wound clinic to have re-wrapped by clinician. (Preferably Coflex TLC)  
Change dressing 2 times per week. 
 
[address] to See for Wound Care and Assessment Should you experience increased redness, swelling, pain, foul odor, size of wound(s), or have a temperature over 101 degrees please contact the [address] [place] [address] Road at [phone] or if after hours contact your primary care physician or go to the hospital emergency department. Signed By: Radha Bolaños, PT, [address] [facility],[address] September 25, 2020

## 2020-09-25 NOTE — PROGRESS NOTES
09/25/20 1506   Wound Toe (Comment  which one) Left;Lateral Great toe   Date First Assessed/Time First Assessed: 09/25/20 1513   Present on Hospital Admission: Yes  Primary Wound Type: Diabetic Ulcer  Location: Toe (Comment  which one)  Wound Location Orientation: Left;Lateral  Wound Description: Great toe   Dressing Status Moist   Dressing Type Open to air   Non-staged Wound Description Full thickness   Wound Length (cm) 0.5 cm   Wound Width (cm) 0.8 cm   Wound Depth (cm) 0.1 cm   Wound Surface Area (cm^2) 0.4 cm^2   Wound Volume (cm^3) 0.04 cm^3   Condition of Base Granulation   Tissue Type Percent Red 100   Drainage Amount Small   Drainage Color Serosanguinous   Wound Odor None   Cydney-wound Assessment Maceration   Cleansing and Cleansing Agents  Soap and water   Wound Toe (Comment  which one) Anterior left 2nd   Date First Assessed/Time First Assessed: 08/10/20 1340   Location: Toe (Comment  which one)  Wound Location Orientation: Anterior  Wound Description: left 2nd   Dressing Status Clean, dry, and intact   Dressing Type Open to air   Wound Length (cm) 0 cm   Wound Width (cm) 0 cm   Wound Depth (cm) 0 cm   Wound Surface Area (cm^2) 0 cm^2   Wound Volume (cm^3) 0 cm^3   Change in Wound Size % 100   Condition of Base Epithelializing   Epithelialization (%) 100  (scab)   Drainage Amount None   Wound Odor None   Cleansing and Cleansing Agents  Soap and water   Wound Leg lower Left   Date First Assessed/Time First Assessed: 01/10/20 1500   Primary Wound Type: Venous  Location: Leg lower  Wound Location Orientation: Left   Dressing Status Breakthrough drainage   Dressing Type   (Zinc unna boot)   Non-staged Wound Description Partial thickness   Wound Length (cm) 1.5 cm   Wound Width (cm) 1.3 cm   Wound Depth (cm) 0.1 cm   Wound Surface Area (cm^2) 1.95 cm^2   Wound Volume (cm^3) 0.2 cm^3   Change in Wound Size % 89.57   Condition of Base Granulation   Tissue Type Percent Red 100   Drainage Amount Moderate Drainage Color Serosanguinous   Wound Odor None   Cleansing and Cleansing Agents  Soap and water   Wound Leg Right   Date First Assessed/Time First Assessed: 08/27/19 0993   Primary Wound Type:  Incision  Location: Leg  Wound Location Orientation: Right   Dressing Status Breakthrough drainage   Dressing Type   (Zinc unna boot)   Non-staged Wound Description Partial thickness   Wound Length (cm) 1.5 cm   Wound Width (cm) 0.6 cm   Wound Depth (cm) 0.1 cm   Wound Surface Area (cm^2) 0.9 cm^2   Wound Volume (cm^3) 0.09 cm^3   Condition of Base Granulation   Tissue Type Percent Red 100   Drainage Amount Moderate   Drainage Color Serosanguinous   Wound Odor None   Cleansing and Cleansing Agents  Soap and water               Patient is currently taking Plavix and Aspirin 81 mg

## 2020-10-02 NOTE — PROGRESS NOTES
Wound Center Progress Note    Patient: Stephen Galvan MRN: [id number]  SSN: [id number]    YOB: 1935  Age: 81 y.o. Sex: female      Subjective:     Chief Complaint:  Recurrent Venous leg ulcer BLE    History of Present Illness:       Wound Caused By: venous insufficiency  Associated Signs and Symptoms: drainage, pain  Timing: since June 2018  Quality: wound  Severity: full thickness  Modifying Factors: Dm2, obesity, venous insufficiency  Current Wound Care: Multilayer compression, CHF    3/11/2018 No new issues. Urinary soiling continues to be improved. Left 2nd toe stable. Getting [facility] for wraps  6/14/2019 Continues to do well with dressings but still with substantial drainage. No further UTI issues. 7/19/2019 No new issues. Tolerating dressings. No further soiling.   7/26/2019 Doing well. No new issues. R leg wound not improving. 8/2/2019 Returns for re-eval. Bx proven [place]am. Chest lesion benign. 9/27/2019 S/P excision leg lesion and SG margins negative. 10/11/2019 Tolerating wraps. No new issues. Pain controlled. Minimal soiling. 12/06/2019 One month since last seen. No new issues. . No tunnelling. 1/10/2020 Returns after being discharged with new opening. Seen today with daughter. No changes in heart or CHF status. Has had some increased swelling. Has had trouble with LE wraps. 1/17/2020 Doing better since last visit and re-admit. Keeping elevated. No new breakdown. Cardiac and renal status stable. 2/14/2020 No new issues. Swelling stable. No recent CHF or other deterioration. Tolerating Unna without deterioration. 3/13/2020 Continued swelling but now bullae. Did not bring wraps today. Some issues at groin with candida. 4/17/2020 Seen via telehealth today. Increased swelling in legs. Has been trying to use faro wraps but worsening despite. Candida better. Blood sugars have been relatively well controlled. 9/25/2020 Has been seen by Dr Manny Mendez last several times. Wound continue to recur when she is switched from [employer]. Currently having some increased CHF and cards managing diuretic.        Past Medical History:   Diagnosis Date    Acute hyponatremia 1/21/2011    Acute on chronic diastolic congestive heart failure ([address] 1/19/2011    Acute renal failure ([address] 1/21/2011    AMI (acute myocardial infarction) ([address] 2009    Anemia 9/5/2012    Arthritis     Asthma     Asthma exacerbation 11/11/2011    CAD (coronary artery disease)     MI 2009, stents heart 2009    CAD (coronary artery disease), native coronary artery 9/2/2009    Previous stent to [facility] with Cypher 3.5x33mm SABA 8/09 9/09 LAD- Xience 2.5x23mm and 3.0x18mm SABA in mid-distal LAD      Cellulitis Bilateral Lower Extremities 1/20/2011    Chest discomfort, tightness, pressure 1/28/2016    Chronic diastolic heart failure ([address] 10/20/2011    Chronic venous insufficiency 10/24/2011    Diabetes (HCC)     checks QD, normal 200, no hyposymptoms     Diabetes Mellitus Type II-insulin dependent 9/2/2009    Diverticulitis 1/28/2016    Dyslipidemia 9/2/2009    Edema 1/28/2016    GERD (gastroesophageal reflux disease)     GLAUCOMA     BILATERAL    Heart failure ([address]     Hypertension     Hypokalemia 1/28/2016    Hypoxemia 9/7/2012    Morbid obesity (HCC)     Nausea & vomiting     Neuropathy in diabetes ([address] 9/2/2009    NSTEMI (non-ST elevation myocardial infarction) ([address] 9/2/2009    Dr Ashley Kuhn    Obstructive sleep apnea (adult) (pediatric)- probably  11/11/2011    TRISH (obstructive sleep apnea) 9/7/2012    Intolerant of CPAP     Other dyspnea and respiratory abnormality 11/10/2011    Other ill-defined conditions(799.89)     benign tumor in lower abdomen- not removed, diabetic ulcers, edema, neuropathy    Peripheral vascular disease ([address] 10/20/2011    Post PTCA 6/20/2014    PVD (peripheral vascular disease) ([address]     Unspecified anemia 1/23/2011    Unspecified sleep apnea     oxygen at night  Venous stasis of lower extremity 2010    BILATERAL W RECURRENT ADMISSIONS    Volume overload 9/6/2012      Past Surgical History:   Procedure Laterality Date    BREAST SURGERY PROCEDURE UNLISTED  1973    benign tumor left breast    CARDIAC SURG PROCEDURE UNLIST      4 stents most recent 2 yrs ago    HX CHOLECYSTECTOMY      HX COLONOSCOPY      HX GI      PTCA EACH ADDL VESSEL      stentsx3     Family History   Problem Relation Age of Onset    Cancer Father     Lung Disease Father     Cancer Brother     Diabetes Paternal Aunt     Hypertension Maternal Grandmother     Hypertension Paternal Grandmother       Social History     Tobacco Use    Smoking status: Never Smoker    Smokeless tobacco: Never Used   Substance Use Topics    Alcohol use: No       Prior to Admission medications    Medication Sig Start Date End Date Taking? Authorizing Provider   nitroglycerin (NITROLINGUAL) 400 mcg/spray spray 1 Spray by SubLINGual route every five (5) minutes as needed for Chest Pain. 4/17/20   Booker Longoria MD   clopidogreL (PLAVIX) 75 mg tab Take 1 Tab by mouth daily. 4/16/20   Booker Longoria MD   atorvastatin (LIPITOR) 80 mg tablet Take 1 Tab by mouth daily. 4/16/20   Booker Longoria MD   DULoxetine (CYMBALTA) 60 mg capsule Take 1 Cap by mouth daily. 4/16/20   Booker Longoria MD   ergocalciferol (Vitamin D2) 1,250 mcg (50,000 unit) capsule Take 1 Cap by mouth every seven (7) days. Takes on wed once a month 4/16/20   Booker Longoria MD   metoprolol tartrate (LOPRESSOR) 50 mg tablet Take 1 Tab by mouth two (2) times a day. 4/16/20   Booker Longoria MD   bumetanide (BUMEX) 2 mg tablet Take 1 Tab by mouth daily. 11/4/19   Booker Longoria MD   dulaglutide (TRULICITY) 7.38 FC/4.8 mL sub-q pen 0.75 mg by SubCUTAneous route every seven (7) days. Provider, Historical   metOLazone (ZAROXOLYN) 10 mg tablet Take 1 Tab by mouth daily.   Patient taking differently: Take 10 mg by mouth daily. Indications: as needed 1/19/18   Cary Longoria MD   aspirin 81 mg chewable tablet Take 1 Tab by mouth daily. 6/12/17   Cary Longoria MD   insulin glargine (TOUJEO SOLOSTAR) 300 unit/mL (1.5 mL) inpn 220 Units by SubCUTAneous route daily. Provider, Historical   acetaminophen (TYLENOL EXTRA STRENGTH) 500 mg tablet Take  by mouth every six (6) hours as needed for Pain. Provider, Historical   insulin glulisine (APIDRA) 100 unit/mL injection 90 Units by SubCUTAneous route three (3) times daily. Provider, Historical   nitroglycerin (NITROSTAT) 0.4 mg SL tablet 0.4 mg by SubLINGual route every five (5) minutes as needed. Provider, Historical   albuterol (PROVENTIL, VENTOLIN) 90 mcg/Actuation inhaler Take 2 Puffs by inhalation every four (4) hours as needed for Wheezing. Dispense: (1) inhaler with no refills. 12/19/10   Nae Breaux PA     Allergies   Allergen Reactions    Pcn [Penicillins] Hives, Swelling and Myalgia     Can take cephalosporins    Bactrim [Sulfamethoprim] Other (comments)     \"potassium acted up\"    Codeine Nausea Only    Dilaudid [Hydromorphone (Bulk)] Other (comments)    Lortab [Hydrocodone-Acetaminophen] Nausea Only        Review of Systems:  Pertinent items are noted in the History of Present Illness. Lab Results   Component Value Date/Time    Hemoglobin A1c 8.7 (H) 09/30/2011 01:07 PM        Immunization History   Administered Date(s) Administered    (RETIRED) Pneumococcal Vaccine (Unspecified Type) 09/20/2010    Influenza Vaccine Split 09/11/2012       Body mass index is 42.43 kg/m². Counseling regarding nutrition done: Yes Pateint counsled about risks of smoking and cessation      Current medications:  Current Outpatient Medications   Medication Sig Dispense Refill    nitroglycerin (NITROLINGUAL) 400 mcg/spray spray 1 Spray by SubLINGual route every five (5) minutes as needed for Chest Pain.  1 Bottle 3    clopidogreL (PLAVIX) 75 mg tab Take 1 Tab by mouth daily. 90 Tab 3    atorvastatin (LIPITOR) 80 mg tablet Take 1 Tab by mouth daily. 90 Tab 3    DULoxetine (CYMBALTA) 60 mg capsule Take 1 Cap by mouth daily. 90 Cap 3    ergocalciferol (Vitamin D2) 1,250 mcg (50,000 unit) capsule Take 1 Cap by mouth every seven (7) days. Takes on wed once a month 13 Cap 1    metoprolol tartrate (LOPRESSOR) 50 mg tablet Take 1 Tab by mouth two (2) times a day. 180 Tab 3    bumetanide (BUMEX) 2 mg tablet Take 1 Tab by mouth daily. 90 Tab 3    dulaglutide (TRULICITY) 0.64 QJ/7.0 mL sub-q pen 0.75 mg by SubCUTAneous route every seven (7) days.  metOLazone (ZAROXOLYN) 10 mg tablet Take 1 Tab by mouth daily. (Patient taking differently: Take 10 mg by mouth daily. Indications: as needed) 90 Tab 3    aspirin 81 mg chewable tablet Take 1 Tab by mouth daily. 90 Tab 3    insulin glargine (TOUJEO SOLOSTAR) 300 unit/mL (1.5 mL) inpn 220 Units by SubCUTAneous route daily.  acetaminophen (TYLENOL EXTRA STRENGTH) 500 mg tablet Take  by mouth every six (6) hours as needed for Pain.  insulin glulisine (APIDRA) 100 unit/mL injection 90 Units by SubCUTAneous route three (3) times daily.  nitroglycerin (NITROSTAT) 0.4 mg SL tablet 0.4 mg by SubLINGual route every five (5) minutes as needed.  albuterol (PROVENTIL, VENTOLIN) 90 mcg/Actuation inhaler Take 2 Puffs by inhalation every four (4) hours as needed for Wheezing. Dispense: (1) inhaler with no refills. 1 g 0         Objective:     Physical Exam:     Visit Vitals  /63 (BP 1 Location: Right arm, BP Patient Position: At rest)               Pulse 79   Temp 98.4 °F (36.9 °C)   Resp 18   Ht 5' 5\" (1.651 m)   Wt 115.7 kg (255 lb)   SpO2 100%   BMI 42.43 kg/m²       General: Healthy appearing  Neuro: alert and oriented to person/place/situation. Otherwise nonfocal.  HEENT: Normocephalic, atraumatic. Neck: Normal range of motion. No masses. Chest: Resp unlabored  Abdomen: Soft, nontender  Ext.  No hemosiderrosis. Psych: cooperative. Pleasant. No anxiety or depression. Normal mood and affect. Assessment:     VSU recurrence. Plan:     Continue multilayer compression. Start nystatin. Monitor for any fevers, systemic signs of infection. CHF management per cards.

## 2020-11-20 ENCOUNTER — HOSPITAL ENCOUNTER (OUTPATIENT)
Dept: WOUND CARE | Age: 85
Discharge: HOME | End: 2020-11-20
Attending: SURGERY
Payer: MEDICARE

## 2020-11-20 VITALS
TEMPERATURE: 97.9 F | HEART RATE: 94 BPM | SYSTOLIC BLOOD PRESSURE: 151 MMHG | DIASTOLIC BLOOD PRESSURE: 92 MMHG | RESPIRATION RATE: 18 BRPM

## 2020-11-20 PROCEDURE — 29580 STRAPPING UNNA BOOT: CPT

## 2020-11-20 NOTE — DISCHARGE INSTRUCTIONS
[address]  [address], [address]  Phone: [phone]  Fax: [phone]    Patient: Jacinto Fisher MRN: [id number]  SSN: [id number]    YOB: 1935  Age: 81 y.o. Sex: female       Return Appointment: 3 weeks with Terrance Jefferson MD    Instructions: Bilateral Lower Legs and Left Lateral base of great toe:   Wash with soap and water. Alginate with silver(sheets); cut to size, apply to wound beds of open areas. Cover with  ABD pads if needed. [employer] unna boots to bilateral lower legs.   Change dressing 2 times per week.    [address] to See for Wound Care and Assessme    Should you experience increased redness, swelling, pain, foul odor, size of wound(s), or have a temperature over 101 degrees please contact the [address] [place] [address] Road at [phone] or if after hours contact your primary care physician or go to the hospital emergency department.     Signed By: Jory Hicks     November 20, 2020

## 2020-11-20 NOTE — WOUND CARE
[employer] Below Knee NAME:  Tracie Hayes YOB: 1935 MEDICAL RECORD NUMBER:  [id number] DATE:  11/20/2020 Remove old Unna Boot or Boots. Appied primary and secondary dressing as ordered. Applied Unna roll from toes to knee overlapping each time. Applied ace wrap or coban from toes to below the knee. Secured with tape and/or metal clips covered with tape. Instructed patient/caregiver to keep dressing dry and intact. DO NOT REMOVE DRESSING. Instructed pt/family/caregiver to report excessive draining, loose bandage, wet dressing, severe pain or tingling in toes. Applied [employer] dressing below the knee to bilateral lower legs. Unna Boot(s) were applied per  Guidelines. Response to treatment: Well tolerated by patient  Electronically signed by Yordan Sepulveda on 11/20/2020 at 1:52 PM

## 2020-11-20 NOTE — WOUND CARE
[address] [place] [address], [address] [address] Phone: [phone] Fax: [phone] Patient: Yasmin Myrick MRN: [id number]  SSN: [id number] YOB: 1935  Age: 81 y.o. Sex: female Return Appointment: 3 weeks with Evan Cam MD 
 
Instructions: Bilateral Lower Legs Wash with soap and water. Alginate with silver(sheets); cut to size, apply to wound beds of open areas. Cover with  ABD pads if needed. [employer] unna boots to bilateral lower legs.  
Change dressing 2 times per week. 
 
 Home Health to See for Wound Care and Assessme Should you experience increased redness, swelling, pain, foul odor, size of wound(s), or have a temperature over 101 degrees please contact the [address] [place] [address] Road at [phone] or if after hours contact your primary care physician or go to the hospital emergency department. Signed By: Aissatou Heart November 20, 2020

## 2020-11-20 NOTE — WOUND CARE
11/20/20 1321 Wound Toe (Comment  which one) Left;Lateral Great toe Date First Assessed/Time First Assessed: 09/25/20 1513   Present on Hospital Admission: Yes  Primary Wound Type: Diabetic Ulcer  Location: Toe (Comment  which one)  Wound Location Orientation: Left;Lateral  Wound Description: Great toe Wound Image Wound Etiology Diabetic Budd Radar 2 Wound Length (cm) 0 cm Wound Width (cm) 0 cm Wound Depth (cm) 0 cm Wound Surface Area (cm^2) 0 cm^2 Change in Wound Size % 100 Wound Volume (cm^3) 0 cm^3 Wound Healing % 100 Wound Assessment Epithelialization Drainage Amount None Wound Odor None Cydney-Wound/Incision Assessment Blanchable erythema Wound Leg lower Left Date First Assessed/Time First Assessed: 01/10/20 1500   Primary Wound Type: Venous  Location: Leg lower  Wound Location Orientation: Left Wound Image Wound Etiology Venous Dressing Status Clean;Dry; Intact Dressing/Treatment  
(calamine unna boot) Wound Length (cm) 0.1 cm Wound Width (cm) 0.1 cm Wound Depth (cm) 0.1 cm Wound Surface Area (cm^2) 0.01 cm^2 Change in Wound Size % 99.95 Wound Volume (cm^3) 0 cm^3 Wound Healing % 100 Drainage Amount Small Drainage Description Serosanguinous Wound Odor None Wound Thickness Description Partial thickness Wound Leg Right Date First Assessed/Time First Assessed: 08/27/19 0959   Primary Wound Type: Incision  Location: Leg  Wound Location Orientation: Right Wound Image Wound Etiology Venous Dressing Status Clean;Dry; Intact Cleansed Soap and water Dressing/Treatment  
(calamine unna boot) Wound Length (cm) 2 cm Wound Width (cm) 2 cm Wound Depth (cm) 0.1 cm Wound Surface Area (cm^2) 4 cm^2 Wound Volume (cm^3) 0.4 cm^3 Wound Assessment Pink/red Drainage Amount Moderate Drainage Description Serosanguinous Wound Odor None Cydney-Wound/Incision Assessment Blanchable erythema;Edematous Wound Thickness Description Partial thickness  
patient is on an anti coagulant daily asa and plavix

## 2020-12-08 NOTE — PROGRESS NOTES
Wound Center Progress Note    Patient: Lester Hernandez MRN: [id number]  SSN: [id number]    YOB: 1935  Age: 81 y.o. Sex: female      Subjective:     Chief Complaint:  Recurrent Venous leg ulcer BLE    History of Present Illness:       Wound Caused By: venous insufficiency  Associated Signs and Symptoms: drainage, pain  Timing: since June 2018  Quality: wound  Severity: full thickness  Modifying Factors: Dm2, obesity, venous insufficiency  Current Wound Care: Multilayer compression, CHF    3/11/2018 No new issues. Urinary soiling continues to be improved. Left 2nd toe stable. Getting [place] for wraps  6/14/2019 Continues to do well with dressings but still with substantial drainage. No further UTI issues. 7/19/2019 No new issues. Tolerating dressings. No further soiling.   7/26/2019 Doing well. No new issues. R leg wound not improving. 8/2/2019 Returns for re-eval. Bx proven [place]. Chest lesion benign. 9/27/2019 S/P excision leg lesion and SG margins negative. 10/11/2019 Tolerating wraps. No new issues. Pain controlled. Minimal soiling. 12/06/2019 One month since last seen. No new issues. . No tunnelling. 1/10/2020 Returns after being discharged with new opening. Seen today with daughter. No changes in heart or CHF status. Has had some increased swelling. Has had trouble with LE wraps. 1/17/2020 Doing better since last visit and re-admit. Keeping elevated. No new breakdown. Cardiac and renal status stable. 2/14/2020 No new issues. Swelling stable. No recent CHF or other deterioration. Tolerating Unna without deterioration. 3/13/2020 Continued swelling but now bullae. Did not bring wraps today. Some issues at groin with candida. 4/17/2020 Seen via telehealth today. Increased swelling in legs. Has been trying to use faro wraps but worsening despite. Candida better. Blood sugars have been relatively well controlled. 9/25/2020 Has been seen by Dr Thom Thao last several times. Wound continue to recur when she is switched from Rare [place]. Currently having some increased CHF and cards managing diuretic.   11/20/2020 No new issues. Tolerating wraps. Minimal pain. No new breakdown. CHF is stable.        Past Medical History:   Diagnosis Date    Acute hyponatremia 1/21/2011    Acute on chronic diastolic congestive heart failure ([address] 1/19/2011    Acute renal failure ([address] 1/21/2011    AMI (acute myocardial infarction) ([address] 2009    Anemia 9/5/2012    Arthritis     Asthma     Asthma exacerbation 11/11/2011    CAD (coronary artery disease)     MI 2009, stents heart 2009    CAD (coronary artery disease), native coronary artery 9/2/2009    Previous stent to dRCA with Cypher 3.5x33mm SABA 8/09 9/09 LAD- Xience 2.5x23mm and 3.0x18mm SABA in mid-distal LAD      Cellulitis Bilateral Lower Extremities 1/20/2011    Chest discomfort, tightness, pressure 1/28/2016    Chronic diastolic heart failure ([address] 10/20/2011    Chronic venous insufficiency 10/24/2011    Diabetes (HCC)     checks QD, normal 200, no hyposymptoms     Diabetes Mellitus Type II-insulin dependent 9/2/2009    Diverticulitis 1/28/2016    Dyslipidemia 9/2/2009    Edema 1/28/2016    GERD (gastroesophageal reflux disease)     GLAUCOMA     BILATERAL    Heart failure ([address]     Hypertension     Hypokalemia 1/28/2016    Hypoxemia 9/7/2012    Morbid obesity (HCC)     Nausea & vomiting     Neuropathy in diabetes ([address] 9/2/2009    NSTEMI (non-ST elevation myocardial infarction) ([address] 9/2/2009    Dr Darren Branham    Obstructive sleep apnea (adult) (pediatric)- probably  11/11/2011    TRISH (obstructive sleep apnea) 9/7/2012    Intolerant of CPAP     Other dyspnea and respiratory abnormality 11/10/2011    Other ill-defined conditions(799.89)     benign tumor in lower abdomen- not removed, diabetic ulcers, edema, neuropathy    Peripheral vascular disease ([address] 10/20/2011    Post PTCA 6/20/2014    PVD [facility]peripheral vascular disease) [facility]     Unspecified anemia 1/23/2011    Unspecified sleep apnea     oxygen at night    Venous stasis of lower extremity 2010    BILATERAL W RECURRENT ADMISSIONS    Volume overload 9/6/2012      Past Surgical History:   Procedure Laterality Date    BREAST SURGERY PROCEDURE UNLISTED  1973    benign tumor left breast    CARDIAC SURG PROCEDURE UNLIST      4 stents most recent 2 yrs ago    HX CHOLECYSTECTOMY      HX COLONOSCOPY      HX GI      PTCA EACH ADDL VESSEL      stentsx3     Family History   Problem Relation Age of Onset    Cancer Father     Lung Disease Father     Cancer Brother     Diabetes Paternal Aunt     Hypertension Maternal Grandmother     Hypertension Paternal Grandmother       Social History     Tobacco Use    Smoking status: Never Smoker    Smokeless tobacco: Never Used   Substance Use Topics    Alcohol use: No       Prior to Admission medications    Medication Sig Start Date End Date Taking? Authorizing Provider   bumetanide (BUMEX) 2 mg tablet Take 1 Tab by mouth daily. 11/3/20   Terri Longoria MD   ergocalciferol (Vitamin D2) 1,250 mcg (50,000 unit) capsule Take 1 Cap by mouth every seven (7) days. Takes on wed once a month 10/21/20   Terri Longoria MD   nitroglycerin (NITROLINGUAL) 400 mcg/spray spray 1 Spray by SubLINGual route every five (5) minutes as needed for Chest Pain. 4/17/20   Terri Longoria MD   clopidogreL (PLAVIX) 75 mg tab Take 1 Tab by mouth daily. 4/16/20   Terri Longoria MD   atorvastatin (LIPITOR) 80 mg tablet Take 1 Tab by mouth daily. 4/16/20   Terri Longoria MD   DULoxetine (CYMBALTA) 60 mg capsule Take 1 Cap by mouth daily. 4/16/20   Terri Longoria MD   metoprolol tartrate (LOPRESSOR) 50 mg tablet Take 1 Tab by mouth two (2) times a day. 4/16/20   Terri Longoria MD   dulaglutide (TRULICITY) 8.30 OV/9.0 mL sub-q pen 0.75 mg by SubCUTAneous route every seven (7) days.     Provider, Historical   metOLazone (ZAROXOLYN) 10 mg tablet Take 1 Tab by mouth daily. Patient taking differently: Take 10 mg by mouth daily. Indications: as needed 1/19/18   Camden Longoria MD   aspirin 81 mg chewable tablet Take 1 Tab by mouth daily. 6/12/17   Camden Longoria MD   insulin glargine (TOUJEO SOLOSTAR) 300 unit/mL (1.5 mL) inpn 220 Units by SubCUTAneous route daily. Provider, Historical   acetaminophen (TYLENOL EXTRA STRENGTH) 500 mg tablet Take  by mouth every six (6) hours as needed for Pain. Provider, Historical   insulin glulisine (APIDRA) 100 unit/mL injection 90 Units by SubCUTAneous route three (3) times daily. Provider, Historical   nitroglycerin (NITROSTAT) 0.4 mg SL tablet 0.4 mg by SubLINGual route every five (5) minutes as needed. Provider, Historical   albuterol (PROVENTIL, VENTOLIN) 90 mcg/Actuation inhaler Take 2 Puffs by inhalation every four (4) hours as needed for Wheezing. Dispense: (1) inhaler with no refills. 12/19/10   Beth Breaux PA     Allergies   Allergen Reactions    Pcn [Penicillins] Hives, Swelling and Myalgia     Can take cephalosporins    Bactrim [Sulfamethoprim] Other (comments)     \"potassium acted up\"    Codeine Nausea Only    Dilaudid [Hydromorphone (Bulk)] Other (comments)    Lortab [Hydrocodone-Acetaminophen] Nausea Only        Review of Systems:  Pertinent items are noted in the History of Present Illness. Lab Results   Component Value Date/Time    Hemoglobin A1c 8.7 (H) 09/30/2011 01:07 PM        Immunization History   Administered Date(s) Administered    (RETIRED) Pneumococcal Vaccine (Unspecified Type) 09/20/2010    Influenza Vaccine Split 09/11/2012       There is no height or weight on file to calculate BMI. Counseling regarding nutrition done: Yes Pateint counsled about risks of smoking and cessation      Current medications:  Current Outpatient Medications   Medication Sig Dispense Refill    bumetanide (BUMEX) 2 mg tablet Take 1 Tab by mouth daily.  80 Tab 3    ergocalciferol (Vitamin D2) 1,250 mcg (50,000 unit) capsule Take 1 Cap by mouth every seven (7) days. Takes on wed once a month 13 Cap 5    nitroglycerin (NITROLINGUAL) 400 mcg/spray spray 1 Spray by SubLINGual route every five (5) minutes as needed for Chest Pain. 1 Bottle 3    clopidogreL (PLAVIX) 75 mg tab Take 1 Tab by mouth daily. 90 Tab 3    atorvastatin (LIPITOR) 80 mg tablet Take 1 Tab by mouth daily. 90 Tab 3    DULoxetine (CYMBALTA) 60 mg capsule Take 1 Cap by mouth daily. 90 Cap 3    metoprolol tartrate (LOPRESSOR) 50 mg tablet Take 1 Tab by mouth two (2) times a day. 180 Tab 3    dulaglutide (TRULICITY) 2.55 VENKATESH/1.1 mL sub-q pen 0.75 mg by SubCUTAneous route every seven (7) days.  metOLazone (ZAROXOLYN) 10 mg tablet Take 1 Tab by mouth daily. (Patient taking differently: Take 10 mg by mouth daily. Indications: as needed) 90 Tab 3    aspirin 81 mg chewable tablet Take 1 Tab by mouth daily. 90 Tab 3    insulin glargine (TOUJEO SOLOSTAR) 300 unit/mL (1.5 mL) inpn 220 Units by SubCUTAneous route daily.  acetaminophen (TYLENOL EXTRA STRENGTH) 500 mg tablet Take  by mouth every six (6) hours as needed for Pain.  insulin glulisine (APIDRA) 100 unit/mL injection 90 Units by SubCUTAneous route three (3) times daily.  nitroglycerin (NITROSTAT) 0.4 mg SL tablet 0.4 mg by SubLINGual route every five (5) minutes as needed.  albuterol (PROVENTIL, VENTOLIN) 90 mcg/Actuation inhaler Take 2 Puffs by inhalation every four (4) hours as needed for Wheezing. Dispense: (1) inhaler with no refills. 1 g 0         Objective:     Physical Exam:     Visit Vitals  BP (!) 151/92 (BP 1 Location: Right arm, BP Patient Position: Sitting)   Pulse 94   Temp 97.9 °F (36.6 °C)   Resp 18       General: Healthy appearing  Neuro: alert and oriented to person/place/situation. Otherwise nonfocal.  HEENT: Normocephalic, atraumatic. Neck: Normal range of motion. No masses.   Chest: Resp unlabored  Abdomen: Soft, nontender  Ext. No hemosiderrosis. Psych: cooperative. Pleasant. No anxiety or depression. Normal mood and affect. Assessment:     VSU recurrence. Plan:     Continue multilayer compression. Continue nystatin. Monitor for any fevers, systemic signs of infection. CHF management per cards.

## 2021-01-08 ENCOUNTER — HOSPITAL ENCOUNTER (OUTPATIENT)
Dept: WOUND CARE | Age: 86
Discharge: HOME | End: 2021-01-08
Attending: SURGERY
Payer: MEDICARE

## 2021-01-08 VITALS
SYSTOLIC BLOOD PRESSURE: 153 MMHG | RESPIRATION RATE: 18 BRPM | HEART RATE: 82 BPM | OXYGEN SATURATION: 95 % | HEIGHT: 65 IN | BODY MASS INDEX: 43.15 KG/M2 | TEMPERATURE: 97.7 F | DIASTOLIC BLOOD PRESSURE: 71 MMHG | WEIGHT: 259 LBS

## 2021-01-08 PROCEDURE — 29580 STRAPPING UNNA BOOT: CPT

## 2021-01-08 NOTE — WOUND CARE
[employer] Below Knee NAME:  Maida Espinoza YOB: 1935 MEDICAL RECORD NUMBER:  [id number] DATE:  1/8/2021 Remove old Unna Boot or Boots. Appied primary and secondary dressing as ordered. Applied Unna roll from toes to knee overlapping each time. Applied ace wrap or coban from toes to below the knee. Secured with tape and/or metal clips covered with tape. Instructed patient/caregiver to keep dressing dry and intact. DO NOT REMOVE DRESSING. Instructed pt/family/caregiver to report excessive draining, loose bandage, wet dressing, severe pain or tingling in toes. Applied [employer] dressing below the knee to bilateral lower legs. Unna Boot(s) were applied per  Guidelines. Response to treatment: Well tolerated by patient  Electronically signed by Margot Bush on 1/8/2021 at 3:25 PM

## 2021-01-08 NOTE — DISCHARGE INSTRUCTIONS
[address]  [address], [address]   Phone: [phone]  Fax: [phone]    Patient: Efren Garrett MRN: [id number]  SSN: [id number]    YOB: 1935  Age: 81 y.o. Sex: female       Return Appointment: 3 weeks with Hussein Boles MD    Instructions: Bilateral Lower Legs   Wash with soap and water. Alginate with silver(sheets); cut to size, apply to wound beds of open areas. Cover with  ABD pads if needed. [place] unna boots to bilateral lower legs.   Change dressing 2 times per week.      Home Health to See for Wound Care and Assessment      Should you experience increased redness, swelling, pain, foul odor, size of wound(s), or have a temperature over 101 degrees please contact the [address] [place] [address] Road at [phone] or if after hours contact your primary care physician or go to the hospital emergency department.     Signed By: Chris Burk     January 8, 2021

## 2021-01-08 NOTE — WOUND CARE
[address] [place] [address], [facility] [address] Phone: [phone] Fax: [phone] Patient: Edward Villalba MRN: [id number]  SSN: [id number] YOB: 1935  Age: 81 y.o. Sex: female Return Appointment: 3 weeks with Mahnaz Reis MD 
 
Instructions: Bilateral Lower Legs Wash with soap and water. Alginate with silver(sheets); cut to size, apply to wound beds of open areas. Cover with  ABD pads if needed. [place] unna boots to bilateral lower legs.  
Change dressing 2 times per week. 
  
 Home Health to See for Wound Care and Assessment Should you experience increased redness, swelling, pain, foul odor, size of wound(s), or have a temperature over 101 degrees please contact the [address] [place] [address] Road at [phone] or if after hours contact your primary care physician or go to the hospital emergency department. Signed By: Yan Traylor   
 January 8, 2021

## 2021-01-08 NOTE — WOUND CARE
01/08/21 1454 Wound Leg lower Left Date First Assessed/Time First Assessed: 01/10/20 1500   Primary Wound Type: Venous  Location: Leg lower  Wound Location Orientation: Left Wound Image Wound Etiology Venous Dressing Status Clean;Dry; Intact Cleansed Soap and water Dressing/Treatment  
(alginate, calamine unna boot) Wound Length (cm) 0.1 cm Wound Width (cm) 0.1 cm Wound Depth (cm) 0.1 cm Wound Surface Area (cm^2) 0.01 cm^2 Change in Wound Size % 99.95 Wound Volume (cm^3) 0 cm^3 Wound Healing % 100 Wound Assessment Epithelialization Drainage Amount Small Drainage Description Serous; Serosanguinous Wound Odor None [place]-Wound/Incision Assessment Edematous Wound Thickness Description Partial thickness Wound Leg Right Date First Assessed/Time First Assessed: 08/27/19 0959   Primary Wound Type: Incision  Location: Leg  Wound Location Orientation: Right Wound Image Wound Etiology Venous Dressing Status Clean;Dry; Intact Cleansed Soap and water Dressing/Treatment  
(alginate, calamine unna boot) Wound Length (cm) 2 cm Wound Width (cm) 1.5 cm Wound Depth (cm) 0.1 cm Wound Surface Area (cm^2) 3 cm^2 Wound Volume (cm^3) 0.3 cm^3 Wound Assessment Pink/red Drainage Amount Moderate Drainage Description Serosanguinous Wound Odor None [place]-Wound/Incision Assessment Edematous Wound Thickness Description Partial thickness Patient is on an anti coagulant daily asa and plavix.

## 2021-01-13 NOTE — PROGRESS NOTES
Wound Center Progress Note    Patient: Chava Green MRN: [id number]  SSN: [id number]    YOB: 1935  Age: 81 y.o. Sex: female      Subjective:     Chief Complaint:  Recurrent Venous leg ulcer BLE    History of Present Illness:       Wound Caused By: venous insufficiency  Associated Signs and Symptoms: drainage, pain  Timing: since June 2018  Quality: wound  Severity: full thickness  Modifying Factors: Dm2, obesity, venous insufficiency  Current Wound Care: Multilayer compression, CHF    3/11/2018 No new issues. Urinary soiling continues to be improved. Left 2nd toe stable. Getting [facility] for wraps  6/14/2019 Continues to do well with dressings but still with substantial drainage. No further UTI issues. 7/19/2019 No new issues. Tolerating dressings. No further soiling.   7/26/2019 Doing well. No new issues. R leg wound not improving. 8/2/2019 Returns for re-eval. Bx proven [place]. Chest lesion benign. 9/27/2019 S/P excision leg lesion and SG margins negative. 10/11/2019 Tolerating wraps. No new issues. Pain controlled. Minimal soiling. 12/06/2019 One month since last seen. No new issues. . No tunnelling. 1/10/2020 Returns after being discharged with new opening. Seen today with daughter. No changes in heart or CHF status. Has had some increased swelling. Has had trouble with LE wraps. 1/17/2020 Doing better since last visit and re-admit. Keeping elevated. No new breakdown. Cardiac and renal status stable. 2/14/2020 No new issues. Swelling stable. No recent CHF or other deterioration. Tolerating Unna without deterioration. 3/13/2020 Continued swelling but now bullae. Did not bring wraps today. Some issues at groin with candida. 4/17/2020 Seen via telehealth today. Increased swelling in legs. Has been trying to use faro wraps but worsening despite. Candida better. Blood sugars have been relatively well controlled. 9/25/2020 Has been seen by Dr Kelby Matute last several times. Wound continue to recur when she is switched from [facility]. Currently having some increased CHF and cards managing diuretic.   11/20/2020 No new issues. Tolerating wraps. Minimal pain. No new breakdown. CHF is stable. 1/8/2021 Has not been seen since November but [facility] has been performing dressings 3x a week. Notes some increase in swelling. No new chest pain or SOB. Diuretic has been reduced due to kidney issues.        Past Medical History:   Diagnosis Date    Acute hyponatremia 1/21/2011    Acute on chronic diastolic congestive heart failure ([address] 1/19/2011    Acute renal failure ([address] 1/21/2011    AMI (acute myocardial infarction) ([address] 2009    Anemia 9/5/2012    Arthritis     Asthma     Asthma exacerbation 11/11/2011    CAD (coronary artery disease)     MI 2009, stents heart 2009    CAD (coronary artery disease), native coronary artery 9/2/2009    Previous stent to dRCA with Cypher 3.5x33mm SABA 8/09 9/09 LAD- Xience 2.5x23mm and 3.0x18mm SABA in mid-distal LAD      Cellulitis Bilateral Lower Extremities 1/20/2011    Chest discomfort, tightness, pressure 1/28/2016    Chronic diastolic heart failure ([address] 10/20/2011    Chronic venous insufficiency 10/24/2011    Diabetes (HCC)     checks QD, normal 200, no hyposymptoms     Diabetes Mellitus Type II-insulin dependent 9/2/2009    Diverticulitis 1/28/2016    Dyslipidemia 9/2/2009    Edema 1/28/2016    GERD (gastroesophageal reflux disease)     GLAUCOMA     BILATERAL    Heart failure ([address]     Hypertension     Hypokalemia 1/28/2016    Hypoxemia 9/7/2012    Morbid obesity (HCC)     Nausea & vomiting     Neuropathy in diabetes ([address] 9/2/2009    NSTEMI (non-ST elevation myocardial infarction) ([address] 9/2/2009    Dr Marsha Dale    Obstructive sleep apnea (adult) (pediatric)- probably  11/11/2011    TRISH (obstructive sleep apnea) 9/7/2012    Intolerant of CPAP     Other dyspnea and respiratory abnormality 11/10/2011    Other ill-defined conditions(799.89)     benign tumor in lower abdomen- not removed, diabetic ulcers, edema, neuropathy    Peripheral vascular disease ([facility] 10/20/2011    Post PTCA 6/20/2014    PVD (peripheral vascular disease) ([facility]     Unspecified anemia 1/23/2011    Unspecified sleep apnea     oxygen at night    Venous stasis of lower extremity 2010    BILATERAL W RECURRENT ADMISSIONS    Volume overload 9/6/2012      Past Surgical History:   Procedure Laterality Date    HX CHOLECYSTECTOMY      HX COLONOSCOPY      HX GI      LA BREAST SURGERY PROCEDURE UNLISTED  1973    benign tumor left breast    LA CARDIAC SURG PROCEDURE UNLIST      4 stents most recent 2 yrs ago    PTCA [facility] ADDL VESSEL      stentsx3     Family History   Problem Relation Age of Onset    Cancer Father     Lung Disease Father     Cancer Brother     Diabetes Paternal Aunt     Hypertension Maternal Grandmother     Hypertension Paternal Grandmother       Social History     Tobacco Use    Smoking status: Never Smoker    Smokeless tobacco: Never Used   Substance Use Topics    Alcohol use: No       Prior to Admission medications    Medication Sig Start Date End Date Taking? Authorizing Provider   bumetanide (BUMEX) 2 mg tablet Take 1 Tab by mouth daily. 11/3/20   Alexandru Longoria MD   ergocalciferol (Vitamin D2) 1,250 mcg (50,000 unit) capsule Take 1 Cap by mouth every seven (7) days. Takes on wed once a month 10/21/20   Alexandru Longoria MD   nitroglycerin (NITROLINGUAL) 400 mcg/spray spray 1 Spray by SubLINGual route every five (5) minutes as needed for Chest Pain. 4/17/20   Alexandru Longoria MD   clopidogreL (PLAVIX) 75 mg tab Take 1 Tab by mouth daily. 4/16/20   Alexandru Longorai MD   atorvastatin (LIPITOR) 80 mg tablet Take 1 Tab by mouth daily. 4/16/20   Alexandru Longoria MD   DULoxetine (CYMBALTA) 60 mg capsule Take 1 Cap by mouth daily.  4/16/20   Alexandru Longoria MD   metoprolol tartrate (LOPRESSOR) 50 mg tablet Take 1 Tab by mouth two (2) times a day. 4/16/20   Santa Longoria MD   dulaglutide (TRULICITY) 7.18 CF/9.5 mL sub-q pen 0.75 mg by SubCUTAneous route every seven (7) days. Provider, Historical   metOLazone (ZAROXOLYN) 10 mg tablet Take 1 Tab by mouth daily. Patient taking differently: Take 10 mg by mouth daily. Indications: as needed 1/19/18   Santa Longoria MD   aspirin 81 mg chewable tablet Take 1 Tab by mouth daily. 6/12/17   Santa Longoria MD   insulin glargine (TOUJEO SOLOSTAR) 300 unit/mL (1.5 mL) inpn 220 Units by SubCUTAneous route daily. Provider, Historical   acetaminophen (TYLENOL EXTRA STRENGTH) 500 mg tablet Take  by mouth every six (6) hours as needed for Pain. Provider, Historical   insulin glulisine (APIDRA) 100 unit/mL injection 90 Units by SubCUTAneous route three (3) times daily. Provider, Historical   nitroglycerin (NITROSTAT) 0.4 mg SL tablet 0.4 mg by SubLINGual route every five (5) minutes as needed. Provider, Historical   albuterol (PROVENTIL, VENTOLIN) 90 mcg/Actuation inhaler Take 2 Puffs by inhalation every four (4) hours as needed for Wheezing. Dispense: (1) inhaler with no refills. 12/19/10   Kimberlee Breaux PA     Allergies   Allergen Reactions    Pcn [Penicillins] Hives, Swelling and Myalgia     Can take cephalosporins    Bactrim [Sulfamethoprim] Other (comments)     \"potassium acted up\"    Codeine Nausea Only    Dilaudid [Hydromorphone (Bulk)] Other (comments)    Lortab [Hydrocodone-Acetaminophen] Nausea Only        Review of Systems:  Pertinent items are noted in the History of Present Illness. Lab Results   Component Value Date/Time    Hemoglobin A1c 8.7 (H) 09/30/2011 01:07 PM        Immunization History   Administered Date(s) Administered    (RETIRED) Pneumococcal Vaccine (Unspecified Type) 09/20/2010    Influenza Vaccine Split 09/11/2012       Body mass index is 43.1 kg/m².     Counseling regarding nutrition done: Yes Pateint counsled about risks of smoking and cessation      Current medications:  Current Outpatient Medications   Medication Sig Dispense Refill    bumetanide (BUMEX) 2 mg tablet Take 1 Tab by mouth daily. 90 Tab 3    ergocalciferol (Vitamin D2) 1,250 mcg (50,000 unit) capsule Take 1 Cap by mouth every seven (7) days. Takes on wed once a month 13 Cap 5    nitroglycerin (NITROLINGUAL) 400 mcg/spray spray 1 Spray by SubLINGual route every five (5) minutes as needed for Chest Pain. 1 Bottle 3    clopidogreL (PLAVIX) 75 mg tab Take 1 Tab by mouth daily. 90 Tab 3    atorvastatin (LIPITOR) 80 mg tablet Take 1 Tab by mouth daily. 90 Tab 3    DULoxetine (CYMBALTA) 60 mg capsule Take 1 Cap by mouth daily. 90 Cap 3    metoprolol tartrate (LOPRESSOR) 50 mg tablet Take 1 Tab by mouth two (2) times a day. 180 Tab 3    dulaglutide (TRULICITY) 7.71 GI/2.3 mL sub-q pen 0.75 mg by SubCUTAneous route every seven (7) days.  metOLazone (ZAROXOLYN) 10 mg tablet Take 1 Tab by mouth daily. (Patient taking differently: Take 10 mg by mouth daily. Indications: as needed) 90 Tab 3    aspirin 81 mg chewable tablet Take 1 Tab by mouth daily. 90 Tab 3    insulin glargine (TOUJEO SOLOSTAR) 300 unit/mL (1.5 mL) inpn 220 Units by SubCUTAneous route daily.  acetaminophen (TYLENOL EXTRA STRENGTH) 500 mg tablet Take  by mouth every six (6) hours as needed for Pain.  insulin glulisine (APIDRA) 100 unit/mL injection 90 Units by SubCUTAneous route three (3) times daily.  nitroglycerin (NITROSTAT) 0.4 mg SL tablet 0.4 mg by SubLINGual route every five (5) minutes as needed.  albuterol (PROVENTIL, VENTOLIN) 90 mcg/Actuation inhaler Take 2 Puffs by inhalation every four (4) hours as needed for Wheezing. Dispense: (1) inhaler with no refills.  1 g 0         Objective:     Physical Exam:     Visit Vitals  BP (!) 153/71 (BP 1 Location: Left arm)   Pulse 82   Temp 97.7 °F (36.5 °C)   Resp 18   Ht 5' 5\" (1.651 m)   Wt 117.5 kg (259 lb)   SpO2 95% BMI 43.10 kg/m²       General: Healthy appearing  Neuro: alert and oriented to person/place/situation. Otherwise nonfocal.  HEENT: Normocephalic, atraumatic. Neck: Normal range of motion. No masses. Chest: Resp unlabored  Abdomen: Soft, nontender  Ext. No hemosiderrosis. Psych: cooperative. Pleasant. No anxiety or depression. Normal mood and affect. Assessment:     VSU recurrence. Plan:     Difficult balance between need for diuresis and maintenance of kidney function. Continue multilayer compression. Continue nystatin. Monitor for any fevers, systemic signs of infection. CHF management per cards.

## 2021-04-23 ENCOUNTER — TELEPHONE (OUTPATIENT)
Dept: WOUND CARE | Age: 86
End: 2021-04-23

## 2021-04-23 NOTE — TELEPHONE ENCOUNTER
Received phone call from Pt about wanting RN to get an update from her [facility] RN. Called [facility] RN and she states that Pt's legs overall are improving, there are two inner ankle wounds that are still open but improving, and her toes she occasionally bumps into things but overall is improving. Pt last seen in January by Dr. Mayra Wray. All questions answered.

## 2021-06-23 ENCOUNTER — TELEPHONE (OUTPATIENT)
Dept: WOUND CARE | Age: 86
End: 2021-06-23

## 2021-06-23 NOTE — TELEPHONE ENCOUNTER
Received phone call from [facility] nurse with [facility], stating that patients wounds have not changed but her swelling is better. She is requesting changing the dressing at next visit. Patient is currently scheduled for a July appointment.

## 2021-07-02 ENCOUNTER — HOSPITAL ENCOUNTER (OUTPATIENT)
Dept: WOUND CARE | Age: 86
Discharge: HOME | End: 2021-07-02
Attending: SURGERY
Payer: MEDICARE

## 2021-07-02 VITALS
RESPIRATION RATE: 18 BRPM | TEMPERATURE: 97.2 F | BODY MASS INDEX: 43.15 KG/M2 | DIASTOLIC BLOOD PRESSURE: 82 MMHG | WEIGHT: 259 LBS | OXYGEN SATURATION: 95 % | HEART RATE: 91 BPM | HEIGHT: 65 IN | SYSTOLIC BLOOD PRESSURE: 137 MMHG

## 2021-07-02 PROCEDURE — 29580 STRAPPING UNNA BOOT: CPT

## 2021-07-02 PROCEDURE — 99214 OFFICE O/P EST MOD 30 MIN: CPT

## 2021-07-02 RX ORDER — GABAPENTIN 100 MG/1
200 CAPSULE ORAL 3 TIMES DAILY
COMMUNITY

## 2021-07-02 NOTE — WOUND CARE
07/02/21 1426   Wound Toe (Comment  which one) Left;Dorsal GREAT   Date First Assessed/Time First Assessed: 07/02/21 1416   Present on Hospital Admission: Yes  Wound Approximate Age at First Assessment (Weeks): 4 weeks  Primary Wound Type: Diabetic Ulcer  Location: Toe (Comment  which one)  Wound Location Orientation. .. Wound Image    Wound Etiology Diabetic Gloria 2   Dressing Status Old drainage noted   Cleansed Cleansed with saline; Soap and water   Dressing/Treatment   (Betadine, Rolled Gauze)   Offloading for Diabetic Foot Ulcers Diabetic shoes/inserts   Wound Length (cm) 2 cm   Wound Width (cm) 2.5 cm   Wound Depth (cm) 0.2 cm   Wound Surface Area (cm^2) 5 cm^2   Wound Volume (cm^3) 1 cm^3   Wound Assessment [place]/red;Slough   Drainage Amount Small   Drainage Description Thick; Serous   Wound Odor None   [place]-Wound/Incision Assessment Edematous   Edges Attached edges   Wound Thickness Description Full thickness   Wound Leg lower Left   Date First Assessed/Time First Assessed: 01/10/20 1500   Primary Wound Type: Venous  Location: Leg lower  Wound Location Orientation: Left   Wound Image    Wound Etiology Venous   Dressing Status Clean; Intact; Old drainage noted   Cleansed Cleansed with saline; Soap and water   Dressing/Treatment   (Aquacel Ag, ABD, Unna Boot - Bilateral)   Wound Length (cm) 4.5 cm   Wound Width (cm) 1.8 cm   Wound Depth (cm) 0.1 cm   Wound Surface Area (cm^2) 8.1 cm^2   Change in Wound Size % 56.68   Wound Volume (cm^3) 0.81 cm^3   Wound Healing % 57   Wound Assessment Pink/red   Drainage Amount Moderate   Drainage Description Serosanguinous   Wound Odor None   [place]-Wound/Incision Assessment Blanchable erythema;Edematous   Edges Attached edges   Wound Thickness Description Partial thickness     Anticoagulated with ASA & Plavix  Wounds mechanically debrided with gauze and normal saline.

## 2021-07-02 NOTE — WOUND CARE
[address]  [address], [address] [facility]   Phone: [phone]  Fax: [phone]    Patient: Rosemarie Vazquez MRN: [id number]  SSN: [id number]    YOB: 1935  Age: 81 y.o. Sex: female       Return Appointment: 1 month with Artemio Durand MD    Instructions: Left Lower Leg - Medial:  Cleanse wound and periwound with wound cleanser or normal saline. Alginate with silver(sheets); cut to size, apply to wound bed. Cover with ABD. Bilateral Unna boot with wound and lower extremity assessment. If there is any problem with the dressing (too tight, slides down,, etc.)  Patient to return to clinic to have re-wrapped by clinician. Dressing change 3x weekly. [facility] for wound care 3x weekly. Left Great Toe:  Cleanse wound and periwound with wound cleanser or normal saline. Apply Betadine DAILY. Home Health for wound care 3x weekly. Family to provide Great Toe care on alternate days. Elevate lower legs when at rest.  Diabetic Diet. Low Salt Diet. Adequate Hydration - water is best.      Should you experience increased redness, swelling, pain, foul odor, size of wound(s), or have a temperature over 101 degrees please contact the [address] at [phone] or if after hours contact your primary care physician or go to the hospital emergency department.     Signed By: Kael Kirby RN     July 2, 2021

## 2021-07-03 NOTE — PROGRESS NOTES
Wound Center Progress Note    Patient: Madai Malik MRN: [id number]  SSN: [id number]    YOB: 1935  Age: 81 y.o. Sex: female      Subjective:     Chief Complaint:  Recurrent Venous leg ulcer BLE    History of Present Illness:       Wound Caused By: venous insufficiency  Associated Signs and Symptoms: drainage, pain  Timing: since June 2018  Quality: wound  Severity: full thickness  Modifying Factors: Dm2, obesity, venous insufficiency  Current Wound Care: Multilayer compression, CHF    3/11/2018 No new issues. Urinary soiling continues to be improved. Left 2nd toe stable. Getting [facility] for wraps  6/14/2019 Continues to do well with dressings but still with substantial drainage. No further UTI issues. 7/19/2019 No new issues. Tolerating dressings. No further soiling.   7/26/2019 Doing well. No new issues. R leg wound not improving. 8/2/2019 Returns for re-eval. Bx proven [place]. Chest lesion benign. 9/27/2019 S/P excision leg lesion and SG margins negative. 10/11/2019 Tolerating wraps. No new issues. Pain controlled. Minimal soiling. 12/06/2019 One month since last seen. No new issues. . No tunnelling. 1/10/2020 Returns after being discharged with new opening. Seen today with daughter. No changes in heart or CHF status. Has had some increased swelling. Has had trouble with LE wraps. 1/17/2020 Doing better since last visit and re-admit. Keeping elevated. No new breakdown. Cardiac and renal status stable. 2/14/2020 No new issues. Swelling stable. No recent CHF or other deterioration. Tolerating [place] without deterioration. 3/13/2020 Continued swelling but now bullae. Did not bring wraps today. Some issues at groin with candida. 4/17/2020 Seen via telehealth today. Increased swelling in legs. Has been trying to use faro wraps but worsening despite. Candida better. Blood sugars have been relatively well controlled. 9/25/2020 Has been seen by Dr Venkata Cheng last several times. Wound continue to recur when she is switched from [employer]. Currently having some increased CHF and cards managing diuretic.   11/20/2020 No new issues. Tolerating wraps. Minimal pain. No new breakdown. CHF is stable. 1/8/2021 Has not been seen since November but [facility] has been performing dressings 3x a week. Notes some increase in swelling. No new chest pain or SOB. Diuretic has been reduced due to kidney issues. 7/2/2021 Has not been seen for several months but has not had any significant deteriorations in health or hospitalizations. New issues with some loss of left great toenail. New skin tear on the left side.        Past Medical History:   Diagnosis Date    Acute hyponatremia 1/21/2011    Acute on chronic diastolic congestive heart failure ([address] 1/19/2011    Acute renal failure ([address] 1/21/2011    AMI (acute myocardial infarction) ([address] 2009    Anemia 9/5/2012    Arthritis     Asthma     Asthma exacerbation 11/11/2011    CAD (coronary artery disease)     MI 2009, stents heart 2009    CAD (coronary artery disease), native coronary artery 9/2/2009    Previous stent to [facility] with Cypher 3.5x33mm SABA 8/09 9/09 LAD- Xience 2.5x23mm and 3.0x18mm SABA in mid-distal LAD      Cellulitis Bilateral Lower Extremities 1/20/2011    Chest discomfort, tightness, pressure 1/28/2016    Chronic diastolic heart failure ([address] 10/20/2011    Chronic venous insufficiency 10/24/2011    Diabetes [facility]     checks QD, normal 200, no hyposymptoms     Diabetes Mellitus Type II-insulin dependent 9/2/2009    Diverticulitis 1/28/2016    Dyslipidemia 9/2/2009    Edema 1/28/2016    GERD (gastroesophageal reflux disease)     GLAUCOMA     BILATERAL    Heart failure ([address]     Hypertension     Hypokalemia 1/28/2016    Hypoxemia 9/7/2012    Morbid obesity (HCC)     Nausea & vomiting     Neuropathy in diabetes ([address] 9/2/2009    NSTEMI (non-ST elevation myocardial infarction) ([address] 9/2/2009    Dr Barbara Tavares Obstructive sleep apnea (adult) (pediatric)- probably  11/11/2011    TRISH (obstructive sleep apnea) 9/7/2012    Intolerant of CPAP     Other dyspnea and respiratory abnormality 11/10/2011    Other ill-defined conditions(799.89)     benign tumor in lower abdomen- not removed, diabetic ulcers, edema, neuropathy    Peripheral vascular [facility] 10/20/2011    Post PTCA 6/20/2014    PVD (peripheral vascular disease) ([facility]     Unspecified anemia 1/23/2011    Unspecified sleep apnea     oxygen at night    Venous stasis of lower extremity 2010    BILATERAL W RECURRENT ADMISSIONS    Volume overload 9/6/2012      Past Surgical History:   Procedure Laterality Date    HX CHOLECYSTECTOMY      HX COLONOSCOPY      HX GI      IA BREAST SURGERY PROCEDURE UNLISTED  1973    benign tumor left breast    [place] CARDIAC SURG PROCEDURE UNLIST      4 stents most recent 2 yrs ago    [facility] AD[facility] VESSEL      stentsx3     Family History   Problem Relation Age of Onset    Cancer Father     Lung Disease Father     Cancer Brother     Diabetes Paternal Aunt     Hypertension Maternal Grandmother     Hypertension Paternal Grandmother       Social History     Tobacco Use    Smoking status: Never Smoker    Smokeless tobacco: Never Used   Substance Use Topics    Alcohol use: No       Prior to Admission medications    Medication Sig Start Date End Date Taking? Authorizing Provider   gabapentin (Neurontin) 100 mg capsule Take 200 mg by mouth three (3) times daily. Yes Provider, Historical   bumetanide (BUMEX) 2 mg tablet Take 1 Tab by mouth daily. 11/3/20   Derick Longoria MD   ergocalciferol (Vitamin D2) 1,250 mcg (50,000 unit) capsule Take 1 Cap by mouth every seven (7) days. Takes on wed once a month 10/21/20   Derick Longoria MD   nitroglycerin (NITROLINGUAL) 400 mcg/spray spray 1 Spray by SubLINGual route every five (5) minutes as needed for Chest Pain.  4/17/20   Derick Longoria MD   clopidogreL (PLAVIX) 75 mg tab Take 1 Tab by mouth daily. 4/16/20   Kim Longoria MD   atorvastatin (LIPITOR) 80 mg tablet Take 1 Tab by mouth daily. 4/16/20   Kim Longoria MD   DULoxetine (CYMBALTA) 60 mg capsule Take 1 Cap by mouth daily. 4/16/20   Kim Longoria MD   metoprolol tartrate (LOPRESSOR) 50 mg tablet Take 1 Tab by mouth two (2) times a day. 4/16/20   Kim Longoria MD   dulaglutide (TRULICITY) 0.84 EN/2.9 mL sub-q pen 0.75 mg by SubCUTAneous route every seven (7) days. Provider, Historical   metOLazone (ZAROXOLYN) 10 mg tablet Take 1 Tab by mouth daily. Patient taking differently: Take 10 mg by mouth daily. Indications: as needed 1/19/18   Kim Longoria MD   aspirin 81 mg chewable tablet Take 1 Tab by mouth daily. 6/12/17   Kim Longoria MD   insulin glargine (TOUJEO SOLOSTAR) 300 unit/mL (1.5 mL) inpn 220 Units by SubCUTAneous route daily. Provider, Historical   acetaminophen (TYLENOL EXTRA STRENGTH) 500 mg tablet Take  by mouth every six (6) hours as needed for Pain. Provider, Historical   insulin glulisine (APIDRA) 100 unit/mL injection 90 Units by SubCUTAneous route three (3) times daily. Provider, Historical   nitroglycerin (NITROSTAT) 0.4 mg SL tablet 0.4 mg by SubLINGual route every five (5) minutes as needed. Provider, Historical   albuterol (PROVENTIL, VENTOLIN) 90 mcg/Actuation inhaler Take 2 Puffs by inhalation every four (4) hours as needed for Wheezing. Dispense: (1) inhaler with no refills. 12/19/10   Joselyn Breaux PA     Allergies   Allergen Reactions    Pcn [Penicillins] Hives, Swelling and Myalgia     Can take cephalosporins    Bactrim [Sulfamethoprim] Other (comments)     \"potassium acted up\"    Codeine Nausea Only    Dilaudid [Hydromorphone (Bulk)] Other (comments)    Lortab [Hydrocodone-Acetaminophen] Nausea Only        Review of Systems:  Pertinent items are noted in the History of Present Illness.      Lab Results   Component Value Date/Time Hemoglobin A1c 8.7 (H) 09/30/2011 01:07 PM        Immunization History   Administered Date(s) Administered    (RETIRED) Pneumococcal Vaccine (Unspecified Type) 09/20/2010    Influenza Vaccine Split 09/11/2012       Body mass index is 43.1 kg/m². Counseling regarding nutrition done: Yes Pateint counsled about risks of smoking and cessation      Current medications:  Current Outpatient Medications   Medication Sig Dispense Refill    gabapentin (Neurontin) 100 mg capsule Take 200 mg by mouth three (3) times daily.  bumetanide (BUMEX) 2 mg tablet Take 1 Tab by mouth daily. 90 Tab 3    ergocalciferol (Vitamin D2) 1,250 mcg (50,000 unit) capsule Take 1 Cap by mouth every seven (7) days. Takes on wed once a month 13 Cap 5    nitroglycerin (NITROLINGUAL) 400 mcg/spray spray 1 Spray by SubLINGual route every five (5) minutes as needed for Chest Pain. 1 Bottle 3    clopidogreL (PLAVIX) 75 mg tab Take 1 Tab by mouth daily. 90 Tab 3    atorvastatin (LIPITOR) 80 mg tablet Take 1 Tab by mouth daily. 90 Tab 3    DULoxetine (CYMBALTA) 60 mg capsule Take 1 Cap by mouth daily. 90 Cap 3    metoprolol tartrate (LOPRESSOR) 50 mg tablet Take 1 Tab by mouth two (2) times a day. 180 Tab 3    dulaglutide (TRULICITY) 6.02 GJ/7.7 mL sub-q pen 0.75 mg by SubCUTAneous route every seven (7) days.  metOLazone (ZAROXOLYN) 10 mg tablet Take 1 Tab by mouth daily. (Patient taking differently: Take 10 mg by mouth daily. Indications: as needed) 90 Tab 3    aspirin 81 mg chewable tablet Take 1 Tab by mouth daily. 90 Tab 3    insulin glargine (TOUJEO SOLOSTAR) 300 unit/mL (1.5 mL) inpn 220 Units by SubCUTAneous route daily.  acetaminophen (TYLENOL EXTRA STRENGTH) 500 mg tablet Take  by mouth every six (6) hours as needed for Pain.  insulin glulisine (APIDRA) 100 unit/mL injection 90 Units by SubCUTAneous route three (3) times daily.       nitroglycerin (NITROSTAT) 0.4 mg SL tablet 0.4 mg by SubLINGual route every five (5) minutes as needed.  albuterol (PROVENTIL, VENTOLIN) 90 mcg/Actuation inhaler Take 2 Puffs by inhalation every four (4) hours as needed for Wheezing. Dispense: (1) inhaler with no refills. 1 g 0         Objective:     Physical Exam:     Visit Vitals  /82 (BP 1 Location: Right upper arm, BP Patient Position: Sitting)   Pulse 91   Temp 97.2 °F (36.2 °C)   Resp 18   Ht 5' 5\" (1.651 m)   Wt 117.5 kg (259 lb)   SpO2 95%   BMI 43.10 kg/m²       General: Healthy appearing  Neuro: alert and oriented to person/place/situation. Otherwise nonfocal.  HEENT: Normocephalic, atraumatic. Neck: Normal range of motion. No masses. Chest: Resp unlabored  Abdomen: Soft, nontender  Ext. No hemosiderrosis. Psych: cooperative. Pleasant. No anxiety or depression. Normal mood and affect. Assessment:     VSU recurrence. Plan:     Swelling in legs much better after long hiatus. Continue current compression. Continue multilayer compression. Continue nystatin. Monitor for any fevers, systemic signs of infection. CHF management per cards.

## 2021-07-30 ENCOUNTER — HOSPITAL ENCOUNTER (OUTPATIENT)
Dept: LAB | Age: 86
Discharge: HOME | End: 2021-07-30
Attending: SURGERY
Payer: MEDICARE

## 2021-07-30 ENCOUNTER — HOSPITAL ENCOUNTER (OUTPATIENT)
Dept: WOUND CARE | Age: 86
Discharge: HOME | End: 2021-07-30
Attending: SURGERY
Payer: MEDICARE

## 2021-07-30 VITALS
OXYGEN SATURATION: 95 % | SYSTOLIC BLOOD PRESSURE: 169 MMHG | DIASTOLIC BLOOD PRESSURE: 93 MMHG | HEIGHT: 65 IN | TEMPERATURE: 98.7 F | RESPIRATION RATE: 18 BRPM | HEART RATE: 82 BPM | WEIGHT: 259 LBS | BODY MASS INDEX: 43.15 KG/M2

## 2021-07-30 DIAGNOSIS — L98.499 TYPE 2 DIABETES MELLITUS WITH OTHER SKIN ULCER, UNSPECIFIED WHETHER LONG TERM INSULIN USE (HCC): ICD-10-CM

## 2021-07-30 DIAGNOSIS — E11.622 CONTROLLED TYPE 2 DIABETES MELLITUS WITH OTHER SKIN ULCER, WITHOUT LONG-TERM CURRENT USE OF INSULIN (HCC): ICD-10-CM

## 2021-07-30 DIAGNOSIS — L98.499 TYPE 2 DIABETES MELLITUS WITH OTHER SKIN ULCER, UNSPECIFIED WHETHER LONG TERM INSULIN USE (HCC): Primary | ICD-10-CM

## 2021-07-30 DIAGNOSIS — E66.01 MORBID OBESITY (HCC): Chronic | ICD-10-CM

## 2021-07-30 DIAGNOSIS — L97.221 NON-PRESSURE CHRONIC ULCER OF LEFT CALF, LIMITED TO BREAKDOWN OF SKIN (HCC): ICD-10-CM

## 2021-07-30 DIAGNOSIS — L97.919 IDIOPATHIC CHRONIC VENOUS HTN OF BOTH LEGS WITH ULCER AND INFLAMMATION (HCC): ICD-10-CM

## 2021-07-30 DIAGNOSIS — I89.0 LYMPHEDEMA: ICD-10-CM

## 2021-07-30 DIAGNOSIS — I87.333 IDIOPATHIC CHRONIC VENOUS HTN OF BOTH LEGS WITH ULCER AND INFLAMMATION (HCC): ICD-10-CM

## 2021-07-30 DIAGNOSIS — E11.622 TYPE 2 DIABETES MELLITUS WITH OTHER SKIN ULCER, UNSPECIFIED WHETHER LONG TERM INSULIN USE (HCC): Primary | ICD-10-CM

## 2021-07-30 DIAGNOSIS — E11.622 TYPE 2 DIABETES MELLITUS WITH OTHER SKIN ULCER, UNSPECIFIED WHETHER LONG TERM INSULIN USE (HCC): ICD-10-CM

## 2021-07-30 DIAGNOSIS — L97.929 IDIOPATHIC CHRONIC VENOUS HTN OF BOTH LEGS WITH ULCER AND INFLAMMATION (HCC): ICD-10-CM

## 2021-07-30 DIAGNOSIS — I87.2 CHRONIC VENOUS INSUFFICIENCY: ICD-10-CM

## 2021-07-30 DIAGNOSIS — E11.9 TYPE 2 DIABETES MELLITUS WITHOUT COMPLICATION, UNSPECIFIED WHETHER LONG TERM INSULIN USE (HCC): Chronic | ICD-10-CM

## 2021-07-30 PROBLEM — E11.628 CONTROLLED TYPE 2 DIABETES MELLITUS WITH SKIN COMPLICATION, WITHOUT LONG-TERM CURRENT USE OF INSULIN (HCC): Status: ACTIVE | Noted: 2021-07-30

## 2021-07-30 LAB
EST. AVERAGE GLUCOSE BLD GHB EST-MCNC: 151 MG/DL
GLUCOSE BLD STRIP.AUTO-MCNC: 53 MG/DL (ref 65–100)
GLUCOSE BLD STRIP.AUTO-MCNC: 91 MG/DL (ref 65–100)
HBA1C MFR BLD: 6.9 % (ref 4.2–6.3)
SERVICE CMNT-IMP: ABNORMAL
SERVICE CMNT-IMP: NORMAL

## 2021-07-30 PROCEDURE — 29580 STRAPPING UNNA BOOT: CPT

## 2021-07-30 PROCEDURE — 36415 COLL VENOUS BLD VENIPUNCTURE: CPT

## 2021-07-30 PROCEDURE — 99204 OFFICE O/P NEW MOD 45 MIN: CPT | Performed by: SURGERY

## 2021-07-30 PROCEDURE — 83036 HEMOGLOBIN GLYCOSYLATED A1C: CPT

## 2021-07-30 PROCEDURE — 82962 GLUCOSE BLOOD TEST: CPT

## 2021-07-30 NOTE — PROGRESS NOTES
[address]    [address] [facility] [address] [place], [facility]  Consult Note/H&P/Progress Note      [address] RECORD NUMBER:  [id number]  AGE: 81 y.o. RACE WHITE/NON-  GENDER: female  : 1935  EPISODE DATE:  2021       Subjective:      CC (Chief Complaint) and HISTORY of PRESENT ILLNESS (HPI):    Deanna Stewart is a 81 y.o. female who presents today for wound/ulcer evaluation. I saw her for first visit on 2021    She was cared for by Dr. Inez Solano previously. This information was obtained from the patient  The following HPI elements were documented for the patient's wound:  Location: Bilateral lower extremity venous ulcers  Duration: In a years  Severity: BMI greater than 37;  age 81  Context: Somewhat palliative care; +DM  Modifying Factors:  Nothing makes better or worse  Quality:    Timing:     Associated Signs and Symptoms: No fevers    Ulcer Identification:  Ulcer Type: venous    Contributing Factors: lymphedema and obesity and DM    Below are notes from Dr. Inez Solano    3/11/2018 No new issues. Urinary soiling continues to be improved. Left 2nd toe stable. Getting [facility] for wraps  2019 Continues to do well with dressings but still with substantial drainage. No further UTI issues. 2019 No new issues. Tolerating dressings. No further soiling.   2019 Doing well. No new issues. R leg wound not improving. 2019 Returns for re-eval. Bx proven [place]. Chest lesion benign. 2019 S/P excision leg lesion and SG margins negative. 10/11/2019 Tolerating wraps. No new issues. Pain controlled. Minimal soiling. 2019 One month since last seen. No new issues. . No tunnelling. 1/10/2020 Returns after being discharged with new opening. Seen today with daughter. No changes in heart or CHF status. Has had some increased swelling. Has had trouble with LE wraps. 2020 Doing better since last visit and re-admit.  Keeping elevated. No new breakdown. Cardiac and renal status stable. 2/14/2020 No new issues. Swelling stable. No recent CHF or other deterioration. Tolerating Unna without deterioration. 3/13/2020 Continued swelling but now bullae. Did not bring wraps today. Some issues at groin with candida. 4/17/2020 Seen via telehealth today. Increased swelling in legs. Has been trying to use faro wraps but worsening despite. Candida better. Blood sugars have been relatively well controlled. 9/25/2020 Has been seen by Dr Liane Marie last several times. Wound continue to recur when she is switched from [employer]. Currently having some increased CHF and cards managing diuretic.   11/20/2020 No new issues. Tolerating wraps. Minimal pain. No new breakdown. CHF is stable. 1/8/2021 Has not been seen since November but [facility] has been performing dressings 3x a week. Notes some increase in swelling. No new chest pain or SOB. Diuretic has been reduced due to kidney issues. 7/2/2021 Has not been seen for several months but has not had any significant deteriorations in health or hospitalizations. New issues with some loss of left great toenail. New skin tear on the left side.                    PAST MEDICAL HISTORY  Past Medical History:   Diagnosis Date    Acute hyponatremia 1/21/2011    Acute on chronic diastolic congestive heart failure ([address] 1/19/2011    Acute renal failure ([facility] 1/21/2011    AMI (acute myocardial infarction) ([facility] 2009    Anemia 9/5/2012    Arthritis     Asthma     Asthma exacerbation 11/11/2011    CAD (coronary artery disease)     MI 2009, stents heart 2009    CAD (coronary artery disease), native coronary artery 9/2/2009    Previous stent to dRCA with Cypher 3.5x33mm SABA 8/09 9/09 LAD- Xience 2.5x23mm and 3.0x18mm SABA in mid-distal LAD      Cellulitis Bilateral Lower Extremities 1/20/2011    Chest discomfort, tightness, pressure 1/28/2016    Chronic diastolic heart failure (HCC) 10/20/2011    Chronic venous insufficiency 10/24/2011    Diabetes ([facility]     checks QD, normal 200, no hyposymptoms     Diabetes Mellitus Type II-insulin dependent 9/2/2009    Diverticulitis 1/28/2016    Dyslipidemia 9/2/2009    Edema 1/28/2016    GERD (gastroesophageal reflux disease)     GLAUCOMA     BILATERAL    Heart failure ([address]     Hypertension     Hypokalemia 1/28/2016    Hypoxemia 9/7/2012    Morbid obesity ([address]     Nausea & vomiting     Neuropathy in diabetes ([address] 9/2/2009    NSTEMI (non-ST elevation myocardial infarction) ([address] 9/2/2009    Dr Franco York    Obstructive sleep apnea (adult) (pediatric)- probably  11/11/2011    TRISH (obstructive sleep apnea) 9/7/2012    Intolerant of CPAP     Other dyspnea and respiratory abnormality 11/10/2011    Other ill-defined conditions(799.89)     benign tumor in lower abdomen- not removed, diabetic ulcers, edema, neuropathy    Peripheral vascular disease ([address] 10/20/2011    Post PTCA 6/20/2014    PVD (peripheral vascular disease) ([address]     Unspecified anemia 1/23/2011    Unspecified sleep apnea     oxygen at night    Venous stasis of lower extremity 2010    BILATERAL W RECURRENT ADMISSIONS    Volume overload 9/6/2012        PAST SURGICAL HISTORY  Past Surgical History:   Procedure Laterality Date    HX CHOLECYSTECTOMY      HX COLONOSCOPY      HX GI      OK BREAST SURGERY PROCEDURE UNLISTED  1973    benign tumor left breast    OK CARDIAC SURG PROCEDURE UNLIST      4 stents most recent 2 yrs ago    PTCA [facility] ADDL VESSEL      stentsx3       FAMILY HISTORY  Family History   Problem Relation Age of Onset    Cancer Father     Lung Disease Father     Cancer Brother     Diabetes Paternal Aunt     Hypertension Maternal Grandmother     Hypertension Paternal Grandmother        SOCIAL HISTORY  Social History     Tobacco Use    Smoking status: Never Smoker    Smokeless tobacco: Never Used   Substance Use Topics    Alcohol use: No    Drug use:  No ALLERGIES  Allergies   Allergen Reactions    Pcn [Penicillins] Hives, Swelling and Myalgia     Can take cephalosporins    Bactrim [Sulfamethoprim] Other (comments)     \"potassium acted up\"    Codeine Nausea Only    Dilaudid [Hydromorphone (Bulk)] Other (comments)    Lortab [Hydrocodone-Acetaminophen] Nausea Only       MEDICATIONS  Current Outpatient Medications on File Prior to Encounter   Medication Sig Dispense Refill    gabapentin (Neurontin) 100 mg capsule Take 200 mg by mouth three (3) times daily.  bumetanide (BUMEX) 2 mg tablet Take 1 Tab by mouth daily. 90 Tab 3    ergocalciferol (Vitamin D2) 1,250 mcg (50,000 unit) capsule Take 1 Cap by mouth every seven (7) days. Takes on wed once a month 13 Cap 5    nitroglycerin (NITROLINGUAL) 400 mcg/spray spray 1 Spray by SubLINGual route every five (5) minutes as needed for Chest Pain. 1 Bottle 3    clopidogreL (PLAVIX) 75 mg tab Take 1 Tab by mouth daily. 90 Tab 3    atorvastatin (LIPITOR) 80 mg tablet Take 1 Tab by mouth daily. 90 Tab 3    DULoxetine (CYMBALTA) 60 mg capsule Take 1 Cap by mouth daily. 90 Cap 3    metoprolol tartrate (LOPRESSOR) 50 mg tablet Take 1 Tab by mouth two (2) times a day. 180 Tab 3    dulaglutide (TRULICITY) 6.06 RW/0.7 mL sub-q pen 0.75 mg by SubCUTAneous route every seven (7) days.  metOLazone (ZAROXOLYN) 10 mg tablet Take 1 Tab by mouth daily. (Patient taking differently: Take 10 mg by mouth daily. Indications: as needed) 90 Tab 3    aspirin 81 mg chewable tablet Take 1 Tab by mouth daily. 90 Tab 3    insulin glargine (TOUJEO SOLOSTAR) 300 unit/mL (1.5 mL) inpn 220 Units by SubCUTAneous route daily.  acetaminophen (TYLENOL EXTRA STRENGTH) 500 mg tablet Take  by mouth every six (6) hours as needed for Pain.  insulin glulisine (APIDRA) 100 unit/mL injection 90 Units by SubCUTAneous route three (3) times daily.       nitroglycerin (NITROSTAT) 0.4 mg SL tablet 0.4 mg by SubLINGual route every five (5) minutes as needed.  albuterol (PROVENTIL, VENTOLIN) 90 mcg/Actuation inhaler Take 2 Puffs by inhalation every four (4) hours as needed for Wheezing. Dispense: (1) inhaler with no refills. 1 g 0     No current facility-administered medications on file prior to encounter. REVIEW OF SYSTEMS  The patient has no difficulty with chest pain or shortness of breath. No fever or chills. Comprehensive review of systems was otherwise unremarkable except as noted above. Objective:   Physical Exam:   Recent vitals (if inpt):  Patient Vitals for the past 24 hrs:   BP Temp Pulse Resp SpO2 Height Weight   07/30/21 0946 (!) 169/93 98.7 °F (37.1 °C) 82 18 95 % 5' 5\" (1.651 m) 259 lb (117.5 kg)       Visit Vitals  BP (!) 169/93 (BP 1 Location: Right arm)   Pulse 82   Temp 98.7 °F (37.1 °C)   Resp 18   Ht 5' 5\" (1.651 m)   Wt 259 lb (117.5 kg)   SpO2 95%   BMI 43.10 kg/m²     Wt Readings from Last 3 Encounters:   07/30/21 259 lb (117.5 kg)   07/02/21 259 lb (117.5 kg)   01/08/21 259 lb (117.5 kg)     Constitutional: Alert, oriented, cooperative patient in no acute distress; appears stated age;  General appearance is within normal limits for wound care patient population. See the today's recorded vitals signs and constitutional data. Eyes: Pupils equal; Sclera are clear. EOMs intact; The eyes appear to track and move normally. The sclera are not injected. The conjunctive are clear. The eyelids are normal. There is no scleral icterus. ENMT: There are no obvious external ear, nose, lip or mouth lesions. Nares normal; Neck: Overall contour of the neck is normal with no obvious neck masses. Gross hearing is within normal limits. No obvious neck masses  Resp:  Breathing is non-labored; normal rate and effort; no audible wheezing. CV: RRR;  no JVD; No evidence of cyanosis of the upper extremities. The extremities are perfused without embolic sign, splinter hemorrhages, or petechia.    GI: soft and non-distended without acute abnormality noted. Musculoskeletal: unremarkable with normal function. No embolic signs or cyanosis. Neuro:  Oriented; moves all 4; no focal deficits  Psychiatric: Judgement and insight are within normal limits for the wound care population of patients. Patient is oriented to person, place, and time. Recent and remote memory are within normal limits. Mood and affect are within normal limits. Integumentary (Skin/Wounds)  See inspection of wound(s) below. There are no other skin areas of palpable concern. See wound center documentation including photos in the [address] Wound Template made part of this record by reference. Wounds:  Wound Leg Right (Active)   Wound Image   07/30/21 1040   Wound Etiology Venous 07/30/21 1040   Dressing Status Old drainage noted 07/30/21 1040   Cleansed Soap and water 07/30/21 1040   Wound Length (cm) 2 cm 07/30/21 1040   Wound Width (cm) 2 cm 07/30/21 1040   Wound Depth (cm) 0.1 cm 07/30/21 1040   Wound Surface Area (cm^2) 4 cm^2 07/30/21 1040   Wound Volume (cm^3) 0.4 cm^3 07/30/21 1040   Wound Assessment Granulation tissue; Epithelialization 07/30/21 1040   Drainage Amount Moderate 07/30/21 1040   Drainage Description Serosanguinous 07/30/21 1040   Wound Odor None 07/30/21 1040   [place]-Wound/Incision Assessment Edematous 07/30/21 1040   Wound Thickness Description Full thickness 07/30/21 1040   Number of days: 703       Wound Leg lower Left;Medial (Active)   Wound Image   07/30/21 1040   Wound Etiology Venous 07/30/21 1040   Dressing Status Old drainage noted 07/30/21 1040   Cleansed Soap and water 07/30/21 1040   Wound Length (cm) 9.8 cm 07/30/21 1040   Wound Width (cm) 4.5 cm 07/30/21 1040   Wound Depth (cm) 0.1 cm 07/30/21 1040   Wound Surface Area (cm^2) 44.1 cm^2 07/30/21 1040   Change in Wound Size % -135.83 07/30/21 1040   Wound Volume (cm^3) 4.41 cm^3 07/30/21 1040   Wound Healing % -136 07/30/21 1040   Wound Assessment Granulation tissue 07/30/21 1040   Drainage Amount Moderate 07/30/21 1040   Drainage Description Serosanguinous 07/30/21 1040   Wound Odor None 07/30/21 1040   [place]-Wound/Incision Assessment Blanchable erythema 07/30/21 1040   Edges Attached edges 07/02/21 1426   Wound Thickness Description Full thickness 07/30/21 1040   Number of days: 567       Wound Toe (Comment  which one) Left;Dorsal GREAT (Active)   Wound Image   07/30/21 1040   Wound Etiology Diabetic Gloria 2 07/30/21 1040   Dressing Status Clean;Dry 07/30/21 1040   Cleansed Soap and water 07/30/21 1040   Dressing/Treatment Open to air 07/30/21 1040   Offloading for Diabetic Foot Ulcers Diabetic shoes/inserts 07/30/21 1040   Wound Length (cm) 0 cm 07/30/21 1040   Wound Width (cm) 0 cm 07/30/21 1040   Wound Depth (cm) 0 cm 07/30/21 1040   Wound Surface Area (cm^2) 0 cm^2 07/30/21 1040   Change in Wound Size % 100 07/30/21 1040   Wound Volume (cm^3) 0 cm^3 07/30/21 1040   Wound Healing % 100 07/30/21 1040   Wound Assessment Epithelialization 07/30/21 1040   Drainage Amount None 07/30/21 1040   Drainage Description Thick; Serous 07/02/21 1426   Wound Odor None 07/30/21 1040   [place]-Wound/Incision Assessment Edematous 07/02/21 1426   Edges Attached edges 07/02/21 1426   Wound Thickness Description Full thickness 07/02/21 1426   Number of days: 28       Wound Leg lower Left;Posterior (Active)   Wound Image   07/30/21 1040   Wound Etiology Venous 07/30/21 1040   Dressing Status Old drainage noted 07/30/21 1040   Cleansed Soap and water 07/30/21 1040   Wound Length (cm) 3.7 cm 07/30/21 1040   Wound Width (cm) 1 cm 07/30/21 1040   Wound Depth (cm) 0.1 cm 07/30/21 1040   Wound Surface Area (cm^2) 3.7 cm^2 07/30/21 1040   Wound Volume (cm^3) 0.37 cm^3 07/30/21 1040   Wound Assessment Granulation tissue 07/30/21 1040   Drainage Amount Small 07/30/21 1040   Drainage Description Serosanguinous 07/30/21 1040   Wound Odor None 07/30/21 1040   [place]-Wound/Incision Assessment Edematous 07/30/21 1040   Wound Thickness Description Full thickness 07/30/21 1040   Number of days: 0       [REMOVED] Wound Leg Medial;Left;Right (Removed)   Number of days: 6999       [REMOVED] Wound Leg Lower Left (Removed)   Dressing Status  Clean, dry, and intact 09/18/18 1407   Dressing Type  Unna boot 09/18/18 1407   Read-Only, Retired. Wound Length (cm) 0 cm 09/11/18 1601   Read-Only, Retired. Wound Width (cm) 0 cm 09/11/18 1601   Read-Only, Retired. Wound Depth (cm) 0 09/11/18 1601   Read-Only, Retired. Wound Surface area (cm^2) 0 cm^2 09/11/18 1601   Read-Only, Retired. Epithelialization (%) 100 09/11/18 1601   Read-Only, Retired. Drainage Amount None 09/11/18 1601   Wound Odor None 09/11/18 1601   Read-Only, Retired. Cleansing and Cleansing Agents Normal saline; Soap and water 09/11/18 1601   Read-Only, Retired. Dressing Type Applied Unna boot 09/11/18 1628   Read-Only, Retired. Procedure Tolerated Well 09/11/18 1628   Number of days: 32       [REMOVED] Wound Leg Lower Right (Removed)   Dressing Status  Clean, dry, and intact 01/17/20 1458   Dressing Type  Unna boot 10/05/18 1452   Read-Only, Retired. Non-Pressure Injury Full thickness (subcut/muscle) 11/08/19 1513   Read-Only, Retired. Wound Length (cm) 0.1 cm 01/17/20 1458   Read-Only, Retired. Wound Width (cm) 0.1 cm 01/17/20 1458   Read-Only, Retired. Wound Depth (cm) 0.1 01/17/20 1458   Read-Only, Retired. Wound Surface area (cm^2) 0.01 cm^2 01/17/20 1458   Read-Only, Retired. Change in Wound Size % 96 01/17/20 1458   Read-Only, Retired. Condition of Base Pink 10/11/19 1516   Read-Only, Retired. Epithelialization (%) 50 01/10/20 1458   Read-Only, Retired. Tissue Type Red 08/17/18 1436   Read-Only, Retired. Tissue Type Percent Pink 50 01/10/20 1458   Read-Only, Retired. Tissue Type Percent Red 100 11/08/19 1513   Read-Only, Retired. Drainage Amount Scant 01/17/20 1458   Read-Only, Retired.  Drainage Color Serous 01/17/20 1458   Wound Odor None 01/10/20 1458 Read-Only, Retired. Periwound Skin Condition Intact 01/10/20 1458   Read-Only, Retired. Cleansing and Cleansing Agents Normal saline 01/17/20 1458   Read-Only, Retired. Dressing Type Applied Xeroform;ABD pad;Unna boot 08/31/18 0901   Read-Only, Retired. Procedure Tolerated Well 11/08/19 1513   Number of days: 560       [REMOVED] Wound Toe Left (Removed)   Read-Only, Retired. Wound Length (cm) 0 cm 11/02/18 1104   Read-Only, Retired. Wound Width (cm) 0 cm 11/02/18 1104   Read-Only, Retired. Wound Depth (cm) 0 11/02/18 1104   Read-Only, Retired. Wound Surface area (cm^2) 0 cm^2 11/02/18 1104   Read-Only, Retired. Change in Wound Size % 100 11/02/18 1104   Read-Only, Retired. Tissue Type Percent Pink 100 10/19/18 1508   Read-Only, Retired. Drainage Amount None 11/02/18 1104   Read-Only, Retired. Drainage Color Serosanguinous 10/19/18 1508   Wound Odor None 11/02/18 1104   Read-Only, Retired. Periwound Skin Condition Intact 10/19/18 1508   Read-Only, Retired. Cleansing and Cleansing Agents Normal saline 11/02/18 1104   Read-Only, Retired. Procedure Tolerated Well 10/12/18 1623   Number of days: 21       [REMOVED] Wound Toe (specify in comments) Left;Plantar (Removed)   Dressing Status  Clean, dry, and intact 11/02/18 1104   Dressing Type  Adhesive wound dressing (Band-Aid) 11/02/18 1104   Read-Only, Retired. Non-Pressure Injury Partial thickness (epider/derm) 10/19/18 1508   Read-Only, Retired. Wound Length (cm) 0 cm 11/02/18 1104   Read-Only, Retired. Wound Width (cm) 0 cm 11/02/18 1104   Read-Only, Retired. Wound Depth (cm) 0 11/02/18 1104   Read-Only, Retired. Wound Surface area (cm^2) 0 cm^2 11/02/18 1104   Read-Only, Retired. Change in Wound Size % 100 11/02/18 1104   Read-Only, Retired. Condition of Base [place] 10/19/18 1508   Read-Only, Retired. Drainage Amount None 11/02/18 1104   Wound Odor None 11/02/18 1104   Read-Only, Retired. Periwound Skin Condition Intact 10/19/18 1508   Read-Only, Retired.  Cleansing and Cleansing Agents Normal saline 11/02/18 1104   Number of days: 14       [REMOVED] Wound Leg Lower Left;Medial (Removed)   Dressing Status  Clean, dry, and intact 03/01/19 1341   Read-Only, Retired. Wound Length (cm) 0 cm 03/01/19 1341   Read-Only, Retired. Wound Width (cm) 0 cm 03/01/19 1341   Read-Only, Retired. Wound Depth (cm) 0 03/01/19 1341   Read-Only, Retired. Wound Surface area (cm^2) 0 cm^2 03/01/19 1341   Read-Only, Retired. Change in Wound Size % 100 03/01/19 1341   Read-Only, Retired. Condition of Base Epithelializing 03/01/19 1341   Read-Only, Retired. Tissue Type Percent Pink 100 03/01/19 1341   Read-Only, Retired. Tissue Type Percent Red 100 01/18/19 1317   Read-Only, Retired. Drainage Amount None 03/01/19 1341   Read-Only, Retired. Drainage Color Serous 01/18/19 1317   Wound Odor None 03/01/19 1341   Read-Only, Retired. Periwound Skin Condition Intact 03/01/19 1341   Read-Only, Retired.  Cleansing and Cleansing Agents Soap and water 03/01/19 1341   Number of days: 42       [REMOVED] Wound Leg lower Left (Removed)   Dressing Status Clean, dry, and intact 08/23/19 1452   Wound Length (cm) 0 cm 08/23/19 1452   Wound Width (cm) 0 cm 08/23/19 1452   Wound Depth (cm) 0 cm 08/23/19 1452   Wound Volume (cm^3) 0 cm^3 08/23/19 1452   Drainage Amount None 08/23/19 1452   Drainage Description Serous 08/16/19 1427   Wound Odor None 08/23/19 1452   Cydney-Wound/Incision Assessment Blanchable erythema 08/23/19 1452   Number of days: 14       [REMOVED] Wound Leg upper Right (Removed)   Dressing Status Clean, dry, and intact 09/13/19 1510   Dressing/Treatment Open to air 09/27/19 1406   Wound Length (cm) 0 cm 09/27/19 1406   Wound Width (cm) 0 cm 09/27/19 1406   Wound Depth (cm) 0 cm 09/27/19 1406   Wound Volume (cm^3) 0 cm^3 09/27/19 1406   Drainage Amount None 09/27/19 1406   Wound Odor None 09/27/19 1406   Cydney-Wound/Incision Assessment Intact 09/27/19 1406   Number of days: 28       [REMOVED] Wound Toe (Comment which one) Anterior left 2nd (Removed)   Dressing Status Clean, dry, and intact 09/25/20 1506   Dressing/Treatment Open to air 09/25/20 1506   Wound Length (cm) 0 cm 09/25/20 1506   Wound Width (cm) 0 cm 09/25/20 1506   Wound Depth (cm) 0 cm 09/25/20 1506   Wound Surface Area (cm^2) 0 cm^2 09/25/20 1506   Change in Wound Size % 100 09/25/20 1506   Wound Volume (cm^3) 0 cm^3 09/25/20 1506   Drainage Amount None 09/25/20 1506   Drainage Description Serous 08/10/20 1329   Wound Odor None 09/25/20 1506   Cydney-Wound/Incision Assessment Dry 08/17/20 1328   Wound Thickness Description Partial thickness 08/24/20 1459   Number of days: 46       [REMOVED] Wound Toe (Comment  which one) Left;Lateral Great toe (Removed)   Wound Image   11/20/20 1321   Wound Etiology Diabetic Gloria 2 11/20/20 1321   Dressing Status Moist 09/25/20 1506   Dressing/Treatment Open to air 09/25/20 1506   Wound Length (cm) 0 cm 11/20/20 1321   Wound Width (cm) 0 cm 11/20/20 1321   Wound Depth (cm) 0 cm 11/20/20 1321   Wound Surface Area (cm^2) 0 cm^2 11/20/20 1321   Change in Wound Size % 100 11/20/20 1321   Wound Volume (cm^3) 0 cm^3 11/20/20 1321   Wound Healing % 100 11/20/20 1321   Wound Assessment Epithelialization 11/20/20 1321   Drainage Amount None 11/20/20 1321   Drainage Description Serosanguinous 09/25/20 1506   Wound Odor None 11/20/20 1321   Cydney-Wound/Incision Assessment Blanchable erythema 11/20/20 1321   Wound Thickness Description Full thickness 09/25/20 1506   Number of days: 105                      Amount and/or Complexity of Data Reviewed and Analyzed:  I reviewed and analyzed all of the unique labs and radiologic studies that are shown below as well as any that are in the HPI, and any that are in the expanded problem list below  *Each unique test, order, or document contributes to the combination of 2 or combination of 3 in Category 1 below.   I also independently reviewed radiology images for studies I described above in the HPI or studies I have ordered. For this visit I also reviewed old records and prior notes. No results for input(s): WBC, HGB, PLT, NA, K, CL, CO2, BUN, CREA, GLU, PTP, INR, APTT, TBIL, TBILI, CBIL, ALT, AP, AML, AML, LPSE, LCAD, NH4, TROPT, TROIQ, PCO2, PO2, HCO3, HGBEXT, PLTEXT, INREXT, HGBEXT, PLTEXT, INREXT in the last 72 hours. No lab exists for component: SGOT, GPT,  PH  No results for input(s): PTP, INR, APTT, TBIL, TBILI, CBIL, ALT, AP, AML, LPSE, INREXT, INREXT in the last 72 hours. No lab exists for component: SGOT, GPT    Most recent blood counts (CBC) review  Lab Results   Component Value Date/Time    WBC 7.4 08/17/2020 02:55 PM    HGB 10.7 (L) 08/17/2020 02:55 PM    HCT 37.3 08/17/2020 02:55 PM    HEMATOCRIT 36 11/10/2011 02:55 PM    PLATELET 955 95/87/1410 02:55 PM    MCV 94.7 08/17/2020 02:55 PM     Most recent chemistry (BMP) test review   Lab Results   Component Value Date/Time    Sodium 140 08/17/2020 02:55 PM    Potassium 4.6 08/17/2020 02:55 PM    Chloride 106 08/17/2020 02:55 PM    CO2 27 08/17/2020 02:55 PM    Anion gap 7 08/17/2020 02:55 PM    Glucose 244 (H) 08/17/2020 02:55 PM    BUN 19 08/17/2020 02:55 PM    Creatinine 1.01 (H) 08/17/2020 02:55 PM    GFR est AA >60 08/17/2020 02:55 PM    GFR est non-AA 56 (L) 08/17/2020 02:55 PM    Calcium 9.6 08/17/2020 02:55 PM     Most recent Liver enzyme test review  Lab Results   Component Value Date/Time    ALT (SGPT) 19 07/09/2014 08:45 PM    AST (SGOT) 13 (L) 07/09/2014 08:45 PM    Alk.  phosphatase 139 (H) 07/09/2014 08:45 PM    Bilirubin, total 0.7 07/09/2014 08:45 PM     Most recent coagulation (coags) review  Lab Results   Component Value Date/Time    INR 1.0 06/20/2014 09:32 AM    INR 1.1 11/10/2011 02:25 PM    INR 1.0 01/19/2011 02:25 PM    Prothrombin time 10.7 06/20/2014 09:32 AM    Prothrombin time 11.6 11/10/2011 02:25 PM    Prothrombin time 10.2 01/19/2011 02:25 PM     Lab Results   Component Value Date/Time    INR 1.0 06/20/2014 09:32 AM    aPTT 26.9 11/10/2011 02:25 PM    Lipase 96 07/09/2014 08:45 PM    Troponin-I <0.05 11/10/2011 02:50 PM    Troponin-I, Qt. <0.04 (L) 04/26/2011 04:35 AM       Diabetic assessment  Lab Results   Component Value Date/Time    Hemoglobin A1c 8.7 (H) 09/30/2011 01:07 PM       Nutritional assessment screen to assess wound healing ability:  Lab Results   Component Value Date/Time    Protein, total 7.8 07/09/2014 08:45 PM    Albumin 3.3 07/09/2014 08:45 PM     Lab Results   Component Value Date/Time    Protein, total 7.8 07/09/2014 08:45 PM    Albumin 3.3 07/09/2014 08:45 PM       XR Results (most recent):  Results from Hospital Encounter encounter on 09/08/14    XR CHEST PA LAT    Narrative  Two view chest x-ray. CLINICAL INDICATION: Cough. FINDINGS: Single AP view the chest compared to a similar exam dated 9/17/2012  shows no significant change in the cardiopulmonary structures with mild  prominence of the interstitial markings. No dense airspace consolidation noted. No pleural effusion or pneumothorax evident. The cardiac silhouette and  mediastinum are unremarkable. Impression  :  1. No acute cardiopulmonary mallet. 2. The stable, diffuse interstitial prominence may be related to chronic lung  disease. CT Results (most recent):  Results from Hospital Encounter encounter on 10/26/15    CT ABD PELV W CONT    Narrative  ABDOMINAL CT WITH CONTRAST: 10/26/2015  Indication:Left lower quadrant pain for 5 days  Comparison:July 9, 2014  100cc of optiray 350   was injected to aid in the detection of abdominal and  pelvic pathology. Findings: The lung bases are remarkable for a 3 mm calcified nodule in the left lung base  and an area of hypoventilation in the right lung base  . There are new scattered  low-density lesions throughout the hepatic parenchyma of which the largest is in  the medial segment of the left hepatic lobe measuring 51 mm. There is no mass  effect associated with these lesions.  The patient is status post  cholecystectomy. The pancreas, aorta, and kidneys are unremarkable. There are a  few splenic calcifications. There is no significant adenopathy. There is  diverticula throughout the colon. Small bowel is unremarkable. There is a  fat-containing periumbilical hernia. Bony structures are intact. CT scan of pelvis with contrast:    Uterus, bladder, adnexal structures, soft tissues and bony structures are  unremarkable. 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 Normal RV. Mild/moderate AS. MG 15. PG 26. Mild MR/TR/AR             Essential hypertension ICD-10-CM: I10  ICD-9-CM: 401.9  5/2/2019        Non-pressure chronic ulcer of left calf, limited to breakdown of skin [facility]) ICD-10-CM: N73.806  ICD-9-CM: 707.12  8/21/2018        Edema ICD-10-CM: R60.9  ICD-9-CM: 782.3  1/28/2016        Diverticulitis ICD-10-CM: K57.92  ICD-9-CM: 562.11  1/28/2016    Overview Signed 1/28/2016 12:10 PM by Areli Corrales     1. CT of abdomen (7/9/14):  Evidence of uncomplicated diverticulitis at the junction of the descending and sigmoid colon. Extensive diverticular are seen with some pericolonic fat stranding. TRISH (obstructive sleep apnea) (Chronic) ICD-10-CM: G47.33  ICD-9-CM: 327.23  9/7/2012    Overview Signed 9/7/2012 11:46 AM by Oneyda Bob NP     Intolerant of CPAP             Anemia ICD-10-CM: D64.9  ICD-9-CM: 285.9  9/5/2012        Obstructive sleep apnea (adult) (pediatric)- probably  ICD-10-CM: G47.33  ICD-9-CM: 327.23  11/11/2011        Chronic venous insufficiency ICD-10-CM: I87.2  ICD-9-CM: 459.81  10/24/2011        Chronic diastolic heart failure (HCC) (Chronic) ICD-10-CM: I50.32  ICD-9-CM: 428.32  10/20/2011    Overview Signed 1/28/2016 12:05 PM by Areli Corrales     1. Echo (3/5/10): EF 55%. No wall motion abnormalities. Mild LVH. Mild diastolic dysfunction. Mild bi-atrial enlargement. Mild mitral regurgitation. RVSP 32.    2. Echo (4/26/11): Normal LV systolic function. EF 60%. Mild LVH. Mild diastolic dysfunction. Mild left atrial enlargement. Mild aortic regurgitation. Peripheral vascular disease ([address] (Chronic) ICD-10-CM: I73.9  ICD-9-CM: 443.9  10/20/2011    Overview Signed 1/28/2016 12:08 PM by Areli Corrales     1. Lower extremity duplex (9/2/10): Right lower extremity duplex suggested moderate to severe reduction with multilevel disease. Stenotic SFA most pronounced distally. Right VIBHA 0.5. Left VIBHA suggests mild reduction VIBHA of 0.86. GERD (gastroesophageal reflux disease) (Chronic) ICD-10-CM: K21.9  ICD-9-CM: 530.81  10/20/2011        Anemia, unspecified ICD-10-CM: D64.9  ICD-9-CM: 285.9  1/23/2011        Acute hyponatremia ICD-10-CM: E87.1  ICD-9-CM: 276.1  1/21/2011        CAD (coronary artery disease), native coronary artery (Chronic) ICD-10-CM: I25.10  ICD-9-CM: 414.01  9/2/2009    Overview Addendum 6/11/2017  9:05 PM by Tracy Sinha MD     1. NSTEMI (8/17/09):  EF 60%. PCI of dRCA with  Cypher 3.5x 33 mm SABA.   Residual 80-90% LAD stenosis. 2.  Staged PCI of LAD (9/1/09): Xience 2.5x23mm and 3.0x18mm SABA in mid-distal LAD . Adjunctive POBA of D1 and D2.    3.  PCI of mid RCA (6/20/14): Promus 4 x 20 mm SABA (overlapped with previous stent)             Diabetes mellitus, type 2 (HCC) (Chronic) ICD-10-CM: E11.9  ICD-9-CM: 250.00  9/2/2009        Morbid obesity (HCC) (Chronic) ICD-10-CM: E66.01  ICD-9-CM: 278.01  9/2/2009        Dyslipidemia (Chronic) ICD-10-CM: E78.5  ICD-9-CM: 272.4  9/2/2009        Neuropathy in diabetes ([address] (Chronic) ICD-10-CM: E11.40  ICD-9-CM: 250.60, 357.2  9/2/2009              Active Problems: Morbid obesity ([address] (9/2/2009)      Chronic venous insufficiency (10/24/2011)      Non-pressure chronic ulcer of left calf, limited to breakdown of skin ([address] (8/21/2018)      BMI 40.0-44.9, adult ([address] (7/30/2021)      Lymphedema (7/30/2021)      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 BMI greater than 43  Encourage weight loss    Lymphedema  Encourage elevation, salt avoidance, compression therapy, and weight loss  CHF management per cards. Bilat lower extremity venous ulcers/venous HTN  3 times a week multilayer compression dressings over alginate      Diabetes mellitus  Last hemoglobin A1c in our records was > 8 in 2011  I ordered hemoglobin A1c on 7/30/2021  Encourage close follow-up with primary care, strict diabetic diet, and strict adherence to diabetic medical regimen.           Wound Care  No orders of the defined types were placed in this encounter. Follow-up Information    None                 Level of MDM (2/3 elements below)  Number and Complexity of Problems Addressed Amount and/or Complexity of Data to be Reviewed and Analyzed  *Each unique test, order, or document contributes to the combination of 2 or combination of 3 in Category 1 below.  Risk of Complications and/or Morbidity or Mortality of pt Management     43485  00478 SF Minimal  1self-limited or minor problem Minimal or none Minimal risk of morbidity from additional diagnostic testing or Rx   25455  97042 Low Low  2or more self-limited or minor problems;    or  1stable chronic illness;    or  5LSWTW, uncomplicated illness or injury   Limited  (Must meet the requirements of at least 1 of the 2 categories)  Category 1: Tests and documents   Any combination of 2 from the following:  Review of prior external note(s) from each unique source*;  review of the result(s) of each unique test*;   ordering of each unique test*    or   Category 2: Assessment requiring an independent historian(s)  (For the categories of independent interpretation of tests and discussion of management or test interpretation, see moderate or high) Low risk of morbidity from additional diagnostic testing or treatment     38178  66829 Mod Moderate  1or more chronic illnesses with exacerbation, progression, or side effects of treatment;    or  2or more stable chronic illnesses;    or  1undiagnosed new problem with uncertain prognosis;    or  1acute illness with systemic symptoms;    or  5RVWHZ complicated injury   Moderate  (Must meet the requirements of at least 1 out of 3 categories)  Category 1: Tests, documents, or independent historian(s)  Any combination of 3 from the following:   Review of prior external note(s) from each unique source*;  Review of the result(s) of each unique test*;  Ordering of each unique test*;  Assessment requiring an independent historian(s)    or  Category 2: Independent interpretation of tests   Independent interpretation of a test performed by another physician/other qualified health care professional (not separately reported);     or  Category 3: Discussion of management or test interpretation  Discussion of management or test interpretation with external physician/other qualified health care professional/appropriate source (not separately reported)   Moderate risk of morbidity from additional diagnostic testing or treatment  Examples only:  Prescription drug management   Decision regarding minor surgery with identified patient or procedure risk factors  Decision regarding elective major surgery without identified patient or procedure risk factors   Diagnosis or treatment significantly limited by social determinants of health       46645  83026 High High  1or more chronic illnesses with severe exacerbation, progression, or side effects of treatment;--chronic lower extremity venous hypertension with severe exacerbation to full-thickness ulcers    or  1 acute or chronic illness or injury that poses a threat to life or bodily function   Extensive  (Must meet the requirements of at least 2 out of 3 categories)  Category 1: Tests, documents, or independent historian(s)  Any combination of 3 from the following:   Review of prior external note(s) from each unique source*;  Review of the result(s) of each unique test*;   Ordering of each unique test*;   Assessment requiring an independent historian(s)    or   Category 2: Independent interpretation of tests   Independent interpretation of a test performed by another physician/other qualified health care professional (not separately reported);     or  Category 3: Discussion of management or test interpretation  Discussion of management or test interpretation with external physician/other qualified health care professional/appropriate source (not separately reported)   High risk of morbidity from additional diagnostic testing or treatment  Examples only:  Drug therapy requiring intensive monitoring for toxicity  Decision regarding elective major surgery with identified patient or procedure risk factors  Decision regarding emergency major surgery  Decision regarding hospitalization  Decision not to resuscitate or to de-escalate care because of poor prognosis                 I have personally performed a face-to-face diagnostic evaluation and management  service on this patient. I have independently seen the patient. I have independently obtained the above history from the patient/family. I have independently examined the patient with above findings. I have independently reviewed data/labs for this patient and developed the above plan of care (MDM).   Electronically signed by Shayan Topete MD on 7/30/2021 at 10:58 AM

## 2021-07-30 NOTE — PROGRESS NOTES
07/30/21 1040   Wound Leg lower Left;Medial   Date First Assessed/Time First Assessed: 01/10/20 1500   Primary Wound Type: Venous  Location: Leg lower  Wound Location Orientation: Left;Medial   Wound Image    Wound Etiology Venous   Dressing Status Old drainage noted   Cleansed Soap and water   Dressing/Treatment   (Silver alginate, ABD, Calamine unna boot)   Wound Length (cm) 9.8 cm   Wound Width (cm) 4.5 cm   Wound Depth (cm) 0.1 cm   Wound Surface Area (cm^2) 44.1 cm^2   Change in Wound Size % -135.83   Wound Volume (cm^3) 4.41 cm^3   Wound Healing % -136   Wound Assessment Granulation tissue   Drainage Amount Moderate   Drainage Description Serosanguinous   Wound Odor None   Cydney-Wound/Incision Assessment Blanchable erythema   Wound Thickness Description Full thickness   Wound Leg lower Left;Posterior   Date First Assessed/Time First Assessed: 07/30/21 1039   Present on Hospital Admission: Yes  Primary Wound Type: Venous Ulcer  Location: Leg lower  Wound Location Orientation: Left;Posterior   Wound Image    Wound Etiology Venous   Dressing Status Old drainage noted   Cleansed Soap and water   Dressing/Treatment   (Calamine unna boot)   Wound Length (cm) 3.7 cm   Wound Width (cm) 1 cm   Wound Depth (cm) 0.1 cm   Wound Surface Area (cm^2) 3.7 cm^2   Wound Volume (cm^3) 0.37 cm^3   Wound Assessment Granulation tissue   Drainage Amount Small   Drainage Description Serosanguinous   Wound Odor None   Cydney-Wound/Incision Assessment Edematous   Wound Thickness Description Full thickness   Wound Toe (Comment  which one) Left;Dorsal GREAT   Date First Assessed/Time First Assessed: 07/02/21 1416   Present on Hospital Admission: Yes  Wound Approximate Age at First Assessment (Weeks): 4 weeks  Primary Wound Type: Diabetic Ulcer  Location: Toe (Comment  which one)  Wound Location Orientation. ..    Wound Image    Wound Etiology Diabetic Tracy Sang 2   Dressing Status Clean;Dry   Cleansed Soap and water   Dressing/Treatment Open to air   Offloading for Diabetic Foot Ulcers Diabetic shoes/inserts   Wound Length (cm) 0 cm   Wound Width (cm) 0 cm   Wound Depth (cm) 0 cm   Wound Surface Area (cm^2) 0 cm^2   Change in Wound Size % 100   Wound Volume (cm^3) 0 cm^3   Wound Healing % 100   Wound Assessment Epithelialization   Drainage Amount None   Wound Odor None   Wound Leg Right   Date First Assessed/Time First Assessed: 08/27/19 0959   Primary Wound Type: Incision  Location: Leg  Wound Location Orientation: Right   Wound Image    Wound Etiology Venous   Dressing Status Old drainage noted   Cleansed Soap and water   Dressing/Treatment   (Silver alginate, ABD, Calamine unna boot)   Wound Length (cm) 2 cm   Wound Width (cm) 2 cm   Wound Depth (cm) 0.1 cm   Wound Surface Area (cm^2) 4 cm^2   Wound Volume (cm^3) 0.4 cm^3   Wound Assessment Granulation tissue; Epithelialization   Drainage Amount Moderate   Drainage Description Serosanguinous   Wound Odor None   Cydney-Wound/Incision Assessment Edematous   Wound Thickness Description Full thickness   Patent is currently taking Plavix and Aspirin 81 mg

## 2021-07-30 NOTE — WOUND CARE
[facility] Application   Below Knee    NAME:  Blaine Lara OF BIRTH:  1935  MEDICAL RECORD NUMBER:  [id number]  DATE:  7/30/2021    Remove old Unna Boot or Boots. Appied primary and secondary dressing as ordered. Applied Unna roll from toes to knee overlapping each time. Applied ace wrap or coban from toes to below the knee. Secured with tape and/or metal clips covered with tape. Instructed patient/caregiver to keep dressing dry and intact. DO NOT REMOVE DRESSING. Instructed pt/family/caregiver to report excessive draining, loose bandage, wet dressing, severe pain or tingling in toes. Applied [employer] dressing below the knee to bilateral lower legs. Unna Boot(s) were applied per  Guidelines.     Response to treatment: Well tolerated by patient      Electronically signed by Sarah Villeda PT, [address] [place] [address],[address] on 7/30/2021 at 3:51 PM

## 2021-07-30 NOTE — WOUND CARE
Patient left wound center exam room and got to lobby and told  her blood sugar felt low. This RN checked blood sugar and got result of 53. Gave patient an Ensure to drink and will check in 15 minutes. MD notified of patient feeling low and result. Will update MD with next results.

## 2021-07-30 NOTE — DISCHARGE INSTRUCTIONS
[address]  [address], [address]  Phone: [phone]  Fax: [phone]    Patient: Melani Rodarte MRN: [id number]  SSN: [id number]    YOB: 1935  Age: 81 y.o. Sex: female       Return Appointment: 3 weeks with Kallie Chapa MD    Instructions: Left Medial and Posterior Lower Leg, Right Anterior Lower Leg:  Left great toe wound is healed! Cleanse wound and periwound with wound cleanser or normal saline. Alginate with silver(sheets); cut to size, apply to wound bed. Cover with ABD. Bilateral Zinc Unna boot with wound and lower extremity assessment. If there is any problem with the dressing (too tight, slides down,, etc.)  Patient to return to clinic to have re-wrapped by clinician. Dressing change 3x weekly. [facility] for dressing change 3x weekly.     Elevate lower legs when at rest.  Diabetic Diet. Low Salt Diet.     HgA1c ordered today; patient to complete as outpatient    Should you experience increased redness, swelling, pain, foul odor, size of wound(s), or have a temperature over 101 degrees please contact the [address] [place] [address] Road at [phone] or if after hours contact your primary care physician or go to the hospital emergency department.     Signed By: Fahad Mccallum PT, [address] [place] [address],[address]     July 30, 2021

## 2021-07-30 NOTE — WOUND CARE
Upon leaving wound center, patient with complaints of blood sugar dropping. Blood sugar taken to reveal reading of 53. Can of Glucerna given and blood sugar tested again about 30 minutes later with reading of 91. Patient was instructed that she could leave clinic and go get something to eat for lunch. Patient voiced understanding.

## 2021-07-30 NOTE — WOUND CARE
[address]  [address], [address]  Phone: [phone]  Fax: [phone]    Patient: Cj Hernadez MRN: [id number]  SSN: [id number]    YOB: 1935  Age: 81 y.o. Sex: female       Return Appointment: 3 weeks with Beckie Mills MD    Instructions: Left Medial and Posterior Lower Leg, Right Anterior Lower Leg:  Left great toe wound is healed! Cleanse wound and periwound with wound cleanser or normal saline. Alginate with silver(sheets); cut to size, apply to wound bed. Cover with ABD. Bilateral Zinc Unna boot with wound and lower extremity assessment. If there is any problem with the dressing (too tight, slides down,, etc.)  Patient to return to clinic to have re-wrapped by clinician. Dressing change 3x weekly. [facility] for dressing change 3x weekly.     Elevate lower legs when at rest.  Diabetic Diet. Low Salt Diet. HgA1c ordered today; patient to complete as outpatient    Should you experience increased redness, swelling, pain, foul odor, size of wound(s), or have a temperature over 101 degrees please contact the [address] [place] [address] Road at [phone] or if after hours contact your primary care physician or go to the hospital emergency department.     Signed By: Otis Davis PT, [address] [place] [address],[address]     July 30, 2021

## 2021-08-19 ENCOUNTER — HOSPITAL ENCOUNTER (OUTPATIENT)
Dept: WOUND CARE | Age: 86
Discharge: HOME | End: 2021-08-19
Attending: SURGERY
Payer: MEDICARE

## 2021-08-19 VITALS
SYSTOLIC BLOOD PRESSURE: 123 MMHG | HEART RATE: 79 BPM | RESPIRATION RATE: 16 BRPM | DIASTOLIC BLOOD PRESSURE: 52 MMHG | TEMPERATURE: 97.7 F

## 2021-08-19 DIAGNOSIS — I89.0 LYMPHEDEMA: ICD-10-CM

## 2021-08-19 DIAGNOSIS — L97.221 NON-PRESSURE CHRONIC ULCER OF LEFT CALF, LIMITED TO BREAKDOWN OF SKIN (HCC): ICD-10-CM

## 2021-08-19 DIAGNOSIS — I87.2 CHRONIC VENOUS INSUFFICIENCY: ICD-10-CM

## 2021-08-19 DIAGNOSIS — I87.333 IDIOPATHIC CHRONIC VENOUS HTN OF BOTH LEGS WITH ULCER AND INFLAMMATION (HCC): ICD-10-CM

## 2021-08-19 DIAGNOSIS — L97.919 IDIOPATHIC CHRONIC VENOUS HTN OF BOTH LEGS WITH ULCER AND INFLAMMATION (HCC): ICD-10-CM

## 2021-08-19 DIAGNOSIS — E66.01 MORBID OBESITY (HCC): Chronic | ICD-10-CM

## 2021-08-19 DIAGNOSIS — E11.622 CONTROLLED TYPE 2 DIABETES MELLITUS WITH OTHER SKIN ULCER, WITHOUT LONG-TERM CURRENT USE OF INSULIN (HCC): ICD-10-CM

## 2021-08-19 DIAGNOSIS — L97.929 IDIOPATHIC CHRONIC VENOUS HTN OF BOTH LEGS WITH ULCER AND INFLAMMATION (HCC): ICD-10-CM

## 2021-08-19 DIAGNOSIS — I73.9 PVD (PERIPHERAL VASCULAR DISEASE) (HCC): Primary | ICD-10-CM

## 2021-08-19 PROCEDURE — 29580 STRAPPING UNNA BOOT: CPT

## 2021-08-19 PROCEDURE — 99214 OFFICE O/P EST MOD 30 MIN: CPT | Performed by: SURGERY

## 2021-08-19 NOTE — PROGRESS NOTES
[address]  Consult Note/H&P/Progress Note      [address] RECORD NUMBER:  [id number]  AGE: 81 y.o. RACE WHITE/NON-  GENDER: female  : 1935  EPISODE DATE:  2021       Subjective:      CC (Chief Complaint) and HISTORY of PRESENT ILLNESS (HPI):    Roxy Ayon is a 81 y.o. female who presents today for wound/ulcer evaluation. I saw her for first visit on 2021    She was cared for by Dr. Talon Butler previously. This information was obtained from the patient  The following HPI elements were documented for the patient's wound:  Location: Bilateral lower extremity venous ulcers  Duration: In a years  Severity: BMI greater than 37;  age 81  Context: Somewhat palliative care; +DM  Modifying Factors:  Nothing makes better or worse  Quality:    Timing:     Associated Signs and Symptoms: No fevers    Ulcer Identification:  Ulcer Type: venous    Contributing Factors: lymphedema and obesity and DM    Below are notes from Dr. Talon Butler    3/11/2018 No new issues. Urinary soiling continues to be improved. Left 2nd toe stable. Getting [place] for wraps  2019 Continues to do well with dressings but still with substantial drainage. No further UTI issues. 2019 No new issues. Tolerating dressings. No further soiling.   2019 Doing well. No new issues. R leg wound not improving. 2019 Returns for re-eval. Bx proven [place]. Chest lesion benign. 2019 S/P excision leg lesion and SG margins negative. 10/11/2019 Tolerating wraps. No new issues. Pain controlled. Minimal soiling. 2019 One month since last seen. No new issues. . No tunnelling. 1/10/2020 Returns after being discharged with new opening. Seen today with daughter. No changes in heart or CHF status. Has had some increased swelling. Has had trouble with LE wraps. 2020 Doing better since last visit and re-admit.  Keeping elevated. No new breakdown. Cardiac and renal status stable. 2/14/2020 No new issues. Swelling stable. No recent CHF or other deterioration. Tolerating Unna without deterioration. 3/13/2020 Continued swelling but now bullae. Did not bring wraps today. Some issues at groin with candida. 4/17/2020 Seen via telehealth today. Increased swelling in legs. Has been trying to use faro wraps but worsening despite. Candida better. Blood sugars have been relatively well controlled. 9/25/2020 Has been seen by Dr Ryanne Pagan last several times. Wound continue to recur when she is switched from [place]. Currently having some increased CHF and cards managing diuretic.   11/20/2020 No new issues. Tolerating wraps. Minimal pain. No new breakdown. CHF is stable. 1/8/2021 Has not been seen since November but [facility] has been performing dressings 3x a week. Notes some increase in swelling. No new chest pain or SOB. Diuretic has been reduced due to kidney issues. 7/2/2021 Has not been seen for several months but has not had any significant deteriorations in health or hospitalizations. New issues with some loss of left great toenail. New skin tear on the left side.          5/24/17 non-invasive arterial duplex                    PAST MEDICAL HISTORY  Past Medical History:   Diagnosis Date    Acute hyponatremia 1/21/2011    Acute on chronic diastolic congestive heart failure ([address] 1/19/2011    Acute renal failure ([address] 1/21/2011    AMI (acute myocardial infarction) ([facility] 2009    Anemia 9/5/2012    Arthritis     Asthma     Asthma exacerbation 11/11/2011    CAD (coronary artery disease)     MI 2009, stents heart 2009    CAD (coronary artery disease), native coronary artery 9/2/2009    Previous stent to dRCA with Cypher 3.5x33mm SABA 8/09 9/09 LAD- Xience 2.5x23mm and 3.0x18mm SABA in mid-distal LAD      Cellulitis Bilateral Lower Extremities 1/20/2011    Chest discomfort, tightness, pressure 1/28/2016    Chronic diastolic heart failure ([address] 10/20/2011    Chronic venous insufficiency 10/24/2011    Diabetes ([address]     checks QD, normal 200, no hyposymptoms     Diabetes Mellitus Type II-insulin dependent 9/2/2009    Diverticulitis 1/28/2016    Dyslipidemia 9/2/2009    Edema 1/28/2016    GERD (gastroesophageal reflux disease)     GLAUCOMA     BILATERAL    Heart failure ([address]     Hypertension     Hypokalemia 1/28/2016    Hypoxemia 9/7/2012    Morbid obesity ([address]     Nausea & vomiting     Neuropathy in diabetes ([address] 9/2/2009    NSTEMI (non-ST elevation myocardial infarction) ([address] 9/2/2009    Dr Dee Ojeda    Obstructive sleep apnea (adult) (pediatric)- probably  11/11/2011    TRISH (obstructive sleep apnea) 9/7/2012    Intolerant of CPAP     Other dyspnea and respiratory abnormality 11/10/2011    Other ill-defined conditions(799.89)     benign tumor in lower abdomen- not removed, diabetic ulcers, edema, neuropathy    Peripheral vascular disease ([address] 10/20/2011    Post PTCA 6/20/2014    PVD (peripheral vascular disease) ([address]     Unspecified anemia 1/23/2011    Unspecified sleep apnea     oxygen at night    Venous stasis of lower extremity 2010    BILATERAL W RECURRENT ADMISSIONS    Volume overload 9/6/2012        PAST SURGICAL HISTORY  Past Surgical History:   Procedure Laterality Date    HX CHOLECYSTECTOMY      HX COLONOSCOPY      HX GI      CO BREAST SURGERY PROCEDURE UNLISTED  1973    benign tumor left breast    CO CARDIAC SURG PROCEDURE UNLIST      4 stents most recent 2 yrs ago    PTCA [facility] ADDL VESSEL      stentsx3       FAMILY HISTORY  Family History   Problem Relation Age of Onset    Cancer Father     Lung Disease Father     Cancer Brother     Diabetes Paternal Aunt     Hypertension Maternal Grandmother     Hypertension Paternal Grandmother        SOCIAL HISTORY  Social History     Tobacco Use    Smoking status: Never Smoker    Smokeless tobacco: Never Used   Substance Use Topics    Alcohol use: No    Drug use: No       ALLERGIES  Allergies   Allergen Reactions    Pcn [Penicillins] Hives, Swelling and Myalgia     Can take cephalosporins    Bactrim [Sulfamethoprim] Other (comments)     \"potassium acted up\"    Codeine Nausea Only    Dilaudid [Hydromorphone (Bulk)] Other (comments)    Lortab [Hydrocodone-Acetaminophen] Nausea Only       MEDICATIONS  Current Outpatient Medications on File Prior to Encounter   Medication Sig Dispense Refill    gabapentin (Neurontin) 100 mg capsule Take 200 mg by mouth three (3) times daily.  bumetanide (BUMEX) 2 mg tablet Take 1 Tab by mouth daily. 90 Tab 3    ergocalciferol (Vitamin D2) 1,250 mcg (50,000 unit) capsule Take 1 Cap by mouth every seven (7) days. Takes on wed once a month 13 Cap 5    nitroglycerin (NITROLINGUAL) 400 mcg/spray spray 1 Spray by SubLINGual route every five (5) minutes as needed for Chest Pain. 1 Bottle 3    clopidogreL (PLAVIX) 75 mg tab Take 1 Tab by mouth daily. 90 Tab 3    atorvastatin (LIPITOR) 80 mg tablet Take 1 Tab by mouth daily. 90 Tab 3    DULoxetine (CYMBALTA) 60 mg capsule Take 1 Cap by mouth daily. 90 Cap 3    metoprolol tartrate (LOPRESSOR) 50 mg tablet Take 1 Tab by mouth two (2) times a day. 180 Tab 3    dulaglutide (TRULICITY) 5.92 UG/6.2 mL sub-q pen 0.75 mg by SubCUTAneous route every seven (7) days.  metOLazone (ZAROXOLYN) 10 mg tablet Take 1 Tab by mouth daily. (Patient taking differently: Take 10 mg by mouth daily. Indications: as needed) 90 Tab 3    aspirin 81 mg chewable tablet Take 1 Tab by mouth daily. 90 Tab 3    insulin glargine (TOUJEO SOLOSTAR) 300 unit/mL (1.5 mL) inpn 220 Units by SubCUTAneous route daily.  acetaminophen (TYLENOL EXTRA STRENGTH) 500 mg tablet Take  by mouth every six (6) hours as needed for Pain.  insulin glulisine (APIDRA) 100 unit/mL injection 90 Units by SubCUTAneous route three (3) times daily.       nitroglycerin (NITROSTAT) 0.4 mg SL tablet 0.4 mg by SubLINGual route every five (5) minutes as needed.  albuterol (PROVENTIL, VENTOLIN) 90 mcg/Actuation inhaler Take 2 Puffs by inhalation every four (4) hours as needed for Wheezing. Dispense: (1) inhaler with no refills. 1 g 0     No current facility-administered medications on file prior to encounter. REVIEW OF SYSTEMS  The patient has no difficulty with chest pain or shortness of breath. No fever or chills. Comprehensive review of systems was otherwise unremarkable except as noted above. Objective:   Physical Exam:   Recent vitals (if inpt):  Patient Vitals for the past 24 hrs:   BP Temp Pulse Resp Height Weight   08/19/21 1124 (!) 123/52 97.7 °F (36.5 °C) 79 16 (P) 5' 5\" (1.651 m) (P) 257 lb 3.2 oz (116.7 kg)       Visit Vitals  BP (!) 123/52 (BP 1 Location: Right upper arm, BP Patient Position: Sitting)   Pulse 79   Temp 97.7 °F (36.5 °C)   Resp 16   Ht (P) 5' 5\" (1.651 m)   Wt (P) 257 lb 3.2 oz (116.7 kg)   BMI (P) 42.80 kg/m²     Wt Readings from Last 3 Encounters:   08/19/21 (P) 257 lb 3.2 oz (116.7 kg)   07/30/21 259 lb (117.5 kg)   07/02/21 259 lb (117.5 kg)     Constitutional: Alert, oriented, cooperative patient in no acute distress; appears stated age;  General appearance is within normal limits for wound care patient population. See the today's recorded vitals signs and constitutional data. Eyes: Pupils equal; Sclera are clear. EOMs intact; The eyes appear to track and move normally. The sclera are not injected. The conjunctive are clear. The eyelids are normal. There is no scleral icterus. ENMT: There are no obvious external ear, nose, lip or mouth lesions. Nares normal; Neck: Overall contour of the neck is normal with no obvious neck masses. Gross hearing is within normal limits. No obvious neck masses  Resp:  Breathing is non-labored; normal rate and effort; no audible wheezing. CV: RRR;  no JVD; No evidence of cyanosis of the upper extremities.  The extremities are perfused without embolic sign, splinter hemorrhages, or petechia. GI: soft and non-distended without acute abnormality noted. Musculoskeletal: unremarkable with normal function. No embolic signs or cyanosis. Neuro:  Oriented; moves all 4; no focal deficits  Psychiatric: Judgement and insight are within normal limits for the wound care population of patients. Patient is oriented to person, place, and time. Recent and remote memory are within normal limits. Mood and affect are within normal limits. Integumentary (Skin/Wounds)  See inspection of wound(s) below. There are no other skin areas of palpable concern. See wound center documentation including photos in the [address] Wound Template made part of this record by reference. Wounds:  Wound Leg Right (Active)   Wound Image   08/19/21 1142   Wound Etiology Venous 08/19/21 1142   Dressing Status Clean;Dry; Intact 08/19/21 1142   Cleansed Soap and water 08/19/21 1142   Wound Length (cm) 1.2 cm 08/19/21 1142   Wound Width (cm) 1.3 cm 08/19/21 1142   Wound Depth (cm) 0.1 cm 08/19/21 1142   Wound Surface Area (cm^2) 1.56 cm^2 08/19/21 1142   Wound Volume (cm^3) 0.156 cm^3 08/19/21 1142   Wound Assessment Epithelialization;Granulation tissue 08/19/21 1142   Drainage Amount Moderate 08/19/21 1142   Drainage Description Serosanguinous 08/19/21 1142   Wound Odor None 08/19/21 1142   [place]-Wound/Incision Assessment Edematous 08/19/21 1142   Wound Thickness Description Full thickness 08/19/21 1142   Number of days: 723       Wound Leg lower Left;Medial (Active)   Wound Image   08/19/21 1142   Wound Etiology Venous 08/19/21 1142   Dressing Status Clean;Dry; Intact 08/19/21 1142   Cleansed Soap and water 08/19/21 1142   Wound Length (cm) 0.3 cm 08/19/21 1142   Wound Width (cm) 0.3 cm 08/19/21 1142   Wound Depth (cm) 0.1 cm 08/19/21 1142   Wound Surface Area (cm^2) 0.09 cm^2 08/19/21 1142   Change in Wound Size % 99.52 08/19/21 1142   Wound Volume (cm^3) 0.009 cm^3 08/19/21 1142   Wound Healing % 100 08/19/21 1142   Wound Assessment Granulation tissue 08/19/21 1142   Drainage Amount Moderate 08/19/21 1142   Drainage Description Serosanguinous 08/19/21 1142   Wound Odor None 08/19/21 1142   [place]-Wound/Incision Assessment Blanchable erythema 07/30/21 1040   Edges Attached edges 07/02/21 1426   Wound Thickness Description Full thickness 08/19/21 1142   Number of days: 587       Wound Leg lower Left;Posterior (Active)   Wound Image   08/19/21 1142   Wound Etiology Venous 08/19/21 1142   Dressing Status Clean;Dry; Intact 08/19/21 1142   Cleansed Soap and water 08/19/21 1142   Dressing/Treatment Alginate with Ag 08/19/21 1142   Wound Length (cm) 3.7 cm 08/19/21 1142   Wound Width (cm) 1 cm 08/19/21 1142   Wound Depth (cm) 0.1 cm 08/19/21 1142   Wound Surface Area (cm^2) 3.7 cm^2 08/19/21 1142   Change in Wound Size % 0 08/19/21 1142   Wound Volume (cm^3) 0.37 cm^3 08/19/21 1142   Wound Healing % 0 08/19/21 1142   Wound Assessment Epithelialization 08/19/21 1142   Drainage Amount Small 08/19/21 1142   Drainage Description Serosanguinous 08/19/21 1142   Wound Odor None 08/19/21 1142   [place]-Wound/Incision Assessment Edematous 07/30/21 1040   Wound Thickness Description Full thickness 08/19/21 1142   Number of days: 20       Wound Pretibial Proximal;Right;Lateral #5right lateral lower leg 08/19/21 (Active)   Wound Image   08/19/21 1142   Wound Etiology Skin Tear 08/19/21 1142   Dressing Status Old drainage noted 08/19/21 1142   Cleansed Cleansed with saline 08/19/21 1142   Dressing/Treatment Alginate with Ag 08/19/21 1142   Wound Length (cm) 1.5 cm 08/19/21 1142   Wound Width (cm) 8 cm 08/19/21 1142   Wound Depth (cm) 0.1 cm 08/19/21 1142   Wound Surface Area (cm^2) 12 cm^2 08/19/21 1142   Wound Volume (cm^3) 1.2 cm^3 08/19/21 1142   Drainage Amount Moderate 08/19/21 1142   Drainage Description Serosanguinous 08/19/21 1142   Wound Odor None 08/19/21 1142   Wound Thickness Description Full thickness 08/19/21 1142   Number of days: 0       [REMOVED] Wound Leg Medial;Left;Right (Removed)   Number of days: 5558       [REMOVED] Wound Leg Lower Left (Removed)   Dressing Status  Clean, dry, and intact 09/18/18 1407   Dressing Type  Unna boot 09/18/18 1407   Read-Only, Retired. Wound Length (cm) 0 cm 09/11/18 1601   Read-Only, Retired. Wound Width (cm) 0 cm 09/11/18 1601   Read-Only, Retired. Wound Depth (cm) 0 09/11/18 1601   Read-Only, Retired. Wound Surface area (cm^2) 0 cm^2 09/11/18 1601   Read-Only, Retired. Epithelialization (%) 100 09/11/18 1601   Read-Only, Retired. Drainage Amount None 09/11/18 1601   Wound Odor None 09/11/18 1601   Read-Only, Retired. Cleansing and Cleansing Agents Normal saline; Soap and water 09/11/18 1601   Read-Only, Retired. Dressing Type Applied Unna boot 09/11/18 1628   Read-Only, Retired. Procedure Tolerated Well 09/11/18 1628   Number of days: 32       [REMOVED] Wound Leg Lower Right (Removed)   Dressing Status  Clean, dry, and intact 01/17/20 1458   Dressing Type  Unna boot 10/05/18 1452   Read-Only, Retired. Non-Pressure Injury Full thickness (subcut/muscle) 11/08/19 1513   Read-Only, Retired. Wound Length (cm) 0.1 cm 01/17/20 1458   Read-Only, Retired. Wound Width (cm) 0.1 cm 01/17/20 1458   Read-Only, Retired. Wound Depth (cm) 0.1 01/17/20 1458   Read-Only, Retired. Wound Surface area (cm^2) 0.01 cm^2 01/17/20 1458   Read-Only, Retired. Change in Wound Size % 96 01/17/20 1458   Read-Only, Retired. Condition of Base Pink 10/11/19 1516   Read-Only, Retired. Epithelialization (%) 50 01/10/20 1458   Read-Only, Retired. Tissue Type Red 08/17/18 1436   Read-Only, Retired. Tissue Type Percent Pink 50 01/10/20 1458   Read-Only, Retired. Tissue Type Percent Red 100 11/08/19 1513   Read-Only, Retired. Drainage Amount Scant 01/17/20 1458   Read-Only, Retired. Drainage Color Serous 01/17/20 1458   Wound Odor None 01/10/20 1458   Read-Only, Retired. Periwound Skin Condition Intact 01/10/20 1458   Read-Only, Retired. Cleansing and Cleansing Agents Normal saline 01/17/20 1458   Read-Only, Retired. Dressing Type Applied Xeroform;ABD pad;Unna boot 08/31/18 0901   Read-Only, Retired. Procedure Tolerated Well 11/08/19 1513   Number of days: 560       [REMOVED] Wound Toe Left (Removed)   Read-Only, Retired. Wound Length (cm) 0 cm 11/02/18 1104   Read-Only, Retired. Wound Width (cm) 0 cm 11/02/18 1104   Read-Only, Retired. Wound Depth (cm) 0 11/02/18 1104   Read-Only, Retired. Wound Surface area (cm^2) 0 cm^2 11/02/18 1104   Read-Only, Retired. Change in Wound Size % 100 11/02/18 1104   Read-Only, Retired. Tissue Type Percent Pink 100 10/19/18 1508   Read-Only, Retired. Drainage Amount None 11/02/18 1104   Read-Only, Retired. Drainage Color Serosanguinous 10/19/18 1508   Wound Odor None 11/02/18 1104   Read-Only, Retired. Periwound Skin Condition Intact 10/19/18 1508   Read-Only, Retired. Cleansing and Cleansing Agents Normal saline 11/02/18 1104   Read-Only, Retired. Procedure Tolerated Well 10/12/18 1623   Number of days: 21       [REMOVED] Wound Toe (specify in comments) Left;Plantar (Removed)   Dressing Status  Clean, dry, and intact 11/02/18 1104   Dressing Type  Adhesive wound dressing (Band-Aid) 11/02/18 1104   Read-Only, Retired. Non-Pressure Injury Partial thickness (epider/derm) 10/19/18 1508   Read-Only, Retired. Wound Length (cm) 0 cm 11/02/18 1104   Read-Only, Retired. Wound Width (cm) 0 cm 11/02/18 1104   Read-Only, Retired. Wound Depth (cm) 0 11/02/18 1104   Read-Only, Retired. Wound Surface area (cm^2) 0 cm^2 11/02/18 1104   Read-Only, Retired. Change in Wound Size % 100 11/02/18 1104   Read-Only, Retired. Condition of Base [place] 10/19/18 1508   Read-Only, Retired. Drainage Amount None 11/02/18 1104   Wound Odor None 11/02/18 1104   Read-Only, Retired. Periwound Skin Condition Intact 10/19/18 1508   Read-Only, Retired.  Cleansing and Cleansing Agents Normal saline 11/02/18 1104   Number of days: 14       [REMOVED] Wound Leg Lower Left;Medial (Removed)   Dressing Status  Clean, dry, and intact 03/01/19 1341   Read-Only, Retired. Wound Length (cm) 0 cm 03/01/19 1341   Read-Only, Retired. Wound Width (cm) 0 cm 03/01/19 1341   Read-Only, Retired. Wound Depth (cm) 0 03/01/19 1341   Read-Only, Retired. Wound Surface area (cm^2) 0 cm^2 03/01/19 1341   Read-Only, Retired. Change in Wound Size % 100 03/01/19 1341   Read-Only, Retired. Condition of Base Epithelializing 03/01/19 1341   Read-Only, Retired. Tissue Type Percent Pink 100 03/01/19 1341   Read-Only, Retired. Tissue Type Percent Red 100 01/18/19 1317   Read-Only, Retired. Drainage Amount None 03/01/19 1341   Read-Only, Retired. Drainage Color Serous 01/18/19 1317   Wound Odor None 03/01/19 1341   Read-Only, Retired. Periwound Skin Condition Intact 03/01/19 1341   Read-Only, Retired.  Cleansing and Cleansing Agents Soap and water 03/01/19 1341   Number of days: 42       [REMOVED] Wound Leg lower Left (Removed)   Dressing Status Clean, dry, and intact 08/23/19 1452   Wound Length (cm) 0 cm 08/23/19 1452   Wound Width (cm) 0 cm 08/23/19 1452   Wound Depth (cm) 0 cm 08/23/19 1452   Wound Volume (cm^3) 0 cm^3 08/23/19 1452   Drainage Amount None 08/23/19 1452   Drainage Description Serous 08/16/19 1427   Wound Odor None 08/23/19 1452   [place]-Wound/Incision Assessment Blanchable erythema 08/23/19 1452   Number of days: 14       [REMOVED] Wound Leg upper Right (Removed)   Dressing Status Clean, dry, and intact 09/13/19 1510   Dressing/Treatment Open to air 09/27/19 1406   Wound Length (cm) 0 cm 09/27/19 1406   Wound Width (cm) 0 cm 09/27/19 1406   Wound Depth (cm) 0 cm 09/27/19 1406   Wound Volume (cm^3) 0 cm^3 09/27/19 1406   Drainage Amount None 09/27/19 1406   Wound Odor None 09/27/19 1406   [place]-Wound/Incision Assessment Intact 09/27/19 1406   Number of days: 28       [REMOVED] Wound Toe (Comment  which one) Anterior left 2nd (Removed)   Dressing Status Clean, dry, and intact 09/25/20 1506   Dressing/Treatment Open to air 09/25/20 1506   Wound Length (cm) 0 cm 09/25/20 1506   Wound Width (cm) 0 cm 09/25/20 1506   Wound Depth (cm) 0 cm 09/25/20 1506   Wound Surface Area (cm^2) 0 cm^2 09/25/20 1506   Change in Wound Size % 100 09/25/20 1506   Wound Volume (cm^3) 0 cm^3 09/25/20 1506   Drainage Amount None 09/25/20 1506   Drainage Description Serous 08/10/20 1329   Wound Odor None 09/25/20 1506   [place]-Wound/Incision Assessment Dry 08/17/20 1328   Wound Thickness Description Partial thickness 08/24/20 1459   Number of days: 46       [REMOVED] Wound Toe (Comment  which one) Left;Lateral Great toe (Removed)   Wound Image   11/20/20 1321   Wound Etiology Diabetic Gloria 2 11/20/20 1321   Dressing Status Moist 09/25/20 1506   Dressing/Treatment Open to air 09/25/20 1506   Wound Length (cm) 0 cm 11/20/20 1321   Wound Width (cm) 0 cm 11/20/20 1321   Wound Depth (cm) 0 cm 11/20/20 1321   Wound Surface Area (cm^2) 0 cm^2 11/20/20 1321   Change in Wound Size % 100 11/20/20 1321   Wound Volume (cm^3) 0 cm^3 11/20/20 1321   Wound Healing % 100 11/20/20 1321   Wound Assessment Epithelialization 11/20/20 1321   Drainage Amount None 11/20/20 1321   Drainage Description Serosanguinous 09/25/20 1506   Wound Odor None 11/20/20 1321   [place]-Wound/Incision Assessment Blanchable erythema 11/20/20 1321   Wound Thickness Description Full thickness 09/25/20 1506   Number of days: 105       [REMOVED] Wound Toe (Comment  which one) Left;Dorsal GREAT (Removed)   Wound Image   07/30/21 1040   Wound Etiology Diabetic Gloria 2 07/30/21 1040   Dressing Status Clean;Dry 07/30/21 1040   Cleansed Soap and water 07/30/21 1040   Dressing/Treatment Open to air 07/30/21 1040   Offloading for Diabetic Foot Ulcers Diabetic shoes/inserts 07/30/21 1040   Wound Length (cm) 0 cm 07/30/21 1040   Wound Width (cm) 0 cm 07/30/21 1040   Wound Depth (cm) 0 cm 07/30/21 1040   Wound Surface Area (cm^2) 0 cm^2 07/30/21 1040   Change in Wound Size % 100 07/30/21 1040   Wound Volume (cm^3) 0 cm^3 07/30/21 1040   Wound Healing % 100 07/30/21 1040   Wound Assessment Epithelialization 07/30/21 1040   Drainage Amount None 07/30/21 1040   Drainage Description Thick; Serous 07/02/21 1426   Wound Odor None 07/30/21 1040   [place]-Wound/Incision Assessment Edematous 07/02/21 1426   Edges Attached edges 07/02/21 1426   Wound Thickness Description Full thickness 07/02/21 1426   Number of days: 28                                  Amount and/or Complexity of Data Reviewed and Analyzed:  I reviewed and analyzed all of the unique labs and radiologic studies that are shown below as well as any that are in the HPI, and any that are in the expanded problem list below  *Each unique test, order, or document contributes to the combination of 2 or combination of 3 in Category 1 below. I also independently reviewed radiology images for studies I described above in the HPI or studies I have ordered. For this visit I also reviewed old records and prior notes. No results for input(s): WBC, HGB, PLT, NA, K, CL, CO2, BUN, CREA, GLU, PTP, INR, APTT, TBIL, TBILI, CBIL, ALT, AP, AML, AML, LPSE, LCAD, NH4, TROPT, TROIQ, PCO2, PO2, HCO3, HGBEXT, PLTEXT, INREXT, HGBEXT, PLTEXT, INREXT in the last 72 hours. No lab exists for component: SGOT, GPT,  PH  No results for input(s): PTP, INR, APTT, TBIL, TBILI, CBIL, ALT, AP, AML, LPSE, INREXT, INREXT in the last 72 hours.     No lab exists for component: SGOT, GPT    Most recent blood counts (CBC) review  Lab Results   Component Value Date/Time    WBC 7.4 08/17/2020 02:55 PM    HGB 10.7 (L) 08/17/2020 02:55 PM    HCT 37.3 08/17/2020 02:55 PM    HEMATOCRIT 36 11/10/2011 02:55 PM    PLATELET 661 80/98/0768 02:55 PM    MCV 94.7 08/17/2020 02:55 PM     Most recent chemistry (BMP) test review   Lab Results   Component Value Date/Time    Sodium 140 08/17/2020 02:55 PM    Potassium 4.6 08/17/2020 02:55 PM    Chloride 106 08/17/2020 02:55 PM    CO2 27 08/17/2020 02:55 PM    Anion gap 7 08/17/2020 02:55 PM    Glucose 244 (H) 08/17/2020 02:55 PM    BUN 19 08/17/2020 02:55 PM    Creatinine 1.01 (H) 08/17/2020 02:55 PM    GFR est AA >60 08/17/2020 02:55 PM    GFR est non-AA 56 (L) 08/17/2020 02:55 PM    Calcium 9.6 08/17/2020 02:55 PM     Most recent Liver enzyme test review  Lab Results   Component Value Date/Time    ALT (SGPT) 19 07/09/2014 08:45 PM    AST (SGOT) 13 (L) 07/09/2014 08:45 PM    Alk. phosphatase 139 (H) 07/09/2014 08:45 PM    Bilirubin, total 0.7 07/09/2014 08:45 PM     Most recent coagulation (coags) review  Lab Results   Component Value Date/Time    INR 1.0 06/20/2014 09:32 AM    INR 1.1 11/10/2011 02:25 PM    INR 1.0 01/19/2011 02:25 PM    Prothrombin time 10.7 06/20/2014 09:32 AM    Prothrombin time 11.6 11/10/2011 02:25 PM    Prothrombin time 10.2 01/19/2011 02:25 PM     Lab Results   Component Value Date/Time    INR 1.0 06/20/2014 09:32 AM    aPTT 26.9 11/10/2011 02:25 PM    Lipase 96 07/09/2014 08:45 PM    Troponin-I <0.05 11/10/2011 02:50 PM    Troponin-I, Qt. <0.04 (L) 04/26/2011 04:35 AM       Diabetic assessment  Lab Results   Component Value Date/Time    Hemoglobin A1c 6.9 (H) 07/30/2021 12:49 PM       Nutritional assessment screen to assess wound healing ability:  Lab Results   Component Value Date/Time    Protein, total 7.8 07/09/2014 08:45 PM    Albumin 3.3 07/09/2014 08:45 PM     Lab Results   Component Value Date/Time    Protein, total 7.8 07/09/2014 08:45 PM    Albumin 3.3 07/09/2014 08:45 PM       XR Results (most recent):  Results from Hospital Encounter encounter on 09/08/14    XR CHEST PA LAT    Narrative  Two view chest x-ray. CLINICAL INDICATION: Cough. FINDINGS: Single AP view the chest compared to a similar exam dated 9/17/2012  shows no significant change in the cardiopulmonary structures with mild  prominence of the interstitial markings.  No dense airspace consolidation noted. No pleural effusion or pneumothorax evident. The cardiac silhouette and  mediastinum are unremarkable. Impression  :  1. No acute cardiopulmonary mallet. 2. The stable, diffuse interstitial prominence may be related to chronic lung  disease. CT Results (most recent):  Results from Hospital Encounter encounter on 10/26/15    CT ABD PELV W CONT    Narrative  ABDOMINAL CT WITH CONTRAST: 10/26/2015  Indication:Left lower quadrant pain for 5 days  Comparison:July 9, 2014  100cc of optiray 350   was injected to aid in the detection of abdominal and  pelvic pathology. Findings: The lung bases are remarkable for a 3 mm calcified nodule in the left lung base  and an area of hypoventilation in the right lung base  . There are new scattered  low-density lesions throughout the hepatic parenchyma of which the largest is in  the medial segment of the left hepatic lobe measuring 51 mm. There is no mass  effect associated with these lesions. The patient is status post  cholecystectomy. The pancreas, aorta, and kidneys are unremarkable. There are a  few splenic calcifications. There is no significant adenopathy. There is  diverticula throughout the colon. Small bowel is unremarkable. There is a  fat-containing periumbilical hernia. Bony structures are intact. CT scan of pelvis with contrast:    Uterus, bladder, adnexal structures, soft tissues and bony structures are  unremarkable.     Impression: Pandiverticulosis, probable irregular fatty infiltration of the  liver        Admission date (for inpatients): 8/19/2021   * No surgery found *  * No surgery found *    Assessment:      Problem List Items Addressed This Visit     None          Problem List  Date Reviewed: 7/9/2018        Codes Class Noted    BMI 40.0-44.9, adult [facility]) ICD-10-CM: Z68.41  ICD-9-CM: V85.41  7/30/2021        Lymphedema ICD-10-CM: I89.0  ICD-9-CM: 457.1  7/30/2021        Idiopathic chronic venous HTN of both legs with ulcer and inflammation ([facility] ICD-10-CM: V52.730, L97.919, L97.929  ICD-9-CM: 459.33  7/30/2021        Controlled type 2 diabetes mellitus with skin complication, without long-term current use of insulin (HCC) ICD-10-CM: E11.628  ICD-9-CM: 250.80  7/30/2021        Nonrheumatic aortic valve stenosis ICD-10-CM: I35.0  ICD-9-CM: 424.1  5/2/2019    Overview Signed 5/8/2019  6:15 PM by Camryn Levy MD     Echo (5/3/19):  EF 60-65%. Normal RV. Mild/moderate AS. MG 15. PG 26. Mild MR/TR/AR             Essential hypertension ICD-10-CM: I10  ICD-9-CM: 401.9  5/2/2019        Non-pressure chronic ulcer of left calf, limited to breakdown of skin [facility]) ICD-10-CM: B94.178  ICD-9-CM: 707.12  8/21/2018        Edema ICD-10-CM: R60.9  ICD-9-CM: 782.3  1/28/2016        Diverticulitis ICD-10-CM: K57.92  ICD-9-CM: 562.11  1/28/2016    Overview Signed 1/28/2016 12:10 PM by Masood Helm     1. CT of abdomen (7/9/14): Evidence of uncomplicated diverticulitis at the junction of the descending and sigmoid colon. Extensive diverticular are seen with some pericolonic fat stranding. TRISH (obstructive sleep apnea) (Chronic) ICD-10-CM: G47.33  ICD-9-CM: 327.23  9/7/2012    Overview Signed 9/7/2012 11:46 AM by Erica Mckenzie NP     Intolerant of CPAP             Obstructive sleep apnea (adult) (pediatric)- probably  ICD-10-CM: G47.33  ICD-9-CM: 327.23  11/11/2011        Chronic venous insufficiency ICD-10-CM: I87.2  ICD-9-CM: 459.81  10/24/2011        Chronic diastolic heart failure (HCC) (Chronic) ICD-10-CM: I50.32  ICD-9-CM: 428.32  10/20/2011    Overview Signed 1/28/2016 12:05 PM by Masood Helm     1. Echo (3/5/10): EF 55%. No wall motion abnormalities. Mild LVH. Mild diastolic dysfunction. Mild bi-atrial enlargement. Mild mitral regurgitation. RVSP 32.    2. Echo (4/26/11): Normal LV systolic function. EF 60%. Mild LVH. Mild diastolic dysfunction. Mild left atrial enlargement. Mild aortic regurgitation. Peripheral vascular disease ([facility] (Chronic) ICD-10-CM: I73.9  ICD-9-CM: 443.9  10/20/2011    Overview Signed 1/28/2016 12:08 PM by Shannon Stevens     1. Lower extremity duplex (9/2/10): Right lower extremity duplex suggested moderate to severe reduction with multilevel disease. Stenotic SFA most pronounced distally. Right VIBHA 0.5. Left VIBHA suggests mild reduction VIBHA of 0.86. GERD (gastroesophageal reflux disease) (Chronic) ICD-10-CM: K21.9  ICD-9-CM: 530.81  10/20/2011        Anemia, unspecified ICD-10-CM: D64.9  ICD-9-CM: 285.9  1/23/2011        Acute hyponatremia ICD-10-CM: E87.1  ICD-9-CM: 276.1  1/21/2011        CAD (coronary artery disease), native coronary artery (Chronic) ICD-10-CM: I25.10  ICD-9-CM: 414.01  9/2/2009    Overview Addendum 6/11/2017  9:05 PM by Lauren Magana MD     1. NSTEMI (8/17/09):  EF 60%. PCI of dRCA with  Cypher 3.5x 33 mm SABA. Residual 80-90% LAD stenosis. 2.  Staged PCI of LAD (9/1/09): Xience 2.5x23mm and 3.0x18mm SABA in mid-distal LAD . Adjunctive POBA of D1 and D2.    3.  PCI of mid RCA (6/20/14): Promus 4 x 20 mm SABA (overlapped with previous stent)             Diabetes mellitus, type 2 (HCC) (Chronic) ICD-10-CM: E11.9  ICD-9-CM: 250.00  9/2/2009        Morbid obesity ([facility] (Chronic) ICD-10-CM: E66.01  ICD-9-CM: 278.01  9/2/2009        Dyslipidemia (Chronic) ICD-10-CM: E78.5  ICD-9-CM: 272.4  9/2/2009        Neuropathy in diabetes ([facility] (Chronic) ICD-10-CM: E11.40  ICD-9-CM: 250.60, 357.2  9/2/2009              Active Problems:    * No active hospital problems. *          Plan:     Number and Complexity of Problems addressed and   Risks of complications and/or morbidity of management    Treatment Note please see attached Discharge Instructions  Any procedures done today in the wound center are documented in a separate note in connect care made part of this record by reference  Written patient dismissal instructions given to patient or POA. PVD  Older arterial study above  I ordered arterial duplex on 8/19/21      BMI>42  Encourage weight loss to help with leg swelling  Chronic leg wounds    Lymphedema  Encourage elevation, salt avoidance, compression therapy, and weight loss  CHF management per cards. Bilat lower extremity venous ulcers/venous HTN  3 times a week multilayer compression dressings   The inner layer is zinc oxide    She has home health to help with this       Diabetes mellitus  HgbA1c was >8 in 2011 7/30/2021 HgbA1c 6.9  Encourage close follow-up with primary care, strict diabetic diet, and strict adherence to diabetic medical regimen. Wound Care  No orders of the defined types were placed in this encounter. Follow-up Information    None                 Level of MDM (2/3 elements below)  Number and Complexity of Problems Addressed Amount and/or Complexity of Data to be Reviewed and Analyzed  *Each unique test, order, or document contributes to the combination of 2 or combination of 3 in Category 1 below.  Risk of Complications and/or Morbidity or Mortality of pt Management     31006  45292 SF Minimal  1self-limited or minor problem Minimal or none Minimal risk of morbidity from additional diagnostic testing or Rx   49457  39508 Low Low  2or more self-limited or minor problems;    or  1stable chronic illness;    or  9ARRFA, uncomplicated illness or injury   Limited  (Must meet the requirements of at least 1 of the 2 categories)  Category 1: Tests and documents   Any combination of 2 from the following:  Review of prior external note(s) from each unique source*;  review of the result(s) of each unique test*;   ordering of each unique test*    or   Category 2: Assessment requiring an independent historian(s)  (For the categories of independent interpretation of tests and discussion of management or test interpretation, see moderate or high) Low risk of morbidity from additional diagnostic testing or treatment     04336  62835 Mod Moderate  1or more chronic illnesses with exacerbation, progression, or side effects of treatment;    or  2or more stable chronic illnesses;    or  1undiagnosed new problem with uncertain prognosis;    or  1acute illness with systemic symptoms;    or  2LUVPN complicated injury   Moderate  (Must meet the requirements of at least 1 out of 3 categories)  Category 1: Tests, documents, or independent historian(s)  Any combination of 3 from the following:   Review of prior external note(s) from each unique source*;  Review of the result(s) of each unique test*;  Ordering of each unique test*;  Assessment requiring an independent historian(s)    or  Category 2: Independent interpretation of tests   Independent interpretation of a test performed by another physician/other qualified health care professional (not separately reported);     or  Category 3: Discussion of management or test interpretation  Discussion of management or test interpretation with external physician/other qualified health care professional/appropriate source (not separately reported)   Moderate risk of morbidity from additional diagnostic testing or treatment  Examples only:  Prescription drug management   Decision regarding minor surgery with identified patient or procedure risk factors  Decision regarding elective major surgery without identified patient or procedure risk factors   Diagnosis or treatment significantly limited by social determinants of health       79283  92638 High High  1or more chronic illnesses with severe exacerbation, progression, or side effects of treatment;--chronic lower extremity venous hypertension with severe exacerbation to full-thickness ulcers    or  1 acute or chronic illness or injury that poses a threat to life or bodily function   Extensive  (Must meet the requirements of at least 2 out of 3 categories)  Category 1: Tests, documents, or independent historian(s)  Abbe Castellano combination of 3 from the following:   Review of prior external note(s) from each unique source*;  Review of the result(s) of each unique test*;   Ordering of each unique test*;   Assessment requiring an independent historian(s)    or   Category 2: Independent interpretation of tests   Independent interpretation of a test performed by another physician/other qualified health care professional (not separately reported);     or  Category 3: Discussion of management or test interpretation  Discussion of management or test interpretation with external physician/other qualified health care professional/appropriate source (not separately reported)   High risk of morbidity from additional diagnostic testing or treatment  Examples only:  Drug therapy requiring intensive monitoring for toxicity  Decision regarding elective major surgery with identified patient or procedure risk factors  Decision regarding emergency major surgery  Decision regarding hospitalization  Decision not to resuscitate or to de-escalate care because of poor prognosis                 I have personally performed a face-to-face diagnostic evaluation and management  service on this patient. I have independently seen the patient. I have independently obtained the above history from the patient/family. I have independently examined the patient with above findings. I have independently reviewed data/labs for this patient and developed the above plan of care (MDM).   Electronically signed by Cherri Castro MD on 8/19/2021 at 10:58 AM

## 2021-08-19 NOTE — WOUND CARE
[address]  [address], [address] [place]   Phone: [phone]  Fax: [phone]    Patient: Julissa Juares MRN: [id number]  SSN: [id number]    YOB: 1935  Age: 81 y.o. Sex: female       Return Appointment: 2 weeks with Edgar Arenas MD    Instructions: Left Medial and Posterior Lower Leg, Right Anterior Lower Leg, Right lateral lower leg    Cleanse wound and periwound with wound cleanser or normal saline. Alginate with silver(sheets); cut to size, apply to wound bed. Cover with ABD. Bilateral Zinc Unna boot with wound and lower extremity assessment. If there is any problem with the dressing (too tight, slides down,, etc.)  Patient to return to clinic to have re-wrapped by clinician. (IF POSSIBLE, PLEASE USE THE ZINC COFLEX WRAPS)  Take care when removing dressing not to tear the skin. Dressing change 3x weekly. [facility] [address] for dressing change 3x weekly.     Elevate lower legs when at rest.  Diabetic Diet. Low Salt Diet.         Should you experience increased redness, swelling, pain, foul odor, size of wound(s), or have a temperature over 101 degrees please contact the [address] at [phone] or if after hours contact your primary care physician or go to the hospital emergency department.     Signed By: Cece Nash     August 19, 2021

## 2021-08-19 NOTE — WOUND CARE
[employer] Application   Below Knee    NAME:  Mariah Riggins OF BIRTH:  1935  MEDICAL RECORD NUMBER:  [id number]  DATE:  8/19/2021    Remove old [employer] or Boots. Appied primary and secondary dressing as ordered. Applied Unna roll from toes to knee overlapping each time. Applied ace wrap or coban from toes to below the knee. Secured with tape and/or metal clips covered with tape. Instructed patient/caregiver to keep dressing dry and intact. DO NOT REMOVE DRESSING. Instructed pt/family/caregiver to report excessive draining, loose bandage, wet dressing, severe pain or tingling in toes. Applied [employer] dressing below the knee to bilateral lower legs. Unna Boot(s) were applied per  Guidelines.     Response to treatment: Well tolerated by patient      Electronically signed by Babatunde Londono on 8/19/2021 at 1:54 PM

## 2021-08-19 NOTE — WOUND CARE
08/19/21 1142   Wound Leg lower Left;Posterior   Date First Assessed/Time First Assessed: 07/30/21 1039   Present on Hospital Admission: Yes  Primary Wound Type: Venous Ulcer  Location: Leg lower  Wound Location Orientation: Left;Posterior   Wound Image    Wound Etiology Venous   Dressing Status Clean;Dry; Intact   Cleansed Soap and water   Dressing/Treatment Alginate with Ag  (unna boot)   Wound Length (cm) 3.7 cm   Wound Width (cm) 1 cm   Wound Depth (cm) 0.1 cm   Wound Surface Area (cm^2) 3.7 cm^2   Change in Wound Size % 0   Wound Volume (cm^3) 0.37 cm^3   Wound Healing % 0   Wound Assessment Epithelialization   Drainage Amount Small   Drainage Description Serosanguinous   Wound Odor None   Wound Thickness Description Full thickness   Wound Leg lower Left;Medial   Date First Assessed/Time First Assessed: 01/10/20 1500   Primary Wound Type: Venous  Location: Leg lower  Wound Location Orientation: Left;Medial   Wound Image    Wound Etiology Venous   Dressing Status Clean;Dry; Intact   Cleansed Soap and water   Wound Length (cm) 0.3 cm   Wound Width (cm) 0.3 cm   Wound Depth (cm) 0.1 cm   Wound Surface Area (cm^2) 0.09 cm^2   Change in Wound Size % 99.52   Wound Volume (cm^3) 0.009 cm^3   Wound Healing % 100   Wound Assessment Granulation tissue   Drainage Amount Moderate   Drainage Description Serosanguinous   Wound Odor None   Wound Thickness Description Full thickness   Wound Leg Right   Date First Assessed/Time First Assessed: 08/27/19 0959   Primary Wound Type: Incision  Location: Leg  Wound Location Orientation: Right   Wound Image    Wound Etiology Venous   Dressing Status Clean;Dry; Intact   Cleansed Soap and water   Wound Length (cm) 1.2 cm   Wound Width (cm) 1.3 cm   Wound Depth (cm) 0.1 cm   Wound Surface Area (cm^2) 1.56 cm^2   Wound Volume (cm^3) 0.156 cm^3   Wound Assessment Epithelialization;Granulation tissue   Drainage Amount Moderate   Drainage Description Serosanguinous   Wound Odor None [place]-Wound/Incision Assessment Edematous   Wound Thickness Description Full thickness   Wound Pretibial Proximal;Right;Lateral #5right lateral lower leg 08/19/21   Date First Assessed/Time First Assessed: 08/19/21 1142   Present on Hospital Admission: Yes  Wound Approximate Age at First Assessment (Weeks): 2 weeks  Primary Wound Type: Skin Tear  Location: Pretibial  Wound Location Orientation: Proximal;Right;Lat. .. Wound Image    Wound Etiology Skin Tear   Dressing Status Old drainage noted   Cleansed Cleansed with saline   Dressing/Treatment Alginate with Ag   Wound Length (cm) 1.5 cm   Wound Width (cm) 8 cm   Wound Depth (cm) 0.1 cm   Wound Surface Area (cm^2) 12 cm^2   Wound Volume (cm^3) 1.2 cm^3   Drainage Amount Moderate   Drainage Description Serosanguinous   Wound Odor None   Wound Thickness Description Full thickness   Patient is on asa and plavix.

## 2021-09-09 ENCOUNTER — HOSPITAL ENCOUNTER (OUTPATIENT)
Dept: WOUND CARE | Age: 86
Discharge: HOME | End: 2021-09-09
Attending: SURGERY
Payer: MEDICARE

## 2021-09-09 VITALS
RESPIRATION RATE: 18 BRPM | SYSTOLIC BLOOD PRESSURE: 152 MMHG | HEIGHT: 65 IN | DIASTOLIC BLOOD PRESSURE: 65 MMHG | HEART RATE: 77 BPM | BODY MASS INDEX: 40.98 KG/M2 | WEIGHT: 246 LBS | OXYGEN SATURATION: 95 % | TEMPERATURE: 98.1 F

## 2021-09-09 DIAGNOSIS — I73.9 PERIPHERAL VASCULAR DISEASE (HCC): Chronic | ICD-10-CM

## 2021-09-09 DIAGNOSIS — I89.0 LYMPHEDEMA: ICD-10-CM

## 2021-09-09 DIAGNOSIS — E11.622 CONTROLLED TYPE 2 DIABETES MELLITUS WITH OTHER SKIN ULCER, WITHOUT LONG-TERM CURRENT USE OF INSULIN (HCC): ICD-10-CM

## 2021-09-09 DIAGNOSIS — L97.919 IDIOPATHIC CHRONIC VENOUS HTN OF BOTH LEGS WITH ULCER AND INFLAMMATION (HCC): ICD-10-CM

## 2021-09-09 DIAGNOSIS — I87.333 IDIOPATHIC CHRONIC VENOUS HTN OF BOTH LEGS WITH ULCER AND INFLAMMATION (HCC): ICD-10-CM

## 2021-09-09 DIAGNOSIS — E66.01 MORBID OBESITY (HCC): Chronic | ICD-10-CM

## 2021-09-09 DIAGNOSIS — I87.2 CHRONIC VENOUS INSUFFICIENCY: ICD-10-CM

## 2021-09-09 DIAGNOSIS — L97.929 IDIOPATHIC CHRONIC VENOUS HTN OF BOTH LEGS WITH ULCER AND INFLAMMATION (HCC): ICD-10-CM

## 2021-09-09 DIAGNOSIS — L97.221 NON-PRESSURE CHRONIC ULCER OF LEFT CALF, LIMITED TO BREAKDOWN OF SKIN (HCC): ICD-10-CM

## 2021-09-09 PROCEDURE — 99214 OFFICE O/P EST MOD 30 MIN: CPT | Performed by: SURGERY

## 2021-09-09 PROCEDURE — 29580 STRAPPING UNNA BOOT: CPT

## 2021-09-09 NOTE — PROGRESS NOTES
[address]    [address] [facility] [address] [place], [facility]  Consult Note/H&P/Progress Note      [address] RECORD NUMBER:  [id number]  AGE: 81 y.o. RACE WHITE/NON-  GENDER: female  : 1935  EPISODE DATE:  2021       Subjective:      CC (Chief Complaint) and HISTORY of PRESENT ILLNESS (HPI):    Nichelle Hsu is a 81 y.o. female who presents today for wound/ulcer evaluation. I saw her for first visit on 2021    She was cared for by Dr. Tay Feliciano previously. This information was obtained from the patient  The following HPI elements were documented for the patient's wound:  Location: Bilateral lower extremity venous ulcers  Duration: In a years  Severity: BMI greater than 37;  age 81  Context: Somewhat palliative care; +DM  Modifying Factors:  Nothing makes better or worse  Quality:    Timing:     Associated Signs and Symptoms: No fevers    Ulcer Identification:  Ulcer Type: venous    Contributing Factors: lymphedema and obesity and DM    Below are notes from Dr. Tay Feliciano    3/11/2018 No new issues. Urinary soiling continues to be improved. Left 2nd toe stable. Getting [facility] for wraps  2019 Continues to do well with dressings but still with substantial drainage. No further UTI issues. 2019 No new issues. Tolerating dressings. No further soiling.   2019 Doing well. No new issues. R leg wound not improving. 2019 Returns for re-eval. Bx proven [place]. Chest lesion benign. 2019 S/P excision leg lesion and SG margins negative. 10/11/2019 Tolerating wraps. No new issues. Pain controlled. Minimal soiling. 2019 One month since last seen. No new issues. . No tunnelling. 1/10/2020 Returns after being discharged with new opening. Seen today with daughter. No changes in heart or CHF status. Has had some increased swelling. Has had trouble with LE wraps. 2020 Doing better since last visit and re-admit.  Keeping elevated. No new breakdown. Cardiac and renal status stable. 2/14/2020 No new issues. Swelling stable. No recent CHF or other deterioration. Tolerating Unna without deterioration. 3/13/2020 Continued swelling but now bullae. Did not bring wraps today. Some issues at groin with candida. 4/17/2020 Seen via telehealth today. Increased swelling in legs. Has been trying to use faro wraps but worsening despite. Candida better. Blood sugars have been relatively well controlled. 9/25/2020 Has been seen by Dr Anthony Tellez last several times. Wound continue to recur when she is switched from [employer]. Currently having some increased CHF and cards managing diuretic.   11/20/2020 No new issues. Tolerating wraps. Minimal pain. No new breakdown. CHF is stable. 1/8/2021 Has not been seen since November but [facility] has been performing dressings 3x a week. Notes some increase in swelling. No new chest pain or SOB. Diuretic has been reduced due to kidney issues. 7/2/2021 Has not been seen for several months but has not had any significant deteriorations in health or hospitalizations. New issues with some loss of left great toenail. New skin tear on the left side.          5/24/17 non-invasive arterial duplex                    PAST MEDICAL HISTORY  Past Medical History:   Diagnosis Date    Acute hyponatremia 1/21/2011    Acute on chronic diastolic congestive heart failure ([address] 1/19/2011    Acute renal failure ([facility] 1/21/2011    AMI (acute myocardial infarction) ([facility] 2009    Anemia 9/5/2012    Arthritis     Asthma     Asthma exacerbation 11/11/2011    CAD (coronary artery disease)     MI 2009, stents heart 2009    CAD (coronary artery disease), native coronary artery 9/2/2009    Previous stent to dRCA with Cypher 3.5x33mm SABA 8/09 9/09 LAD- Xience 2.5x23mm and 3.0x18mm SABA in mid-distal LAD      Cellulitis Bilateral Lower Extremities 1/20/2011    Chest discomfort, tightness, pressure 1/28/2016    Chronic diastolic heart failure ([address] 10/20/2011    Chronic venous insufficiency 10/24/2011    Diabetes ([address]     checks QD, normal 200, no hyposymptoms     Diabetes Mellitus Type II-insulin dependent 9/2/2009    Diverticulitis 1/28/2016    Dyslipidemia 9/2/2009    Edema 1/28/2016    GERD (gastroesophageal reflux disease)     GLAUCOMA     BILATERAL    Heart failure ([address]     Hypertension     Hypokalemia 1/28/2016    Hypoxemia 9/7/2012    Morbid obesity ([address]     Nausea & vomiting     Neuropathy in diabetes ([address] 9/2/2009    NSTEMI (non-ST elevation myocardial infarction) ([address] 9/2/2009    Dr Landon Been    Obstructive sleep apnea (adult) (pediatric)- probably  11/11/2011    TRISH (obstructive sleep apnea) 9/7/2012    Intolerant of CPAP     Other dyspnea and respiratory abnormality 11/10/2011    Other ill-defined conditions(799.89)     benign tumor in lower abdomen- not removed, diabetic ulcers, edema, neuropathy    Peripheral vascular disease ([address] 10/20/2011    Post PTCA 6/20/2014    PVD (peripheral vascular disease) ([address]     Unspecified anemia 1/23/2011    Unspecified sleep apnea     oxygen at night    Venous stasis of lower extremity 2010    BILATERAL W RECURRENT ADMISSIONS    Volume overload 9/6/2012        PAST SURGICAL HISTORY  Past Surgical History:   Procedure Laterality Date    HX CHOLECYSTECTOMY      HX COLONOSCOPY      HX GI      [place] BREAST SURGERY PROCEDURE UNLISTED  1973    benign tumor left breast    [place] CARDIAC SURG PROCEDURE UNLIST      4 stents most recent 2 yrs ago    PTCA [facility] ADDL VESSEL      stentsx3       FAMILY HISTORY  Family History   Problem Relation Age of Onset    Cancer Father     Lung Disease Father     Cancer Brother     Diabetes Paternal Aunt     Hypertension Maternal Grandmother     Hypertension Paternal Grandmother        SOCIAL HISTORY  Social History     Tobacco Use    Smoking status: Never Smoker    Smokeless tobacco: Never Used   Substance Use Topics    Alcohol use: No    Drug use: No       ALLERGIES  Allergies   Allergen Reactions    Pcn [Penicillins] Hives, Swelling and Myalgia     Can take cephalosporins    Bactrim [Sulfamethoprim] Other (comments)     \"potassium acted up\"    Codeine Nausea Only    Dilaudid [Hydromorphone (Bulk)] Other (comments)    Lortab [Hydrocodone-Acetaminophen] Nausea Only       MEDICATIONS  Current Outpatient Medications on File Prior to Encounter   Medication Sig Dispense Refill    gabapentin (Neurontin) 100 mg capsule Take 200 mg by mouth three (3) times daily.  bumetanide (BUMEX) 2 mg tablet Take 1 Tab by mouth daily. 90 Tab 3    ergocalciferol (Vitamin D2) 1,250 mcg (50,000 unit) capsule Take 1 Cap by mouth every seven (7) days. Takes on wed once a month 13 Cap 5    nitroglycerin (NITROLINGUAL) 400 mcg/spray spray 1 Spray by SubLINGual route every five (5) minutes as needed for Chest Pain. 1 Bottle 3    clopidogreL (PLAVIX) 75 mg tab Take 1 Tab by mouth daily. 90 Tab 3    atorvastatin (LIPITOR) 80 mg tablet Take 1 Tab by mouth daily. 90 Tab 3    DULoxetine (CYMBALTA) 60 mg capsule Take 1 Cap by mouth daily. 90 Cap 3    metoprolol tartrate (LOPRESSOR) 50 mg tablet Take 1 Tab by mouth two (2) times a day. 180 Tab 3    dulaglutide (TRULICITY) 6.64 WH/3.1 mL sub-q pen 0.75 mg by SubCUTAneous route every seven (7) days.  metOLazone (ZAROXOLYN) 10 mg tablet Take 1 Tab by mouth daily. (Patient taking differently: Take 10 mg by mouth daily. Indications: as needed) 90 Tab 3    aspirin 81 mg chewable tablet Take 1 Tab by mouth daily. 90 Tab 3    insulin glargine (TOUJEO SOLOSTAR) 300 unit/mL (1.5 mL) inpn 220 Units by SubCUTAneous route daily.  acetaminophen (TYLENOL EXTRA STRENGTH) 500 mg tablet Take  by mouth every six (6) hours as needed for Pain.  insulin glulisine (APIDRA) 100 unit/mL injection 90 Units by SubCUTAneous route three (3) times daily.       nitroglycerin (NITROSTAT) 0.4 mg SL tablet 0.4 mg by SubLINGual route every five (5) minutes as needed.  albuterol (PROVENTIL, VENTOLIN) 90 mcg/Actuation inhaler Take 2 Puffs by inhalation every four (4) hours as needed for Wheezing. Dispense: (1) inhaler with no refills. 1 g 0     No current facility-administered medications on file prior to encounter. REVIEW OF SYSTEMS  The patient has no difficulty with chest pain or shortness of breath. No fever or chills. Comprehensive review of systems was otherwise unremarkable except as noted above. Objective:   Physical Exam:   Recent vitals (if inpt):  Patient Vitals for the past 24 hrs:   Height Weight   09/09/21 1132 5' 5\" (1.651 m) 246 lb (111.6 kg)       Visit Vitals  Ht 5' 5\" (1.651 m)   Wt 246 lb (111.6 kg)   BMI 40.94 kg/m²     Wt Readings from Last 3 Encounters:   09/09/21 246 lb (111.6 kg)   08/19/21 (P) 257 lb 3.2 oz (116.7 kg)   07/30/21 259 lb (117.5 kg)     Constitutional: Alert, oriented, cooperative patient in no acute distress; appears stated age;  General appearance is within normal limits for wound care patient population. See the today's recorded vitals signs and constitutional data. Eyes: Pupils equal; Sclera are clear. EOMs intact; The eyes appear to track and move normally. The sclera are not injected. The conjunctive are clear. The eyelids are normal. There is no scleral icterus. ENMT: There are no obvious external ear, nose, lip or mouth lesions. Nares normal; Neck: Overall contour of the neck is normal with no obvious neck masses. Gross hearing is within normal limits. No obvious neck masses  Resp:  Breathing is non-labored; normal rate and effort; no audible wheezing. CV: RRR;  no JVD; No evidence of cyanosis of the upper extremities. The extremities are perfused without embolic sign, splinter hemorrhages, or petechia. GI: soft and non-distended without acute abnormality noted. Musculoskeletal: unremarkable with normal function. No embolic signs or cyanosis. Neuro: Oriented; moves all 4; no focal deficits  Psychiatric: Judgement and insight are within normal limits for the wound care population of patients. Patient is oriented to person, place, and time. Recent and remote memory are within normal limits. Mood and affect are within normal limits. Integumentary (Skin/Wounds)  See inspection of wound(s) below. There are no other skin areas of palpable concern. See wound center documentation including photos in the [address] Wound Template made part of this record by reference. Wound dimensions for 9/9/2021 are as listed in epic with nursing protocol entry made part of this record by reference. Nursing staff had not completed the entry as of 12:11 PM on 9/9/2021        Wounds:  Wound Leg Right (Active)   Wound Image   08/19/21 1142   Wound Etiology Venous 08/19/21 1142   Dressing Status Clean;Dry; Intact 08/19/21 1142   Cleansed Soap and water 08/19/21 1142   Wound Length (cm) 1.2 cm 08/19/21 1142   Wound Width (cm) 1.3 cm 08/19/21 1142   Wound Depth (cm) 0.1 cm 08/19/21 1142   Wound Surface Area (cm^2) 1.56 cm^2 08/19/21 1142   Wound Volume (cm^3) 0.156 cm^3 08/19/21 1142   Wound Assessment Epithelialization;Granulation tissue 08/19/21 1142   Drainage Amount Moderate 08/19/21 1142   Drainage Description Serosanguinous 08/19/21 1142   Wound Odor None 08/19/21 1142   [place]-Wound/Incision Assessment Edematous 08/19/21 1142   Wound Thickness Description Full thickness 08/19/21 1142   Number of days: 744       Wound Leg lower Left;Medial (Active)   Wound Image   08/19/21 1142   Wound Etiology Venous 08/19/21 1142   Dressing Status Clean;Dry; Intact 08/19/21 1142   Cleansed Soap and water 08/19/21 1142   Wound Length (cm) 0.3 cm 08/19/21 1142   Wound Width (cm) 0.3 cm 08/19/21 1142   Wound Depth (cm) 0.1 cm 08/19/21 1142   Wound Surface Area (cm^2) 0.09 cm^2 08/19/21 1142   Change in Wound Size % 99.52 08/19/21 1142   Wound Volume (cm^3) 0.009 cm^3 08/19/21 1142   Wound Healing % 100 08/19/21 1142   Wound Assessment Granulation tissue 08/19/21 1142   Drainage Amount Moderate 08/19/21 1142   Drainage Description Serosanguinous 08/19/21 1142   Wound Odor None 08/19/21 1142   [place]-Wound/Incision Assessment Blanchable erythema 07/30/21 1040   Edges Attached edges 07/02/21 1426   Wound Thickness Description Full thickness 08/19/21 1142   Number of days: 608       Wound Leg lower Left;Posterior (Active)   Wound Image   08/19/21 1142   Wound Etiology Venous 08/19/21 1142   Dressing Status Clean;Dry; Intact 08/19/21 1142   Cleansed Soap and water 08/19/21 1142   Dressing/Treatment Alginate with Ag 08/19/21 1142   Wound Length (cm) 3.7 cm 08/19/21 1142   Wound Width (cm) 1 cm 08/19/21 1142   Wound Depth (cm) 0.1 cm 08/19/21 1142   Wound Surface Area (cm^2) 3.7 cm^2 08/19/21 1142   Change in Wound Size % 0 08/19/21 1142   Wound Volume (cm^3) 0.37 cm^3 08/19/21 1142   Wound Healing % 0 08/19/21 1142   Wound Assessment Epithelialization 08/19/21 1142   Drainage Amount Small 08/19/21 1142   Drainage Description Serosanguinous 08/19/21 1142   Wound Odor None 08/19/21 1142   [place]-Wound/Incision Assessment Edematous 07/30/21 1040   Wound Thickness Description Full thickness 08/19/21 1142   Number of days: 41       Wound Pretibial Proximal;Right;Lateral #5right lateral lower leg 08/19/21 (Active)   Wound Image   08/19/21 1142   Wound Etiology Skin Tear 08/19/21 1142   Dressing Status Old drainage noted 08/19/21 1142   Cleansed Cleansed with saline 08/19/21 1142   Dressing/Treatment Alginate with Ag 08/19/21 1142   Wound Length (cm) 1.5 cm 08/19/21 1142   Wound Width (cm) 8 cm 08/19/21 1142   Wound Depth (cm) 0.1 cm 08/19/21 1142   Wound Surface Area (cm^2) 12 cm^2 08/19/21 1142   Wound Volume (cm^3) 1.2 cm^3 08/19/21 1142   Drainage Amount Moderate 08/19/21 1142   Drainage Description Serosanguinous 08/19/21 1142   Wound Odor None 08/19/21 1142   Wound Thickness Description Full thickness 08/19/21 1142   Number of days: 21       [REMOVED] Wound Leg Medial;Left;Right (Removed)   Number of days: 4710       [REMOVED] Wound Leg Lower Left (Removed)   Dressing Status  Clean, dry, and intact 09/18/18 1407   Dressing Type  Unna boot 09/18/18 1407   Read-Only, Retired. Wound Length (cm) 0 cm 09/11/18 1601   Read-Only, Retired. Wound Width (cm) 0 cm 09/11/18 1601   Read-Only, Retired. Wound Depth (cm) 0 09/11/18 1601   Read-Only, Retired. Wound Surface area (cm^2) 0 cm^2 09/11/18 1601   Read-Only, Retired. Epithelialization (%) 100 09/11/18 1601   Read-Only, Retired. Drainage Amount None 09/11/18 1601   Wound Odor None 09/11/18 1601   Read-Only, Retired. Cleansing and Cleansing Agents Normal saline; Soap and water 09/11/18 1601   Read-Only, Retired. Dressing Type Applied Unna boot 09/11/18 1628   Read-Only, Retired. Procedure Tolerated Well 09/11/18 1628   Number of days: 32       [REMOVED] Wound Leg Lower Right (Removed)   Dressing Status  Clean, dry, and intact 01/17/20 1458   Dressing Type  Unna boot 10/05/18 1452   Read-Only, Retired. Non-Pressure Injury Full thickness (subcut/muscle) 11/08/19 1513   Read-Only, Retired. Wound Length (cm) 0.1 cm 01/17/20 1458   Read-Only, Retired. Wound Width (cm) 0.1 cm 01/17/20 1458   Read-Only, Retired. Wound Depth (cm) 0.1 01/17/20 1458   Read-Only, Retired. Wound Surface area (cm^2) 0.01 cm^2 01/17/20 1458   Read-Only, Retired. Change in Wound Size % 96 01/17/20 1458   Read-Only, Retired. Condition of Base Pink 10/11/19 1516   Read-Only, Retired. Epithelialization (%) 50 01/10/20 1458   Read-Only, Retired. Tissue Type Red 08/17/18 1436   Read-Only, Retired. Tissue Type Percent Pink 50 01/10/20 1458   Read-Only, Retired. Tissue Type Percent Red 100 11/08/19 1513   Read-Only, Retired. Drainage Amount Scant 01/17/20 1458   Read-Only, Retired. Drainage Color Serous 01/17/20 1458   Wound Odor None 01/10/20 1458   Read-Only, Retired.  Periwound Skin Condition Intact 01/10/20 1458   Read-Only, Retired. Cleansing and Cleansing Agents Normal saline 01/17/20 1458   Read-Only, Retired. Dressing Type Applied Xeroform;ABD pad;Unna boot 08/31/18 0901   Read-Only, Retired. Procedure Tolerated Well 11/08/19 1513   Number of days: 560       [REMOVED] Wound Toe Left (Removed)   Read-Only, Retired. Wound Length (cm) 0 cm 11/02/18 1104   Read-Only, Retired. Wound Width (cm) 0 cm 11/02/18 1104   Read-Only, Retired. Wound Depth (cm) 0 11/02/18 1104   Read-Only, Retired. Wound Surface area (cm^2) 0 cm^2 11/02/18 1104   Read-Only, Retired. Change in Wound Size % 100 11/02/18 1104   Read-Only, Retired. Tissue Type Percent Pink 100 10/19/18 1508   Read-Only, Retired. Drainage Amount None 11/02/18 1104   Read-Only, Retired. Drainage Color Serosanguinous 10/19/18 1508   Wound Odor None 11/02/18 1104   Read-Only, Retired. Periwound Skin Condition Intact 10/19/18 1508   Read-Only, Retired. Cleansing and Cleansing Agents Normal saline 11/02/18 1104   Read-Only, Retired. Procedure Tolerated Well 10/12/18 1623   Number of days: 21       [REMOVED] Wound Toe (specify in comments) Left;Plantar (Removed)   Dressing Status  Clean, dry, and intact 11/02/18 1104   Dressing Type  Adhesive wound dressing (Band-Aid) 11/02/18 1104   Read-Only, Retired. Non-Pressure Injury Partial thickness (epider/derm) 10/19/18 1508   Read-Only, Retired. Wound Length (cm) 0 cm 11/02/18 1104   Read-Only, Retired. Wound Width (cm) 0 cm 11/02/18 1104   Read-Only, Retired. Wound Depth (cm) 0 11/02/18 1104   Read-Only, Retired. Wound Surface area (cm^2) 0 cm^2 11/02/18 1104   Read-Only, Retired. Change in Wound Size % 100 11/02/18 1104   Read-Only, Retired. Condition of Base Lynn 10/19/18 1508   Read-Only, Retired. Drainage Amount None 11/02/18 1104   Wound Odor None 11/02/18 1104   Read-Only, Retired. Periwound Skin Condition Intact 10/19/18 1508   Read-Only, Retired.  Cleansing and Cleansing Agents Normal saline 11/02/18 1104 Number of days: 14       [REMOVED] Wound Leg Lower Left;Medial (Removed)   Dressing Status  Clean, dry, and intact 03/01/19 1341   Read-Only, Retired. Wound Length (cm) 0 cm 03/01/19 1341   Read-Only, Retired. Wound Width (cm) 0 cm 03/01/19 1341   Read-Only, Retired. Wound Depth (cm) 0 03/01/19 1341   Read-Only, Retired. Wound Surface area (cm^2) 0 cm^2 03/01/19 1341   Read-Only, Retired. Change in Wound Size % 100 03/01/19 1341   Read-Only, Retired. Condition of Base Epithelializing 03/01/19 1341   Read-Only, Retired. Tissue Type Percent Pink 100 03/01/19 1341   Read-Only, Retired. Tissue Type Percent Red 100 01/18/19 1317   Read-Only, Retired. Drainage Amount None 03/01/19 1341   Read-Only, Retired. Drainage Color Serous 01/18/19 1317   Wound Odor None 03/01/19 1341   Read-Only, Retired. Periwound Skin Condition Intact 03/01/19 1341   Read-Only, Retired.  Cleansing and Cleansing Agents Soap and water 03/01/19 1341   Number of days: 42       [REMOVED] Wound Leg lower Left (Removed)   Dressing Status Clean, dry, and intact 08/23/19 1452   Wound Length (cm) 0 cm 08/23/19 1452   Wound Width (cm) 0 cm 08/23/19 1452   Wound Depth (cm) 0 cm 08/23/19 1452   Wound Volume (cm^3) 0 cm^3 08/23/19 1452   Drainage Amount None 08/23/19 1452   Drainage Description Serous 08/16/19 1427   Wound Odor None 08/23/19 1452   [place]-Wound/Incision Assessment Blanchable erythema 08/23/19 1452   Number of days: 14       [REMOVED] Wound Leg upper Right (Removed)   Dressing Status Clean, dry, and intact 09/13/19 1510   Dressing/Treatment Open to air 09/27/19 1406   Wound Length (cm) 0 cm 09/27/19 1406   Wound Width (cm) 0 cm 09/27/19 1406   Wound Depth (cm) 0 cm 09/27/19 1406   Wound Volume (cm^3) 0 cm^3 09/27/19 1406   Drainage Amount None 09/27/19 1406   Wound Odor None 09/27/19 1406   [place]-Wound/Incision Assessment Intact 09/27/19 1406   Number of days: 28       [REMOVED] Wound Toe (Comment  which one) Anterior left 2nd (Removed) Dressing Status Clean, dry, and intact 09/25/20 1506   Dressing/Treatment Open to air 09/25/20 1506   Wound Length (cm) 0 cm 09/25/20 1506   Wound Width (cm) 0 cm 09/25/20 1506   Wound Depth (cm) 0 cm 09/25/20 1506   Wound Surface Area (cm^2) 0 cm^2 09/25/20 1506   Change in Wound Size % 100 09/25/20 1506   Wound Volume (cm^3) 0 cm^3 09/25/20 1506   Drainage Amount None 09/25/20 1506   Drainage Description Serous 08/10/20 1329   Wound Odor None 09/25/20 1506   [place]-Wound/Incision Assessment Dry 08/17/20 1328   Wound Thickness Description Partial thickness 08/24/20 1459   Number of days: 46       [REMOVED] Wound Toe (Comment  which one) Left;Lateral Great toe (Removed)   Wound Image   11/20/20 1321   Wound Etiology Diabetic Gloria 2 11/20/20 1321   Dressing Status Moist 09/25/20 1506   Dressing/Treatment Open to air 09/25/20 1506   Wound Length (cm) 0 cm 11/20/20 1321   Wound Width (cm) 0 cm 11/20/20 1321   Wound Depth (cm) 0 cm 11/20/20 1321   Wound Surface Area (cm^2) 0 cm^2 11/20/20 1321   Change in Wound Size % 100 11/20/20 1321   Wound Volume (cm^3) 0 cm^3 11/20/20 1321   Wound Healing % 100 11/20/20 1321   Wound Assessment Epithelialization 11/20/20 1321   Drainage Amount None 11/20/20 1321   Drainage Description Serosanguinous 09/25/20 1506   Wound Odor None 11/20/20 1321   [place]-Wound/Incision Assessment Blanchable erythema 11/20/20 1321   Wound Thickness Description Full thickness 09/25/20 1506   Number of days: 105       [REMOVED] Wound Toe (Comment  which one) Left;Dorsal GREAT (Removed)   Wound Image   07/30/21 1040   Wound Etiology Diabetic Gloria 2 07/30/21 1040   Dressing Status Clean;Dry 07/30/21 1040   Cleansed Soap and water 07/30/21 1040   Dressing/Treatment Open to air 07/30/21 1040   Offloading for Diabetic Foot Ulcers Diabetic shoes/inserts 07/30/21 1040   Wound Length (cm) 0 cm 07/30/21 1040   Wound Width (cm) 0 cm 07/30/21 1040   Wound Depth (cm) 0 cm 07/30/21 1040   Wound Surface Area (cm^2) 0 cm^2 07/30/21 1040   Change in Wound Size % 100 07/30/21 1040   Wound Volume (cm^3) 0 cm^3 07/30/21 1040   Wound Healing % 100 07/30/21 1040   Wound Assessment Epithelialization 07/30/21 1040   Drainage Amount None 07/30/21 1040   Drainage Description Thick; Serous 07/02/21 1426   Wound Odor None 07/30/21 1040   [place]-Wound/Incision Assessment Edematous 07/02/21 1426   Edges Attached edges 07/02/21 1426   Wound Thickness Description Full thickness 07/02/21 1426   Number of days: 28                                                    Amount and/or Complexity of Data Reviewed and Analyzed:  I reviewed and analyzed all of the unique labs and radiologic studies that are shown below as well as any that are in the HPI, and any that are in the expanded problem list below  *Each unique test, order, or document contributes to the combination of 2 or combination of 3 in Category 1 below. I also independently reviewed radiology images for studies I described above in the HPI or studies I have ordered. For this visit I also reviewed old records and prior notes. No results for input(s): WBC, HGB, PLT, NA, K, CL, CO2, BUN, CREA, GLU, PTP, INR, APTT, TBIL, TBILI, CBIL, ALT, AP, AML, AML, LPSE, LCAD, NH4, TROPT, TROIQ, PCO2, PO2, HCO3, HGBEXT, PLTEXT, INREXT, HGBEXT, PLTEXT, INREXT in the last 72 hours. No lab exists for component: SGOT, GPT,  PH  No results for input(s): PTP, INR, APTT, TBIL, TBILI, CBIL, ALT, AP, AML, LPSE, INREXT, INREXT in the last 72 hours.     No lab exists for component: SGOT, GPT    Most recent blood counts (CBC) review  Lab Results   Component Value Date/Time    WBC 7.4 08/17/2020 02:55 PM    HGB 10.7 (L) 08/17/2020 02:55 PM    HCT 37.3 08/17/2020 02:55 PM    HEMATOCRIT 36 11/10/2011 02:55 PM    PLATELET 940 05/63/0662 02:55 PM    MCV 94.7 08/17/2020 02:55 PM     Most recent chemistry (BMP) test review   Lab Results   Component Value Date/Time    Sodium 140 08/17/2020 02:55 PM    Potassium 4.6 08/17/2020 02:55 PM    Chloride 106 08/17/2020 02:55 PM    CO2 27 08/17/2020 02:55 PM    Anion gap 7 08/17/2020 02:55 PM    Glucose 244 (H) 08/17/2020 02:55 PM    BUN 19 08/17/2020 02:55 PM    Creatinine 1.01 (H) 08/17/2020 02:55 PM    GFR est AA >60 08/17/2020 02:55 PM    GFR est non-AA 56 (L) 08/17/2020 02:55 PM    Calcium 9.6 08/17/2020 02:55 PM     Most recent Liver enzyme test review  Lab Results   Component Value Date/Time    ALT (SGPT) 19 07/09/2014 08:45 PM    AST (SGOT) 13 (L) 07/09/2014 08:45 PM    Alk. phosphatase 139 (H) 07/09/2014 08:45 PM    Bilirubin, total 0.7 07/09/2014 08:45 PM     Most recent coagulation (coags) review  Lab Results   Component Value Date/Time    INR 1.0 06/20/2014 09:32 AM    INR 1.1 11/10/2011 02:25 PM    INR 1.0 01/19/2011 02:25 PM    Prothrombin time 10.7 06/20/2014 09:32 AM    Prothrombin time 11.6 11/10/2011 02:25 PM    Prothrombin time 10.2 01/19/2011 02:25 PM     Lab Results   Component Value Date/Time    INR 1.0 06/20/2014 09:32 AM    aPTT 26.9 11/10/2011 02:25 PM    Lipase 96 07/09/2014 08:45 PM    Troponin-I <0.05 11/10/2011 02:50 PM    Troponin-I, Qt. <0.04 (L) 04/26/2011 04:35 AM       Diabetic assessment  Lab Results   Component Value Date/Time    Hemoglobin A1c 6.9 (H) 07/30/2021 12:49 PM       Nutritional assessment screen to assess wound healing ability:  Lab Results   Component Value Date/Time    Protein, total 7.8 07/09/2014 08:45 PM    Albumin 3.3 07/09/2014 08:45 PM     Lab Results   Component Value Date/Time    Protein, total 7.8 07/09/2014 08:45 PM    Albumin 3.3 07/09/2014 08:45 PM       XR Results (most recent):  Results from Hospital Encounter encounter on 09/08/14    XR CHEST PA LAT    Narrative  Two view chest x-ray. CLINICAL INDICATION: Cough. FINDINGS: Single AP view the chest compared to a similar exam dated 9/17/2012  shows no significant change in the cardiopulmonary structures with mild  prominence of the interstitial markings.  No dense airspace consolidation noted. No pleural effusion or pneumothorax evident. The cardiac silhouette and  mediastinum are unremarkable. Impression  :  1. No acute cardiopulmonary mallet. 2. The stable, diffuse interstitial prominence may be related to chronic lung  disease. CT Results (most recent):  Results from Hospital Encounter encounter on 10/26/15    CT ABD PELV W CONT    Narrative  ABDOMINAL CT WITH CONTRAST: 10/26/2015  Indication:Left lower quadrant pain for 5 days  Comparison:July 9, 2014  100cc of optiray 350   was injected to aid in the detection of abdominal and  pelvic pathology. Findings: The lung bases are remarkable for a 3 mm calcified nodule in the left lung base  and an area of hypoventilation in the right lung base  . There are new scattered  low-density lesions throughout the hepatic parenchyma of which the largest is in  the medial segment of the left hepatic lobe measuring 51 mm. There is no mass  effect associated with these lesions. The patient is status post  cholecystectomy. The pancreas, aorta, and kidneys are unremarkable. There are a  few splenic calcifications. There is no significant adenopathy. There is  diverticula throughout the colon. Small bowel is unremarkable. There is a  fat-containing periumbilical hernia. Bony structures are intact. CT scan of pelvis with contrast:    Uterus, bladder, adnexal structures, soft tissues and bony structures are  unremarkable.     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 (HCC)    Lymphedema Morbid obesity (HCC) (Chronic)    Non-pressure chronic ulcer of left calf, limited to breakdown of skin ([facility] [address]    Peripheral vascular disease (HCC) (Chronic)          Problem List  Date Reviewed: 7/9/2018        Codes Class Noted    BMI 40.0-44.9, adult [facility]) ICD-10-CM: Z68.41  ICD-9-CM: V85.41  7/30/2021        Lymphedema ICD-10-CM: I89.0  ICD-9-CM: 457.1  7/30/2021        Idiopathic chronic 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 Normal RV. Mild/moderate AS. MG 15. PG 26. Mild MR/TR/AR             Essential hypertension ICD-10-CM: I10  ICD-9-CM: 401.9  5/2/2019        Non-pressure chronic ulcer of left calf, limited to breakdown of skin [facility]) ICD-10-CM: E46.040  ICD-9-CM: 707.12  8/21/2018        Edema ICD-10-CM: R60.9  ICD-9-CM: 782.3  1/28/2016        Diverticulitis ICD-10-CM: K57.92  ICD-9-CM: 562.11  1/28/2016    Overview Signed 1/28/2016 12:10 PM by Janene Kumar     1. CT of abdomen (7/9/14): Evidence of uncomplicated diverticulitis at the junction of the descending and sigmoid colon. Extensive diverticular are seen with some pericolonic fat stranding.              TRISH (obstructive sleep apnea) (Chronic) ICD-10-CM: G47.33  ICD-9-CM: 327.23  9/7/2012    Overview Signed 9/7/2012 11:46 AM by Marcela Moran NP     Intolerant of CPAP             Obstructive sleep apnea (adult) (pediatric)- probably  ICD-10-CM: G47.33  ICD-9-CM: 327.23  11/11/2011        Chronic venous insufficiency ICD-10-CM: V49.6  ICD-9-CM: 459.81  10/24/2011        Chronic diastolic heart failure (HCC) (Chronic) ICD-10-CM: I50.32  ICD-9-CM: 428.32  10/20/2011    Overview Signed 1/28/2016 12:05 PM by Alda Noble     1. Echo (3/5/10): EF 55%. No wall motion abnormalities. Mild LVH. Mild diastolic dysfunction. Mild bi-atrial enlargement. Mild mitral regurgitation. RVSP 32.    2. Echo (4/26/11): Normal LV systolic function. EF 60%. Mild LVH. Mild diastolic dysfunction. Mild left atrial enlargement. Mild aortic regurgitation. Peripheral vascular disease ([address] (Chronic) ICD-10-CM: I73.9  ICD-9-CM: 443.9  10/20/2011    Overview Signed 1/28/2016 12:08 PM by Alda Noble     1. Lower extremity duplex (9/2/10): Right lower extremity duplex suggested moderate to severe reduction with multilevel disease. Stenotic SFA most pronounced distally. Right VIBHA 0.5. Left VIBHA suggests mild reduction VIBHA of 0.86. GERD (gastroesophageal reflux disease) (Chronic) ICD-10-CM: K21.9  ICD-9-CM: 530.81  10/20/2011        Anemia, unspecified ICD-10-CM: D64.9  ICD-9-CM: 285.9  1/23/2011        Acute hyponatremia ICD-10-CM: E87.1  ICD-9-CM: 276.1  1/21/2011        CAD (coronary artery disease), native coronary artery (Chronic) ICD-10-CM: I25.10  ICD-9-CM: 414.01  9/2/2009    Overview Addendum 6/11/2017  9:05 PM by Wyatt Mackay MD     1. NSTEMI (8/17/09):  EF 60%. PCI of dRCA with  Cypher 3.5x 33 mm SABA. Residual 80-90% LAD stenosis. 2.  Staged PCI of LAD (9/1/09): Xience 2.5x23mm and 3.0x18mm SABA in mid-distal LAD . Adjunctive POBA of D1 and D2.    3.  PCI of mid RCA (6/20/14):  Promus 4 x 20 mm SABA (overlapped with previous stent)             Diabetes mellitus, type 2 (HCC) (Chronic) ICD-10-CM: E11.9  ICD-9-CM: 250.00  9/2/2009        Morbid obesity (HCC) (Chronic) ICD-10-CM: E66.01  ICD-9-CM: 278.01  9/2/2009        Dyslipidemia (Chronic) ICD-10-CM: E78.5  ICD-9-CM: 272.4  9/2/2009        Neuropathy in diabetes ([facility] [address] (Chronic) ICD-10-CM: E11.40  ICD-9-CM: 250.60, 357.2  9/2/2009              Active Problems: * No active hospital problems. *          Plan:     Number and Complexity of Problems addressed and   Risks of complications and/or morbidity of management    Treatment Note please see attached Discharge Instructions  Any procedures done today in the wound center are documented in a separate note in connect care made part of this record by reference  Written patient dismissal instructions given to patient or POA. PVD  Older arterial study above  I ordered arterial duplex on 8/19/21      BMI>42  Encourage weight loss to help with leg swelling  Chronic leg wounds    Lymphedema  Encourage elevation, salt avoidance, compression therapy, and weight loss  CHF management per cards. Bilat lower extremity venous ulcers/venous HTN  3 times a week multilayer compression dressings   The inner layer is zinc oxide    She has home health to help with this     On 9/9/2020 when she reported she had no venous stasis stockings  We gave her instructions for getting knee-high low compression Velcro venous stasis stockings and hopefully she will return with them next week. Diabetes mellitus  HgbA1c was >8 in 2011 7/30/2021 HgbA1c 6.9  Encourage close follow-up with primary care, strict diabetic diet, and strict adherence to diabetic medical regimen. Wound Care  No orders of the defined types were placed in this encounter. Follow-up Information    None                 Level of MDM (2/3 elements below)  Number and Complexity of Problems Addressed Amount and/or Complexity of Data to be Reviewed and Analyzed  *Each unique test, order, or document contributes to the combination of 2 or combination of 3 in Category 1 below.  Risk of Complications and/or Morbidity or Mortality of pt Management     33793  57856 SF Minimal  1self-limited or minor problem Minimal or none Minimal risk of morbidity from additional diagnostic testing or Rx   31085  77825 Low Low  2or more self-limited or minor problems;    or  1stable chronic illness;    or  0PSAWU, uncomplicated illness or injury   Limited  (Must meet the requirements of at least 1 of the 2 categories)  Category 1: Tests and documents   Any combination of 2 from the following:  Review of prior external note(s) from each unique source*;  review of the result(s) of each unique test*;   ordering of each unique test*    or   Category 2: Assessment requiring an independent historian(s)  (For the categories of independent interpretation of tests and discussion of management or test interpretation, see moderate or high) Low risk of morbidity from additional diagnostic testing or treatment     88744  10033 Mod Moderate  1or more chronic illnesses with exacerbation, progression, or side effects of treatment;    or  2or more stable chronic illnesses;    or  1undiagnosed new problem with uncertain prognosis;    or  1acute illness with systemic symptoms;    or  5TCVJA complicated injury   Moderate  (Must meet the requirements of at least 1 out of 3 categories)  Category 1: Tests, documents, or independent historian(s)  Any combination of 3 from the following:   Review of prior external note(s) from each unique source*;  Review of the result(s) of each unique test*;  Ordering of each unique test*;  Assessment requiring an independent historian(s)    or  Category 2: Independent interpretation of tests   Independent interpretation of a test performed by another physician/other qualified health care professional (not separately reported);     or  Category 3: Discussion of management or test interpretation  Discussion of management or test interpretation with external physician/other qualified health care professional/appropriate source (not separately reported)   Moderate risk of morbidity from additional diagnostic testing or treatment  Examples only:  Prescription drug management   Decision regarding minor surgery with identified patient or procedure risk factors  Decision regarding elective major surgery without identified patient or procedure risk factors   Diagnosis or treatment significantly limited by social determinants of health       65281  69211 High High  1or more chronic illnesses with severe exacerbation, progression, or side effects of treatment;--chronic lower extremity venous hypertension with severe exacerbation to full-thickness ulcers    or  1 acute or chronic illness or injury that poses a threat to life or bodily function   Extensive  (Must meet the requirements of at least 2 out of 3 categories)  Category 1: Tests, documents, or independent historian(s)  Any combination of 3 from the following:   Review of prior external note(s) from each unique source*;  Review of the result(s) of each unique test*;   Ordering of each unique test*;   Assessment requiring an independent historian(s)    or   Category 2: Independent interpretation of tests   Independent interpretation of a test performed by another physician/other qualified health care professional (not separately reported);     or  Category 3: Discussion of management or test interpretation  Discussion of management or test interpretation with external physician/other qualified health care professional/appropriate source (not separately reported)   High risk of morbidity from additional diagnostic testing or treatment  Examples only:  Drug therapy requiring intensive monitoring for toxicity  Decision regarding elective major surgery with identified patient or procedure risk factors  Decision regarding emergency major surgery  Decision regarding hospitalization  Decision not to resuscitate or to de-escalate care because of poor prognosis                 I have personally performed a face-to-face diagnostic evaluation and management  service on this patient. I have independently seen the patient.    I have independently obtained the above history from the patient/family. I have independently examined the patient with above findings. I have independently reviewed data/labs for this patient and developed the above plan of care (MDM).   Electronically signed by Milagros Morton MD on 9/9/2021 at 10:58 AM

## 2021-09-10 NOTE — WOUND CARE
09/09/21 1110   Wound Pretibial Proximal;Right;Lateral #5right lateral lower leg 08/19/21   Date First Assessed/Time First Assessed: 08/19/21 1142   Present on Hospital Admission: Yes  Wound Approximate Age at First Assessment (Weeks): 2 weeks  Primary Wound Type: Skin Tear  Location: Pretibial  Wound Location Orientation: Proximal;Right;Lat. .. Wound Image    Wound Etiology Skin Tear   Dressing Status Clean;Dry; Intact   Cleansed Cleansed with saline   Dressing/Treatment   (Zinc Unna Boot)   Wound Length (cm) 0 cm   Wound Width (cm) 0 cm   Wound Depth (cm) 0 cm   Wound Surface Area (cm^2) 0 cm^2   Change in Wound Size % 100   Wound Volume (cm^3) 0 cm^3   Wound Healing % 100   Wound Assessment Epithelialization   Drainage Amount None   Cydney-Wound/Incision Assessment Edematous; Other (Comment)  (dependent rubor)   Wound Leg lower Left;Posterior   Date First Assessed/Time First Assessed: 07/30/21 1039   Present on Hospital Admission: Yes  Primary Wound Type: Venous Ulcer  Location: Leg lower  Wound Location Orientation: Left;Posterior   Wound Etiology Venous   Dressing Status Clean;Dry; Intact   Cleansed Cleansed with saline   Dressing/Treatment   (Zinc Unna Boot)   Wound Length (cm) 0 cm   Wound Width (cm) 0 cm   Wound Depth (cm) 0 cm   Wound Surface Area (cm^2) 0 cm^2   Change in Wound Size % 100   Wound Volume (cm^3) 0 cm^3   Wound Healing % 100   Wound Assessment Epithelialization   Drainage Amount None   Cydney-Wound/Incision Assessment Edematous; Other (Comment)  (dependent rubor)     Patient is on ANTICOAGULANT: ASA & Plavix.

## 2021-09-10 NOTE — WOUND CARE
[address]  [address], [address]  Phone: [phone]  Fax: [phone]    Patient: Anastasia Valenzuela MRN: [id number]  SSN: [id number]    YOB: 1935  Age: 81 y.o. Sex: female       Return Appointment: 1 week with Gutierrez Smith MD    Instructions: Right and Left Lower Leg Wounds Resolved:  Remove Bilateral Zinc Unna Boot. Cleanse lower legs. Apply Bilateral Zinc Unna Boot with wound and lower extremity assessment. If there is any problem with the dressing (too tight, slides down,, etc.)  Patient to return to clinic to have re-wrapped by clinician. (IF POSSIBLE, PLEASE USE THE ZINC COFLEX WRAPS)  Take care when removing dressing not to tear the skin. Dressing change 1x weekly. Next appointment at wound clinic in 1 week. [facility] 73 x 1 week     Order [address] [place] [address] for both lower legs. Elevate lower legs when at rest.  Diabetic Diet. Low Salt Diet. Should you experience increased redness, swelling, pain, foul odor, size of wound(s), or have a temperature over 101 degrees please contact the [address] [place] [address] Road at [phone] or if after hours contact your primary care physician or go to the hospital emergency department.     Signed By: Denton Carrillo RN     September 9, 2021

## 2021-09-10 NOTE — WOUND CARE
[employer] Application   Below Knee    NAME:  Honey Gist OF BIRTH:  1935  MEDICAL RECORD NUMBER:  [id number]  DATE:  9/9/2021    Remove old Unna Boot or Boots. Applied moisturizing agent to dry skin as needed. Appied primary and secondary dressing as ordered. Applied Unna roll from toes to knee overlapping each time. Applied ace wrap or coban from toes to below the knee. Secured with tape and/or metal clips covered with tape. Instructed patient/caregiver to keep dressing dry and intact. DO NOT REMOVE DRESSING. Instructed pt/family/caregiver to report excessive draining, loose bandage, wet dressing, severe pain or tingling in toes. Applied [employer] dressing below the knee to bilateral lower legs. Unna Boot(s) were applied per  Guidelines.     Response to treatment: Well tolerated by patient      Electronically signed by Denton Carrillo RN on 9/9/2021 at 8:52 PM

## 2021-09-10 NOTE — DISCHARGE INSTRUCTIONS
[address]  [address], [address]  Phone: [phone]  Fax: [phone]    Patient: Justine Houser MRN: [id number]  SSN: [id number]    YOB: 1935  Age: 81 y.o. Sex: female       Return Appointment: 1 week with Kem Corbett MD    Instructions: Right and Left Lower Leg Wounds Resolved:  Remove Bilateral Zinc Unna Boot. Cleanse lower legs. Apply Bilateral Zinc Unna Boot with wound and lower extremity assessment. If there is any problem with the dressing (too tight, slides down,, etc.)  Patient to return to clinic to have re-wrapped by clinician. (IF POSSIBLE, PLEASE USE THE ZINC COFLEX WRAPS)  Take care when removing dressing not to tear the skin. Dressing change 1x weekly. Next appointment at wound clinic in 1 week. [facility] 73 x 1 week     Order [facility] for both lower legs. Elevate lower legs when at rest.  Diabetic Diet. Low Salt Diet. Should you experience increased redness, swelling, pain, foul odor, size of wound(s), or have a temperature over 101 degrees please contact the [address] [place] [address] Road at [phone] or if after hours contact your primary care physician or go to the hospital emergency department.     Signed By: Maikol Bailey RN     September 9, 2021

## 2021-10-01 ENCOUNTER — TELEPHONE (OUTPATIENT)
Dept: WOUND CARE | Age: 86
End: 2021-10-01

## 2021-10-01 NOTE — TELEPHONE ENCOUNTER
Patient calls today with concerns that [employer] supplier is asking for payment for recently order farrow wraps. I explained to Mrs. Anita Cartwright that insurance will not cover the wraps as she does not have any open wounds. Patient states she cannot afford the wraps. Instructed patient to bring her compression stockings to her next appointment to discuss other options with Dr. Aarti Spencer.

## 2021-10-07 ENCOUNTER — TELEPHONE (OUTPATIENT)
Dept: WOUND CARE | Age: 86
End: 2021-10-07

## 2021-10-07 ENCOUNTER — HOSPITAL ENCOUNTER (OUTPATIENT)
Dept: WOUND CARE | Age: 86
Discharge: HOME | End: 2021-10-07
Attending: SURGERY
Payer: MEDICARE

## 2021-10-07 VITALS
BODY MASS INDEX: 40.98 KG/M2 | DIASTOLIC BLOOD PRESSURE: 89 MMHG | WEIGHT: 246 LBS | TEMPERATURE: 97.2 F | SYSTOLIC BLOOD PRESSURE: 191 MMHG | HEIGHT: 65 IN | HEART RATE: 72 BPM

## 2021-10-07 DIAGNOSIS — I73.9 PERIPHERAL VASCULAR DISEASE (HCC): Chronic | ICD-10-CM

## 2021-10-07 DIAGNOSIS — I87.333 IDIOPATHIC CHRONIC VENOUS HTN OF BOTH LEGS WITH ULCER AND INFLAMMATION (HCC): ICD-10-CM

## 2021-10-07 DIAGNOSIS — E11.622 CONTROLLED TYPE 2 DIABETES MELLITUS WITH OTHER SKIN ULCER, WITHOUT LONG-TERM CURRENT USE OF INSULIN (HCC): ICD-10-CM

## 2021-10-07 DIAGNOSIS — E66.01 MORBID OBESITY (HCC): Chronic | ICD-10-CM

## 2021-10-07 DIAGNOSIS — I89.0 LYMPHEDEMA: ICD-10-CM

## 2021-10-07 DIAGNOSIS — L97.929 IDIOPATHIC CHRONIC VENOUS HTN OF BOTH LEGS WITH ULCER AND INFLAMMATION (HCC): ICD-10-CM

## 2021-10-07 DIAGNOSIS — I87.2 CHRONIC VENOUS INSUFFICIENCY: ICD-10-CM

## 2021-10-07 DIAGNOSIS — L97.919 IDIOPATHIC CHRONIC VENOUS HTN OF BOTH LEGS WITH ULCER AND INFLAMMATION (HCC): ICD-10-CM

## 2021-10-07 DIAGNOSIS — L97.221 NON-PRESSURE CHRONIC ULCER OF LEFT CALF, LIMITED TO BREAKDOWN OF SKIN (HCC): ICD-10-CM

## 2021-10-07 PROCEDURE — 99214 OFFICE O/P EST MOD 30 MIN: CPT

## 2021-10-07 PROCEDURE — 99214 OFFICE O/P EST MOD 30 MIN: CPT | Performed by: SURGERY

## 2021-10-07 NOTE — WOUND CARE
[address]  [address], [address]  Phone: [phone]  Fax: [phone]    Patient: Óscar Castro MRN: [id number]  SSN: [id number]    YOB: 1935  Age: 81 y.o. Sex: female       Return Appointment: discharge from wound center with Catrachito Gillis MD    Instructions: BLE:  No open wounds, wear compression socks daily  Remove before showering, patient and family requests help from home health for weekly removal of compression socks if able  Discharge from wound center    Should you experience increased redness, swelling, pain, foul odor, size of wound(s), or have a temperature over 101 degrees please contact the [address] [place] [address] Road at [phone] or if after hours contact your primary care physician or go to the hospital emergency department.     Signed By: Manny Jordan RN     October 7, 2021

## 2021-10-07 NOTE — PROGRESS NOTES
[address]  Consult Note/H&P/Progress Note      [address] [place] RECORD NUMBER:  [id number]  AGE: 81 y.o. RACE WHITE/NON-  GENDER: female  : 1935  EPISODE DATE:  10/7/2021       Subjective:      CC (Chief Complaint) and HISTORY of PRESENT ILLNESS (HPI):    Chava Green is a 81 y.o. female who presents today for wound/ulcer evaluation. I saw her for first visit on 21    She was cared for by Dr. Vanna Desouza previously. This information was obtained from the patient  The following HPI elements were documented for the patient's wound:  Location: Bilateral lower extremity venous ulcers  Duration: In a years  Severity: BMI greater than 37;  age 81  Context: Somewhat palliative care; +DM  Modifying Factors:  Nothing makes better or worse  Quality:    Timing:     Associated Signs and Symptoms: No fevers    Ulcer Identification:  Ulcer Type: venous    Contributing Factors: lymphedema and obesity and DM    Below are notes from Dr. Vanna Desouza    3/11/2018 No new issues. Urinary soiling continues to be improved. Left 2nd toe stable. Getting [facility] for wraps  2019 Continues to do well with dressings but still with substantial drainage. No further UTI issues. 2019 No new issues. Tolerating dressings. No further soiling.   2019 Doing well. No new issues. R leg wound not improving. 2019 Returns for re-eval. Bx proven [place]. Chest lesion benign. 2019 S/P excision leg lesion and SG margins negative. 10/11/2019 Tolerating wraps. No new issues. Pain controlled. Minimal soiling. 2019 One month since last seen. No new issues. . No tunnelling. 1/10/2020 Returns after being discharged with new opening. Seen today with daughter. No changes in heart or CHF status. Has had some increased swelling. Has had trouble with LE wraps. 2020 Doing better since last visit and re-admit.  Keeping elevated. No new breakdown. Cardiac and renal status stable. 2/14/2020 No new issues. Swelling stable. No recent CHF or other deterioration. Tolerating Unna without deterioration. 3/13/2020 Continued swelling but now bullae. Did not bring wraps today. Some issues at groin with candida. 4/17/2020 Seen via telehealth today. Increased swelling in legs. Has been trying to use faro wraps but worsening despite. Candida better. Blood sugars have been relatively well controlled. 9/25/2020 Has been seen by Dr Isidra Porter last several times. Wound continue to recur when she is switched from [employer]. Currently having some increased CHF and cards managing diuretic.   11/20/2020 No new issues. Tolerating wraps. Minimal pain. No new breakdown. CHF is stable. 1/8/2021 Has not been seen since November but [facility] has been performing dressings 3x a week. Notes some increase in swelling. No new chest pain or SOB. Diuretic has been reduced due to kidney issues. 7/2/2021 Has not been seen for several months but has not had any significant deteriorations in health or hospitalizations. New issues with some loss of left great toenail. New skin tear on the left side.        additional hx and interval changes  10/7/21 office visit      5/24/17 non-invasive arterial duplex                    PAST MEDICAL HISTORY  Past Medical History:   Diagnosis Date    Acute hyponatremia 1/21/2011    Acute on chronic diastolic congestive heart failure ([address] 1/19/2011    Acute renal failure ([address] 1/21/2011    AMI (acute myocardial infarction) ([address] 2009    Anemia 9/5/2012    Arthritis     Asthma     Asthma exacerbation 11/11/2011    CAD (coronary artery disease)     MI 2009, stents heart 2009    CAD (coronary artery disease), native coronary artery 9/2/2009    Previous stent to dRCA with Cypher 3.5x33mm SABA 8/09 9/09 LAD- Xience 2.5x23mm and 3.0x18mm SABA in mid-distal LAD      Cellulitis Bilateral Lower Extremities 1/20/2011    Chest discomfort, tightness, pressure 1/28/2016    Chronic diastolic heart failure ([address] 10/20/2011    Chronic venous insufficiency 10/24/2011    Diabetes ([address]     checks QD, normal 200, no hyposymptoms     Diabetes Mellitus Type II-insulin dependent 9/2/2009    Diverticulitis 1/28/2016    Dyslipidemia 9/2/2009    Edema 1/28/2016    GERD (gastroesophageal reflux disease)     GLAUCOMA     BILATERAL    Heart failure ([address]     Hypertension     Hypokalemia 1/28/2016    Hypoxemia 9/7/2012    Morbid obesity ([address]     Nausea & vomiting     Neuropathy in diabetes ([address] 9/2/2009    NSTEMI (non-ST elevation myocardial infarction) ([address] 9/2/2009    Dr Finesse Herrera    Obstructive sleep apnea (adult) (pediatric)- probably  11/11/2011    TRISH (obstructive sleep apnea) 9/7/2012    Intolerant of CPAP     Other dyspnea and respiratory abnormality 11/10/2011    Other ill-defined conditions(799.89)     benign tumor in lower abdomen- not removed, diabetic ulcers, edema, neuropathy    Peripheral vascular disease ([address] 10/20/2011    Post PTCA 6/20/2014    PVD (peripheral vascular disease) ([address]     Unspecified anemia 1/23/2011    Unspecified sleep apnea     oxygen at night    Venous stasis of lower extremity 2010    BILATERAL W RECURRENT ADMISSIONS    Volume overload 9/6/2012        PAST SURGICAL HISTORY  Past Surgical History:   Procedure Laterality Date    HX CHOLECYSTECTOMY      HX COLONOSCOPY      HX GI      [place] BREAST SURGERY PROCEDURE UNLISTED  1973    benign tumor left breast    TN CARDIAC SURG PROCEDURE UNLIST      4 stents most recent 2 yrs ago    [facility] ADDL VESSEL      stentsx3       FAMILY HISTORY  Family History   Problem Relation Age of Onset    Cancer Father     Lung Disease Father     Cancer Brother     Diabetes Paternal Aunt     Hypertension Maternal Grandmother     Hypertension Paternal Grandmother        SOCIAL HISTORY  Social History     Tobacco Use    Smoking status: Never Smoker    Smokeless tobacco: Never Used   Substance Use Topics    Alcohol use: No    Drug use: No       ALLERGIES  Allergies   Allergen Reactions    Pcn [Penicillins] Hives, Swelling and Myalgia     Can take cephalosporins    Bactrim [Sulfamethoprim] Other (comments)     \"potassium acted up\"    Codeine Nausea Only    Dilaudid [Hydromorphone (Bulk)] Other (comments)    Lortab [Hydrocodone-Acetaminophen] Nausea Only       MEDICATIONS  Current Outpatient Medications on File Prior to Encounter   Medication Sig Dispense Refill    gabapentin (Neurontin) 100 mg capsule Take 200 mg by mouth three (3) times daily.  bumetanide (BUMEX) 2 mg tablet Take 1 Tab by mouth daily. 90 Tab 3    ergocalciferol (Vitamin D2) 1,250 mcg (50,000 unit) capsule Take 1 Cap by mouth every seven (7) days. Takes on wed once a month 13 Cap 5    nitroglycerin (NITROLINGUAL) 400 mcg/spray spray 1 Spray by SubLINGual route every five (5) minutes as needed for Chest Pain. 1 Bottle 3    clopidogreL (PLAVIX) 75 mg tab Take 1 Tab by mouth daily. 90 Tab 3    atorvastatin (LIPITOR) 80 mg tablet Take 1 Tab by mouth daily. 90 Tab 3    DULoxetine (CYMBALTA) 60 mg capsule Take 1 Cap by mouth daily. 90 Cap 3    metoprolol tartrate (LOPRESSOR) 50 mg tablet Take 1 Tab by mouth two (2) times a day. 180 Tab 3    dulaglutide (TRULICITY) 4.10 RW/4.0 mL sub-q pen 0.75 mg by SubCUTAneous route every seven (7) days.  metOLazone (ZAROXOLYN) 10 mg tablet Take 1 Tab by mouth daily. (Patient taking differently: Take 10 mg by mouth daily. Indications: as needed) 90 Tab 3    aspirin 81 mg chewable tablet Take 1 Tab by mouth daily. 90 Tab 3    insulin glargine (TOUJEO SOLOSTAR) 300 unit/mL (1.5 mL) inpn 220 Units by SubCUTAneous route daily.  acetaminophen (TYLENOL EXTRA STRENGTH) 500 mg tablet Take  by mouth every six (6) hours as needed for Pain.  insulin glulisine (APIDRA) 100 unit/mL injection 90 Units by SubCUTAneous route three (3) times daily.  nitroglycerin (NITROSTAT) 0.4 mg SL tablet 0.4 mg by SubLINGual route every five (5) minutes as needed.  albuterol (PROVENTIL, VENTOLIN) 90 mcg/Actuation inhaler Take 2 Puffs by inhalation every four (4) hours as needed for Wheezing. Dispense: (1) inhaler with no refills. 1 g 0     No current facility-administered medications on file prior to encounter. REVIEW OF SYSTEMS  The patient has no difficulty with chest pain or shortness of breath. No fever or chills. Comprehensive review of systems was otherwise unremarkable except as noted above. Objective:   Physical Exam:   Recent vitals (if inpt):  Patient Vitals for the past 24 hrs:   BP Temp Pulse Height Weight   10/07/21 1114 (!) 191/89 97.2 °F (36.2 °C) 72 5' 5\" (1.651 m) 246 lb (111.6 kg)       Visit Vitals  BP (!) 191/89 (BP 1 Location: Right upper arm, BP Patient Position: At rest)   Pulse 72   Temp 97.2 °F (36.2 °C)   Ht 5' 5\" (1.651 m)   Wt 246 lb (111.6 kg)   BMI 40.94 kg/m²     Wt Readings from Last 3 Encounters:   10/07/21 246 lb (111.6 kg)   09/09/21 246 lb (111.6 kg)   08/19/21 (P) 257 lb 3.2 oz (116.7 kg)     Constitutional: Alert, oriented, cooperative patient in no acute distress; appears stated age;  General appearance is within normal limits for wound care patient population. See the today's recorded vitals signs and constitutional data. Eyes: Pupils equal; Sclera are clear. EOMs intact; The eyes appear to track and move normally. The sclera are not injected. The conjunctive are clear. The eyelids are normal. There is no scleral icterus. ENMT: There are no obvious external ear, nose, lip or mouth lesions. Nares normal; Neck: Overall contour of the neck is normal with no obvious neck masses. Gross hearing is within normal limits. No obvious neck masses  Resp:  Breathing is non-labored; normal rate and effort; no audible wheezing. CV: RRR;  no JVD; No evidence of cyanosis of the upper extremities.  The extremities are perfused without embolic sign, splinter hemorrhages, or petechia. GI: soft and non-distended without acute abnormality noted. Musculoskeletal: unremarkable with normal function. No embolic signs or cyanosis. Neuro:  Oriented; moves all 4; no focal deficits  Psychiatric: Judgement and insight are within normal limits for the wound care population of patients. Patient is oriented to person, place, and time. Recent and remote memory are within normal limits. Mood and affect are within normal limits. Integumentary (Skin/Wounds)  See inspection of wound(s) below. There are no other skin areas of palpable concern. See wound center documentation including photos in the [address] Wound Template made part of this record by reference. Wound dimensions for 9/9/2021 are as listed in epic with nursing protocol entry made part of this record by reference. Nursing staff had not completed the entry as of 12:11 PM on 9/9/2021        Wounds:  Wound Leg Right (Active)   Wound Image   08/19/21 1142   Wound Etiology Venous 08/19/21 1142   Dressing Status Clean;Dry; Intact 08/19/21 1142   Cleansed Soap and water 08/19/21 1142   Wound Length (cm) 1.2 cm 08/19/21 1142   Wound Width (cm) 1.3 cm 08/19/21 1142   Wound Depth (cm) 0.1 cm 08/19/21 1142   Wound Surface Area (cm^2) 1.56 cm^2 08/19/21 1142   Wound Volume (cm^3) 0.156 cm^3 08/19/21 1142   Wound Assessment Epithelialization;Granulation tissue 08/19/21 1142   Drainage Amount Moderate 08/19/21 1142   Drainage Description Serosanguinous 08/19/21 1142   Wound Odor None 08/19/21 1142   [place]-Wound/Incision Assessment Edematous 08/19/21 1142   Wound Thickness Description Full thickness 08/19/21 1142   Number of days: 772       Wound Leg lower Left;Medial (Active)   Wound Image   08/19/21 1142   Wound Etiology Venous 08/19/21 1142   Dressing Status Clean;Dry; Intact 08/19/21 1142   Cleansed Soap and water 08/19/21 1142   Wound Length (cm) 0.3 cm 08/19/21 1142 Wound Width (cm) 0.3 cm 08/19/21 1142   Wound Depth (cm) 0.1 cm 08/19/21 1142   Wound Surface Area (cm^2) 0.09 cm^2 08/19/21 1142   Change in Wound Size % 99.52 08/19/21 1142   Wound Volume (cm^3) 0.009 cm^3 08/19/21 1142   Wound Healing % 100 08/19/21 1142   Wound Assessment Granulation tissue 08/19/21 1142   Drainage Amount Moderate 08/19/21 1142   Drainage Description Serosanguinous 08/19/21 1142   Wound Odor None 08/19/21 1142   [place]-Wound/Incision Assessment Blanchable erythema 07/30/21 1040   Edges Attached edges 07/02/21 1426   Wound Thickness Description Full thickness 08/19/21 1142   Number of days: 636       Wound Leg lower Left;Posterior (Active)   Wound Image   10/07/21 1128   Wound Etiology Venous 09/09/21 1110   Dressing Status Intact 10/07/21 1128   Cleansed Soap and water 10/07/21 1128   Dressing/Treatment Alginate with Ag 08/19/21 1142   Wound Length (cm) 0 cm 10/07/21 1128   Wound Width (cm) 0 cm 10/07/21 1128   Wound Depth (cm) 0 cm 10/07/21 1128   Wound Surface Area (cm^2) 0 cm^2 10/07/21 1128   Change in Wound Size % 100 10/07/21 1128   Wound Volume (cm^3) 0 cm^3 10/07/21 1128   Wound Healing % 100 10/07/21 1128   Wound Assessment Epithelialization 09/09/21 1110   Drainage Amount None 10/07/21 1128   Drainage Description Serosanguinous 08/19/21 1142   Wound Odor None 10/07/21 1128   [place]-Wound/Incision Assessment Intact 10/07/21 1128   Wound Thickness Description Full thickness 08/19/21 1142   Number of days: 69       Wound Pretibial Proximal;Right;Lateral #5right lateral lower leg 08/19/21 (Active)   Wound Image   10/07/21 1128   Wound Etiology Skin Tear 09/09/21 1110   Dressing Status Intact 10/07/21 1128   Cleansed Soap and water 10/07/21 1128   Dressing/Treatment Alginate with Ag 08/19/21 1142   Wound Length (cm) 0 cm 10/07/21 1128   Wound Width (cm) 0 cm 10/07/21 1128   Wound Depth (cm) 0 cm 10/07/21 1128   Wound Surface Area (cm^2) 0 cm^2 10/07/21 1128   Change in Wound Size % 100 10/07/21 1128   Wound Volume (cm^3) 0 cm^3 10/07/21 1128   Wound Healing % 100 10/07/21 1128   Wound Assessment Epithelialization 10/07/21 1128   Drainage Amount None 10/07/21 1128   Drainage Description Serosanguinous 08/19/21 1142   Wound Odor None 10/07/21 1128   [place]-Wound/Incision Assessment Intact 10/07/21 1128   Wound Thickness Description Full thickness 08/19/21 1142   Number of days: 49       [REMOVED] Wound Leg Medial;Left;Right (Removed)   Number of days: 1882       [REMOVED] Wound Leg Lower Left (Removed)   Dressing Status  Clean, dry, and intact 09/18/18 1407   Dressing Type  Unna boot 09/18/18 1407   Read-Only, Retired. Wound Length (cm) 0 cm 09/11/18 1601   Read-Only, Retired. Wound Width (cm) 0 cm 09/11/18 1601   Read-Only, Retired. Wound Depth (cm) 0 09/11/18 1601   Read-Only, Retired. Wound Surface area (cm^2) 0 cm^2 09/11/18 1601   Read-Only, Retired. Epithelialization (%) 100 09/11/18 1601   Read-Only, Retired. Drainage Amount None 09/11/18 1601   Wound Odor None 09/11/18 1601   Read-Only, Retired. Cleansing and Cleansing Agents Normal saline; Soap and water 09/11/18 1601   Read-Only, Retired. Dressing Type Applied Unna boot 09/11/18 1628   Read-Only, Retired. Procedure Tolerated Well 09/11/18 1628   Number of days: 32       [REMOVED] Wound Leg Lower Right (Removed)   Dressing Status  Clean, dry, and intact 01/17/20 1458   Dressing Type  Unna boot 10/05/18 1452   Read-Only, Retired. Non-Pressure Injury Full thickness (subcut/muscle) 11/08/19 1513   Read-Only, Retired. Wound Length (cm) 0.1 cm 01/17/20 1458   Read-Only, Retired. Wound Width (cm) 0.1 cm 01/17/20 1458   Read-Only, Retired. Wound Depth (cm) 0.1 01/17/20 1458   Read-Only, Retired. Wound Surface area (cm^2) 0.01 cm^2 01/17/20 1458   Read-Only, Retired. Change in Wound Size % 96 01/17/20 1458   Read-Only, Retired. Condition of Base Pink 10/11/19 1516   Read-Only, Retired. Epithelialization (%) 50 01/10/20 1458   Read-Only, Retired. Tissue Type Red 08/17/18 1436   Read-Only, Retired. Tissue Type Percent Pink 50 01/10/20 1458   Read-Only, Retired. Tissue Type Percent Red 100 11/08/19 1513   Read-Only, Retired. Drainage Amount Scant 01/17/20 1458   Read-Only, Retired. Drainage Color Serous 01/17/20 1458   Wound Odor None 01/10/20 1458   Read-Only, Retired. Periwound Skin Condition Intact 01/10/20 1458   Read-Only, Retired. Cleansing and Cleansing Agents Normal saline 01/17/20 1458   Read-Only, Retired. Dressing Type Applied Xeroform;ABD pad;Unna boot 08/31/18 0901   Read-Only, Retired. Procedure Tolerated Well 11/08/19 1513   Number of days: 560       [REMOVED] Wound Toe Left (Removed)   Read-Only, Retired. Wound Length (cm) 0 cm 11/02/18 1104   Read-Only, Retired. Wound Width (cm) 0 cm 11/02/18 1104   Read-Only, Retired. Wound Depth (cm) 0 11/02/18 1104   Read-Only, Retired. Wound Surface area (cm^2) 0 cm^2 11/02/18 1104   Read-Only, Retired. Change in Wound Size % 100 11/02/18 1104   Read-Only, Retired. Tissue Type Percent Pink 100 10/19/18 1508   Read-Only, Retired. Drainage Amount None 11/02/18 1104   Read-Only, Retired. Drainage Color Serosanguinous 10/19/18 1508   Wound Odor None 11/02/18 1104   Read-Only, Retired. Periwound Skin Condition Intact 10/19/18 1508   Read-Only, Retired. Cleansing and Cleansing Agents Normal saline 11/02/18 1104   Read-Only, Retired. Procedure Tolerated Well 10/12/18 1623   Number of days: 21       [REMOVED] Wound Toe (specify in comments) Left;Plantar (Removed)   Dressing Status  Clean, dry, and intact 11/02/18 1104   Dressing Type  Adhesive wound dressing (Band-Aid) 11/02/18 1104   Read-Only, Retired. Non-Pressure Injury Partial thickness (epider/derm) 10/19/18 1508   Read-Only, Retired. Wound Length (cm) 0 cm 11/02/18 1104   Read-Only, Retired. Wound Width (cm) 0 cm 11/02/18 1104   Read-Only, Retired. Wound Depth (cm) 0 11/02/18 1104   Read-Only, Retired.  Wound Surface area (cm^2) 0 cm^2 11/02/18 1104 Read-Only, Retired. Change in Wound Size % 100 11/02/18 1104   Read-Only, Retired. Condition of Base [place] 10/19/18 1508   Read-Only, Retired. Drainage Amount None 11/02/18 1104   Wound Odor None 11/02/18 1104   Read-Only, Retired. Periwound Skin Condition Intact 10/19/18 1508   Read-Only, Retired. Cleansing and Cleansing Agents Normal saline 11/02/18 1104   Number of days: 14       [REMOVED] Wound Leg Lower Left;Medial (Removed)   Dressing Status  Clean, dry, and intact 03/01/19 1341   Read-Only, Retired. Wound Length (cm) 0 cm 03/01/19 1341   Read-Only, Retired. Wound Width (cm) 0 cm 03/01/19 1341   Read-Only, Retired. Wound Depth (cm) 0 03/01/19 1341   Read-Only, Retired. Wound Surface area (cm^2) 0 cm^2 03/01/19 1341   Read-Only, Retired. Change in Wound Size % 100 03/01/19 1341   Read-Only, Retired. Condition of Base Epithelializing 03/01/19 1341   Read-Only, Retired. Tissue Type Percent Pink 100 03/01/19 1341   Read-Only, Retired. Tissue Type Percent Red 100 01/18/19 1317   Read-Only, Retired. Drainage Amount None 03/01/19 1341   Read-Only, Retired. Drainage Color Serous 01/18/19 1317   Wound Odor None 03/01/19 1341   Read-Only, Retired. Periwound Skin Condition Intact 03/01/19 1341   Read-Only, Retired.  Cleansing and Cleansing Agents Soap and water 03/01/19 1341   Number of days: 42       [REMOVED] Wound Leg lower Left (Removed)   Dressing Status Clean, dry, and intact 08/23/19 1452   Wound Length (cm) 0 cm 08/23/19 1452   Wound Width (cm) 0 cm 08/23/19 1452   Wound Depth (cm) 0 cm 08/23/19 1452   Wound Volume (cm^3) 0 cm^3 08/23/19 1452   Drainage Amount None 08/23/19 1452   Drainage Description Serous 08/16/19 1427   Wound Odor None 08/23/19 1452   [place]-Wound/Incision Assessment Blanchable erythema 08/23/19 1452   Number of days: 14       [REMOVED] Wound Leg upper Right (Removed)   Dressing Status Clean, dry, and intact 09/13/19 1510   Dressing/Treatment Open to air 09/27/19 1406   Wound Length (cm) 0 cm 09/27/19 1406   Wound Width (cm) 0 cm 09/27/19 1406   Wound Depth (cm) 0 cm 09/27/19 1406   Wound Volume (cm^3) 0 cm^3 09/27/19 1406   Drainage Amount None 09/27/19 1406   Wound Odor None 09/27/19 1406   [place]-Wound/Incision Assessment Intact 09/27/19 1406   Number of days: 28       [REMOVED] Wound Toe (Comment  which one) Anterior left 2nd (Removed)   Dressing Status Clean, dry, and intact 09/25/20 1506   Dressing/Treatment Open to air 09/25/20 1506   Wound Length (cm) 0 cm 09/25/20 1506   Wound Width (cm) 0 cm 09/25/20 1506   Wound Depth (cm) 0 cm 09/25/20 1506   Wound Surface Area (cm^2) 0 cm^2 09/25/20 1506   Change in Wound Size % 100 09/25/20 1506   Wound Volume (cm^3) 0 cm^3 09/25/20 1506   Drainage Amount None 09/25/20 1506   Drainage Description Serous 08/10/20 1329   Wound Odor None 09/25/20 1506   [place]-Wound/Incision Assessment Dry 08/17/20 1328   Wound Thickness Description Partial thickness 08/24/20 1459   Number of days: 46       [REMOVED] Wound Toe (Comment  which one) Left;Lateral Great toe (Removed)   Wound Image   11/20/20 1321   Wound Etiology Diabetic Gloria 2 11/20/20 1321   Dressing Status Moist 09/25/20 1506   Dressing/Treatment Open to air 09/25/20 1506   Wound Length (cm) 0 cm 11/20/20 1321   Wound Width (cm) 0 cm 11/20/20 1321   Wound Depth (cm) 0 cm 11/20/20 1321   Wound Surface Area (cm^2) 0 cm^2 11/20/20 1321   Change in Wound Size % 100 11/20/20 1321   Wound Volume (cm^3) 0 cm^3 11/20/20 1321   Wound Healing % 100 11/20/20 1321   Wound Assessment Epithelialization 11/20/20 1321   Drainage Amount None 11/20/20 1321   Drainage Description Serosanguinous 09/25/20 1506   Wound Odor None 11/20/20 1321   [place]-Wound/Incision Assessment Blanchable erythema 11/20/20 1321   Wound Thickness Description Full thickness 09/25/20 1506   Number of days: 105       [REMOVED] Wound Toe (Comment  which one) Left;Dorsal GREAT (Removed)   Wound Image   07/30/21 1040   Wound Etiology Diabetic Gloria 2 07/30/21 1040   Dressing Status Clean;Dry 07/30/21 1040   Cleansed Soap and water 07/30/21 1040   Dressing/Treatment Open to air 07/30/21 1040   Offloading for Diabetic Foot Ulcers Diabetic shoes/inserts 07/30/21 1040   Wound Length (cm) 0 cm 07/30/21 1040   Wound Width (cm) 0 cm 07/30/21 1040   Wound Depth (cm) 0 cm 07/30/21 1040   Wound Surface Area (cm^2) 0 cm^2 07/30/21 1040   Change in Wound Size % 100 07/30/21 1040   Wound Volume (cm^3) 0 cm^3 07/30/21 1040   Wound Healing % 100 07/30/21 1040   Wound Assessment Epithelialization 07/30/21 1040   Drainage Amount None 07/30/21 1040   Drainage Description Thick; Serous 07/02/21 1426   Wound Odor None 07/30/21 1040   [place]-Wound/Incision Assessment Edematous 07/02/21 1426   Edges Attached edges 07/02/21 1426   Wound Thickness Description Full thickness 07/02/21 1426   Number of days: 28                                                            Amount and/or Complexity of Data Reviewed and Analyzed:  I reviewed and analyzed all of the unique labs and radiologic studies that are shown below as well as any that are in the HPI, and any that are in the expanded problem list below  *Each unique test, order, or document contributes to the combination of 2 or combination of 3 in Category 1 below. I also independently reviewed radiology images for studies I described above in the HPI or studies I have ordered. For this visit I also reviewed old records and prior notes. No results for input(s): WBC, HGB, PLT, NA, K, CL, CO2, BUN, CREA, GLU, PTP, INR, APTT, TBIL, TBILI, CBIL, ALT, AP, AML, AML, LPSE, LCAD, NH4, TROPT, TROIQ, PCO2, PO2, HCO3, HGBEXT, PLTEXT, INREXT, HGBEXT, PLTEXT, INREXT in the last 72 hours. No lab exists for component: SGOT, GPT,  PH  No results for input(s): PTP, INR, APTT, TBIL, TBILI, CBIL, ALT, AP, AML, LPSE, INREXT, INREXT in the last 72 hours.     No lab exists for component: SGOT, GPT    Most recent blood counts (CBC) review  Lab Results   Component Value Date/Time    WBC 7.4 08/17/2020 02:55 PM    HGB 10.7 (L) 08/17/2020 02:55 PM    HCT 37.3 08/17/2020 02:55 PM    HEMATOCRIT 36 11/10/2011 02:55 PM    PLATELET 876 07/62/6031 02:55 PM    MCV 94.7 08/17/2020 02:55 PM     Most recent chemistry (BMP) test review   Lab Results   Component Value Date/Time    Sodium 140 08/17/2020 02:55 PM    Potassium 4.6 08/17/2020 02:55 PM    Chloride 106 08/17/2020 02:55 PM    CO2 27 08/17/2020 02:55 PM    Anion gap 7 08/17/2020 02:55 PM    Glucose 244 (H) 08/17/2020 02:55 PM    BUN 19 08/17/2020 02:55 PM    Creatinine 1.01 (H) 08/17/2020 02:55 PM    GFR est AA >60 08/17/2020 02:55 PM    GFR est non-AA 56 (L) 08/17/2020 02:55 PM    Calcium 9.6 08/17/2020 02:55 PM     Most recent Liver enzyme test review  Lab Results   Component Value Date/Time    ALT (SGPT) 19 07/09/2014 08:45 PM    AST (SGOT) 13 (L) 07/09/2014 08:45 PM    Alk.  phosphatase 139 (H) 07/09/2014 08:45 PM    Bilirubin, total 0.7 07/09/2014 08:45 PM     Most recent coagulation (coags) review  Lab Results   Component Value Date/Time    INR 1.0 06/20/2014 09:32 AM    INR 1.1 11/10/2011 02:25 PM    INR 1.0 01/19/2011 02:25 PM    Prothrombin time 10.7 06/20/2014 09:32 AM    Prothrombin time 11.6 11/10/2011 02:25 PM    Prothrombin time 10.2 01/19/2011 02:25 PM     Lab Results   Component Value Date/Time    INR 1.0 06/20/2014 09:32 AM    aPTT 26.9 11/10/2011 02:25 PM    Lipase 96 07/09/2014 08:45 PM    Troponin-I <0.05 11/10/2011 02:50 PM    Troponin-I, Qt. <0.04 (L) 04/26/2011 04:35 AM       Diabetic assessment  Lab Results   Component Value Date/Time    Hemoglobin A1c 6.9 (H) 07/30/2021 12:49 PM       Nutritional assessment screen to assess wound healing ability:  Lab Results   Component Value Date/Time    Protein, total 7.8 07/09/2014 08:45 PM    Albumin 3.3 07/09/2014 08:45 PM     Lab Results   Component Value Date/Time    Protein, total 7.8 07/09/2014 08:45 PM    Albumin 3.3 07/09/2014 08:45 PM       XR Results (most recent):  Results from [place] encounter on 09/08/14    XR CHEST PA LAT    Narrative  Two view chest x-ray. CLINICAL INDICATION: Cough. FINDINGS: Single AP view the chest compared to a similar exam dated 9/17/2012  shows no significant change in the cardiopulmonary structures with mild  prominence of the interstitial markings. No dense airspace consolidation noted. No pleural effusion or pneumothorax evident. The cardiac silhouette and  mediastinum are unremarkable. Impression  :  1. No acute cardiopulmonary mallet. 2. The stable, diffuse interstitial prominence may be related to chronic lung  disease. CT Results (most recent):  Results from Hospital Encounter encounter on 10/26/15    CT ABD PELV W CONT    Narrative  ABDOMINAL CT WITH CONTRAST: 10/26/2015  Indication:Left lower quadrant pain for 5 days  Comparison:July 9, 2014  100cc of optiray 350   was injected to aid in the detection of abdominal and  pelvic pathology. Findings: The lung bases are remarkable for a 3 mm calcified nodule in the left lung base  and an area of hypoventilation in the right lung base  . There are new scattered  low-density lesions throughout the hepatic parenchyma of which the largest is in  the medial segment of the left hepatic lobe measuring 51 mm. There is no mass  effect associated with these lesions. The patient is status post  cholecystectomy. The pancreas, aorta, and kidneys are unremarkable. There are a  few splenic calcifications. There is no significant adenopathy. There is  diverticula throughout the colon. Small bowel is unremarkable. There is a  fat-containing periumbilical hernia. Bony structures are intact. CT scan of pelvis with contrast:    Uterus, bladder, adnexal structures, soft tissues and bony structures are  unremarkable.     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 (HCC)    Lymphedema    Morbid obesity (HCC) (Chronic)    Non-pressure chronic ulcer of left calf, limited to breakdown of skin ([facility]    Peripheral vascular disease ([facility] (Chronic)          Problem List  Date Reviewed: 7/9/2018        Codes Class Noted    BMI 40.0-44.9, adult [facility]) ICD-10-CM: Z68.41  ICD-9-CM: V85.41  7/30/2021        Lymphedema ICD-10-CM: I89.0  ICD-9-CM: 457.1  7/30/2021        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 Normal RV. Mild/moderate AS. MG 15. PG 26. Mild MR/TR/AR             Essential hypertension ICD-10-CM: I10  ICD-9-CM: 401.9  5/2/2019        Non-pressure chronic ulcer of left calf, limited to breakdown of skin [facility]) ICD-10-CM: Q66.837  ICD-9-CM: 707.12  8/21/2018        Edema ICD-10-CM: R60.9  ICD-9-CM: 782.3  1/28/2016        Diverticulitis ICD-10-CM: K57.92  ICD-9-CM: 562.11  1/28/2016    Overview Signed 1/28/2016 12:10 PM by Hermes Hoffman     1. CT of abdomen (7/9/14): Evidence of uncomplicated diverticulitis at the junction of the descending and sigmoid colon. Extensive diverticular are seen with some pericolonic fat stranding.              TRISH (obstructive sleep apnea) (Chronic) ICD-10-CM: G47.33  ICD-9-CM: 327.23 9/7/2012    Overview Signed 9/7/2012 11:46 AM by Jessica Powell NP     Intolerant of CPAP             Obstructive sleep apnea (adult) (pediatric)- probably  ICD-10-CM: G47.33  ICD-9-CM: 327.23  11/11/2011        Chronic venous insufficiency ICD-10-CM: I87.2  ICD-9-CM: 459.81  10/24/2011        Chronic diastolic heart failure (HCC) (Chronic) ICD-10-CM: I50.32  ICD-9-CM: 428.32  10/20/2011    Overview Signed 1/28/2016 12:05 PM by Chikis Go     1. Echo (3/5/10): EF 55%. No wall motion abnormalities. Mild LVH. Mild diastolic dysfunction. Mild bi-atrial enlargement. Mild mitral regurgitation. RVSP 32.    2. Echo (4/26/11): Normal LV systolic function. EF 60%. Mild LVH. Mild diastolic dysfunction. Mild left atrial enlargement. Mild aortic regurgitation. Peripheral vascular disease ([address] (Chronic) ICD-10-CM: I73.9  ICD-9-CM: 443.9  10/20/2011    Overview Signed 1/28/2016 12:08 PM by Chikis Go     1. Lower extremity duplex (9/2/10): Right lower extremity duplex suggested moderate to severe reduction with multilevel disease. Stenotic SFA most pronounced distally. Right VIBHA 0.5. Left VIBHA suggests mild reduction VIBHA of 0.86. GERD (gastroesophageal reflux disease) (Chronic) ICD-10-CM: K21.9  ICD-9-CM: 530.81  10/20/2011        Anemia, unspecified ICD-10-CM: D64.9  ICD-9-CM: 285.9  1/23/2011        Acute hyponatremia ICD-10-CM: E87.1  ICD-9-CM: 276.1  1/21/2011        CAD (coronary artery disease), native coronary artery (Chronic) ICD-10-CM: I25.10  ICD-9-CM: 414.01  9/2/2009    Overview Addendum 6/11/2017  9:05 PM by Kelli Rice MD     1. NSTEMI (8/17/09):  EF 60%. PCI of dRCA with  Cypher 3.5x 33 mm SABA. Residual 80-90% LAD stenosis. 2.  Staged PCI of LAD (9/1/09): Xience 2.5x23mm and 3.0x18mm SABA in mid-distal LAD . Adjunctive POBA of D1 and D2.    3.  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 *          Plan:     Number and Complexity of Problems addressed and   Risks of complications and/or morbidity of management    Treatment Note please see attached Discharge Instructions  Any procedures done today in the wound center are documented in a separate note in connect care made part of this record by reference  Written patient dismissal instructions given to patient or POA. PVD  Older arterial study above  I ordered arterial duplex on 8/19/21  Not done as of October, 2021    BMI>42  Encourage weight loss to help with leg swelling  Chronic leg wounds    Lymphedema  Encourage elevation, salt avoidance, compression therapy, and weight loss  CHF management per cards. Bilat lower extremity venous ulcers/venous HTN  3 times a week multilayer compression dressings   The inner layer is zinc oxide    She has home health to help with this     On 9/9/2020 when she reported she had no venous stasis stockings  We gave her instructions for getting knee-high low compression Velcro venous stasis stockings and hopefully she will return with them next week. As of 10/7/2021 her venous leg ulcers had completely healed. She could not afford the [employer]. She purchased knee-high low compression venous stasis stockings. I encouraged her to wear these daily and to return to see us if she developed recurrent ulcers. Diabetes mellitus  HgbA1c was >8 in 2011 7/30/2021 HgbA1c 6.9  Encourage close follow-up with primary care, strict diabetic diet, and strict adherence to diabetic medical regimen.           Wound Care  No orders of the defined types were placed in this encounter. Follow-up Information    None                 Level of MDM (2/3 elements below)  Number and Complexity of Problems Addressed Amount and/or Complexity of Data to be Reviewed and Analyzed  *Each unique test, order, or document contributes to the combination of 2 or combination of 3 in Category 1 below.  Risk of Complications and/or Morbidity or Mortality of pt Management     78752  52283 SF Minimal  1self-limited or minor problem Minimal or none Minimal risk of morbidity from additional diagnostic testing or Rx   21426  75679 Low Low  2or more self-limited or minor problems;    or  1stable chronic illness;    or  5WBKCO, uncomplicated illness or injury   Limited  (Must meet the requirements of at least 1 of the 2 categories)  Category 1: Tests and documents   Any combination of 2 from the following:  Review of prior external note(s) from each unique source*;  review of the result(s) of each unique test*;   ordering of each unique test*    or   Category 2: Assessment requiring an independent historian(s)  (For the categories of independent interpretation of tests and discussion of management or test interpretation, see moderate or high) Low risk of morbidity from additional diagnostic testing or treatment     54159  27540 Mod Moderate  1or more chronic illnesses with exacerbation, progression, or side effects of treatment;    or  2or more stable chronic illnesses;    or  1undiagnosed new problem with uncertain prognosis;    or  1acute illness with systemic symptoms;    or  1GIGGK complicated injury   Moderate  (Must meet the requirements of at least 1 out of 3 categories)  Category 1: Tests, documents, or independent historian(s)  Any combination of 3 from the following:   Review of prior external note(s) from each unique source*;  Review of the result(s) of each unique test*;  Ordering of each unique test*;  Assessment requiring an independent historian(s)    or  Category 2: Independent interpretation of tests   Independent interpretation of a test performed by another physician/other qualified health care professional (not separately reported);     or  Category 3: Discussion of management or test interpretation  Discussion of management or test interpretation with external physician/other qualified health care professional/appropriate source (not separately reported)   Moderate risk of morbidity from additional diagnostic testing or treatment  Examples only:  Prescription drug management   Decision regarding minor surgery with identified patient or procedure risk factors  Decision regarding elective major surgery without identified patient or procedure risk factors   Diagnosis or treatment significantly limited by social determinants of health       24379  39714 High High  1or more chronic illnesses with severe exacerbation, progression, or side effects of treatment;--chronic lower extremity venous hypertension with severe exacerbation to full-thickness ulcers    or  1 acute or chronic illness or injury that poses a threat to life or bodily function   Extensive  (Must meet the requirements of at least 2 out of 3 categories)  Category 1: Tests, documents, or independent historian(s)  Any combination of 3 from the following:   Review of prior external note(s) from each unique source*;  Review of the result(s) of each unique test*;   Ordering of each unique test*;   Assessment requiring an independent historian(s)    or   Category 2: Independent interpretation of tests   Independent interpretation of a test performed by another physician/other qualified health care professional (not separately reported);     or  Category 3: Discussion of management or test interpretation  Discussion of management or test interpretation with external physician/other qualified health care professional/appropriate source (not separately reported)   High risk of morbidity from additional diagnostic testing or treatment  Examples only:  Drug therapy requiring intensive monitoring for toxicity  Decision regarding elective major surgery with identified patient or procedure risk factors  Decision regarding emergency major surgery  Decision regarding hospitalization  Decision not to resuscitate or to de-escalate care because of poor prognosis                 I have personally performed a face-to-face diagnostic evaluation and management  service on this patient. I have independently seen the patient. I have independently obtained the above history from the patient/family. I have independently examined the patient with above findings. I have independently reviewed data/labs for this patient and developed the above plan of care (MDM).   Electronically signed by Alan Ramirez MD on 10/7/2021 at 10:58 AM

## 2021-10-07 NOTE — WOUND CARE
10/07/21 1128   Wound Pretibial Proximal;Right;Lateral #5right lateral lower leg 08/19/21   Date First Assessed/Time First Assessed: 08/19/21 1142   Present on Hospital Admission: Yes  Wound Approximate Age at First Assessment (Weeks): 2 weeks  Primary Wound Type: Skin Tear  Location: Pretibial  Wound Location Orientation: Proximal;Right;Lat. ..    Wound Image    Dressing Status Intact   Cleansed Soap and water   Wound Length (cm) 0 cm   Wound Width (cm) 0 cm   Wound Depth (cm) 0 cm   Wound Surface Area (cm^2) 0 cm^2   Change in Wound Size % 100   Wound Volume (cm^3) 0 cm^3   Wound Healing % 100   Wound Assessment Epithelialization   Drainage Amount None   Wound Odor None   Cydney-Wound/Incision Assessment Intact   Wound Leg lower Left;Posterior   Date First Assessed/Time First Assessed: 07/30/21 1039   Present on Hospital Admission: Yes  Primary Wound Type: Venous Ulcer  Location: Leg lower  Wound Location Orientation: Left;Posterior   Wound Image    Dressing Status Intact   Cleansed Soap and water   Wound Length (cm) 0 cm   Wound Width (cm) 0 cm   Wound Depth (cm) 0 cm   Wound Surface Area (cm^2) 0 cm^2   Change in Wound Size % 100   Wound Volume (cm^3) 0 cm^3   Wound Healing % 100   Drainage Amount None   Wound Odor None   Cydney-Wound/Incision Assessment Intact     Patient is currently taking aspirin and plavix daily

## 2021-10-07 NOTE — DISCHARGE INSTRUCTIONS
[address]  [address], [address]   Phone: [phone]  Fax: [phone]    Patient: Nisha Chapin MRN: [id number]  SSN: [id number]    YOB: 1935  Age: 81 y.o. Sex: female       Return Appointment: discharge from wound center with Elbert Condon MD    Instructions: BLE:  No open wounds, wear compression socks daily  Remove before showering, patient and family requests help from home health for weekly removal of compression socks if able  Discharge from wound center    Should you experience increased redness, swelling, pain, foul odor, size of wound(s), or have a temperature over 101 degrees please contact the [address] [place] [address] Road at [phone] or if after hours contact your primary care physician or go to the hospital emergency department.     Signed By: Piero Nur RN     October 7, 2021

## 2021-10-07 NOTE — TELEPHONE ENCOUNTER
Pt called wanting to ask questions about her farrow wraps. Explained to patient at visit today, insurance will not pay for wraps if you do not have wounds, patient was given opportunity to purchase out of pocket and states she cannot afford. No wounds present at today's visit. Compression wraps were applied at today's visit, attempted to call patient back 3 different times. Each time line was busy.

## 2021-12-13 PROBLEM — I48.11 LONGSTANDING PERSISTENT ATRIAL FIBRILLATION (HCC): Status: ACTIVE | Noted: 2021-12-13

## 2022-02-13 ENCOUNTER — TELEPHONE (OUTPATIENT)
Dept: CARDIOLOGY | Age: 87
End: 2022-02-13

## 2022-02-14 NOTE — TELEPHONE ENCOUNTER
2230 -- Pt called and states that she may have taken an extra Eliquis. Reports usually takes med at 0800/2000. States she had company over to watch the [employer] and wasn't sure if she took her 2000 dose. So she took another tab at 2215. States she then got worried and called service. Discussed that if she did happen to take an extra dose would monitor for s/s increased bleeding. Otherwise would continue dosing on schedule. Pt v/u.      Kedar Morales, NP-C

## 2022-03-18 PROBLEM — I89.0 LYMPHEDEMA: Status: ACTIVE | Noted: 2021-07-30

## 2022-03-18 PROBLEM — L97.929 IDIOPATHIC CHRONIC VENOUS HTN OF BOTH LEGS WITH ULCER AND INFLAMMATION (HCC): Status: ACTIVE | Noted: 2021-07-30

## 2022-03-18 PROBLEM — I87.333 IDIOPATHIC CHRONIC VENOUS HTN OF BOTH LEGS WITH ULCER AND INFLAMMATION (HCC): Status: ACTIVE | Noted: 2021-07-30

## 2022-03-18 PROBLEM — L97.919 IDIOPATHIC CHRONIC VENOUS HTN OF BOTH LEGS WITH ULCER AND INFLAMMATION (HCC): Status: ACTIVE | Noted: 2021-07-30

## 2022-03-19 PROBLEM — E11.628 CONTROLLED TYPE 2 DIABETES MELLITUS WITH SKIN COMPLICATION, WITHOUT LONG-TERM CURRENT USE OF INSULIN (HCC): Status: ACTIVE | Noted: 2021-07-30

## 2022-03-19 PROBLEM — L97.221 NON-PRESSURE CHRONIC ULCER OF LEFT CALF, LIMITED TO BREAKDOWN OF SKIN (HCC): Status: ACTIVE | Noted: 2018-08-21

## 2022-03-19 PROBLEM — I48.11 LONGSTANDING PERSISTENT ATRIAL FIBRILLATION (HCC): Status: ACTIVE | Noted: 2021-12-13

## 2022-03-19 PROBLEM — I35.0 NONRHEUMATIC AORTIC VALVE STENOSIS: Status: ACTIVE | Noted: 2019-05-02

## 2022-03-19 PROBLEM — I10 ESSENTIAL HYPERTENSION: Status: ACTIVE | Noted: 2019-05-02

## 2022-12-09 ENCOUNTER — TELEPHONE (OUTPATIENT)
Dept: CARDIOLOGY CLINIC | Age: 87
End: 2022-12-09

## 2022-12-09 NOTE — TELEPHONE ENCOUNTER
Received Patient Assistance for Eliquis.  Awaiting for Dr. Sergey Heredia to sign, then will fax//brendab

## 2022-12-12 ENCOUNTER — TELEPHONE (OUTPATIENT)
Dept: CARDIOLOGY CLINIC | Age: 87
End: 2022-12-12

## 2022-12-12 NOTE — TELEPHONE ENCOUNTER
Patient called stating that her oxygen was 84-88 without oxygen and 98% with oxygen. Patient states that her oxygen and SOB did not get worse until she started the Eliquis. Patient is wanting to know if the Eliquis could have caused the decrease in her oxygen. Patient informed that DR. Tijerina will be asked and call back with doctors response.  Patient voiced understanding//fredyab

## 2022-12-12 NOTE — TELEPHONE ENCOUNTER
Eliquis will not increase her oxygen requirements.   She may need to discuss with her pulmonologist.

## 2022-12-12 NOTE — TELEPHONE ENCOUNTER
Pt states that she has to wear oxygen 24 hours a day since taking eliquis. Pt states that she can barely breath. Pt has questions she would like to ask Dr. Angle Tolentino, they are important.

## 2022-12-13 ENCOUNTER — TELEPHONE (OUTPATIENT)
Dept: CARDIOLOGY CLINIC | Age: 87
End: 2022-12-13

## 2022-12-13 NOTE — TELEPHONE ENCOUNTER
Spoke to pt's daughter. States pt has been acting off since starting eliquis a year ago. Currently, pt's daughter states pt lethargic, sleeps a lot, and has episodes of hallucinations. States DAVON hameed is pt's PCP and they told her pt was anemic. [facility] NP wants pt to hold her eliquis. They will be testing for occult blood in her stool tomorrow. Scheduled appt on 12/19 with Dr. Ruiz Dover at 12/19 in [address] [place] [address] office to discuss plan of care and med management. Call placed to Eugenio Quintero requesting last 2 visit notes and most recent labs.

## 2022-12-13 NOTE — TELEPHONE ENCOUNTER
MD Michael Reynaga RN  Caller: Unspecified (Today,  1:22 PM)  If her doctor wants her to hold then she should follow their advice. I cannot make the decision without seeing her or her labs. Spoke to pt's daughter. Made her aware of Dr. Serjio Goode response. Aware this RN has called attempting to get a copy of pt's labs. Verb understanding.

## 2022-12-13 NOTE — TELEPHONE ENCOUNTER
Patient called stating she visited her family doctor and the NP states the patient is anemic and has asked the patient to hold taking her Eliquis. Patient wants Dr Marshall Tang to give his opinion on holding her Eliquis. Patient states she is SOB and when not on oxygen here O2 level drops below 88%.  P

## 2022-12-19 ENCOUNTER — TELEPHONE (OUTPATIENT)
Dept: CARDIOLOGY CLINIC | Age: 87
End: 2022-12-19

## 2022-12-19 NOTE — TELEPHONE ENCOUNTER
Pt daughter called back states that a doctor said that maybe the side affects are from the eliquis. Pt daughter is wondering can she go back on Plavix. States that she hasn't been the same since taking the eliquis.

## 2022-12-19 NOTE — TELEPHONE ENCOUNTER
Triage informed Matilda she may request pt transfer to [facility] Attending physician will determine if pt stable for transfer and if there is a room available at [facility]. Matilda requests cancellation of appt today and for Dr. Ellie Contreras to be informed.

## 2022-12-19 NOTE — TELEPHONE ENCOUNTER
Patients daughter called stating her mother has been admitted to [facility]. She is experiencing SOB. Daughter states her condition hasn't improved over the last two days and she is panicky and feels like she is smothering. Patients' daughter would like direction on what should be done for her mother. Patient does have an appt with Dr Sherly Becker today at 10:00 AM that they cannot make.

## 2022-12-21 NOTE — TELEPHONE ENCOUNTER
Spoke to pt's daughter. Very upset that pt remains at [facility] despite requesting transfer to [facility]. Explained it is her right to request transfer and it is their responsibility to make that happen if there is a bed available. Explained to pt's daughter that we cannot address pt's eliquis while she is in the hospital, but that she needs to let pt's providers at [facility] know her concerns that pt might have an allergy to eliquis as her sx seem to appear right after pt takes eliquis every time. Verb understanding.

## 2022-12-21 NOTE — TELEPHONE ENCOUNTER
Attempted to call pt. No answer. Left message to call.  Noted pt remains in [facility]

## 2022-12-22 ENCOUNTER — TELEPHONE (OUTPATIENT)
Dept: CARDIOLOGY CLINIC | Age: 87
End: 2022-12-22

## 2022-12-22 NOTE — TELEPHONE ENCOUNTER
Dr Clint Espitia called to update Dr Tate Acuña on patient's condition. Would like a call when he returns to the office. Dr Clint Espitia informed I would relay message to Dr. Ttae Acuña. Dr. Clint Espitia thanked me//daerll

## 2022-12-22 NOTE — TELEPHONE ENCOUNTER
Called ROLY Grey, she states that her sister must have called. Matilda states that she thought things were taken care of yesterday. Matilda states that she is not at the hospital at this time that her sister is there and must have questions. Asked that I call her at [phone]. I called sister, no answer, left message on voice mail.  Called Matilda again to advise no answer, Matilda did not answer, left detailed voice mail and to call me back if needed//[email or web address]

## 2022-12-22 NOTE — TELEPHONE ENCOUNTER
Patient's daughter-Matilda has called once again about the patient. See Telephone notes yesterday 12/19/2022 through 12/21/2022. Note was closed yesterday by Vasu Padgett. Matilda is very worried about her mom. She called and ask to speak with Corbin Tello. The patient only seems to be getting worse. While in the hospital [facility] in [facility]) patient's leg got injured and required 17 stitches. Patient had continually told nurses and doctors she can't breath. They have since done a chest and her lungs are surrounded by fluid. The doctors told the daughter that the fluid can't be addressed until the C02-88 & Sodium 116 is corrected. So they put the patient on Bi-pap machine. Please call the daughter-Matilda to advise [phone].

## 2022-12-22 NOTE — TELEPHONE ENCOUNTER
Dr. Debi Bliss from [email or web address]s [facility] requesting to speak to triage nurse

## 2022-12-30 ENCOUNTER — TELEPHONE (OUTPATIENT)
Dept: CARDIOLOGY CLINIC | Age: 87
End: 2022-12-30

## 2022-12-30 NOTE — TELEPHONE ENCOUNTER
Ja is requesting to talk to Nash Irving again about patient being in [facility]. Please call when you can.

## 2022-12-30 NOTE — TELEPHONE ENCOUNTER
Daughter calling to confirm pt is on waiting list for transfer to [facility]. Confirmed transfer request has been initiated.

## 2023-01-02 ENCOUNTER — HOSPITAL ENCOUNTER (INPATIENT)
Age: 88
LOS: 2 days | Discharge: HOME HEALTH | DRG: 291 | End: 2023-01-04
Attending: INTERNAL MEDICINE | Admitting: INTERNAL MEDICINE
Payer: MEDICARE

## 2023-01-02 ENCOUNTER — APPOINTMENT (OUTPATIENT)
Dept: GENERAL RADIOLOGY | Age: 88
DRG: 291 | End: 2023-01-02
Attending: INTERNAL MEDICINE
Payer: MEDICARE

## 2023-01-02 DIAGNOSIS — I50.32 CHRONIC DIASTOLIC HEART FAILURE (HCC): Chronic | ICD-10-CM

## 2023-01-02 DIAGNOSIS — E87.1 HYPONATREMIA: ICD-10-CM

## 2023-01-02 DIAGNOSIS — E87.79 OTHER HYPERVOLEMIA: Primary | ICD-10-CM

## 2023-01-02 DIAGNOSIS — I27.20 PULMONARY HYPERTENSION (HCC): Chronic | ICD-10-CM

## 2023-01-02 PROBLEM — I48.11 LONGSTANDING PERSISTENT ATRIAL FIBRILLATION (HCC): Status: ACTIVE | Noted: 2021-12-13

## 2023-01-02 PROBLEM — L97.221 NON-PRESSURE CHRONIC ULCER OF LEFT CALF, LIMITED TO BREAKDOWN OF SKIN (HCC): Status: ACTIVE | Noted: 2018-08-21

## 2023-01-02 PROBLEM — I10 ESSENTIAL HYPERTENSION: Status: ACTIVE | Noted: 2019-05-02

## 2023-01-02 PROBLEM — I89.0 LYMPHEDEMA: Status: ACTIVE | Noted: 2021-07-30

## 2023-01-02 PROBLEM — J96.00 ACUTE RESPIRATORY FAILURE (HCC): Status: ACTIVE | Noted: 2023-01-02

## 2023-01-02 PROBLEM — I35.0 NONRHEUMATIC AORTIC VALVE STENOSIS: Status: ACTIVE | Noted: 2019-05-02

## 2023-01-02 LAB
ERYTHROCYTE [DISTWIDTH] IN BLOOD BY AUTOMATED COUNT: 19.7 % (ref 11.9–14.6)
GLUCOSE BLD STRIP.AUTO-MCNC: 122 MG/DL (ref 65–100)
HCT VFR BLD AUTO: 26.4 % (ref 35.8–46.3)
HGB BLD-MCNC: 7.9 G/DL (ref 11.7–15.4)
MCH RBC QN AUTO: 30.2 PG (ref 26.1–32.9)
MCHC RBC AUTO-ENTMCNC: 29.9 G/DL (ref 31.4–35)
MCV RBC AUTO: 100.8 FL (ref 82–102)
NRBC # BLD: 0 K/UL (ref 0–0.2)
PLATELET # BLD AUTO: 177 K/UL (ref 150–450)
PMV BLD AUTO: 9.7 FL (ref 9.4–12.3)
RBC # BLD AUTO: 2.62 M/UL (ref 4.05–5.2)
SERVICE CMNT-IMP: ABNORMAL
WBC # BLD AUTO: 7.4 K/UL (ref 4.3–11.1)

## 2023-01-02 PROCEDURE — 85027 COMPLETE CBC AUTOMATED: CPT

## 2023-01-02 PROCEDURE — 80048 BASIC METABOLIC PNL TOTAL CA: CPT

## 2023-01-02 PROCEDURE — 5A09357 ASSISTANCE WITH RESPIRATORY VENTILATION, LESS THAN 24 CONSECUTIVE HOURS, CONTINUOUS POSITIVE AIRWAY PRESSURE: ICD-10-PCS | Performed by: NURSE PRACTITIONER

## 2023-01-02 PROCEDURE — 2700000000 HC OXYGEN THERAPY PER DAY

## 2023-01-02 PROCEDURE — 36415 COLL VENOUS BLD VENIPUNCTURE: CPT

## 2023-01-02 PROCEDURE — 94760 N-INVAS EAR/PLS OXIMETRY 1: CPT

## 2023-01-02 PROCEDURE — 2580000003 HC RX 258: Performed by: NURSE PRACTITIONER

## 2023-01-02 PROCEDURE — 82962 GLUCOSE BLOOD TEST: CPT

## 2023-01-02 PROCEDURE — 83735 ASSAY OF MAGNESIUM: CPT

## 2023-01-02 PROCEDURE — 2100000000 HC CCU R&B

## 2023-01-02 PROCEDURE — 71045 X-RAY EXAM CHEST 1 VIEW: CPT

## 2023-01-02 PROCEDURE — 6370000000 HC RX 637 (ALT 250 FOR IP): Performed by: NURSE PRACTITIONER

## 2023-01-02 PROCEDURE — 94660 CPAP INITIATION&MGMT: CPT

## 2023-01-02 RX ORDER — INSULIN LISPRO 100 [IU]/ML
0-4 INJECTION, SOLUTION INTRAVENOUS; SUBCUTANEOUS NIGHTLY
Status: DISCONTINUED | OUTPATIENT
Start: 2023-01-02 | End: 2023-01-04 | Stop reason: HOSPADM

## 2023-01-02 RX ORDER — ASPIRIN 81 MG/1
81 TABLET, CHEWABLE ORAL DAILY
Status: DISCONTINUED | OUTPATIENT
Start: 2023-01-03 | End: 2023-01-04 | Stop reason: HOSPADM

## 2023-01-02 RX ORDER — ASPIRIN 81 MG/1
81 TABLET, CHEWABLE ORAL DAILY
COMMUNITY
Start: 2019-07-08

## 2023-01-02 RX ORDER — METOPROLOL SUCCINATE 25 MG/1
50 TABLET, EXTENDED RELEASE ORAL 2 TIMES DAILY
Status: DISCONTINUED | OUTPATIENT
Start: 2023-01-02 | End: 2023-01-03

## 2023-01-02 RX ORDER — BUMETANIDE 0.25 MG/ML
1 INJECTION, SOLUTION INTRAMUSCULAR; INTRAVENOUS EVERY 12 HOURS
Status: DISCONTINUED | OUTPATIENT
Start: 2023-01-03 | End: 2023-01-03

## 2023-01-02 RX ORDER — POLYETHYLENE GLYCOL 3350 17 G/17G
17 POWDER, FOR SOLUTION ORAL DAILY PRN
Status: DISCONTINUED | OUTPATIENT
Start: 2023-01-02 | End: 2023-01-04 | Stop reason: HOSPADM

## 2023-01-02 RX ORDER — BUSPIRONE HYDROCHLORIDE 5 MG/1
7.5 TABLET ORAL 2 TIMES DAILY
Status: DISCONTINUED | OUTPATIENT
Start: 2023-01-02 | End: 2023-01-04 | Stop reason: HOSPADM

## 2023-01-02 RX ORDER — ACETAMINOPHEN 650 MG/1
650 SUPPOSITORY RECTAL EVERY 6 HOURS PRN
Status: DISCONTINUED | OUTPATIENT
Start: 2023-01-02 | End: 2023-01-04 | Stop reason: HOSPADM

## 2023-01-02 RX ORDER — SODIUM CHLORIDE 0.9 % (FLUSH) 0.9 %
5-40 SYRINGE (ML) INJECTION PRN
Status: DISCONTINUED | OUTPATIENT
Start: 2023-01-02 | End: 2023-01-04 | Stop reason: HOSPADM

## 2023-01-02 RX ORDER — ONDANSETRON 2 MG/ML
4 INJECTION INTRAMUSCULAR; INTRAVENOUS EVERY 4 HOURS PRN
Status: DISCONTINUED | OUTPATIENT
Start: 2023-01-02 | End: 2023-01-04 | Stop reason: HOSPADM

## 2023-01-02 RX ORDER — ACETAMINOPHEN 325 MG/1
650 TABLET ORAL EVERY 6 HOURS PRN
Status: DISCONTINUED | OUTPATIENT
Start: 2023-01-02 | End: 2023-01-04 | Stop reason: SDUPTHER

## 2023-01-02 RX ORDER — ENOXAPARIN SODIUM 100 MG/ML
30 INJECTION SUBCUTANEOUS EVERY 12 HOURS
Status: DISCONTINUED | OUTPATIENT
Start: 2023-01-03 | End: 2023-01-04 | Stop reason: HOSPADM

## 2023-01-02 RX ORDER — GUAIFENESIN 600 MG/1
1200 TABLET, EXTENDED RELEASE ORAL 2 TIMES DAILY
Status: DISCONTINUED | OUTPATIENT
Start: 2023-01-02 | End: 2023-01-04 | Stop reason: HOSPADM

## 2023-01-02 RX ORDER — PROMETHAZINE HYDROCHLORIDE 25 MG/1
25 TABLET ORAL EVERY 6 HOURS PRN
Status: DISCONTINUED | OUTPATIENT
Start: 2023-01-02 | End: 2023-01-04 | Stop reason: HOSPADM

## 2023-01-02 RX ORDER — MAGNESIUM SULFATE IN WATER 40 MG/ML
2000 INJECTION, SOLUTION INTRAVENOUS PRN
Status: DISCONTINUED | OUTPATIENT
Start: 2023-01-02 | End: 2023-01-04 | Stop reason: HOSPADM

## 2023-01-02 RX ORDER — INSULIN LISPRO 100 [IU]/ML
0-4 INJECTION, SOLUTION INTRAVENOUS; SUBCUTANEOUS
Status: DISCONTINUED | OUTPATIENT
Start: 2023-01-03 | End: 2023-01-04 | Stop reason: HOSPADM

## 2023-01-02 RX ORDER — INSULIN GLARGINE 100 [IU]/ML
30 INJECTION, SOLUTION SUBCUTANEOUS DAILY
Status: DISCONTINUED | OUTPATIENT
Start: 2023-01-03 | End: 2023-01-04 | Stop reason: HOSPADM

## 2023-01-02 RX ORDER — SODIUM CHLORIDE 0.9 % (FLUSH) 0.9 %
5-40 SYRINGE (ML) INJECTION EVERY 12 HOURS SCHEDULED
Status: DISCONTINUED | OUTPATIENT
Start: 2023-01-02 | End: 2023-01-03

## 2023-01-02 RX ORDER — ATORVASTATIN CALCIUM 80 MG/1
80 TABLET, FILM COATED ORAL DAILY
Status: DISCONTINUED | OUTPATIENT
Start: 2023-01-03 | End: 2023-01-04 | Stop reason: HOSPADM

## 2023-01-02 RX ORDER — LANOLIN ALCOHOL/MO/W.PET/CERES
5 CREAM (GRAM) TOPICAL NIGHTLY PRN
Status: DISCONTINUED | OUTPATIENT
Start: 2023-01-03 | End: 2023-01-04 | Stop reason: HOSPADM

## 2023-01-02 RX ORDER — HYDROCODONE BITARTRATE AND ACETAMINOPHEN 5; 325 MG/1; MG/1
1 TABLET ORAL EVERY 6 HOURS PRN
Status: DISCONTINUED | OUTPATIENT
Start: 2023-01-02 | End: 2023-01-04 | Stop reason: HOSPADM

## 2023-01-02 RX ORDER — NITROGLYCERIN 0.4 MG/1
0.4 TABLET SUBLINGUAL EVERY 5 MIN PRN
Status: DISCONTINUED | OUTPATIENT
Start: 2023-01-02 | End: 2023-01-04 | Stop reason: HOSPADM

## 2023-01-02 RX ORDER — ACETAMINOPHEN 325 MG/1
650 TABLET ORAL EVERY 6 HOURS PRN
Status: DISCONTINUED | OUTPATIENT
Start: 2023-01-02 | End: 2023-01-04 | Stop reason: HOSPADM

## 2023-01-02 RX ORDER — POTASSIUM CHLORIDE 20 MEQ/1
40 TABLET, EXTENDED RELEASE ORAL PRN
Status: DISCONTINUED | OUTPATIENT
Start: 2023-01-02 | End: 2023-01-04 | Stop reason: HOSPADM

## 2023-01-02 RX ORDER — ATORVASTATIN CALCIUM 80 MG/1
80 TABLET, FILM COATED ORAL DAILY
Status: ON HOLD | COMMUNITY
Start: 2018-11-02 | End: 2023-01-02

## 2023-01-02 RX ORDER — POTASSIUM CHLORIDE 7.45 MG/ML
10 INJECTION INTRAVENOUS PRN
Status: DISCONTINUED | OUTPATIENT
Start: 2023-01-02 | End: 2023-01-04 | Stop reason: HOSPADM

## 2023-01-02 RX ADMIN — SODIUM CHLORIDE, PRESERVATIVE FREE 5 ML: 5 INJECTION INTRAVENOUS at 22:49

## 2023-01-02 RX ADMIN — BUSPIRONE HYDROCHLORIDE 7.5 MG: 5 TABLET ORAL at 22:56

## 2023-01-02 RX ADMIN — METOPROLOL SUCCINATE 50 MG: 25 TABLET, EXTENDED RELEASE ORAL at 22:44

## 2023-01-02 RX ADMIN — GUAIFENESIN 1200 MG: 600 TABLET ORAL at 22:44

## 2023-01-02 ASSESSMENT — ENCOUNTER SYMPTOMS
SHORTNESS OF BREATH: 1
EYES NEGATIVE: 1
BLOATING: 1
SPUTUM PRODUCTION: 1

## 2023-01-03 PROBLEM — I89.0 LYMPHEDEMA: Chronic | Status: ACTIVE | Noted: 2021-07-30

## 2023-01-03 PROBLEM — E87.70 VOLUME OVERLOAD: Status: ACTIVE | Noted: 2023-01-03

## 2023-01-03 PROBLEM — J96.10 CHRONIC RESPIRATORY FAILURE (HCC): Status: ACTIVE | Noted: 2023-01-03

## 2023-01-03 PROBLEM — I10 ESSENTIAL HYPERTENSION: Chronic | Status: ACTIVE | Noted: 2019-05-02

## 2023-01-03 PROBLEM — E87.1 HYPONATREMIA: Status: ACTIVE | Noted: 2023-01-03

## 2023-01-03 PROBLEM — L97.221 NON-PRESSURE CHRONIC ULCER OF LEFT CALF, LIMITED TO BREAKDOWN OF SKIN (HCC): Chronic | Status: ACTIVE | Noted: 2018-08-21

## 2023-01-03 PROBLEM — I48.11 LONGSTANDING PERSISTENT ATRIAL FIBRILLATION (HCC): Chronic | Status: ACTIVE | Noted: 2021-12-13

## 2023-01-03 PROBLEM — I27.20 PULMONARY HYPERTENSION (HCC): Status: ACTIVE | Noted: 2023-01-03

## 2023-01-03 PROBLEM — I27.20 PULMONARY HYPERTENSION (HCC): Chronic | Status: ACTIVE | Noted: 2023-01-03

## 2023-01-03 PROBLEM — J96.10 CHRONIC RESPIRATORY FAILURE (HCC): Chronic | Status: ACTIVE | Noted: 2023-01-03

## 2023-01-03 PROBLEM — I35.0 NONRHEUMATIC AORTIC VALVE STENOSIS: Chronic | Status: ACTIVE | Noted: 2019-05-02

## 2023-01-03 PROBLEM — Z91.89 SEDENTARY LIFESTYLE: Chronic | Status: ACTIVE | Noted: 2023-01-03

## 2023-01-03 LAB
ALBUMIN SERPL-MCNC: 2.7 G/DL (ref 3.2–4.6)
ANION GAP SERPL CALC-SCNC: 2 MMOL/L (ref 2–11)
ANION GAP SERPL CALC-SCNC: 5 MMOL/L (ref 2–11)
BUN SERPL-MCNC: 74 MG/DL (ref 8–23)
BUN SERPL-MCNC: 74 MG/DL (ref 8–23)
CALCIUM SERPL-MCNC: 9 MG/DL (ref 8.3–10.4)
CALCIUM SERPL-MCNC: 9.1 MG/DL (ref 8.3–10.4)
CHLORIDE SERPL-SCNC: 94 MMOL/L (ref 101–110)
CHLORIDE SERPL-SCNC: 94 MMOL/L (ref 101–110)
CO2 SERPL-SCNC: 32 MMOL/L (ref 21–32)
CO2 SERPL-SCNC: 34 MMOL/L (ref 21–32)
CREAT SERPL-MCNC: 1.3 MG/DL (ref 0.6–1)
CREAT SERPL-MCNC: 1.4 MG/DL (ref 0.6–1)
CRP SERPL-MCNC: 3.6 MG/DL (ref 0–0.9)
ERYTHROCYTE [DISTWIDTH] IN BLOOD BY AUTOMATED COUNT: 19.5 % (ref 11.9–14.6)
ERYTHROCYTE [SEDIMENTATION RATE] IN BLOOD: 57 MM/HR (ref 0–30)
GLUCOSE BLD STRIP.AUTO-MCNC: 171 MG/DL (ref 65–100)
GLUCOSE BLD STRIP.AUTO-MCNC: 227 MG/DL (ref 65–100)
GLUCOSE BLD STRIP.AUTO-MCNC: 249 MG/DL (ref 65–100)
GLUCOSE BLD STRIP.AUTO-MCNC: 268 MG/DL (ref 65–100)
GLUCOSE SERPL-MCNC: 102 MG/DL (ref 65–100)
GLUCOSE SERPL-MCNC: 165 MG/DL (ref 65–100)
HCT VFR BLD AUTO: 26.4 % (ref 35.8–46.3)
HGB BLD-MCNC: 7.8 G/DL (ref 11.7–15.4)
HISTORY CHECK: NORMAL
MAGNESIUM SERPL-MCNC: 2.4 MG/DL (ref 1.8–2.4)
MAGNESIUM SERPL-MCNC: 2.4 MG/DL (ref 1.8–2.4)
MCH RBC QN AUTO: 29.9 PG (ref 26.1–32.9)
MCHC RBC AUTO-ENTMCNC: 29.5 G/DL (ref 31.4–35)
MCV RBC AUTO: 101.1 FL (ref 82–102)
NRBC # BLD: 0 K/UL (ref 0–0.2)
NT PRO BNP: 3646 PG/ML
PLATELET # BLD AUTO: 179 K/UL (ref 150–450)
PMV BLD AUTO: 10 FL (ref 9.4–12.3)
POTASSIUM SERPL-SCNC: 4 MMOL/L (ref 3.5–5.1)
POTASSIUM SERPL-SCNC: 4 MMOL/L (ref 3.5–5.1)
RBC # BLD AUTO: 2.61 M/UL (ref 4.05–5.2)
SERVICE CMNT-IMP: ABNORMAL
SODIUM SERPL-SCNC: 128 MMOL/L (ref 133–143)
SODIUM SERPL-SCNC: 133 MMOL/L (ref 133–143)
WBC # BLD AUTO: 7.4 K/UL (ref 4.3–11.1)

## 2023-01-03 PROCEDURE — 82962 GLUCOSE BLOOD TEST: CPT

## 2023-01-03 PROCEDURE — 36415 COLL VENOUS BLD VENIPUNCTURE: CPT

## 2023-01-03 PROCEDURE — 85652 RBC SED RATE AUTOMATED: CPT

## 2023-01-03 PROCEDURE — 86923 COMPATIBILITY TEST ELECTRIC: CPT

## 2023-01-03 PROCEDURE — 94660 CPAP INITIATION&MGMT: CPT

## 2023-01-03 PROCEDURE — 86850 RBC ANTIBODY SCREEN: CPT

## 2023-01-03 PROCEDURE — 6360000002 HC RX W HCPCS: Performed by: NURSE PRACTITIONER

## 2023-01-03 PROCEDURE — 83880 ASSAY OF NATRIURETIC PEPTIDE: CPT

## 2023-01-03 PROCEDURE — P9016 RBC LEUKOCYTES REDUCED: HCPCS

## 2023-01-03 PROCEDURE — 83735 ASSAY OF MAGNESIUM: CPT

## 2023-01-03 PROCEDURE — 2100000000 HC CCU R&B

## 2023-01-03 PROCEDURE — 80048 BASIC METABOLIC PNL TOTAL CA: CPT

## 2023-01-03 PROCEDURE — 86140 C-REACTIVE PROTEIN: CPT

## 2023-01-03 PROCEDURE — 85027 COMPLETE CBC AUTOMATED: CPT

## 2023-01-03 PROCEDURE — 2500000003 HC RX 250 WO HCPCS: Performed by: INTERNAL MEDICINE

## 2023-01-03 PROCEDURE — 76937 US GUIDE VASCULAR ACCESS: CPT

## 2023-01-03 PROCEDURE — 6370000000 HC RX 637 (ALT 250 FOR IP): Performed by: NURSE PRACTITIONER

## 2023-01-03 PROCEDURE — 30233N1 TRANSFUSION OF NONAUTOLOGOUS RED BLOOD CELLS INTO PERIPHERAL VEIN, PERCUTANEOUS APPROACH: ICD-10-PCS | Performed by: INTERNAL MEDICINE

## 2023-01-03 PROCEDURE — 2700000000 HC OXYGEN THERAPY PER DAY

## 2023-01-03 PROCEDURE — 6370000000 HC RX 637 (ALT 250 FOR IP): Performed by: INTERNAL MEDICINE

## 2023-01-03 PROCEDURE — 36430 TRANSFUSION BLD/BLD COMPNT: CPT

## 2023-01-03 PROCEDURE — 2580000003 HC RX 258: Performed by: NURSE PRACTITIONER

## 2023-01-03 PROCEDURE — 2580000003 HC RX 258: Performed by: INTERNAL MEDICINE

## 2023-01-03 PROCEDURE — 82040 ASSAY OF SERUM ALBUMIN: CPT

## 2023-01-03 PROCEDURE — 2500000003 HC RX 250 WO HCPCS: Performed by: NURSE PRACTITIONER

## 2023-01-03 RX ORDER — SODIUM CHLORIDE 9 MG/ML
INJECTION, SOLUTION INTRAVENOUS PRN
Status: DISCONTINUED | OUTPATIENT
Start: 2023-01-03 | End: 2023-01-04 | Stop reason: HOSPADM

## 2023-01-03 RX ORDER — BUMETANIDE 0.25 MG/ML
1 INJECTION, SOLUTION INTRAMUSCULAR; INTRAVENOUS EVERY 6 HOURS
Status: DISCONTINUED | OUTPATIENT
Start: 2023-01-03 | End: 2023-01-03

## 2023-01-03 RX ORDER — BUMETANIDE 1 MG/1
2 TABLET ORAL DAILY
Status: DISCONTINUED | OUTPATIENT
Start: 2023-01-04 | End: 2023-01-04 | Stop reason: HOSPADM

## 2023-01-03 RX ORDER — SODIUM CHLORIDE 9 MG/ML
25 INJECTION, SOLUTION INTRAVENOUS PRN
Status: DISCONTINUED | OUTPATIENT
Start: 2023-01-03 | End: 2023-01-04 | Stop reason: HOSPADM

## 2023-01-03 RX ORDER — LIDOCAINE HYDROCHLORIDE 10 MG/ML
5 INJECTION, SOLUTION EPIDURAL; INFILTRATION; INTRACAUDAL; PERINEURAL ONCE
Status: DISCONTINUED | OUTPATIENT
Start: 2023-01-03 | End: 2023-01-03

## 2023-01-03 RX ORDER — BUMETANIDE 0.25 MG/ML
1 INJECTION, SOLUTION INTRAMUSCULAR; INTRAVENOUS ONCE
Status: COMPLETED | OUTPATIENT
Start: 2023-01-03 | End: 2023-01-03

## 2023-01-03 RX ORDER — PREDNISONE 20 MG/1
60 TABLET ORAL DAILY
Status: DISCONTINUED | OUTPATIENT
Start: 2023-01-03 | End: 2023-01-03

## 2023-01-03 RX ORDER — PREDNISONE 20 MG/1
20 TABLET ORAL DAILY
Status: DISCONTINUED | OUTPATIENT
Start: 2023-01-04 | End: 2023-01-04 | Stop reason: HOSPADM

## 2023-01-03 RX ORDER — SODIUM CHLORIDE 0.9 % (FLUSH) 0.9 %
5-40 SYRINGE (ML) INJECTION EVERY 12 HOURS SCHEDULED
Status: DISCONTINUED | OUTPATIENT
Start: 2023-01-03 | End: 2023-01-04 | Stop reason: HOSPADM

## 2023-01-03 RX ORDER — METOPROLOL SUCCINATE 25 MG/1
25 TABLET, EXTENDED RELEASE ORAL 2 TIMES DAILY
Status: DISCONTINUED | OUTPATIENT
Start: 2023-01-03 | End: 2023-01-04 | Stop reason: HOSPADM

## 2023-01-03 RX ORDER — SODIUM CHLORIDE 0.9 % (FLUSH) 0.9 %
5-40 SYRINGE (ML) INJECTION PRN
Status: DISCONTINUED | OUTPATIENT
Start: 2023-01-03 | End: 2023-01-04 | Stop reason: HOSPADM

## 2023-01-03 RX ADMIN — METOPROLOL SUCCINATE 25 MG: 25 TABLET, EXTENDED RELEASE ORAL at 10:27

## 2023-01-03 RX ADMIN — GUAIFENESIN 1200 MG: 600 TABLET ORAL at 20:03

## 2023-01-03 RX ADMIN — SODIUM CHLORIDE, PRESERVATIVE FREE 5 ML: 5 INJECTION INTRAVENOUS at 20:05

## 2023-01-03 RX ADMIN — INSULIN GLARGINE 30 UNITS: 100 INJECTION, SOLUTION SUBCUTANEOUS at 09:39

## 2023-01-03 RX ADMIN — PREDNISONE 60 MG: 20 TABLET ORAL at 10:32

## 2023-01-03 RX ADMIN — ASPIRIN 81 MG: 81 TABLET, CHEWABLE ORAL at 09:27

## 2023-01-03 RX ADMIN — SODIUM CHLORIDE, PRESERVATIVE FREE 5 ML: 5 INJECTION INTRAVENOUS at 11:00

## 2023-01-03 RX ADMIN — ATORVASTATIN CALCIUM 80 MG: 80 TABLET, FILM COATED ORAL at 09:30

## 2023-01-03 RX ADMIN — BUMETANIDE 1 MG: 0.25 INJECTION INTRAMUSCULAR; INTRAVENOUS at 02:21

## 2023-01-03 RX ADMIN — INSULIN LISPRO 1 UNITS: 100 INJECTION, SOLUTION INTRAVENOUS; SUBCUTANEOUS at 11:29

## 2023-01-03 RX ADMIN — INSULIN LISPRO 1 UNITS: 100 INJECTION, SOLUTION INTRAVENOUS; SUBCUTANEOUS at 17:54

## 2023-01-03 RX ADMIN — BUSPIRONE HYDROCHLORIDE 7.5 MG: 5 TABLET ORAL at 20:03

## 2023-01-03 RX ADMIN — GUAIFENESIN 1200 MG: 600 TABLET ORAL at 09:30

## 2023-01-03 RX ADMIN — ENOXAPARIN SODIUM 30 MG: 100 INJECTION SUBCUTANEOUS at 09:39

## 2023-01-03 RX ADMIN — BUSPIRONE HYDROCHLORIDE 7.5 MG: 5 TABLET ORAL at 09:27

## 2023-01-03 RX ADMIN — METOPROLOL SUCCINATE 25 MG: 25 TABLET, EXTENDED RELEASE ORAL at 20:02

## 2023-01-03 RX ADMIN — Medication 4.5 MG: at 20:04

## 2023-01-03 RX ADMIN — BUMETANIDE 1 MG: 0.25 INJECTION, SOLUTION INTRAMUSCULAR; INTRAVENOUS at 22:06

## 2023-01-03 RX ADMIN — SODIUM CHLORIDE, PRESERVATIVE FREE 10 ML: 5 INJECTION INTRAVENOUS at 09:26

## 2023-01-03 RX ADMIN — BUMETANIDE 1 MG: 0.25 INJECTION INTRAMUSCULAR; INTRAVENOUS at 09:39

## 2023-01-03 NOTE — H&P
[facility] Cardiology History & Physical      Date of  Admission: 1/2/2023  7:50 PM     Primary Care Physician: Dr. Mckenzie Teran  Primary Cardiologist: Dr. Warren Gunter  Admitting Physician: Dr. Warren Gunter    CC: shortness of breath    HPI:  Chanda Morrell is a 81 y.o. female with past medical history of IDDM, CKD stage 3, HTN, HLD, permanent AFIB on Eliquis as outpatient, HFpEF, CAD s/p PCI remotely, vulvodynia, morbid obesity, KIANA (intolerant of CPAP in the past) and aortic stenosis who presented to [facility] on 12/17 with complaints of worsening shortness of breath. Patient reportedly developed worsening shortness of breath 3 weeks prior to presentation. She was initially managed as an outpatient with increasing diuretic therapy. She reported improvement initially, but was found to be hypoxic on EMS arrival the day of presentation. She was admitted to [facility] by the hospitalist service for treatment. Labs on admission were creatinine 1.0, BUN 31 and sodium 133. During hospitalization, patient was started on IV diuretic therapy. She became confused and was moved to the ICU for BiPAP therapy. She was also noted to be hyponatremic with sodium as low as 116 on 12/20. Patient has been transferred to [facility] ICU for continued treatment per family request. Carlos Carreon done this morning at outside facility showed sodium 130, potassium  4.0, creatinine 1.41 and BUN 68. Palliative care saw patient at [facility] and recommended hospice. Patient and family declined with plan for patient to return home after admission. Patient and family request FULL CODE status. Patient has been approved for Trilogy therapy once discharged. Continues to report shortness of breath and abdominal fullness. States that she feels as though she is \"smothering\". RSVP by echocardiograms done at outside facility have been elevated at 60-80 mmHg.       Past Medical History:   Diagnosis Date    Acute hyponatremia 1/21/2011    Acute on chronic diastolic congestive heart failure ([address] 1/19/2011    Acute renal failure ([address] 1/21/2011    AMI (acute myocardial infarction) ([address] 2009    Anemia 9/5/2012    Anemia, unspecified 1/23/2011    Arthritis     Asthma     Asthma exacerbation 11/11/2011    CAD (coronary artery disease)     MI 2009, stents heart 2009    CAD (coronary artery disease), native coronary artery 9/2/2009    Previous stent to dRCA with Cypher 3.5x33mm KUNAL 8/09 9/09 LAD- Xience 2.5x23mm and 3.0x18mm KUNAL in mid-distal LAD      Cellulitis 1/20/2011    Chest discomfort 1/28/2016    Chronic diastolic heart failure ([address] 10/20/2011    Chronic venous insufficiency 10/24/2011    Diabetes ([address]     checks QD, normal 200, no hyposymptoms     Diverticulitis 1/28/2016    Dyslipidemia 9/2/2009    Edema 1/28/2016    GERD (gastroesophageal reflux disease)     Heart failure ([address]     Hypertension     Hypokalemia 1/28/2016    Hypoxemia 9/7/2012    Morbid obesity ([address]     Nausea & vomiting     Neuropathy in diabetes ([address] 9/2/2009    NSTEMI (non-ST elevation myocardial infarction) ([address] 9/2/2009    Dr Isaiah Dash    Obstructive sleep apnea (adult) (pediatric) 11/11/2011    KIANA (obstructive sleep apnea) 9/7/2012    Intolerant of CPAP     Other dyspnea and respiratory abnormality 11/10/2011    Other ill-defined conditions(799.89)     BILATERAL    Other ill-defined conditions(799.89)     benign tumor in lower abdomen- not removed, diabetic ulcers, edema, neuropathy    Peripheral vascular disease ([address] 10/20/2011    Post PTCA 6/20/2014    PVD (peripheral vascular disease) ([address]     Unspecified sleep apnea     oxygen at night    Venous stasis of lower extremity 2010    BILATERAL W RECURRENT ADMISSIONS    Volume overload 9/6/2012      Past Surgical History:   Procedure Laterality Date    BREAST SURGERY  1973    benign tumor left breast    CHOLECYSTECTOMY      COLONOSCOPY      GI      AR CARDIAC SURG PROCEDURE UNLIST      4 stents most recent 2 yrs ago    [facility]      stentsx3       Allergies   Allergen Reactions    Penicillins Hives, Other (See Comments) and Swelling     Can take cephalosporins    Codeine Nausea Only    Hydrocodone-Acetaminophen Nausea Only    Hydromorphone Other (See Comments)    Sulfamethoxazole-Trimethoprim Other (See Comments)     \"potassium acted up\"  Potassium gets high      Social History     Socioeconomic History    Marital status:      Spouse name: Not on file    Number of children: Not on file    Years of education: Not on file    Highest education level: Not on file   Occupational History    Not on file   Tobacco Use    Smoking status: Never    Smokeless tobacco: Never   Substance and Sexual Activity    Alcohol use: No    Drug use: No    Sexual activity: Not on file   Other Topics Concern    Not on file   Social History Narrative    9/29/11:  PATIENT IS , HAS GROWN CHILDREN. IS A HOMEMAKER. Social Determinants of Health     Financial Resource Strain: Not on file   Food Insecurity: Not on file   Transportation Needs: Not on file   Physical Activity: Not on file   Stress: Not on file   Social Connections: Not on file   Intimate Partner Violence: Not on file   Housing Stability: Not on file     Family History   Problem Relation Age of Onset    Lung Disease Father     Cancer Father     Hypertension Paternal Grandmother     Diabetes Paternal Aunt     Hypertension Maternal Grandmother     Cancer Brother         No current facility-administered medications for this encounter. Review of Systems    Review of Systems   Constitutional: Positive for weight gain. HENT: Negative. Eyes: Negative. Cardiovascular: Negative. Respiratory:  Positive for shortness of breath and sputum production. Endocrine: Negative. Hematologic/Lymphatic: Negative. Skin: Negative. Musculoskeletal: Negative. Gastrointestinal:  Positive for bloating. Genitourinary: Negative.     Neurological: Negative. Psychiatric/Behavioral: Negative. Subjective:   BP (!) 119/56   Pulse 72   Temp 97.6 °F (36.4 °C) (Oral)   Resp 29   Ht 5' 5\" (1.651 m)   Wt 259 lb 0.7 oz (117.5 kg)   SpO2 100%   BMI 43.11 kg/m²   Physical Exam  HENT:      Mouth/Throat:      Mouth: Mucous membranes are moist.   Eyes:      Pupils: Pupils are equal, round, and reactive to light. Cardiovascular:      Rate and Rhythm: Normal rate. Rhythm irregular. Heart sounds: Murmur heard. Pulmonary:      Breath sounds: Normal breath sounds. Abdominal:      General: Bowel sounds are normal.   Musculoskeletal:         General: No swelling. Skin:     General: Skin is warm and dry. Neurological:      Mental Status: She is alert and oriented to person, place, and time. Psychiatric:         Mood and Affect: Mood normal.        Cardiographics  Telemetry: AFIB  ECG: not done  Echocardiogram:  see Epic and Care Everywhere for full report    Labs: CBC, BMP, magnesium ordered tonight on admission. Patient has been seen and examined by Dr. Amari Cruz and he agrees with the following assessment and plan:     Assessment/Plan:          Acute respiratory failure ([facility] -- admit to ICU. Currently 100% on 3LNC. Trilogy approved for outpatient therapy. Continue BiPAP QHS and PRN during the day. Obstructive sleep apnea (adult) (pediatric) -- see above      BMI 40.0-44.9, adult (HCC)      Lymphedema -- chronic. Consult wound/OT for wraps. Chronic diastolic heart failure (HCC) -- EF by last echo was > 65%. See above. Check blood work tonight. If renal function is stable, will start Bumex 1mg BID. Monitor strict I/Os. Daily weights. PCXR pending. Dyslipidemia -- continue statin therapy. Diabetes mellitus, type 2 ([facility] -- continue insulin regimen from outside facility with lantus and SSI. Essential hypertension -- controlled. Monitor BP closely. Titrate medications as needed.        Anemia, unspecified -- HGB baseline 10-11. It was 7.8 this AM. Eliquis has been held after lacertion to LE. 1 unit PRBC was given on 12/24. CBC pending. Nonrheumatic aortic valve stenosis -- reported severe by last echocardiogram.       Longstanding persistent atrial fibrillation (HCC) -- rate controlled with Toprol XL 50mg BID. Holding Eliquis since 12/21 due to acute anemia after injury/laceration to LE. Restart ASA      Non-pressure chronic ulcer of left calf, limited to breakdown of skin ([facility] -- consult wound care      Biventricular failure -- difficult to treat as patient's can develop renal injury with aggressive diuresis. Monitor renal function closely with IV diuretic therapy.             Kris Gaines, APRN - CNP  1/2/2023 10:35 PM

## 2023-01-03 NOTE — PROGRESS NOTES
I had a long discussion with patient and family in regards to her poor prognosis. They feel strongly that patient is a \"fighter\". They would like her to go home once trilogy is arranged. We discussed her high likelihood of readmission. They are comfortable with this and would like home health. We will attempt to arrange. Stop her IV Lasix and start her home Bumex back.     Harlan Linn MD

## 2023-01-03 NOTE — PROGRESS NOTES
Pt throughout morning wants on and off BIPAP constantly to eat to drink, etc. \"I can't stand it, I feel it right here (touching abd) I know it's my nerves, I just need it please!!\" Pt educated on bayron NC with a sat of 94-95% and not showing any signs of distress and bayron well. Pt was adamant' \"I need this!!\". Pt educated that she must stay on bipap for a minimum of 4 hours then may come off. RT, Peder Fails, also to bedside and reiterated what this RN had told pt. She v/u  and happy to be on bipap.

## 2023-01-03 NOTE — CARE COORDINATION
This CM met with pt and daughter at bedside this day. Pt transferred from [facility] to [facility] 1/2/23. This CM reviewed following information and confirmed all with pt and daughter:     Pt is current with [facility]  prior to admission and is agreeable to have them resume services at discharge  Pt is current with [address] and receives NP visits 2 times a week  Pt's current DME includes: walker, w/c, shower chair, BSC, lift chair, and oxygen (unsure of DME company that provides it)  Pt is current with [employer]    It is anticipated pt with discharge home tomorrow with family support. Pt will need a trilogy at discharge - this was initiated while pt at [facility]. This CM spoke with Fer Munoz with [facility] and everything is approved and trilogy is ready to be delivered when pt is medically stable for discharge. This CM updated Fer Munoz that anticipated discharge is tomorrow - she confirms that someone will meet pt/family at home to get DME arranged. She is agreeable for this CM to provide pt daughter with her direct cell phone to finalize arrangements. This CM spoke with pt and daughter regarding trilogy and Kimber's phone number provided to pt daughter, Mirella Haines. Home health referral made to [facility]. This CM spoke with geovani Vora with [address] to alert to pt discharging home tentatively tomorrow. He requested H&P be sent - this CM sent this day. Pt requests medical transport home. This CM arranged transport via [facility] for 11am  tentatively 1/4/23 @ 11:00am.  This CM updated pt and daughter regarding time and they are agreeable. No additional CM needs at this time. Will continue to monitor and update as needed. 01/03/23 [id number]   Service Assessment   Patient Orientation Alert and Oriented   Cognition Alert   History Provided By Patient; Child/Family   Primary Caregiver Family   Support Systems Spouse/Significant Other;Children   PCP Verified by CM Yes   Last Visit to PCP Within last 6 months   Prior Functional Level Other (see comment)  (assistance as needed)   Current Functional Level Other (see comment)  (TBD by clinical team)   Can patient return to prior living arrangement Yes   Ability to make needs known: Good   Family able to assist with home care needs: Yes   Services At/After Discharge   Mode of Transport at Discharge BLS   Confirm Follow Up Transport Other (see comment)  [facility])   Condition of Participation: Discharge Planning   The Plan for Transition of Care is related to the following treatment goals: Home with family support   The Patient and/or Patient Representative was provided with a Choice of Provider? Patient   The Patient and/Or Patient Representative agree with the Discharge Plan? Yes   Freedom of Choice list was provided with basic dialogue that supports the patient's individualized plan of care/goals, treatment preferences, and shares the quality data associated with the providers?   Yes

## 2023-01-03 NOTE — CONSULTS
History and Physical Initial Visit NOTE           1/3/2023    Darell Dejesus                        Date of Admission:  1/2/2023    The patient's chart is reviewed and the patient is discussed with the staff. Subjective:     Patient is a 81 y.o.  female seen and evaluated at the request of Dr. Marielos Parnell for pulmonary HTN with chronic respiratory failure. [facility] after being admitted there since December 17. She is morbidly obese  (BMI43) with DM, CAD with remote PCI, moderate aortic stenosis, and severe PHTN. She was admitted there with acute respiratory failure and acute diastolic heart failure. She was diuresed and developed hyponatremia. CXR showed patchy infiltrates/edema. CPR was 3.6, ESR 57 and WBC 7,400, D dimer 1.04. Na+ 128 today. There have been ongoing conversations regarding hospice yet she and her family have continued to decline. She is on and off BiPAP here for comfort. Her oxygen saturations are adequate in the 90's. Trilogy was being arranged at [facility] but has not yet gotten the machine. BMP here does not indicate chronic hypercapnea at 32. She is chronically debilitated and does not walk. She has Bilateral lymphedema and is grossly volume overloaded with hyponatremia. She is on NC with sufficient oxygen saturations. We were asked to see her for PHTN with chronic respiratory failure, untreated OHS, diastolic heart failure and morbid obesity.      Review of Systems: Comprehensive ROS negative except in HPI    Current Outpatient Medications   Medication Instructions    acetaminophen (TYLENOL) 500 MG tablet Oral, EVERY 6 HOURS PRN    albuterol sulfate  (90 Base) MCG/ACT inhaler 2 puffs, Inhalation, EVERY 4 HOURS PRN    apixaban (ELIQUIS) 5 mg, Oral, 2 TIMES DAILY    aspirin 81 mg, Oral, DAILY    atorvastatin (LIPITOR) 80 mg, Oral, DAILY    bumetanide (BUMEX) 2 mg, Oral, DAILY    Dulaglutide 0.75 mg, SubCUTAneous, EVERY 7 DAYS    DULoxetine (CYMBALTA) 60 mg, Oral, EVERY OTHER DAY    ergocalciferol (ERGOCALCIFEROL) 50,000 Units, Oral, EVERY 7 DAYS    gabapentin (NEURONTIN) 200 mg, Oral, 3 TIMES DAILY    insulin glargine (1 unit dial) (TOUJEO) 220 Units, SubCUTAneous, DAILY    insulin glulisine (APIDRA) 110 Units, SubCUTAneous, DAILY WITH BREAKFAST, 110 units with breakfast and lunch, 120 units with dinner    metOLazone (ZAROXOLYN) 10 mg, Oral, DAILY    metoprolol tartrate (LOPRESSOR) 50 mg, Oral, 2 TIMES DAILY    nitroGLYCERIN (NITROLINGUAL) 0.4 MG/SPRAY 0.4 mg spray 1 spray, SubLINGual    nitroGLYCERIN (NITROSTAT) 0.4 mg, SubLINGual      Past Medical History:   Diagnosis Date    Acute hyponatremia 1/21/2011    Acute on chronic diastolic congestive heart failure (HCC) 1/19/2011    Acute renal failure ([facility] 1/21/2011    AMI (acute myocardial infarction) ([facility] 2009    Anemia 9/5/2012    Anemia, unspecified 1/23/2011    Arthritis     Asthma     Asthma exacerbation 11/11/2011    CAD (coronary artery disease)     MI 2009, stents heart 2009    CAD (coronary artery disease), native coronary artery 9/2/2009    Previous stent to [facility] with Cypher 3.5x33mm KUNAL 8/09 9/09 LAD- Xience 2.5x23mm and 3.0x18mm KUNAL in mid-distal LAD      Cellulitis 1/20/2011    Chest discomfort 1/28/2016    Chronic diastolic heart failure ([address] 10/20/2011    Chronic venous insufficiency 10/24/2011    Diabetes ([address]     checks QD, normal 200, no hyposymptoms     Diverticulitis 1/28/2016    Dyslipidemia 9/2/2009    Edema 1/28/2016    GERD (gastroesophageal reflux disease)     Heart failure ([facility])     Hypertension     Hypokalemia 1/28/2016    Hypoxemia 9/7/2012    Morbid obesity ([facility])     Nausea & vomiting     Neuropathy in diabetes ([address] 9/2/2009    NSTEMI (non-ST elevation myocardial infarction) ([facility] 9/2/2009    Dr Brien Moore    Obstructive sleep apnea (adult) (pediatric) 11/11/2011    KIANA (obstructive sleep apnea) 9/7/2012    Intolerant of CPAP     Other dyspnea and respiratory abnormality 11/10/2011    Other ill-defined conditions(799.89)     BILATERAL    Other ill-defined conditions(799.89)     benign tumor in lower abdomen- not removed, diabetic ulcers, edema, neuropathy    Peripheral vascular disease ([facility] 10/20/2011    Post PTCA 6/20/2014    PVD (peripheral vascular disease) ([facility]     Unspecified sleep apnea     oxygen at night    Venous stasis of lower extremity 2010    BILATERAL W RECURRENT ADMISSIONS    Volume overload 9/6/2012     Past Surgical History:   Procedure Laterality Date    BREAST SURGERY  1973    benign tumor left breast    CHOLECYSTECTOMY      COLONOSCOPY      GI      PA CARDIAC SURG PROCEDURE UNLIST      4 stents most recent 2 yrs ago    PTCA [facility] ADDL VESSEL      stentsx3     Social History     Socioeconomic History    Marital status:      Spouse name: Not on file    Number of children: Not on file    Years of education: Not on file    Highest education level: Not on file   Occupational History    Not on file   Tobacco Use    Smoking status: Never    Smokeless tobacco: Never   Substance and Sexual Activity    Alcohol use: No    Drug use: No    Sexual activity: Not on file   Other Topics Concern    Not on file   Social History Narrative    9/29/11:  PATIENT IS , HAS GROWN CHILDREN. IS A HOMEMAKER.      Social Determinants of Health     Financial Resource Strain: Not on file   Food Insecurity: Not on file   Transportation Needs: Not on file   Physical Activity: Not on file   Stress: Not on file   Social Connections: Not on file   Intimate Partner Violence: Not on file   Housing Stability: Not on file     Family History   Problem Relation Age of Onset    Lung Disease Father     Cancer Father     Hypertension Paternal Grandmother     Diabetes Paternal Aunt     Hypertension Maternal Grandmother     Cancer Brother      Allergies   Allergen Reactions    Penicillins Hives, Other (See Comments) and Swelling     Can take cephalosporins    Codeine Nausea Only    Hydrocodone-Acetaminophen Nausea Only    Hydromorphone Other (See Comments)    Sulfamethoxazole-Trimethoprim Other (See Comments)     \"potassium acted up\"  Potassium gets high     Objective:   Blood pressure (!) 104/59, pulse 74, temperature 97.5 °F (36.4 °C), resp. rate (!) 31, height 5' 5\" (1.651 m), weight 259 lb 0.7 oz (117.5 kg), SpO2 97 %. Intake/Output Summary (Last 24 hours) at 1/3/2023 1314  Last data filed at 1/3/2023 0400  Gross per 24 hour   Intake 360 ml   Output 300 ml   Net 60 ml     PHYSICAL EXAM   Constitutional:  the patient is morbidly obese and in no acute distress  EENMT:  Sclera clear, pupils equal, oral mucosa moist  Respiratory: diminished B bases on BiPAP  Cardiovascular:  RRR without M,G,R. There is 2+ lower extremity edema. wrapped  Gastrointestinal: soft and non-tender; with positive bowel sounds. protuberant  Musculoskeletal: warm without cyanosis. Normal muscle tone. Skin:  no jaundice or rashes, B LE wounds   Neurologic: symmetric strength, fluent speech  Psychiatric:  calm, appropriate, oriented x 3    Imaging: I performed an independent interpretation of the patient's images.   CXR:           Recent Labs     01/02/23  2242 01/03/23  0610 01/03/23  0615 01/03/23  1057   WBC 7.4  --  7.4  --    HGB 7.9*  --  7.8*  --    HCT 26.4*  --  26.4*  --      --  179  --      --  128*  --    K 4.0  --  4.0  --    CL 94*  --  94*  --    CO2 34*  --  32  --    BUN 74*  --  74*  --    CREATININE 1.40*  --  1.30*  --    MG 2.4  --  2.4  --    NTPROBNP  --   --   --  3,646*   CRP  --  3.6*  --   --        Lab Results   Component Value Date/Time     01/03/2023 06:15 AM    K 4.0 01/03/2023 06:15 AM    CL 94 01/03/2023 06:15 AM    CO2 32 01/03/2023 06:15 AM    BUN 74 01/03/2023 06:15 AM    CREATININE 1.30 01/03/2023 06:15 AM    GLUCOSE 165 01/03/2023 06:15 AM    CALCIUM 9.0 01/03/2023 06:15 AM      No results found for: BNP    ECHO: 02/02/22    TRANSTHORACIC ECHOCARDIOGRAM (TTE) COMPLETE (CONTRAST/BUBBLE/3D PRN) 02/03/2022  7:40 AM, 02/03/2022 12:00 AM (Final)    Narrative  This is a summary report. The complete report is available in the patient's medical record. If you cannot access the medical record, please contact the sending organization for a detailed fax or copy. Technical qualifiers: Echo study was technically difficult, technically difficult with poor endocardial visualization, technically difficult due to patient's body habitus, a technically difficult Doppler study and limited due to patient tolerance. Left Ventricle: Left ventricle size is normal. Mildly increased wall thickness. Normal left ventricular systolic function with a visually estimated EF of 60 - 65%. Aortic Valve: Mildly calcified cusps. Mild transvalvular regurgitation. Moderate stenosis. AV mean gradient is 22 mmHg. AV peak gradient is 44 mmHg. Mitral Valve: Mildly thickened leaflets. Mild mitral annular calcification. Tricuspid Valve: Mild transvalvular regurgitation. RVSP is 50 mmHg. Signed by: Kaylee Hernandez MD on 2/3/2022  7:40 AM, Signed by: Unknown Provider Result on 2/3/2022 12:00 AM    MICRO: No results for input(s): CULTURE in the last 72 hours. Assessment and Plan:  (Medical Decision Making)   Active Problems:    Chronic respiratory failure (HCC)  On 3 lpm, oxygen saturations adequate      Obstructive sleep apnea (adult) (pediatric)    BMI 40.0-44.9, adult (HCC)  Trying to arrange trilogy       Lymphedema    Chronic diastolic heart failure (HCC)    PHTN    Nonrheumatic aortic valve stenosis    Longstanding persistent atrial fibrillation (HCC)    Non-pressure chronic ulcer of left calf, limited to breakdown of skin (HCC)  Wound care following    PHTN with chronic respiratory failure on 3 lpm, untreated OHS, chronic debility with sedentary lifestyle, diastolic heart failure with lymphedema, hyponatremia and super morbid obesity.  Options are limited with inability to walk, diuresis intolerance with hyponatremia, and morbid obesity. Convert to NC, she is not hypoxic per numbers. Check ABG for baseline. Will use BIPAP with sleep. Trying to arrange trilogy for home (ordered 12/6, will ask [facility] to assist here). Options will be limited with inability to adequately tolerate diuresis with hyponatremia. She is on bumex, check albumin level. ms. She has been on prednisone for elevated CRP which is now 2. Will decrease dose and taper off. Full Code    Thank you very much for this referral.  We appreciate the opportunity to participate in this patient's care. Will follow along with above stated plan. In this split/shared evaluation I performed performed a medically appropriate history and exam, counseled and educated the patient and/or family member, ordered medications, tests or procedures, documented information in EMR, and coordinated care. which accounted for 23 clinical time. Danielle Quintero, NP, APRN - CNP    In this split/shared evaluation I performed reviewed the patients's H&P, available images, labs, cultures. , discussed case in detail with NPP, performed a medically appropriate history and exam, counseled and educated the patient and/or family member, ordered and/or reviewed medications, tests or procedures, documented information in EMR, independently interpreted images, and coordinated care. which accounted for 30 minutes clinical time. Impression: Challenging situation with likely secondary PH with multiple risk factors including untreated KIANA (now willing to wear NIV), HFpEF and with limited treatment options in the setting of CKD, hyponatremia, severe debility, and obesity. The usual beneficial interventions would be nocturnal NIV, weight loss, supplemental oxygen, cardiovascular conditioning, diuretics and or MRA/SGLT2i therapies.   Unfortunately almost all of these interventions are not practically or readily possible due to comorbidities. I am happy to see her as an outpatient to try to help prevent re-hospitalization any way I can but it will be a challenge.     Ke Luciano MD

## 2023-01-03 NOTE — WOUND CARE
Chronic venous changes to bilateral lower legs with chronic ulcer on right lower leg just below the knee. Bilateral thighs and mons pubis with chronic changes that have changed the skin to have hard areas and lichification that is common with lymphedema. Left knee and left leg just below the knee with healing skin tear, with scab areas in the tear, daughter states had stiches, no stiches seen at this time, skin flap is adherent and the area is well healing. Panus folds with intertrigo, rash consistent with fungal rash. Perianal areas with mild erythema and hemorrhoids, both buttocks with blanchable purple discoloration, most consistent with vascular congestion CHF changes of the dependent areas including buttocks. Patient does have CHF, pulmonary edema, using bipap, is in ICU. Currently on Progessa air mattress. The concern for pulmonary edema worsening if tight compression is used, will use ace bandages and if patient tolerates can consider compression wraps as she becomes more stable. Discussed at length with daughter, goals of care with my discussion with daughter and patient is to proceed full code and full treatment. Daughter agreeable to conservative wraps for now, wishes for full compression wraps as prior to admission if possible prior to discharge. Will monitor and adjust as patient becomes more stable.

## 2023-01-03 NOTE — PROGRESS NOTES
TRANSFER - IN REPORT:    Verbal report received from Junior Singh RN on Derrel Lesches being received from [facility] ICU to CVICU room 105  for routine progression of patient care      Report consisted of patients Situation, Background, Assessment and Recommendations(SBAR). Information from the following report(s) SBAR, Intake/Output, MAR, Recent Results, Med Rec Status, and Cardiac Rhythm A-Fib  was reviewed with the receiving nurse. Opportunity for questions and clarification was provided. Assessment completed upon patients arrival to unit and care assumed.

## 2023-01-03 NOTE — RT PROTOCOL NOTE
Patient placed on V60 BIPAP and connected to the Nurse Call System. Alarms are activated and nurse has been notified.

## 2023-01-03 NOTE — PROGRESS NOTES
Nata, wound RN, to bedside and cleaning bilat legs and xeroform placed to all areas x3 and wrapped loosely with bozena and ace wraps,Inspected groin/sacral/buttocks. Jackie care done, noted hemorrhoids slight bleeding, rectal area and bilat buttocks purple in color,(2nd to poor perfusion per wound RN), new foam silicone to sacral area as well also changed pure wick.

## 2023-01-03 NOTE — PROGRESS NOTES
Initial visit made to patient and a prayer was provided. The patient shared that she was advised to consider hospice care, but she said that she was not ready for that.     Brisa Mckoy, [address] [facility], [facility]

## 2023-01-03 NOTE — PROGRESS NOTES
Dr Dewayne Benavides to bedside, pt off of bipap to 2L NC per MD request, Daughter, Star, also here to talk about POC and prognosis. Dr Dewayne Benavides very calm and concerning, answering all questions.

## 2023-01-04 VITALS
BODY MASS INDEX: 43.78 KG/M2 | DIASTOLIC BLOOD PRESSURE: 63 MMHG | HEART RATE: 73 BPM | RESPIRATION RATE: 27 BRPM | HEIGHT: 65 IN | SYSTOLIC BLOOD PRESSURE: 137 MMHG | WEIGHT: 262.79 LBS | TEMPERATURE: 98.1 F | OXYGEN SATURATION: 97 %

## 2023-01-04 PROBLEM — E66.2 OBESITY HYPOVENTILATION SYNDROME (HCC): Status: ACTIVE | Noted: 2023-01-04

## 2023-01-04 PROBLEM — J96.01 ACUTE RESPIRATORY FAILURE WITH HYPOXIA (HCC): Status: ACTIVE | Noted: 2023-01-04

## 2023-01-04 PROBLEM — E87.70 VOLUME OVERLOAD: Status: RESOLVED | Noted: 2023-01-03 | Resolved: 2023-01-04

## 2023-01-04 LAB
ABO + RH BLD: NORMAL
ANION GAP SERPL CALC-SCNC: 3 MMOL/L (ref 2–11)
BLD PROD TYP BPU: NORMAL
BLOOD BANK DISPENSE STATUS: NORMAL
BLOOD GROUP ANTIBODIES SERPL: NORMAL
BPU ID: NORMAL
BUN SERPL-MCNC: 70 MG/DL (ref 8–23)
CALCIUM SERPL-MCNC: 9.3 MG/DL (ref 8.3–10.4)
CHLORIDE SERPL-SCNC: 96 MMOL/L (ref 101–110)
CO2 SERPL-SCNC: 32 MMOL/L (ref 21–32)
CREAT SERPL-MCNC: 1.2 MG/DL (ref 0.6–1)
CROSSMATCH RESULT: NORMAL
ERYTHROCYTE [DISTWIDTH] IN BLOOD BY AUTOMATED COUNT: 18.8 % (ref 11.9–14.6)
GLUCOSE BLD STRIP.AUTO-MCNC: 245 MG/DL (ref 65–100)
GLUCOSE SERPL-MCNC: 238 MG/DL (ref 65–100)
HCT VFR BLD AUTO: 28.1 % (ref 35.8–46.3)
HGB BLD-MCNC: 8.6 G/DL (ref 11.7–15.4)
MAGNESIUM SERPL-MCNC: 2.6 MG/DL (ref 1.8–2.4)
MCH RBC QN AUTO: 30 PG (ref 26.1–32.9)
MCHC RBC AUTO-ENTMCNC: 30.6 G/DL (ref 31.4–35)
MCV RBC AUTO: 97.9 FL (ref 82–102)
NRBC # BLD: 0 K/UL (ref 0–0.2)
PLATELET # BLD AUTO: 182 K/UL (ref 150–450)
PMV BLD AUTO: 9.8 FL (ref 9.4–12.3)
POTASSIUM SERPL-SCNC: 4.7 MMOL/L (ref 3.5–5.1)
RBC # BLD AUTO: 2.87 M/UL (ref 4.05–5.2)
SERVICE CMNT-IMP: ABNORMAL
SODIUM SERPL-SCNC: 131 MMOL/L (ref 133–143)
SPECIMEN EXP DATE BLD: NORMAL
UNIT DIVISION: 0
WBC # BLD AUTO: 3.4 K/UL (ref 4.3–11.1)

## 2023-01-04 PROCEDURE — 83735 ASSAY OF MAGNESIUM: CPT

## 2023-01-04 PROCEDURE — 80048 BASIC METABOLIC PNL TOTAL CA: CPT

## 2023-01-04 PROCEDURE — 82962 GLUCOSE BLOOD TEST: CPT

## 2023-01-04 PROCEDURE — 36415 COLL VENOUS BLD VENIPUNCTURE: CPT

## 2023-01-04 PROCEDURE — 94660 CPAP INITIATION&MGMT: CPT

## 2023-01-04 PROCEDURE — 6370000000 HC RX 637 (ALT 250 FOR IP): Performed by: NURSE PRACTITIONER

## 2023-01-04 PROCEDURE — 85027 COMPLETE CBC AUTOMATED: CPT

## 2023-01-04 PROCEDURE — 6370000000 HC RX 637 (ALT 250 FOR IP): Performed by: INTERNAL MEDICINE

## 2023-01-04 PROCEDURE — 2580000003 HC RX 258: Performed by: INTERNAL MEDICINE

## 2023-01-04 PROCEDURE — 99233 SBSQ HOSP IP/OBS HIGH 50: CPT | Performed by: INTERNAL MEDICINE

## 2023-01-04 RX ORDER — METOLAZONE 10 MG/1
10 TABLET ORAL PRN
Qty: 30 TABLET | Refills: 3 | Status: SHIPPED | OUTPATIENT
Start: 2023-01-04

## 2023-01-04 RX ORDER — GUAIFENESIN 600 MG/1
1200 TABLET, EXTENDED RELEASE ORAL 2 TIMES DAILY
Qty: 28 TABLET | Refills: 0 | Status: SHIPPED | OUTPATIENT
Start: 2023-01-04 | End: 2023-01-11

## 2023-01-04 RX ORDER — PREDNISONE 20 MG/1
20 TABLET ORAL DAILY
Qty: 10 TABLET | Refills: 0 | Status: SHIPPED | OUTPATIENT
Start: 2023-01-05 | End: 2023-01-15

## 2023-01-04 RX ORDER — METOPROLOL SUCCINATE 25 MG/1
25 TABLET, EXTENDED RELEASE ORAL 2 TIMES DAILY
Qty: 60 TABLET | Refills: 3 | Status: SHIPPED | OUTPATIENT
Start: 2023-01-04

## 2023-01-04 RX ADMIN — METOPROLOL SUCCINATE 25 MG: 25 TABLET, EXTENDED RELEASE ORAL at 08:06

## 2023-01-04 RX ADMIN — SODIUM CHLORIDE, PRESERVATIVE FREE 10 ML: 5 INJECTION INTRAVENOUS at 08:07

## 2023-01-04 RX ADMIN — ATORVASTATIN CALCIUM 80 MG: 80 TABLET, FILM COATED ORAL at 08:05

## 2023-01-04 RX ADMIN — INSULIN GLARGINE 30 UNITS: 100 INJECTION, SOLUTION SUBCUTANEOUS at 08:30

## 2023-01-04 RX ADMIN — ASPIRIN 81 MG: 81 TABLET, CHEWABLE ORAL at 08:06

## 2023-01-04 RX ADMIN — BUSPIRONE HYDROCHLORIDE 7.5 MG: 5 TABLET ORAL at 08:05

## 2023-01-04 RX ADMIN — BUMETANIDE 2 MG: 1 TABLET ORAL at 08:06

## 2023-01-04 RX ADMIN — PREDNISONE 20 MG: 20 TABLET ORAL at 08:06

## 2023-01-04 RX ADMIN — INSULIN LISPRO 1 UNITS: 100 INJECTION, SOLUTION INTRAVENOUS; SUBCUTANEOUS at 08:13

## 2023-01-04 RX ADMIN — GUAIFENESIN 1200 MG: 600 TABLET ORAL at 08:06

## 2023-01-04 NOTE — PROGRESS NOTES
Pt yelling for nurse, stated she wanted her blood pressure cuff off because it was hurting her arm. Removed cuff per patient wishes.

## 2023-01-04 NOTE — PROGRESS NOTES
Physician Progress Note      Gilmer Henson  CSN #:                  [id number]  :                       1935  ADMIT DATE:       2023 7:50 PM  [address] [place] DATE:  RESPONDING  PROVIDER #:        Musa Davidson MD          QUERY TEXT:    Patient admitted with respiratory failure/CHF exacerbation, noted to have   permanent atrial fibrillation and is maintained on eliquis. If possible,   please document in progress notes and discharge summary if you are evaluating   and/or treating any of the following: The medical record reflects the following:  Risk Factors: 81 y.o. female with past medical history of IDDM, CKD stage 3,   HTN, HLD, permanent AFIB on Eliquis as outpatient, HFpEF, CAD s/p PCI   remotely, vulvodynia, morbid obesity, KIANA (intolerant of CPAP in the past) and   aortic stenosis  Clinical Indicators: Permanent atrial fibrillation  Treatment: Xarelto    Thank you,  Cally Del Rosario RN, BSN, CDI  [email or web address]  . Options provided:  -- Secondary hypercoagulable state in a patient with atrial fibrillation  -- Other - I will add my own diagnosis  -- Disagree - Not applicable / Not valid  -- Disagree - Clinically unable to determine / Unknown  -- Refer to Clinical Documentation Reviewer    PROVIDER RESPONSE TEXT:    Provider disagreed with this query.     Query created by: Jacobo Arita on 2023 9:54 AM      Electronically signed by:  Musa Davidson MD 2023 10:43 AM

## 2023-01-04 NOTE — PROGRESS NOTES
Patient discharge in NAD, [facility] to transport patient to home with daughter. Discharge instructions reviewed with patient and daughter, understanding verbalized. All lines and wires removed per LDA. VSS upon transport and [facility] assumed care.

## 2023-01-04 NOTE — DISCHARGE SUMMARY
[facility] Discharge Summary     Patient ID:  James Esteves  [id number]  89 y.o.  1935    Admit date: 1/2/2023    Discharge date and time:  01/04/23    Admitting Physician: Phoenix Gonzalez MD      Discharge Physician: SHARI Youssef CNP/Dr. Tijerina    Admission Diagnoses: Acute respiratory failure [facility]) [J96.00]    Discharge Diagnoses: Active Problems:    Chronic respiratory failure (HCC)    Sedentary lifestyle    Pulmonary hypertension (HCC)    Hyponatremia    Obstructive sleep apnea (adult) (pediatric)    BMI 40.0-44.9, adult (HCC)    Lymphedema    Morbid obesity (HCC)    Chronic diastolic heart failure (HCC)    Dyslipidemia    Diabetes mellitus, type 2 (HCC)    Essential hypertension    Anemia, unspecified    Nonrheumatic aortic valve stenosis    Longstanding persistent atrial fibrillation (HCC)    Non-pressure chronic ulcer of left calf, limited to breakdown of skin ([address]  Resolved Problems:    Volume overload      Cardiology Procedures this admission:  None    Consults: Pulmonary    Hospital Course:     Mr Leeanne Rodriguez was transferred from an outside facility where she was hospitalized start on Dc 17 with acute respiratory failure and acute HFpEF. The patient had multiple medical p[problems including morbid obesity, CAD with remote PCI, moderate AS, and severe pulmonary HTN. The patient was treated at the other facility with IV diureses and ultimately developed hyponatremia. During that time she required intermittent BIPAP use. Palliative care and Hospice was presented to the patient and family but currently the patient refuses. Echos performed at the outside hospital showed normal EF, severe AS, and elevated RVSP with mas of 85 and repeat RVSP of 66. The patient was admitted for CVICU for further evaluation and treatment. She was started on IV Bumix IVP and given 1 unit of blood due to anemia with Hgb of 7.8 in the setting of CAD, HFpEF, and respiratory failure. Ultimately with advanced age and co morbidities the patient is not a candidate for valvular replacement at this time. [facility] saw the patient due to severe PH and the consideration of NIV for home use. The patient is now willing to complete NIV at home and will go home with home trilogy and interim HH. Multiple talks were had with the patient about her over all poor prognosis and consideration of [facility]. Currently the patient is not interested in hospice or palliative care. The day of discharge Pt's labs were WNL for this patient. . Pt was seen and examined by Dr. Duarte Rivera and determined stable and ready for discharge. Pt was instructed to follow discharge instructions given by nursing staff. Due to anemia the patient will be discharged out on ASA only. A message was sent to ut office and will call the patient for follow up. The patient will have labs completed in two weeks with follow up as well. DISPOSITION: The patient is being discharged home in stable condition on a low saturated fat, low cholesterol and low salt diet. Pt is instructed to advance activities as tolerated limited to fatigue or shortness of breath. Discharge Exam: BP (!) 149/76   Pulse 71   Temp 98.1 °F (36.7 °C) (Temporal)   Resp 23   Ht 5' 5\" (1.651 m)   Wt 262 lb 12.6 oz (119.2 kg)   SpO2 95%   BMI 43.73 kg/m²  Pt has been seen by Lilliana: see his progress note for exam details. Recent Results (from the past 24 hour(s))   Brain Natriuretic Peptide    Collection Time: 01/03/23 10:57 AM   Result Value Ref Range    NT Pro-BNP 3,646 (H) <450 PG/ML   Albumin    Collection Time: 01/03/23 10:57 AM   Result Value Ref Range    Albumin 2.7 (L) 3.2 - 4.6 g/dL   POCT Glucose    Collection Time: 01/03/23 11:27 AM   Result Value Ref Range    POC Glucose 227 (H) 65 - 100 mg/dL    Performed by: Idalmis.     PREPARE RBC (CROSSMATCH), 1 Units    Collection Time: 01/03/23 12:45 PM Result Value Ref Range    History Check Historical check performed    TYPE AND SCREEN    Collection Time: 01/03/23 12:54 PM   Result Value Ref Range    Crossmatch expiration date 01/06/2023,4000     ABO/Rh A POSITIVE     Antibody Screen NEG     Unit Number [id number]     Product Code Blood Bank RC LR     Unit Divison 00     Dispense Status Blood Bank TRANSFUSED     Crossmatch Result Compatible    POCT Glucose    Collection Time: 01/03/23  5:51 PM   Result Value Ref Range    POC Glucose 249 (H) 65 - 100 mg/dL    Performed by: Idalmis.     POCT Glucose    Collection Time: 01/03/23  8:15 PM   Result Value Ref Range    POC Glucose 268 (H) 65 - 100 mg/dL    Performed by: Gypsy    Basic Metabolic Panel w/ Reflex to MG    Collection Time: 01/04/23  5:33 AM   Result Value Ref Range    Sodium 131 (L) 133 - 143 mmol/L    Potassium 4.7 3.5 - 5.1 mmol/L    Chloride 96 (L) 101 - 110 mmol/L    CO2 32 21 - 32 mmol/L    Anion Gap 3 2 - 11 mmol/L    Glucose 238 (H) 65 - 100 mg/dL    BUN 70 (H) 8 - 23 MG/DL    Creatinine 1.20 (H) 0.6 - 1.0 MG/DL    Est, Glom Filt Rate 44 (L) >60 ml/min/1.73m2    Calcium 9.3 8.3 - 10.4 MG/DL   Magnesium    Collection Time: 01/04/23  5:33 AM   Result Value Ref Range    Magnesium 2.6 (H) 1.8 - 2.4 mg/dL   CBC    Collection Time: 01/04/23  5:33 AM   Result Value Ref Range    WBC 3.4 (L) 4.3 - 11.1 K/uL    RBC 2.87 (L) 4.05 - 5.2 M/uL    Hemoglobin 8.6 (L) 11.7 - 15.4 g/dL    Hematocrit 28.1 (L) 35.8 - 46.3 %    MCV 97.9 82 - 102 FL    MCH 30.0 26.1 - 32.9 PG    MCHC 30.6 (L) 31.4 - 35.0 g/dL    RDW 18.8 (H) 11.9 - 14.6 %    Platelets 822 292 - 961 K/uL    MPV 9.8 9.4 - 12.3 FL    nRBC 0.00 0.0 - 0.2 K/uL   POCT Glucose    Collection Time: 01/04/23  7:56 AM   Result Value Ref Range    POC Glucose 245 (H) 65 - 100 mg/dL    Performed by: Arlene Contreras          Patient Instructions:     Current Discharge Medication List        START taking these medications    Details metoprolol succinate (TOPROL XL) 25 MG extended release tablet Take 1 tablet by mouth in the morning and at bedtime  Qty: 60 tablet, Refills: 3      guaiFENesin (MUCINEX) 600 MG extended release tablet Take 2 tablets by mouth 2 times daily for 7 days  Qty: 28 tablet, Refills: 0      predniSONE (DELTASONE) 20 MG tablet Take 1 tablet by mouth daily for 10 days  Qty: 10 tablet, Refills: 0           CONTINUE these medications which have CHANGED    Details   metOLazone (ZAROXOLYN) 10 MG tablet Take 1 tablet by mouth as needed (For weight gain of 2 lbs in one day or 10 lbs in one week with increase lower limb edema)  Qty: 30 tablet, Refills: 3           CONTINUE these medications which have NOT CHANGED    Details   aspirin 81 MG chewable tablet Take 81 mg by mouth daily      acetaminophen (TYLENOL) 500 MG tablet Take by mouth every 6 hours as needed      albuterol sulfate  (90 Base) MCG/ACT inhaler Inhale 2 puffs into the lungs every 4 hours as needed      atorvastatin (LIPITOR) 80 MG tablet Take 80 mg by mouth daily      bumetanide (BUMEX) 2 MG tablet Take 2 mg by mouth daily      Dulaglutide 0.75 MG/0.5ML SOPN Inject 0.75 mg into the skin every 7 days      DULoxetine (CYMBALTA) 60 MG extended release capsule Take 60 mg by mouth every other day      ergocalciferol (ERGOCALCIFEROL) 1.25 MG (32650 UT) capsule Take 50,000 Units by mouth every 7 days      gabapentin (NEURONTIN) 100 MG capsule Take 200 mg by mouth 3 times daily.       Insulin Glargine, 1 Unit Dial, 300 UNIT/ML SOPN Inject 220 Units into the skin daily      insulin glulisine (APIDRA) 100 UNIT/ML injection Inject 110 Units into the skin daily (with breakfast) 110 units with breakfast and lunch, 120 units with dinner      nitroGLYCERIN (NITROLINGUAL) 0.4 MG/SPRAY 0.4 mg spray Place 1 spray under the tongue      nitroGLYCERIN (NITROSTAT) 0.4 MG SL tablet Place 0.4 mg under the tongue           STOP taking these medications       apixaban (ELIQUIS) 5 MG TABS tablet Comments:   Reason for Stopping:         metoprolol tartrate (LOPRESSOR) 50 MG tablet Comments:   Reason for Stopping:                   Signed:  SAHRI Sousa CNP  1/4/2023  9:53 AM

## 2023-01-04 NOTE — PROGRESS NOTES
Pulmonary Daily Progress NOTE           1/4/2023    Amna Drafts                        Date of Admission:  1/2/2023    Hospital course  Patient is a 81 y.o.  female seen and evaluated at the request of Dr. Darren Lopez for pulmonary HTN with chronic respiratory failure. She was transferred to [facility] from [facility] after being admitted there since December 17. She is morbidly obese  (BMI43) with DM, CAD with remote PCI, moderate aortic stenosis, and severe PHTN. She was admitted there with acute respiratory failure and acute diastolic heart failure. She was diuresed and developed hyponatremia. CXR showed patchy infiltrates/edema. CPR was 3.6, ESR 57 and WBC 7,400, D dimer 1.04. Na+ 128 today. There have been ongoing conversations regarding hospice yet she and her family have continued to decline. She is on and off BiPAP here for comfort. Her oxygen saturations are adequate in the 90's. Trilogy was being arranged at [facility] but has not yet gotten the machine. BMP here does not indicate chronic hypercapnea at 32. She is chronically debilitated and does not walk. She has Bilateral lymphedema and is grossly volume overloaded with hyponatremia. She is on NC with sufficient oxygen saturations. We were asked to see her for PHTN with chronic respiratory failure, untreated OHS, diastolic heart failure and morbid obesity. The patient's chart is reviewed and the patient is discussed with the staff. Subjective:     Patient today is going home. Transport is here. Per notes will get  home trilogy setup and also still using oxygen and diureses. Per DR. Plummer's notes high righ to return and I agree. Will get outpatient f/u for pulmonary hypertension.      Review of Systems: Comprehensive ROS negative except in HPI    Current Outpatient Medications   Medication Instructions    acetaminophen (TYLENOL) 500 MG tablet Oral, EVERY 6 HOURS PRN    albuterol sulfate  (90 Base) MCG/ACT inhaler 2 puffs, Inhalation, EVERY 4 HOURS PRN    aspirin 81 mg, Oral, DAILY    atorvastatin (LIPITOR) 80 mg, Oral, DAILY    bumetanide (BUMEX) 2 mg, Oral, DAILY    Dulaglutide 0.75 mg, SubCUTAneous, EVERY 7 DAYS    DULoxetine (CYMBALTA) 60 mg, Oral, EVERY OTHER DAY    ergocalciferol (ERGOCALCIFEROL) 50,000 Units, Oral, EVERY 7 DAYS    gabapentin (NEURONTIN) 200 mg, Oral, 3 TIMES DAILY    guaiFENesin (MUCINEX) 1,200 mg, Oral, 2 TIMES DAILY    insulin glargine (1 unit dial) (TOUJEO) 220 Units, SubCUTAneous, DAILY    insulin glulisine (APIDRA) 110 Units, SubCUTAneous, DAILY WITH BREAKFAST, 110 units with breakfast and lunch, 120 units with dinner    metOLazone (ZAROXOLYN) 10 mg, Oral, PRN    metoprolol succinate (TOPROL XL) 25 mg, Oral, 2 times daily    nitroGLYCERIN (NITROLINGUAL) 0.4 MG/SPRAY 0.4 mg spray 1 spray, SubLINGual    nitroGLYCERIN (NITROSTAT) 0.4 mg, SubLINGual    [START ON 1/5/2023] predniSONE (DELTASONE) 20 mg, Oral, DAILY      Past Medical History:   Diagnosis Date    Acute hyponatremia 1/21/2011    Acute on chronic diastolic congestive heart failure ([facility] 1/19/2011    Acute renal failure ([facility] 1/21/2011    AMI (acute myocardial infarction) ([facility] 2009    Anemia 9/5/2012    Anemia, unspecified 1/23/2011    Arthritis     Asthma     Asthma exacerbation 11/11/2011    CAD (coronary artery disease)     MI 2009, stents heart 2009    CAD (coronary artery disease), native coronary artery 9/2/2009    Previous stent to dRCA with Cypher 3.5x33mm KUNAL 8/09 9/09 LAD- Xience 2.5x23mm and 3.0x18mm KUNAL in mid-distal LAD      Cellulitis 1/20/2011    Chest discomfort 1/28/2016    Chronic diastolic heart failure ([address] 10/20/2011    Chronic venous insufficiency 10/24/2011    Diabetes ([facility]     checks QD, normal 200, no hyposymptoms     Diverticulitis 1/28/2016    Dyslipidemia 9/2/2009    Edema 1/28/2016    GERD (gastroesophageal reflux disease)     Heart failure (HCC)     Hypertension     Hypokalemia 1/28/2016    Hypoxemia 9/7/2012    Morbid obesity ([facility]     Nausea & vomiting     Neuropathy in diabetes ([facility] 9/2/2009    NSTEMI (non-ST elevation myocardial infarction) ([facility] 9/2/2009    Dr Jani Overton    Obstructive sleep apnea (adult) (pediatric) 11/11/2011    KIANA (obstructive sleep apnea) 9/7/2012    Intolerant of CPAP     Other dyspnea and respiratory abnormality 11/10/2011    Other ill-defined conditions(799.89)     BILATERAL    Other ill-defined conditions(799.89)     benign tumor in lower abdomen- not removed, diabetic ulcers, edema, neuropathy    Peripheral vascular disease ([facility] 10/20/2011    Post PTCA 6/20/2014    PVD (peripheral vascular disease) ([facility]     Unspecified sleep apnea     oxygen at night    Venous stasis of lower extremity 2010    BILATERAL W RECURRENT ADMISSIONS    Volume overload 9/6/2012     Past Surgical History:   Procedure Laterality Date    BREAST SURGERY  1973    benign tumor left breast    CHOLECYSTECTOMY      COLONOSCOPY      GI      [place] CARDIAC SURG PROCEDURE UNLIST      4 stents most recent 2 yrs ago    [facility] ADDL VESSEL      stentsx3     Social History     Socioeconomic History    Marital status:      Spouse name: Not on file    Number of children: Not on file    Years of education: Not on file    Highest education level: Not on file   Occupational History    Not on file   Tobacco Use    Smoking status: Never    Smokeless tobacco: Never   Substance and Sexual Activity    Alcohol use: No    Drug use: No    Sexual activity: Not on file   Other Topics Concern    Not on file   Social History Narrative    9/29/11:  PATIENT IS , HAS GROWN CHILDREN. IS A HOMEMAKER.      Social Determinants of Health     Financial Resource Strain: Not on file   Food Insecurity: Not on file   Transportation Needs: Not on file   Physical Activity: Not on file   Stress: Not on file   Social Connections: Not on file   Intimate Partner Violence: Not on file   Housing Stability: Not on file     Family History Problem Relation Age of Onset    Lung Disease Father     Cancer Father     Hypertension Paternal Grandmother     Diabetes Paternal Aunt     Hypertension Maternal Grandmother     Cancer Brother      Allergies   Allergen Reactions    Penicillins Hives, Other (See Comments) and Swelling     Can take cephalosporins    Codeine Nausea Only    Hydrocodone-Acetaminophen Nausea Only    Hydromorphone Other (See Comments)    Sulfamethoxazole-Trimethoprim Other (See Comments)     \"potassium acted up\"  Potassium gets high     Objective:   Blood pressure (!) 151/66, pulse 72, temperature 98.1 °F (36.7 °C), temperature source Temporal, resp. rate 30, height 5' 5\" (1.651 m), weight 262 lb 12.6 oz (119.2 kg), SpO2 98 %. Intake/Output Summary (Last 24 hours) at 1/4/2023 1103  Last data filed at 1/4/2023 0739  Gross per 24 hour   Intake 470 ml   Output 800 ml   Net -330 ml       PHYSICAL EXAM   Constitutional:  the patient is morbidly obese and in no acute distress  EENMT:  Sclera clear, pupils equal, oral mucosa moist  Respiratory: diminished B bases on BiPAP ST at this time. Cardiovascular:  RRR with ERNIE 3/6 in Aortic location. There is +1 lower extremity edema. wrapped  Gastrointestinal: soft and non-tender; with positive bowel sounds. protuberant  Musculoskeletal: warm without cyanosis. Normal muscle tone. Skin:  no jaundice or rashes, B LE wounds   Neurologic: symmetric strength, fluent speech  Psychiatric:  calm, appropriate, oriented x 3    Imaging: I performed an independent interpretation of the patient's images.   CXR:           Recent Labs     01/02/23  2242 01/03/23  0610 01/03/23  0615 01/03/23  1057 01/04/23  0533   WBC 7.4  --  7.4  --  3.4*   HGB 7.9*  --  7.8*  --  8.6*   HCT 26.4*  --  26.4*  --  28.1*     --  179  --  182     --  128*  --  131*   K 4.0  --  4.0  --  4.7   CL 94*  --  94*  --  96*   CO2 34*  --  32  --  32   BUN 74*  --  74*  --  70*   CREATININE 1.40*  --  1.30*  --  1.20*   MG 2.4  --  2.4  --  2.6*   NTPROBNP  --   --   --  3,646*  --    CRP  --  3.6*  --   --   --          Lab Results   Component Value Date/Time     01/04/2023 05:33 AM    K 4.7 01/04/2023 05:33 AM    CL 96 01/04/2023 05:33 AM    CO2 32 01/04/2023 05:33 AM    BUN 70 01/04/2023 05:33 AM    CREATININE 1.20 01/04/2023 05:33 AM    GLUCOSE 238 01/04/2023 05:33 AM    CALCIUM 9.3 01/04/2023 05:33 AM        No results found for: BNP    ECHO: 02/02/22    TRANSTHORACIC ECHOCARDIOGRAM (TTE) COMPLETE (CONTRAST/BUBBLE/3D PRN) 02/03/2022  7:40 AM, 02/03/2022 12:00 AM (Final)    Narrative  This is a summary report. The complete report is available in the patient's medical record. If you cannot access the medical record, please contact the sending organization for a detailed fax or copy. Technical qualifiers: Echo study was technically difficult, technically difficult with poor endocardial visualization, technically difficult due to patient's body habitus, a technically difficult Doppler study and limited due to patient tolerance. Left Ventricle: Left ventricle size is normal. Mildly increased wall thickness. Normal left ventricular systolic function with a visually estimated EF of 60 - 65%. Aortic Valve: Mildly calcified cusps. Mild transvalvular regurgitation. Moderate stenosis. AV mean gradient is 22 mmHg. AV peak gradient is 44 mmHg. Mitral Valve: Mildly thickened leaflets. Mild mitral annular calcification. Tricuspid Valve: Mild transvalvular regurgitation. RVSP is 50 mmHg. Signed by: Susan Dexter MD on 2/3/2022  7:40 AM, Signed by: Unknown Provider Result on 2/3/2022 12:00 AM    MICRO: No results for input(s): CULTURE in the last 72 hours.   Assessment and Plan:  (Medical Decision Making)   Active Problems:    Chronic respiratory failure (HCC)  --On 3 lpm, oxygen saturations adequate      Obstructive sleep apnea (adult) (pediatric)    BMI 40.0-44.9, adult ([facility]  --reportly has arranged home Triology and advised to f/u with DME      Lymphedema    Chronic diastolic heart failure (HCC)    PHTN    Nonrheumatic aortic valve stenosis    Longstanding persistent atrial fibrillation (HCC)    Non-pressure chronic ulcer of left calf, limited to breakdown of skin (HCC)  Wound care following    PHTN with chronic respiratory failure on 3 lpm, likely from untreated OHS, chronic debility with sedentary lifestyle, diastolic heart failure with lymphedema, hyponatremia and super morbid obesity. Options are limited with inability to walk, diuresis intolerance with hyponatremia, and morbid obesity. Arranged trilogy for home (ordered 12/6, will ask med emporium to assist here). Options will be limited with inability to adequately tolerate diuresis with hyponatremia. She is on bumex and take off steroids    High risk for re-admission and both the patient and her daughter are aware. Dr. Lalitha Piña will f/u as outpatient for her pulmonary hypertension but will be a challenge since limited treatment options. See Dr. Lalitha Piña or NP for pulmonary hypertension. Getting started with NIPPV and may need new echo in 3 months. Continue diurses and oxygen. Full Code     More than 50% of the time documented was spent in face-to-face contact with the patient and in the care of the patient on the floor/unit where the patient is located.          Time spent is 35 minutes      Inocencia Calhoun MD

## 2023-01-04 NOTE — CARE COORDINATION
Pt with discharge orders this day. Pt to return home with spouse and support of her family. Transport arranged via [employer] for  @ 11am.  This CM received confirmation from Ana Daniels with [facility] that he received referral.  Appointment has been scheduled for pt to be seen at home on 1/9/2023 - this CM added appointment to pt's AVS.  This CM sent referral to [facility] 1/3/23 - this CM called [facility] this day and spoke with Lowell Sanchez who confirms receipt of referral and that their clinicians have been notified. This CM sent updated clinical information this day; informed Lowell Sanchez of pt's discharge home this day. This CM called and spoke with Wilson Jasmine, pt's daughter, this morning to confirm above information. She confirms she has been in contact with Agueda Marino from [facility] regarding trilogy and that they will have someone meet them at pt's home to set up equipment. No additional CM needs at discharge. 01/03/23 4157   Service Assessment   Patient Orientation Alert and Oriented   Cognition Alert   History Provided By Patient; Child/Family   Primary Caregiver Family   Support Systems Spouse/Significant Other;Children   PCP Verified by CM Yes   Last Visit to PCP Within last 6 months   Prior Functional Level Other (see comment)  (assistance as needed)   Current Functional Level Other (see comment)  (TBD by clinical team)   Can patient return to prior living arrangement Yes   Ability to make needs known: Good   Family able to assist with home care needs: Yes   Social/Functional History   Lives With Spouse   Type of Home House   Discharge Planning   Type of [facility]   Patient expects to be discharged to: [address] Discharge   Mode of Transport at Discharge BLS   Confirm Follow Up Transport Other (see comment)  [facility])   Condition of Participation: Discharge Planning   The Plan for Transition of Care is related to the following treatment goals: Home with family support   The Patient and/or Patient Representative was provided with a Choice of Provider? Patient   The Patient and/Or Patient Representative agree with the Discharge Plan? Yes   Freedom of Choice list was provided with basic dialogue that supports the patient's individualized plan of care/goals, treatment preferences, and shares the quality data associated with the providers?   Yes

## 2023-01-04 NOTE — PROGRESS NOTES
Patient on V60 and connected to Monitoring including Continuous Pulse Oximetry. V60 plugged into central monitoring system. Alarms are activated and nurse has been notified. Documentation completed.

## 2023-01-05 ENCOUNTER — TELEPHONE (OUTPATIENT)
Dept: CARDIOLOGY CLINIC | Age: 88
End: 2023-01-05

## 2023-01-05 NOTE — TELEPHONE ENCOUNTER
Transitions of Care call    Admit: 1/2/23  DC :1/4/23  DX: Acute Respiratory Failure, Pulmonary HTN, [place]rosnic CHF, CAD, Anemia  TC14 appt with Dr. Liang Christensen 1/12/23 @ 1:30. I spoke to pt's daughter. She said pt is feeling better & eating well. Discharge instructions & DC medications reviewed with her daughter. She said pt has started all new medications & stopped the Eliquis & Lopressor. Tc14 appt confirmed with pt's daughter. She will let us know if pt has any problems or concerns prior to her f/u on 1/12/23.

## 2023-01-25 DIAGNOSIS — I27.20 PULMONARY HYPERTENSION (HCC): Primary | Chronic | ICD-10-CM

## 2023-01-26 NOTE — PROGRESS NOTES
Ms. Babs Obregon is a 82yo F with PMH of morbid obese (BMI43) with DM, CAD with remote PCI, moderate aortic stenosis, and severe PHTN. She is being referred as a hospital f/up for pulmonary hypertension. She was transferred to [facility] from [facility] after being admitted there since December 17. She was admitted there with acute respiratory failure and acute diastolic heart failure. She was diuresed and developed hyponatremia. CXR showed patchy infiltrates/edema. CPR was 3.6, ESR 57 and WBC 7,400, D dimer 1.04. Na+ 128 today. There have been ongoing conversations regarding hospice yet she and her family have continued to decline. She is on and off BiPAP here for comfort. Her oxygen saturations are adequate in the 90's. Trilogy was being arranged at [facility] but has not yet gotten the machine. BMP here does not indicate chronic hypercapnea at 32. She is chronically debilitated and does not walk. She has Bilateral lymphedema and is grossly volume overloaded with hyponatremia. She is on NC with sufficient oxygen saturations. [facility] saw the patient due to severe PH and the consideration of NIV for home use. The patient is now willing to complete NIV at home and will go home with home trilogy and interim HH. Multiple talks were had with the patient about her over all poor prognosis and consideration of [facility]. Currently the patient is not interested in hospice or palliative care. Diagnostic Review:    6MWT N/A   distance *   Start;end O2 sat *   Max MISHA dyspnea *   Change of 30m? *       Right Heart Cath    RA    RV    PA    PCWP    CO    PVR    VASOREACTIVE? TTE 04/26/2011 11/14/2011 9/6/2012 08/02/2017 05/03/2019 2/3/22 12/17/22 ([facility]) 12/28/22 ([facility]) 12/31/22 ([facility])   LV EF: 38%  Mild diastolic dysfunction. EF: >65%  There was mild concentric hypertrophy.  Features were consistent with a pseudonormal left ventricular filling pattern, with concomitant

## 2023-02-01 ENCOUNTER — TELEPHONE (OUTPATIENT)
Dept: CARDIOLOGY CLINIC | Age: 88
End: 2023-02-01

## 2023-02-01 NOTE — TELEPHONE ENCOUNTER
Spoke to pt. States she gets panic attacks and feels like she can't breathe. Asking for something to treat anxiety. Explained we do not treat anxiety, recommend calling PCP. Verb understanding.

## 2023-02-01 NOTE — TELEPHONE ENCOUNTER
Pt's called son on behalf of the pt to report that she had an anxiety attack and would like to be put on a med to help. It comes and goes. BP, HR, and vitals are good. Thinks the anxiety is causing SOB. Would like a quick acting anxiety aid. Would like a short term and long term anxiety med, Cymbalta.

## 2023-07-05 NOTE — WOUND CARE
[address]  [address], [address]  Phone: [phone]  Fax: [phone]    Patient: Minerva Ahmadi MRN: [id number]  SSN: [id number]    YOB: 1935  Age: 81 y.o. Sex: female       Return Appointment: 2 weeks with Katja Baugh MD    Instructions: Right lower leg:  Clean wound with saline. Acticoat Flex 3: cut top approximate size of wound, apply to wound bed. Cover with ABD and wrap bilateral lower legs with unna boots. Home health to change dressing twice weekly. Do not get dressing or wound wet.  May shower if wound can be effectively kept dry.  Cast covers may be purchased at [facility] and [facility]. Should you experience increased redness, swelling, pain, foul odor, size of wound(s), or have a temperature over 101 degrees please contact the [address] [place] [address] Road at [phone] or if after hours contact your primary care physician or go to the hospital emergency department.     Signed By: Zora Shah PT, [facility]     November 8, 2019 In an effort to ensure that our patients [employer], a Team Member has reviewed your chart and identified an opportunity to provide the best care possible. An attempt was made to discuss or schedule overdue Preventive or Disease Management screening.     The Outcome was Contact was not made, letter/portal message sent If you have any questions or need help with scheduling, contact your primary care provider.. Care Gaps include Breast Cancer Screening, Immunizations and Wellness Visits.

## 2023-10-09 ENCOUNTER — TELEPHONE (OUTPATIENT)
Age: 88
End: 2023-10-09

## 2024-08-08 ENCOUNTER — TELEPHONE (OUTPATIENT)
Age: 89
End: 2024-08-08

## 2024-08-08 NOTE — TELEPHONE ENCOUNTER
Pt called in and stated she was in the hospital and they gave her eliquis but she is asking to switch back to plavix medication. She wanted to see if  will re-prescribe Plavix that she was originally taking. Please advise.

## 2024-08-08 NOTE — TELEPHONE ENCOUNTER
Patient called and informed that since we have not seen her since 2022, she will need to continue with her medications that was given to her at discharge from [facility]. She can discuss changing medications at her appointment 9/5/24. Patient voiced understanding and thanked me//darell

## (undated) DEVICE — DRAPE SHT 3 QTR PROXIMA 53X77 --

## (undated) DEVICE — MICRODISSECTION NEEDLE STRAIGHT SLEEVE: Brand: COLORADO

## (undated) DEVICE — SUT ETHLN 4-0 18IN PS2 BLK --

## (undated) DEVICE — SHEET, DRAPE, SPLIT, STERILE: Brand: MEDLINE

## (undated) DEVICE — BANDAGE COMPR SELF ADH 5 YDX4 IN TAN STRL PREMIERPRO LF

## (undated) DEVICE — 3M™ TEGADERM™ TRANSPARENT FILM DRESSING FRAME STYLE, 1626W, 4 IN X 4-3/4 IN (10 CM X 12 CM), 50/CT 4CT/CASE: Brand: 3M™ TEGADERM™

## (undated) DEVICE — SYR 50ML LR LCK 1ML GRAD NSAF --

## (undated) DEVICE — DRAPE,TOP,102X53,STERILE: Brand: MEDLINE

## (undated) DEVICE — Device

## (undated) DEVICE — AMD ANTIMICROBIAL GAUZE SPONGES,12 PLY USP TYPE VII, 0.2% POLYHEXAMETHYLENE BIGUANIDE HCI (PHMB): Brand: CURITY

## (undated) DEVICE — 2000CC GUARDIAN II: Brand: GUARDIAN

## (undated) DEVICE — SUTURE ABSRB X-1 REV CUT 1/2 CIR 22MM UD BRAID 27IN SZ 3-0 J458H

## (undated) DEVICE — DRAPE TWL SURG 16X26IN BLU ORB04] ALLCARE INC]

## (undated) DEVICE — SYR 10ML LUER LOK 1/5ML GRAD --

## (undated) DEVICE — SURGICAL PROCEDURE PACK BASIC ST FRANCIS

## (undated) DEVICE — BUTTON SWITCH PENCIL BLADE ELECTRODE, HOLSTER: Brand: EDGE

## (undated) DEVICE — NEEDLE HYPO 25GA L1.5IN BLU POLYPR HUB S STL REG BVL STR

## (undated) DEVICE — NEEDLE SPNL 22GA L3.5IN BLK HUB S STL REG WALL FIT STYL W/

## (undated) DEVICE — SOLUTION IV 1000ML 0.9% SOD CHL

## (undated) DEVICE — SUTURE PERMAHAND SZ 2-0 L18IN NONABSORBABLE BLK L26MM PS 1588H

## (undated) DEVICE — REM POLYHESIVE ADULT PATIENT RETURN ELECTRODE: Brand: VALLEYLAB

## (undated) DEVICE — BANDAGE,GAUZE,BULKEE II,4.5"X4.1YD,STRL: Brand: MEDLINE

## (undated) DEVICE — BLADE SURG NO15 S STL STR DISP GLASSVAN

## (undated) DEVICE — DRESSING,GAUZE,XEROFORM,CURAD,5"X9",ST: Brand: CURAD